# Patient Record
Sex: FEMALE | Race: WHITE | NOT HISPANIC OR LATINO | Employment: OTHER | URBAN - METROPOLITAN AREA
[De-identification: names, ages, dates, MRNs, and addresses within clinical notes are randomized per-mention and may not be internally consistent; named-entity substitution may affect disease eponyms.]

---

## 2017-01-04 ENCOUNTER — ALLSCRIPTS OFFICE VISIT (OUTPATIENT)
Dept: OTHER | Facility: OTHER | Age: 72
End: 2017-01-04

## 2017-07-12 ENCOUNTER — ALLSCRIPTS OFFICE VISIT (OUTPATIENT)
Dept: OTHER | Facility: OTHER | Age: 72
End: 2017-07-12

## 2018-01-12 VITALS
HEART RATE: 51 BPM | BODY MASS INDEX: 28.07 KG/M2 | WEIGHT: 158.44 LBS | SYSTOLIC BLOOD PRESSURE: 126 MMHG | DIASTOLIC BLOOD PRESSURE: 66 MMHG | HEIGHT: 63 IN

## 2018-01-13 VITALS
BODY MASS INDEX: 28.07 KG/M2 | SYSTOLIC BLOOD PRESSURE: 118 MMHG | HEART RATE: 50 BPM | HEIGHT: 63 IN | DIASTOLIC BLOOD PRESSURE: 68 MMHG | WEIGHT: 158.44 LBS

## 2018-07-10 PROBLEM — I10 ESSENTIAL HYPERTENSION: Status: ACTIVE | Noted: 2018-07-10

## 2018-07-10 PROBLEM — I49.1 PAC (PREMATURE ATRIAL CONTRACTION): Status: ACTIVE | Noted: 2018-07-10

## 2018-07-10 PROBLEM — I48.0 PAROXYSMAL ATRIAL FIBRILLATION (HCC): Status: ACTIVE | Noted: 2018-07-10

## 2018-07-10 PROBLEM — E78.5 DYSLIPIDEMIA: Status: ACTIVE | Noted: 2018-07-10

## 2018-07-10 NOTE — PROGRESS NOTES
Cardiology Outpatient Follow up Note    Anuj Rosales 68 y o  female MRN: [id number]    07/11/18          Assessment/Plan:    1  Paroxysmal atrial fibrillation  She is on Verapamil and Metoprolol succinate for symptoms, and feels relatively well controlled currently  She was previously on Xarelto 20 mg but this was discontinued due to minimal amount of afib seen lasting < 30 seconds  Repeat 7 day event monitor 1/17 showed no afib  Her symptoms seemed to correlate with PACs more than afib on previous monitor  2  Dyslipidemia  Lipid panel 3/14 showed total cholesterol elevated at 307, , HDL 68, and   She is taking krill oil, but I doubt this is going to lower her LDL enough  She states she has been tried on multiple statins, but had difficulty tolerating them due to her fibromyalgia  She tried diet and exercise and krill oil for 3 months  Lipid panel 9/15 showed total cholesterol 265, , HDL 63,   She reports she cannot take statins, and also tried Welchol with myalgias  Lipid panel 6/17 showed total cholesterol 264, HDL 63, ,       3  HTN  She is on Verapamil 120 and Metoprolol 50 bid, BP is controlled  1  Paroxysmal atrial fibrillation (HCC)  POCT ECG   2  Essential hypertension  POCT ECG   3  PAC (premature atrial contraction)  POCT ECG   4  Dyslipidemia  POCT ECG       HPI: 68 y o  woman with a history of HTN, HL, palpitations and paroxysmal atrial fibrillation, who is here for follow up of atrial fibrillation  Holter monitor in 2/14 showed minimum HR 40 bpm, maximum 169 bpm, average 63 bpm  320 PVCs, multiple short episodes of SVT, longest lasting about 14 seconds, which appeared to be atrial fibrillation at a rate of 169 bpm  Longest R-R interval was 1 9 seconds       Echo 5/9/14 showed normal LV systolic and diastolic function, EF 43%, left atrium mildly dilated, mildly thickened mitral valve with mild mitral regurgitation, normal PASP, with normal RV size and function  Stress echo on 5/7/14, she was able to exercise for 9 minutes of the Francis protocol, achieving 89% of MPHR, echo and EKG showed no inducible ischemia  I had started her on Metoprolol for rate control, and we had been titrating up the Metoprolol because she continued to have palpitations  However, she had issues with lightheadedness with Metoprolol at higher doses, and did not tolerate Flecainide  She saw Dr Ariel Boyer after 7 day event monitor in 10/14 which did not show any recurrence of afib, but only PACs  He gave her Propafenone to try, and she tried it for about a week but felt palpitations were worse, so stopped it  She is off Xarelto due to the very minimal afib that she had lasting less than 30 seconds as per Dr Ariel Boyer  7 day event monitor 1/17 showed minimum HR 47 BPM, maximum HR 94 BPM, average HR 60 BPM  No definitive afib seen, some PACs, PVCs, atrial couplet, short run of PSVT  Currently she is taking Metoprolol and Verapamil, and she feels that palpitations are occurring less frequently, not every day, lasts only for a few seconds  She reports that palpitations occur sometimes for a week, then go away and come back, which is unchanged from previously  She is exercising in the form of walking, she denies any problems with chest pain or shortness of breath with exertion  Mild SCHMIDT when climbing stairs  No orthopnea, uses 1 pillow to sleep, no PND  No LE edema  No dizziness or lightheadedness       Patient Active Problem List   Diagnosis    Paroxysmal atrial fibrillation (HCC)    Essential hypertension    PAC (premature atrial contraction)    Dyslipidemia       Allergies   Allergen Reactions    Iodine Hives     Severe allergy to IV CT scan dye    Iodinated Diagnostic Agents Hives         Current Outpatient Prescriptions:     ergocalciferol (VITAMIN D2) 50,000 units, Take 1 capsule by mouth every 14 (fourteen) days, Disp: , Rfl:     levothyroxine 75 mcg tablet, Take 1 tablet by mouth daily, Disp: , Rfl:     metoprolol succinate (TOPROL-XL) 50 mg 24 hr tablet, Take 1 tablet by mouth 2 (two) times a day, Disp: , Rfl:     omeprazole (PRILOSEC) 20 mg delayed release capsule, Take 1 capsule by mouth daily, Disp: , Rfl:     verapamil (CALAN-SR) 120 mg CR tablet, Take 120 mg by mouth daily, Disp: , Rfl:     Past Medical History:   Diagnosis Date    Arthritis     Cataract     Dyslipidemia (high LDL; low HDL)     Fibromyalgia     Hypertension     Hypothyroidism     Palpitation     Paroxysmal atrial fibrillation ([facility] [address] )        Family History   Problem Relation Age of Onset    Hypertension Mother     Hyperlipidemia Mother     Parkinsonism Mother     Stroke Mother     Atrial fibrillation Father     Stroke Father     Cancer Father         skin cancer    Anuerysm Neg Hx     Clotting disorder Neg Hx        Past Surgical History:   Procedure Laterality Date    CATARACT EXTRACTION, BILATERAL  11/2016    HYSTERECTOMY  05/2016    total hysterectomy    REPLACEMENT TOTAL KNEE      TONSILLECTOMY         Social History     Social History    Marital status: /Civil Union     Spouse name: N/A    Number of children: N/A    Years of education: N/A     Occupational History    Not on file  Social History Main Topics    Smoking status: Never Smoker    Smokeless tobacco: Never Used    Alcohol use No    Drug use: No    Sexual activity: Not on file     Other Topics Concern    Not on file     Social History Narrative    No narrative on file       Review of Systems   Constitution: Negative for chills, decreased appetite, diaphoresis, fever, weakness, malaise/fatigue, night sweats, weight gain and weight loss  HENT: Negative for ear pain, hearing loss, hoarse voice, nosebleeds, sore throat and tinnitus  Eyes: Negative for blurred vision and pain  Cardiovascular: Positive for palpitations   Negative for chest pain, claudication, cyanosis, dyspnea on exertion, irregular heartbeat, leg swelling, near-syncope, orthopnea, paroxysmal nocturnal dyspnea and syncope  Respiratory: Negative for cough, hemoptysis, shortness of breath, sleep disturbances due to breathing, snoring, sputum production and wheezing  Hematologic/Lymphatic: Negative for adenopathy and bleeding problem  Does not bruise/bleed easily  Skin: Negative for color change, dry skin, flushing, itching, poor wound healing and rash  Musculoskeletal: Positive for arthritis  Negative for back pain, falls, joint pain, muscle cramps, muscle weakness, myalgias and neck pain  Gastrointestinal: Negative for abdominal pain, constipation, diarrhea, dysphagia, heartburn, hematemesis, hematochezia, melena, nausea and vomiting  Genitourinary: Negative for dysuria, frequency, hematuria, hesitancy, non-menstrual bleeding and urgency  Neurological: Positive for headaches  Negative for excessive daytime sleepiness, dizziness, focal weakness, light-headedness, loss of balance, numbness, paresthesias, tremors and vertigo  Psychiatric/Behavioral: Negative for altered mental status, depression and memory loss  The patient does not have insomnia and is not nervous/anxious  Allergic/Immunologic: Positive for environmental allergies  Negative for persistent infections  Vitals: /76 (BP Location: Left arm, Patient Position: Sitting, Cuff Size: Adult)   Pulse 60   Ht 5' 3" (1 6 m)   Wt 71 5 kg (157 lb 9 6 oz)   SpO2 97%   BMI 27 92 kg/m²        Physical Exam:     GEN: Alert and oriented x 3, in no acute distress  Well appearing and well nourished  HEENT: Sclera anicteric, conjunctivae pink, mucous membranes moist  Oropharynx clear  NECK: Supple, no carotid bruits, no significant JVD  Trachea midline, no thyromegaly  HEART: Bradycardic, regular rhythm, normal S1 and S2, no murmurs, clicks, gallops or rubs  PMI nondisplaced, no thrills     LUNGS: Clear to auscultation bilaterally; no wheezes, rales, or rhonchi  No increased work of breathing or signs of respiratory distress  ABDOMEN: Soft, nontender, nondistended, normoactive bowel sounds  EXTREMITIES: Skin warm and well perfused, no clubbing, cyanosis, or edema  NEURO: No focal findings  Normal gait  Normal speech  Mood and affect normal    SKIN: Normal without suspicious lesions on exposed skin        Lab Results:       No results found for: HGBA1C  No results found for: CHOL  No results found for: HDL  No results found for: LDLCALC  No results found for: TRIG  No components found for: CHOLHDL

## 2018-07-11 ENCOUNTER — OFFICE VISIT (OUTPATIENT)
Dept: CARDIOLOGY CLINIC | Facility: CLINIC | Age: 73
End: 2018-07-11
Payer: MEDICARE

## 2018-07-11 VITALS
OXYGEN SATURATION: 97 % | SYSTOLIC BLOOD PRESSURE: 140 MMHG | BODY MASS INDEX: 27.93 KG/M2 | HEIGHT: 63 IN | WEIGHT: 157.6 LBS | HEART RATE: 60 BPM | DIASTOLIC BLOOD PRESSURE: 76 MMHG

## 2018-07-11 DIAGNOSIS — E78.5 DYSLIPIDEMIA: ICD-10-CM

## 2018-07-11 DIAGNOSIS — I48.0 PAROXYSMAL ATRIAL FIBRILLATION (HCC): Primary | ICD-10-CM

## 2018-07-11 DIAGNOSIS — I10 ESSENTIAL HYPERTENSION: ICD-10-CM

## 2018-07-11 DIAGNOSIS — I49.1 PAC (PREMATURE ATRIAL CONTRACTION): ICD-10-CM

## 2018-07-11 PROCEDURE — 99214 OFFICE O/P EST MOD 30 MIN: CPT | Performed by: INTERNAL MEDICINE

## 2018-07-11 PROCEDURE — 93000 ELECTROCARDIOGRAM COMPLETE: CPT | Performed by: INTERNAL MEDICINE

## 2018-07-11 RX ORDER — ERGOCALCIFEROL 1.25 MG/1
1 CAPSULE ORAL
COMMUNITY
Start: 2018-05-30 | End: 2021-01-26 | Stop reason: SDUPTHER

## 2018-07-11 RX ORDER — LEVOTHYROXINE SODIUM 0.07 MG/1
1 TABLET ORAL DAILY
COMMUNITY
Start: 2018-05-05 | End: 2020-10-19 | Stop reason: SDUPTHER

## 2018-07-11 RX ORDER — METOPROLOL SUCCINATE 50 MG/1
1 TABLET, EXTENDED RELEASE ORAL 2 TIMES DAILY
COMMUNITY
Start: 2014-04-24 | End: 2018-10-07 | Stop reason: SDUPTHER

## 2018-07-11 RX ORDER — OMEPRAZOLE 20 MG/1
1 CAPSULE, DELAYED RELEASE ORAL DAILY
COMMUNITY

## 2018-10-07 DIAGNOSIS — I48.0 PAROXYSMAL ATRIAL FIBRILLATION (HCC): Primary | ICD-10-CM

## 2018-10-10 RX ORDER — METOPROLOL SUCCINATE 50 MG/1
TABLET, EXTENDED RELEASE ORAL
Qty: 180 TABLET | Refills: 3 | Status: SHIPPED | OUTPATIENT
Start: 2018-10-10 | End: 2019-10-08 | Stop reason: SDUPTHER

## 2019-07-01 NOTE — PROGRESS NOTES
Cardiology Outpatient Follow up Note    Sherin Mccabe 76 y o  female MRN: [id number]    07/02/19          Assessment/Plan:    1  Paroxysmal atrial fibrillation  She is on Verapamil and Metoprolol succinate for symptoms, and feels relatively well controlled currently  She was previously on Xarelto 20 mg but this was discontinued due to minimal amount of afib seen lasting < 30 seconds  Repeat 7 day event monitor 1/17 showed no afib  Her symptoms seemed to correlate with PACs more than afib on previous monitor  Will order repeat 7 day event monitor now to screen for any higher burden of afib which would change our current management  2  Dyslipidemia  Lipid panel 3/14 showed total cholesterol elevated at 307, , HDL 68, and   She is taking krill oil, but I doubt this is going to lower her LDL enough  She states she has been tried on multiple statins, but had difficulty tolerating them due to her fibromyalgia  She tried diet and exercise and krill oil for 3 months  Lipid panel 9/15 showed total cholesterol 265, , HDL 63,   She reports she cannot take statins, and also tried Welchol with myalgias  Lipid panel 6/17 showed total cholesterol 264, HDL 63, ,   Will recheck lipid panel this year  3  HTN  She is on Verapamil 120 and Metoprolol 50 bid, BP is controlled  1  Paroxysmal atrial fibrillation (HCC)  POCT ECG    Cardiac event monitor   2  PAC (premature atrial contraction)     3  Essential hypertension     4  Dyslipidemia  Lipid Panel with Direct LDL reflex       HPI: 76 y  o  woman with a history of HTN, HL, palpitations and paroxysmal atrial fibrillation, who is here for follow up of atrial fibrillation  Holter monitor in 2/14 showed minimum HR 40 bpm, maximum 169 bpm, average 63 bpm  320 PVCs, multiple short episodes of SVT, longest lasting about 14 seconds, which appeared to be atrial fibrillation at a rate of 169 bpm  Longest R-R interval was 1 9 seconds  Echo 5/9/14 showed normal LV systolic and diastolic function, EF 27%, left atrium mildly dilated, mildly thickened mitral valve with mild mitral regurgitation, normal PASP, with normal RV size and function  Stress echo on 5/7/14, she was able to exercise for 9 minutes of the Francis protocol, achieving 89% of MPHR, echo and EKG showed no inducible ischemia  I had started her on Metoprolol for rate control, and we had been titrating up the Metoprolol because she continued to have palpitations  However, she had issues with lightheadedness with Metoprolol at higher doses, and did not tolerate Flecainide  She saw Dr Rosendo Delacruz after 7 day event monitor in 10/14 which did not show any recurrence of afib, but only PACs  He gave her Propafenone to try, and she tried it for about a week but felt palpitations were worse, so stopped it  She is off Xarelto due to the very minimal afib that she had lasting less than 30 seconds as per Dr Rosendo Delacruz  7 day event monitor 1/17 showed minimum HR 47 BPM, maximum HR 94 BPM, average HR 60 BPM  No definitive afib seen, some PACs, PVCs, atrial couplet, short run of PSVT  Currently she is taking Metoprolol and Verapamil, and she feels that palpitations are occurring only once in a blue moon, just lasts for a few seconds  She is exercising in the form of walking, she denies any problems with chest pain or shortness of breath with exertion  No orthopnea, uses 1 pillow to sleep, no PND  No LE edema  No dizziness or lightheadedness  She started taking CBD oil for fibromyalgia, and reports it does seem to help       Patient Active Problem List   Diagnosis    Paroxysmal atrial fibrillation (HCC)    Essential hypertension    PAC (premature atrial contraction)    Dyslipidemia       Allergies   Allergen Reactions    Iodine Hives     Severe allergy to IV CT scan dye    Iodinated Diagnostic Agents Hives         Current Outpatient Medications:     ergocalciferol (VITAMIN D2) 50,000 units, Take 1 capsule by mouth every 14 (fourteen) days, Disp: , Rfl:     levothyroxine 75 mcg tablet, Take 1 tablet by mouth daily, Disp: , Rfl:     metoprolol succinate (TOPROL-XL) 50 mg 24 hr tablet, TAKE 1 TABLET TWICE DAILY, Disp: 180 tablet, Rfl: 3    omeprazole (PRILOSEC) 20 mg delayed release capsule, Take 1 capsule by mouth daily, Disp: , Rfl:     verapamil (CALAN-SR) 120 mg CR tablet, Take 120 mg by mouth daily, Disp: , Rfl:     Past Medical History:   Diagnosis Date    Arthritis     Cataract     Dyslipidemia (high LDL; low HDL)     Fibromyalgia     Hypertension     Hypothyroidism     Palpitation     Paroxysmal atrial fibrillation ([facility] [address] )        Family History   Problem Relation Age of Onset    Hypertension Mother    Mccurdy Hyperlipidemia Mother     Parkinsonism Mother     Stroke Mother     Atrial fibrillation Father     Stroke Father     Cancer Father         skin cancer    Anuerysm Neg Hx     Clotting disorder Neg Hx        Past Surgical History:   Procedure Laterality Date    CATARACT EXTRACTION, BILATERAL  11/2016    HYSTERECTOMY  05/2016    total hysterectomy    REPLACEMENT TOTAL KNEE      TONSILLECTOMY         Social History     Socioeconomic History    Marital status: /Civil Union     Spouse name: Not on file    Number of children: Not on file    Years of education: Not on file    Highest education level: Not on file   Occupational History    Not on file   Social Needs    Financial resource strain: Not on file    Food insecurity:     Worry: Not on file     Inability: Not on file    Transportation needs:     Medical: Not on file     Non-medical: Not on file   Tobacco Use    Smoking status: Never Smoker    Smokeless tobacco: Never Used   Substance and Sexual Activity    Alcohol use: No    Drug use: No    Sexual activity: Not on file   Lifestyle    Physical activity:     Days per week: Not on file     Minutes per session: Not on file    Stress: Not on file   Relationships    Social connections:     Talks on phone: Not on file     Gets together: Not on file     Attends [employer] service: Not on file     Active member of club or organization: Not on file     Attends meetings of clubs or organizations: Not on file     Relationship status: Not on file    Intimate partner violence:     Fear of current or ex partner: Not on file     Emotionally abused: Not on file     Physically abused: Not on file     Forced sexual activity: Not on file   Other Topics Concern    Not on file   Social History Narrative    Not on file       Review of Systems   Constitution: Negative for chills, decreased appetite, diaphoresis, fever, malaise/fatigue, night sweats, weight gain and weight loss  HENT: Negative for ear pain, hearing loss, hoarse voice, nosebleeds, sore throat and tinnitus  Eyes: Negative for blurred vision and pain  Cardiovascular: Negative for chest pain, claudication, cyanosis, dyspnea on exertion, irregular heartbeat, leg swelling, near-syncope, orthopnea, palpitations, paroxysmal nocturnal dyspnea and syncope  Respiratory: Negative for cough, hemoptysis, shortness of breath, sleep disturbances due to breathing, snoring, sputum production and wheezing  Hematologic/Lymphatic: Negative for adenopathy and bleeding problem  Does not bruise/bleed easily  Skin: Negative for color change, dry skin, flushing, itching, poor wound healing and rash  Musculoskeletal: Positive for arthritis  Negative for back pain, falls, joint pain, muscle cramps, muscle weakness, myalgias and neck pain  Gastrointestinal: Negative for abdominal pain, constipation, diarrhea, dysphagia, heartburn, hematemesis, hematochezia, melena, nausea and vomiting  Genitourinary: Negative for dysuria, frequency, hematuria, hesitancy, non-menstrual bleeding and urgency  Neurological: Positive for headaches   Negative for excessive daytime sleepiness, dizziness, focal weakness, light-headedness, loss of balance, numbness, paresthesias, tremors, vertigo and weakness  Psychiatric/Behavioral: Negative for altered mental status, depression and memory loss  The patient does not have insomnia and is not nervous/anxious  Allergic/Immunologic: Positive for environmental allergies  Negative for persistent infections  Vitals: /80 (BP Location: Right arm, Patient Position: Sitting, Cuff Size: Adult)   Pulse 62   Ht 5' 3" (1 6 m)   Wt 71 2 kg (157 lb)   SpO2 98%   BMI 27 81 kg/m²        Physical Exam:     GEN: Alert and oriented x 3, in no acute distress  Well appearing and well nourished  HEENT: Sclera anicteric, conjunctivae pink, mucous membranes moist  Oropharynx clear  NECK: Supple, no carotid bruits, no significant JVD  Trachea midline, no thyromegaly  HEART: Bradycardic, regular rhythm, normal S1 and S2, no murmurs, clicks, gallops or rubs  PMI nondisplaced, no thrills  LUNGS: Clear to auscultation bilaterally; no wheezes, rales, or rhonchi  No increased work of breathing or signs of respiratory distress  ABDOMEN: Soft, nontender, nondistended, normoactive bowel sounds  EXTREMITIES: Skin warm and well perfused, no clubbing, cyanosis, or edema  NEURO: No focal findings  Normal gait  Normal speech  Mood and affect normal    SKIN: Normal without suspicious lesions on exposed skin        Lab Results:       No results found for: HGBA1C  No results found for: CHOL  No results found for: HDL  No results found for: LDLCALC  No results found for: TRIG  No results found for: CHOLHDL

## 2019-07-02 ENCOUNTER — OFFICE VISIT (OUTPATIENT)
Dept: CARDIOLOGY CLINIC | Facility: CLINIC | Age: 74
End: 2019-07-02
Payer: MEDICARE

## 2019-07-02 VITALS
HEIGHT: 63 IN | WEIGHT: 157 LBS | BODY MASS INDEX: 27.82 KG/M2 | SYSTOLIC BLOOD PRESSURE: 128 MMHG | OXYGEN SATURATION: 98 % | DIASTOLIC BLOOD PRESSURE: 80 MMHG | HEART RATE: 62 BPM

## 2019-07-02 DIAGNOSIS — I48.0 PAROXYSMAL ATRIAL FIBRILLATION (HCC): Primary | ICD-10-CM

## 2019-07-02 DIAGNOSIS — E78.5 DYSLIPIDEMIA: ICD-10-CM

## 2019-07-02 DIAGNOSIS — I49.1 PAC (PREMATURE ATRIAL CONTRACTION): ICD-10-CM

## 2019-07-02 DIAGNOSIS — I10 ESSENTIAL HYPERTENSION: ICD-10-CM

## 2019-07-02 PROCEDURE — 99214 OFFICE O/P EST MOD 30 MIN: CPT | Performed by: INTERNAL MEDICINE

## 2019-07-02 PROCEDURE — 93000 ELECTROCARDIOGRAM COMPLETE: CPT | Performed by: INTERNAL MEDICINE

## 2019-08-14 ENCOUNTER — EVENT RECORDER/EXTENDED HOLTER (OUTPATIENT)
Dept: CARDIOLOGY CLINIC | Facility: CLINIC | Age: 74
End: 2019-08-14
Payer: MEDICARE

## 2019-08-14 PROCEDURE — 93228 REMOTE 30 DAY ECG REV/REPORT: CPT | Performed by: INTERNAL MEDICINE

## 2019-10-08 DIAGNOSIS — I48.0 PAROXYSMAL ATRIAL FIBRILLATION (HCC): ICD-10-CM

## 2019-10-08 RX ORDER — METOPROLOL SUCCINATE 50 MG/1
TABLET, EXTENDED RELEASE ORAL
Qty: 180 TABLET | Refills: 3 | Status: SHIPPED | OUTPATIENT
Start: 2019-10-08 | End: 2020-01-07 | Stop reason: SDUPTHER

## 2020-01-07 DIAGNOSIS — I48.0 PAROXYSMAL ATRIAL FIBRILLATION (HCC): ICD-10-CM

## 2020-01-07 RX ORDER — METOPROLOL SUCCINATE 50 MG/1
50 TABLET, EXTENDED RELEASE ORAL 2 TIMES DAILY
Qty: 180 TABLET | Refills: 3 | Status: SHIPPED | OUTPATIENT
Start: 2020-01-07 | End: 2020-12-21 | Stop reason: SDUPTHER

## 2020-06-24 ENCOUNTER — OFFICE VISIT (OUTPATIENT)
Dept: CARDIOLOGY CLINIC | Facility: CLINIC | Age: 75
End: 2020-06-24
Payer: MEDICARE

## 2020-06-24 ENCOUNTER — TELEPHONE (OUTPATIENT)
Dept: CARDIOLOGY CLINIC | Facility: CLINIC | Age: 75
End: 2020-06-24

## 2020-06-24 ENCOUNTER — APPOINTMENT (OUTPATIENT)
Dept: LAB | Facility: CLINIC | Age: 75
End: 2020-06-24
Payer: MEDICARE

## 2020-06-24 ENCOUNTER — HOSPITAL ENCOUNTER (OUTPATIENT)
Dept: RADIOLOGY | Facility: HOSPITAL | Age: 75
Discharge: HOME | End: 2020-06-24
Payer: MEDICARE

## 2020-06-24 ENCOUNTER — TRANSCRIBE ORDERS (OUTPATIENT)
Dept: LAB | Facility: CLINIC | Age: 75
End: 2020-06-24

## 2020-06-24 VITALS
HEIGHT: 63 IN | DIASTOLIC BLOOD PRESSURE: 80 MMHG | HEART RATE: 62 BPM | SYSTOLIC BLOOD PRESSURE: 132 MMHG | BODY MASS INDEX: 28.53 KG/M2 | WEIGHT: 161 LBS | OXYGEN SATURATION: 98 %

## 2020-06-24 DIAGNOSIS — I48.0 PAROXYSMAL ATRIAL FIBRILLATION (HCC): Primary | ICD-10-CM

## 2020-06-24 DIAGNOSIS — R06.00 DOE (DYSPNEA ON EXERTION): ICD-10-CM

## 2020-06-24 DIAGNOSIS — R06.00 DOE (DYSPNEA ON EXERTION): Primary | ICD-10-CM

## 2020-06-24 DIAGNOSIS — I48.0 PAROXYSMAL ATRIAL FIBRILLATION (HCC): ICD-10-CM

## 2020-06-24 DIAGNOSIS — E78.5 DYSLIPIDEMIA: ICD-10-CM

## 2020-06-24 DIAGNOSIS — I10 ESSENTIAL HYPERTENSION: ICD-10-CM

## 2020-06-24 DIAGNOSIS — I49.1 PAC (PREMATURE ATRIAL CONTRACTION): ICD-10-CM

## 2020-06-24 LAB
ANION GAP SERPL CALCULATED.3IONS-SCNC: 5 MMOL/L (ref 4–13)
BUN SERPL-MCNC: 23 MG/DL (ref 5–25)
CALCIUM SERPL-MCNC: 8.9 MG/DL (ref 8.3–10.1)
CHLORIDE SERPL-SCNC: 104 MMOL/L (ref 100–108)
CO2 SERPL-SCNC: 29 MMOL/L (ref 21–32)
CREAT SERPL-MCNC: 0.84 MG/DL (ref 0.6–1.3)
ERYTHROCYTE [DISTWIDTH] IN BLOOD BY AUTOMATED COUNT: 13.2 % (ref 11.6–15.1)
GFR SERPL CREATININE-BSD FRML MDRD: 68 ML/MIN/1.73SQ M
GLUCOSE P FAST SERPL-MCNC: 96 MG/DL (ref 65–99)
HCT VFR BLD AUTO: 42.1 % (ref 34.8–46.1)
HGB BLD-MCNC: 13.5 G/DL (ref 11.5–15.4)
MCH RBC QN AUTO: 32 PG (ref 26.8–34.3)
MCHC RBC AUTO-ENTMCNC: 32.1 G/DL (ref 31.4–37.4)
MCV RBC AUTO: 100 FL (ref 82–98)
PLATELET # BLD AUTO: 279 THOUSANDS/UL (ref 149–390)
PMV BLD AUTO: 10 FL (ref 8.9–12.7)
POTASSIUM SERPL-SCNC: 4 MMOL/L (ref 3.5–5.3)
RBC # BLD AUTO: 4.22 MILLION/UL (ref 3.81–5.12)
SODIUM SERPL-SCNC: 138 MMOL/L (ref 136–145)
WBC # BLD AUTO: 6.39 THOUSAND/UL (ref 4.31–10.16)

## 2020-06-24 PROCEDURE — 99214 OFFICE O/P EST MOD 30 MIN: CPT | Performed by: NURSE PRACTITIONER

## 2020-06-24 PROCEDURE — 93000 ELECTROCARDIOGRAM COMPLETE: CPT | Performed by: NURSE PRACTITIONER

## 2020-06-24 PROCEDURE — 85027 COMPLETE CBC AUTOMATED: CPT

## 2020-06-24 PROCEDURE — 80048 BASIC METABOLIC PNL TOTAL CA: CPT

## 2020-06-24 PROCEDURE — 71046 X-RAY EXAM CHEST 2 VIEWS: CPT

## 2020-06-24 PROCEDURE — 36415 COLL VENOUS BLD VENIPUNCTURE: CPT

## 2020-06-25 ENCOUNTER — HOSPITAL ENCOUNTER (OUTPATIENT)
Dept: NUCLEAR MEDICINE | Facility: HOSPITAL | Age: 75
Discharge: HOME | End: 2020-06-25
Payer: MEDICARE

## 2020-06-25 DIAGNOSIS — R06.00 DOE (DYSPNEA ON EXERTION): ICD-10-CM

## 2020-06-25 DIAGNOSIS — I48.0 PAROXYSMAL ATRIAL FIBRILLATION (HCC): ICD-10-CM

## 2020-06-25 DIAGNOSIS — I48.0 PAF (PAROXYSMAL ATRIAL FIBRILLATION) (HCC): Primary | ICD-10-CM

## 2020-06-25 PROCEDURE — 78580 LUNG PERFUSION IMAGING: CPT

## 2020-06-25 PROCEDURE — A9540 TC99M MAA: HCPCS

## 2020-06-29 ENCOUNTER — TELEPHONE (OUTPATIENT)
Dept: CARDIOLOGY CLINIC | Facility: CLINIC | Age: 75
End: 2020-06-29

## 2020-06-29 ENCOUNTER — TELEPHONE (OUTPATIENT)
Dept: NON INVASIVE DIAGNOSTICS | Facility: HOSPITAL | Age: 75
End: 2020-06-29

## 2020-06-29 DIAGNOSIS — I25.118 ATHSCL HEART DISEASE OF NATIVE COR ART W OTH ANG PCTRS (HCC): Primary | ICD-10-CM

## 2020-07-01 ENCOUNTER — HOSPITAL ENCOUNTER (OUTPATIENT)
Dept: ULTRASOUND IMAGING | Facility: HOSPITAL | Age: 75
Discharge: HOME | End: 2020-07-01
Payer: MEDICARE

## 2020-07-01 DIAGNOSIS — R06.00 DOE (DYSPNEA ON EXERTION): ICD-10-CM

## 2020-07-01 DIAGNOSIS — I48.0 PAROXYSMAL ATRIAL FIBRILLATION (HCC): ICD-10-CM

## 2020-07-01 PROCEDURE — 93970 EXTREMITY STUDY: CPT | Performed by: SURGERY

## 2020-07-01 PROCEDURE — 93970 EXTREMITY STUDY: CPT

## 2020-07-27 ENCOUNTER — APPOINTMENT (OUTPATIENT)
Dept: LAB | Facility: CLINIC | Age: 75
End: 2020-07-27
Payer: MEDICARE

## 2020-07-27 ENCOUNTER — CLINICAL SUPPORT (OUTPATIENT)
Dept: CARDIOLOGY CLINIC | Facility: CLINIC | Age: 75
End: 2020-07-27
Payer: MEDICARE

## 2020-07-27 ENCOUNTER — TELEPHONE (OUTPATIENT)
Dept: CARDIOLOGY CLINIC | Facility: CLINIC | Age: 75
End: 2020-07-27

## 2020-07-27 DIAGNOSIS — I25.118 ATHSCL HEART DISEASE OF NATIVE COR ART W OTH ANG PCTRS (HCC): ICD-10-CM

## 2020-07-27 DIAGNOSIS — I48.0 PAF (PAROXYSMAL ATRIAL FIBRILLATION) (HCC): ICD-10-CM

## 2020-07-27 DIAGNOSIS — I48.0 PAF (PAROXYSMAL ATRIAL FIBRILLATION) (HCC): Primary | ICD-10-CM

## 2020-07-27 LAB
ALBUMIN SERPL BCP-MCNC: 3.7 G/DL (ref 3.5–5)
ALP SERPL-CCNC: 65 U/L (ref 46–116)
ALT SERPL W P-5'-P-CCNC: 22 U/L (ref 12–78)
ANION GAP SERPL CALCULATED.3IONS-SCNC: 7 MMOL/L (ref 4–13)
AST SERPL W P-5'-P-CCNC: 16 U/L (ref 5–45)
BASOPHILS # BLD AUTO: 0.03 THOUSANDS/ΜL (ref 0–0.1)
BASOPHILS NFR BLD AUTO: 1 % (ref 0–1)
BILIRUB SERPL-MCNC: 0.6 MG/DL (ref 0.2–1)
BUN SERPL-MCNC: 24 MG/DL (ref 5–25)
CALCIUM SERPL-MCNC: 9 MG/DL (ref 8.3–10.1)
CHLORIDE SERPL-SCNC: 105 MMOL/L (ref 100–108)
CO2 SERPL-SCNC: 28 MMOL/L (ref 21–32)
CREAT SERPL-MCNC: 0.91 MG/DL (ref 0.6–1.3)
EOSINOPHIL # BLD AUTO: 0.16 THOUSAND/ΜL (ref 0–0.61)
EOSINOPHIL NFR BLD AUTO: 3 % (ref 0–6)
ERYTHROCYTE [DISTWIDTH] IN BLOOD BY AUTOMATED COUNT: 13.2 % (ref 11.6–15.1)
GFR SERPL CREATININE-BSD FRML MDRD: 62 ML/MIN/1.73SQ M
GLUCOSE SERPL-MCNC: 102 MG/DL (ref 65–140)
HCT VFR BLD AUTO: 41.1 % (ref 34.8–46.1)
HGB BLD-MCNC: 13.5 G/DL (ref 11.5–15.4)
IMM GRANULOCYTES # BLD AUTO: 0.03 THOUSAND/UL (ref 0–0.2)
IMM GRANULOCYTES NFR BLD AUTO: 1 % (ref 0–2)
LYMPHOCYTES # BLD AUTO: 2.06 THOUSANDS/ΜL (ref 0.6–4.47)
LYMPHOCYTES NFR BLD AUTO: 34 % (ref 14–44)
MCH RBC QN AUTO: 32.6 PG (ref 26.8–34.3)
MCHC RBC AUTO-ENTMCNC: 32.8 G/DL (ref 31.4–37.4)
MCV RBC AUTO: 99 FL (ref 82–98)
MONOCYTES # BLD AUTO: 0.61 THOUSAND/ΜL (ref 0.17–1.22)
MONOCYTES NFR BLD AUTO: 10 % (ref 4–12)
NEUTROPHILS # BLD AUTO: 3.26 THOUSANDS/ΜL (ref 1.85–7.62)
NEUTS SEG NFR BLD AUTO: 51 % (ref 43–75)
NRBC BLD AUTO-RTO: 0 /100 WBCS
PLATELET # BLD AUTO: 292 THOUSANDS/UL (ref 149–390)
PMV BLD AUTO: 9.7 FL (ref 8.9–12.7)
POTASSIUM SERPL-SCNC: 3.7 MMOL/L (ref 3.5–5.3)
PROT SERPL-MCNC: 7.2 G/DL (ref 6.4–8.2)
RBC # BLD AUTO: 4.14 MILLION/UL (ref 3.81–5.12)
SODIUM SERPL-SCNC: 140 MMOL/L (ref 136–145)
WBC # BLD AUTO: 6.15 THOUSAND/UL (ref 4.31–10.16)

## 2020-07-27 PROCEDURE — 85025 COMPLETE CBC W/AUTO DIFF WBC: CPT

## 2020-07-27 PROCEDURE — 80053 COMPREHEN METABOLIC PANEL: CPT

## 2020-07-27 PROCEDURE — 36415 COLL VENOUS BLD VENIPUNCTURE: CPT

## 2020-07-27 PROCEDURE — 0296T PR EXT ECG > 48HR TO 21 DAY RCRD W/CONECT INTL RCRD: CPT | Performed by: INTERNAL MEDICINE

## 2020-08-03 DIAGNOSIS — I48.0 PAF (PAROXYSMAL ATRIAL FIBRILLATION) (HCC): ICD-10-CM

## 2020-08-03 PROCEDURE — U0003 INFECTIOUS AGENT DETECTION BY NUCLEIC ACID (DNA OR RNA); SEVERE ACUTE RESPIRATORY SYNDROME CORONAVIRUS 2 (SARS-COV-2) (CORONAVIRUS DISEASE [COVID-19]), AMPLIFIED PROBE TECHNIQUE, MAKING USE OF HIGH THROUGHPUT TECHNOLOGIES AS DESCRIBED BY CMS-2020-01-R: HCPCS

## 2020-08-04 LAB — SARS-COV-2 RNA SPEC QL NAA+PROBE: NOT DETECTED

## 2020-08-05 ENCOUNTER — ANESTHESIA EVENT (OUTPATIENT)
Dept: NON INVASIVE DIAGNOSTICS | Facility: HOSPITAL | Age: 75
End: 2020-08-05
Payer: MEDICARE

## 2020-08-05 RX ORDER — SODIUM CHLORIDE 9 MG/ML
50 INJECTION, SOLUTION INTRAVENOUS CONTINUOUS
Status: CANCELLED | OUTPATIENT
Start: 2020-08-05

## 2020-08-05 NOTE — ANESTHESIA PREPROCEDURE EVALUATION
Procedure:  DELANO    Relevant Problems   CARDIO   (+) Essential hypertension   (+) PAC (premature atrial contraction)   (+) Paroxysmal atrial fibrillation (HCC)        Physical Exam    Airway    Mallampati score: II  TM Distance: >3 FB  Neck ROM: full     Dental   lower dentures and upper dentures,     Cardiovascular      Pulmonary      Other Findings        Anesthesia Plan  ASA Score- 3     Anesthesia Type- IV sedation with anesthesia with ASA Monitors  Additional Monitors:   Airway Plan:           Plan Factors-Exercise tolerance (METS): >4 METS  Chart reviewed  EKG reviewed  Patient is not a current smoker  Patient not instructed to abstain from smoking on day of procedure  Patient did not smoke on day of surgery  Induction- intravenous  Postoperative Plan-     Informed Consent- Anesthetic plan and risks discussed with patient  I personally reviewed this patient with the CRNA  Discussed and agreed on the Anesthesia Plan with the CRNA  Sabas Centeno

## 2020-08-06 ENCOUNTER — ANESTHESIA (OUTPATIENT)
Dept: NON INVASIVE DIAGNOSTICS | Facility: HOSPITAL | Age: 75
End: 2020-08-06
Payer: MEDICARE

## 2020-08-06 ENCOUNTER — HOSPITAL ENCOUNTER (OUTPATIENT)
Dept: NON INVASIVE DIAGNOSTICS | Facility: HOSPITAL | Age: 75
Discharge: HOME | End: 2020-08-06
Payer: MEDICARE

## 2020-08-06 ENCOUNTER — HOSPITAL ENCOUNTER (OUTPATIENT)
Dept: NON INVASIVE DIAGNOSTICS | Facility: HOSPITAL | Age: 75
Discharge: HOME | End: 2020-08-06
Attending: INTERNAL MEDICINE | Admitting: INTERNAL MEDICINE
Payer: MEDICARE

## 2020-08-06 VITALS
DIASTOLIC BLOOD PRESSURE: 57 MMHG | TEMPERATURE: 98.1 F | BODY MASS INDEX: 27.82 KG/M2 | RESPIRATION RATE: 18 BRPM | OXYGEN SATURATION: 95 % | HEART RATE: 56 BPM | WEIGHT: 157 LBS | SYSTOLIC BLOOD PRESSURE: 114 MMHG | HEIGHT: 63 IN

## 2020-08-06 DIAGNOSIS — I48.0 PAF (PAROXYSMAL ATRIAL FIBRILLATION) (HCC): ICD-10-CM

## 2020-08-06 LAB
ANION GAP SERPL CALCULATED.3IONS-SCNC: 7 MMOL/L (ref 4–13)
ATRIAL RATE: 46 BPM
ATRIAL RATE: 54 BPM
ATRIAL RATE: 55 BPM
BUN SERPL-MCNC: 18 MG/DL (ref 5–25)
CALCIUM SERPL-MCNC: 9 MG/DL (ref 8.3–10.1)
CHLORIDE SERPL-SCNC: 107 MMOL/L (ref 100–108)
CO2 SERPL-SCNC: 27 MMOL/L (ref 21–32)
CREAT SERPL-MCNC: 0.94 MG/DL (ref 0.6–1.3)
GFR SERPL CREATININE-BSD FRML MDRD: 60 ML/MIN/1.73SQ M
GLUCOSE P FAST SERPL-MCNC: 103 MG/DL (ref 65–99)
GLUCOSE SERPL-MCNC: 103 MG/DL (ref 65–140)
P AXIS: 17 DEGREES
P AXIS: 37 DEGREES
POTASSIUM SERPL-SCNC: 3.9 MMOL/L (ref 3.5–5.3)
PR INTERVAL: 168 MS
PR INTERVAL: 172 MS
QRS AXIS: 10 DEGREES
QRS AXIS: 15 DEGREES
QRS AXIS: 16 DEGREES
QRSD INTERVAL: 68 MS
QRSD INTERVAL: 70 MS
QRSD INTERVAL: 80 MS
QT INTERVAL: 392 MS
QT INTERVAL: 412 MS
QT INTERVAL: 560 MS
QTC INTERVAL: 371 MS
QTC INTERVAL: 428 MS
QTC INTERVAL: 535 MS
SODIUM SERPL-SCNC: 141 MMOL/L (ref 136–145)
T WAVE AXIS: -12 DEGREES
T WAVE AXIS: 84 DEGREES
T WAVE AXIS: 95 DEGREES
VENTRICULAR RATE: 54 BPM
VENTRICULAR RATE: 55 BPM
VENTRICULAR RATE: 65 BPM

## 2020-08-06 PROCEDURE — 92960 CARDIOVERSION ELECTRIC EXT: CPT | Performed by: INTERNAL MEDICINE

## 2020-08-06 PROCEDURE — 92960 CARDIOVERSION ELECTRIC EXT: CPT

## 2020-08-06 PROCEDURE — 93312 ECHO TRANSESOPHAGEAL: CPT

## 2020-08-06 PROCEDURE — 93320 DOPPLER ECHO COMPLETE: CPT | Performed by: INTERNAL MEDICINE

## 2020-08-06 PROCEDURE — 93005 ELECTROCARDIOGRAM TRACING: CPT

## 2020-08-06 PROCEDURE — NC001 PR NO CHARGE: Performed by: INTERNAL MEDICINE

## 2020-08-06 PROCEDURE — 93312 ECHO TRANSESOPHAGEAL: CPT | Performed by: INTERNAL MEDICINE

## 2020-08-06 PROCEDURE — 93010 ELECTROCARDIOGRAM REPORT: CPT | Performed by: INTERNAL MEDICINE

## 2020-08-06 PROCEDURE — 80048 BASIC METABOLIC PNL TOTAL CA: CPT | Performed by: INTERNAL MEDICINE

## 2020-08-06 PROCEDURE — 93325 DOPPLER ECHO COLOR FLOW MAPG: CPT | Performed by: INTERNAL MEDICINE

## 2020-08-06 RX ORDER — SODIUM CHLORIDE 9 MG/ML
50 INJECTION, SOLUTION INTRAVENOUS CONTINUOUS
Status: DISCONTINUED | OUTPATIENT
Start: 2020-08-06 | End: 2020-08-06 | Stop reason: HOSPADM

## 2020-08-06 RX ORDER — PROPOFOL 10 MG/ML
INJECTION, EMULSION INTRAVENOUS AS NEEDED
Status: DISCONTINUED | OUTPATIENT
Start: 2020-08-06 | End: 2020-08-06

## 2020-08-06 RX ORDER — SODIUM CHLORIDE 9 MG/ML
INJECTION, SOLUTION INTRAVENOUS CONTINUOUS PRN
Status: DISCONTINUED | OUTPATIENT
Start: 2020-08-06 | End: 2020-08-06

## 2020-08-06 RX ORDER — FENTANYL CITRATE 50 UG/ML
INJECTION, SOLUTION INTRAMUSCULAR; INTRAVENOUS AS NEEDED
Status: DISCONTINUED | OUTPATIENT
Start: 2020-08-06 | End: 2020-08-06

## 2020-08-06 RX ORDER — PROPOFOL 10 MG/ML
INJECTION, EMULSION INTRAVENOUS CONTINUOUS PRN
Status: DISCONTINUED | OUTPATIENT
Start: 2020-08-06 | End: 2020-08-06

## 2020-08-06 RX ADMIN — SODIUM CHLORIDE 50 ML/HR: 0.9 INJECTION, SOLUTION INTRAVENOUS at 07:44

## 2020-08-06 RX ADMIN — FENTANYL CITRATE 25 MCG: 50 INJECTION, SOLUTION INTRAMUSCULAR; INTRAVENOUS at 09:20

## 2020-08-06 RX ADMIN — PROPOFOL 50 MG: 10 INJECTION, EMULSION INTRAVENOUS at 09:17

## 2020-08-06 RX ADMIN — PROPOFOL 50 MG: 10 INJECTION, EMULSION INTRAVENOUS at 09:14

## 2020-08-06 RX ADMIN — PROPOFOL 70 MCG/KG/MIN: 10 INJECTION, EMULSION INTRAVENOUS at 09:17

## 2020-08-06 RX ADMIN — SODIUM CHLORIDE: 0.9 INJECTION, SOLUTION INTRAVENOUS at 09:01

## 2020-08-06 RX ADMIN — FENTANYL CITRATE 25 MCG: 50 INJECTION, SOLUTION INTRAMUSCULAR; INTRAVENOUS at 09:14

## 2020-08-06 NOTE — ANESTHESIA POSTPROCEDURE EVALUATION
Post-Op Assessment Note    CV Status:  Stable  Pain Score: 0    Pain management: adequate     Mental Status:  Arousable   Hydration Status:  Stable   PONV Controlled:  None   Airway Patency:  Patent      Post Op Vitals Reviewed: Yes      Staff: Anesthesiologist, CRNA         No complications documented      BP      Temp      Pulse     Resp      SpO2

## 2020-08-06 NOTE — H&P
Cath Outpatient H&P Exam - Cardiology   Lillian Cameron 76 y o  female MRN: [id number]  Unit/Bed#: [facility] 03-01 Encounter: [id number]     Office cardiologist: Oleg KING/ pending appt with Shayna Bentley MD    PCP: Laura Maria MD      History of Present Illness   HPI:  Lillian Cameron is a 76 y o  female who presents with paroxysmal atrial fibrillation recently started on rivaroxaban, essential hypertension, dyslipidemia, hypothyroidism and fibromyalgia presented to [facility] outpatient noninvasive Cardiology for ambulatory transesophageal echocardiogram with cardioversion  Patient had remote history of paroxysmal atrial fibrillation and was in sinus mechanism until June of this year  She was found to have persistent atrial fibrillation on prolonged outpatient monitoring  She was initiated on rivaroxaban at the time for anticoagulation        Historical Information   Past Medical History:   Diagnosis Date    Arthritis     Cataract     Dyslipidemia (high LDL; low HDL)     Fibromyalgia     Hypertension     Hypothyroidism     Palpitation     Paroxysmal atrial fibrillation [facility])      Past Surgical History:   Procedure Laterality Date    CATARACT EXTRACTION, BILATERAL  11/2016    HYSTERECTOMY  05/2016    total hysterectomy    REPLACEMENT TOTAL KNEE      TONSILLECTOMY       Social History   Social History     Substance and Sexual Activity   Alcohol Use No     Social History     Substance and Sexual Activity   Drug Use No     Social History     Tobacco Use   Smoking Status Never Smoker   Smokeless Tobacco Never Used     Family History:   Family History   Problem Relation Age of Onset    Hypertension Mother     Hyperlipidemia Mother     Parkinsonism Mother     Stroke Mother     Atrial fibrillation Father     Stroke Father     Cancer Father         skin cancer    Anuerysm Neg Hx     Clotting disorder Neg Hx        Meds/Allergies   PTA meds:   Prior to Admission Medications Prescriptions Last Dose Informant Patient Reported? Taking?   ergocalciferol (VITAMIN D2) 50,000 units  Self Yes No   Sig: Take 1 capsule by mouth every 14 (fourteen) days   levothyroxine 75 mcg tablet  Self Yes No   Sig: Take 1 tablet by mouth daily   metoprolol succinate (TOPROL-XL) 50 mg 24 hr tablet   No No   Sig: Take 1 tablet (50 mg total) by mouth 2 (two) times a day   omeprazole (PRILOSEC) 20 mg delayed release capsule  Self Yes No   Sig: Take 1 capsule by mouth daily   rivaroxaban (XARELTO) 20 mg tablet   No No   Sig: Take 1 tablet (20 mg total) by mouth daily with breakfast   rivaroxaban (XARELTO) 20 mg tablet   No No   Sig: Take 1 tablet (20 mg total) by mouth daily with breakfast   verapamil (CALAN-SR) 120 mg CR tablet  Self Yes No   Sig: Take 120 mg by mouth daily      Facility-Administered Medications: None     Allergies   Allergen Reactions    Iodine Hives     Severe allergy to IV CT scan dye    Iodinated Diagnostic Agents Hives       Review of Systems    Physical Exam    GEN: Kika Enrique appears well, alert and oriented x 3, pleasant and cooperative   HEENT:  Normocephalic, atraumatic, anicteric, moist mucous membranes  NECK: No JVD or carotid bruits   HEART: Irregular rhythm, normal rate,  normal S1 and S2, no murmurs, clicks, gallops or rubs   LUNGS: Clear to auscultation bilaterally; no wheezes, rales, or rhonchi; respiration nonlabored   ABDOMEN:  Normoactive bowel sounds, soft, no tenderness, no distention  EXTREMITIES: peripheral pulses palpable; no edema  NEURO: no gross focal findings; cranial nerves grossly intact   SKIN:  Dry, intact, warm to touch    EKG:   Date:  08/06/2020  Interpretation:  Atrial fibrillation with nonspecific T-wave changes        ECHO:  [facility] [address] [place] [address]               Transthoracic  Echocardiogram  05/07/2014    SUMMARY  LEFT  VENTRICLE:  Systolic  function  was  normal   Ejection  fraction  was  estimated  to  be  60  %    There  were  no  regional  wall  motion  abnormalities  The  pulmonary  vein  flow  pattern  was  normal   Left  ventricular  diastolic  function  parameters  were  normal     LEFT  ATRIUM:  The  atrium  was  mildly  dilated  MITRAL  VALVE:  There  was  mild  thickening  of  the  valve,  involving  the leaflet  margin  more  than the  base  There  was  mild  regurgitation  TRICUSPID  VALVE:  There  was  mild  regurgitation  Pulmonary  artery  systolic  pressure  was  within  the  normal  range  PULMONIC  VALVE:  There  was  trace  regurgitation  [facility]  [facility]  Accredited  Echocardiography  Laboratory    Prepared  and  electronically  signed  by  Wendy Ellis MD      Lab Results:   I have personally reviewed pertinent lab results  No results found for: CKTOTAL, CKMB, CKMBINDEX, TROPONINI    Lab Results   Component Value Date    GLUCOSE 115 03/08/2015    CALCIUM 9 0 08/06/2020     (L) 03/08/2015    K 3 9 08/06/2020    CO2 27 08/06/2020     08/06/2020    BUN 18 08/06/2020    CREATININE 0 94 08/06/2020       Lab Results   Component Value Date    WBC 6 15 07/27/2020    HGB 13 5 07/27/2020    HCT 41 1 07/27/2020    MCV 99 (H) 07/27/2020     07/27/2020           No results found for: CHOL  No results found for: HDL  No results found for: LDLCALC  No results found for: TRIG    Lab Results   Component Value Date    ALT 22 07/27/2020    AST 16 07/27/2020         Assessment/Plan     Assessment:    1  Paroxysmal atrial fibrillation  -7/27 extended Holter:  Persistent atrial fibrillation  -05/2014 TTE:  LVEF 60%, no RWMA, LVIDd 4 5 cm, LA diameter 3 5 cm, no significant valvulopathy, normal RV  -[id number] 4, HAS-BLED 2  -rivaroxaban, verapamil 120 mg q d , metoprolol succinate 50 mg b i d   -last ECG demonstrating atrial fibrillation on 08/06  -last dose of anticoagulation 8/6 AM      Plan:  1   Proceed with transesophageal echocardiography and possible cardioversion if no intracardiac thrombus        Case discussed and reviewed with Dr Prabha Solitario who agrees with my assessment and plan  Thank you for involving us in the care of your patient  Epic/ Allscripts/Care Everywhere records reviewed: Yes    ** Please Note: Fluency DirectDictation voice to text software may have been used in the creation of this document   **

## 2020-08-07 ENCOUNTER — TELEPHONE (OUTPATIENT)
Dept: NON INVASIVE DIAGNOSTICS | Facility: HOSPITAL | Age: 75
End: 2020-08-07

## 2020-08-07 ENCOUNTER — CLINICAL SUPPORT (OUTPATIENT)
Dept: CARDIOLOGY CLINIC | Facility: CLINIC | Age: 75
End: 2020-08-07
Payer: MEDICARE

## 2020-08-07 VITALS
WEIGHT: 156 LBS | DIASTOLIC BLOOD PRESSURE: 72 MMHG | HEIGHT: 63 IN | TEMPERATURE: 97.3 F | HEART RATE: 75 BPM | BODY MASS INDEX: 27.64 KG/M2 | SYSTOLIC BLOOD PRESSURE: 130 MMHG

## 2020-08-07 DIAGNOSIS — I48.0 PAF (PAROXYSMAL ATRIAL FIBRILLATION) (HCC): Primary | ICD-10-CM

## 2020-08-07 DIAGNOSIS — I48.0 PAROXYSMAL ATRIAL FIBRILLATION (HCC): ICD-10-CM

## 2020-08-07 DIAGNOSIS — I10 ESSENTIAL (PRIMARY) HYPERTENSION: Primary | ICD-10-CM

## 2020-08-07 PROCEDURE — 93000 ELECTROCARDIOGRAM COMPLETE: CPT | Performed by: INTERNAL MEDICINE

## 2020-08-07 NOTE — PROGRESS NOTES
Patient came in today for EKG nurse visit under the direction of Dr Verena Santos extra heart beat after having cardioversion yesterday,8/6/20  As per Dr Marisa Morillo he reviewed EKG in office today patient is not in Afib yes she has an extra beat therefore he wants her to double up on her Verapamil 120mg twice daily, if she still do not feel better please contact us or call 911, also keep her appointment to see Dr Roderick Babin on Sept 3rd, demonstrated understanding preceded to check out

## 2020-08-12 ENCOUNTER — TRANSCRIBE ORDERS (OUTPATIENT)
Dept: LAB | Facility: CLINIC | Age: 75
End: 2020-08-12

## 2020-08-12 ENCOUNTER — APPOINTMENT (OUTPATIENT)
Dept: LAB | Facility: AMBULARY SURGERY CENTER | Age: 75
End: 2020-08-12
Payer: MEDICARE

## 2020-08-12 DIAGNOSIS — E03.9 MYXEDEMA HEART DISEASE: ICD-10-CM

## 2020-08-12 DIAGNOSIS — I51.9 MYXEDEMA HEART DISEASE: Primary | ICD-10-CM

## 2020-08-12 DIAGNOSIS — I51.9 MYXEDEMA HEART DISEASE: ICD-10-CM

## 2020-08-12 DIAGNOSIS — E03.9 MYXEDEMA HEART DISEASE: Primary | ICD-10-CM

## 2020-08-12 LAB
ALBUMIN SERPL BCP-MCNC: 4 G/DL (ref 3.5–5)
ALP SERPL-CCNC: 66 U/L (ref 46–116)
ALT SERPL W P-5'-P-CCNC: 19 U/L (ref 12–78)
ANION GAP SERPL CALCULATED.3IONS-SCNC: 8 MMOL/L (ref 4–13)
AST SERPL W P-5'-P-CCNC: 16 U/L (ref 5–45)
BACTERIA UR QL AUTO: NORMAL /HPF
BASOPHILS # BLD AUTO: 0.04 THOUSANDS/ΜL (ref 0–0.1)
BASOPHILS NFR BLD AUTO: 1 % (ref 0–1)
BILIRUB SERPL-MCNC: 0.71 MG/DL (ref 0.2–1)
BILIRUB UR QL STRIP: NEGATIVE
BUN SERPL-MCNC: 19 MG/DL (ref 5–25)
CALCIUM SERPL-MCNC: 9.5 MG/DL (ref 8.3–10.1)
CHLORIDE SERPL-SCNC: 105 MMOL/L (ref 100–108)
CHOLEST SERPL-MCNC: 257 MG/DL (ref 50–200)
CLARITY UR: CLEAR
CO2 SERPL-SCNC: 26 MMOL/L (ref 21–32)
COLOR UR: YELLOW
CREAT SERPL-MCNC: 0.92 MG/DL (ref 0.6–1.3)
EOSINOPHIL # BLD AUTO: 0.14 THOUSAND/ΜL (ref 0–0.61)
EOSINOPHIL NFR BLD AUTO: 2 % (ref 0–6)
ERYTHROCYTE [DISTWIDTH] IN BLOOD BY AUTOMATED COUNT: 13.2 % (ref 11.6–15.1)
GFR SERPL CREATININE-BSD FRML MDRD: 61 ML/MIN/1.73SQ M
GLUCOSE P FAST SERPL-MCNC: 95 MG/DL (ref 65–99)
GLUCOSE UR STRIP-MCNC: NEGATIVE MG/DL
HCT VFR BLD AUTO: 41.6 % (ref 34.8–46.1)
HDLC SERPL-MCNC: 70 MG/DL
HGB BLD-MCNC: 13.8 G/DL (ref 11.5–15.4)
HGB UR QL STRIP.AUTO: NEGATIVE
IMM GRANULOCYTES # BLD AUTO: 0.01 THOUSAND/UL (ref 0–0.2)
IMM GRANULOCYTES NFR BLD AUTO: 0 % (ref 0–2)
KETONES UR STRIP-MCNC: NEGATIVE MG/DL
LDLC SERPL CALC-MCNC: 170 MG/DL (ref 0–100)
LEUKOCYTE ESTERASE UR QL STRIP: NEGATIVE
LYMPHOCYTES # BLD AUTO: 2.22 THOUSANDS/ΜL (ref 0.6–4.47)
LYMPHOCYTES NFR BLD AUTO: 35 % (ref 14–44)
MCH RBC QN AUTO: 32.4 PG (ref 26.8–34.3)
MCHC RBC AUTO-ENTMCNC: 33.2 G/DL (ref 31.4–37.4)
MCV RBC AUTO: 98 FL (ref 82–98)
MONOCYTES # BLD AUTO: 0.6 THOUSAND/ΜL (ref 0.17–1.22)
MONOCYTES NFR BLD AUTO: 10 % (ref 4–12)
NEUTROPHILS # BLD AUTO: 3.26 THOUSANDS/ΜL (ref 1.85–7.62)
NEUTS SEG NFR BLD AUTO: 52 % (ref 43–75)
NITRITE UR QL STRIP: NEGATIVE
NON-SQ EPI CELLS URNS QL MICRO: NORMAL /HPF
NONHDLC SERPL-MCNC: 187 MG/DL
NRBC BLD AUTO-RTO: 0 /100 WBCS
PH UR STRIP.AUTO: 7 [PH]
PLATELET # BLD AUTO: 301 THOUSANDS/UL (ref 149–390)
PMV BLD AUTO: 10.1 FL (ref 8.9–12.7)
POTASSIUM SERPL-SCNC: 4.4 MMOL/L (ref 3.5–5.3)
PROT SERPL-MCNC: 7.7 G/DL (ref 6.4–8.2)
PROT UR STRIP-MCNC: NEGATIVE MG/DL
RBC # BLD AUTO: 4.26 MILLION/UL (ref 3.81–5.12)
RBC #/AREA URNS AUTO: NORMAL /HPF
SODIUM SERPL-SCNC: 139 MMOL/L (ref 136–145)
SP GR UR STRIP.AUTO: 1.02 (ref 1–1.03)
T4 FREE SERPL-MCNC: 1.37 NG/DL (ref 0.76–1.46)
TRIGL SERPL-MCNC: 85 MG/DL
TSH SERPL DL<=0.05 MIU/L-ACNC: 3.61 UIU/ML (ref 0.36–3.74)
UROBILINOGEN UR QL STRIP.AUTO: 1 E.U./DL
WBC # BLD AUTO: 6.27 THOUSAND/UL (ref 4.31–10.16)
WBC #/AREA URNS AUTO: NORMAL /HPF

## 2020-08-12 PROCEDURE — 80061 LIPID PANEL: CPT

## 2020-08-12 PROCEDURE — 84439 ASSAY OF FREE THYROXINE: CPT

## 2020-08-12 PROCEDURE — 84443 ASSAY THYROID STIM HORMONE: CPT

## 2020-08-12 PROCEDURE — 81001 URINALYSIS AUTO W/SCOPE: CPT | Performed by: INTERNAL MEDICINE

## 2020-08-12 PROCEDURE — 36415 COLL VENOUS BLD VENIPUNCTURE: CPT

## 2020-08-12 PROCEDURE — 85025 COMPLETE CBC W/AUTO DIFF WBC: CPT

## 2020-08-12 PROCEDURE — 80053 COMPREHEN METABOLIC PANEL: CPT

## 2020-09-03 ENCOUNTER — OFFICE VISIT (OUTPATIENT)
Dept: CARDIOLOGY CLINIC | Facility: CLINIC | Age: 75
End: 2020-09-03
Payer: MEDICARE

## 2020-09-03 VITALS
BODY MASS INDEX: 28.03 KG/M2 | SYSTOLIC BLOOD PRESSURE: 120 MMHG | WEIGHT: 158.2 LBS | HEIGHT: 63 IN | DIASTOLIC BLOOD PRESSURE: 64 MMHG | HEART RATE: 65 BPM | OXYGEN SATURATION: 97 %

## 2020-09-03 DIAGNOSIS — I49.1 PAC (PREMATURE ATRIAL CONTRACTION): Primary | ICD-10-CM

## 2020-09-03 DIAGNOSIS — E78.5 DYSLIPIDEMIA: ICD-10-CM

## 2020-09-03 DIAGNOSIS — I10 ESSENTIAL HYPERTENSION: ICD-10-CM

## 2020-09-03 DIAGNOSIS — I48.0 PAROXYSMAL ATRIAL FIBRILLATION (HCC): ICD-10-CM

## 2020-09-03 PROCEDURE — 99215 OFFICE O/P EST HI 40 MIN: CPT | Performed by: INTERNAL MEDICINE

## 2020-09-03 PROCEDURE — 93000 ELECTROCARDIOGRAM COMPLETE: CPT | Performed by: INTERNAL MEDICINE

## 2020-09-03 NOTE — PROGRESS NOTES
[facility]  Follow up note  Benedict Dockery 76 y o  female MRN: [id number]        Problems    1  PAC (premature atrial contraction)     2  Paroxysmal atrial fibrillation (HCC)     3  Dyslipidemia     4  Essential hypertension         Impression:     Symptomatic paroxysmal atrial fibrillation  o Dyspnea on exertion was a primary symptom with recent recurrence  o Status post cardioversion 8/6/2020  o Maintaining sinus rhythm  o Verapamil was increased, continues on metoprolol  o She is on Eliquis from an anticoagulation standpoint   Premature atrial contraction  o This at correlated with symptoms on prior Holter test   Hyperlipidemia  o Uncontrolled,   o May need to consider other options in the future slight she as Bempedoic acid   Hypertension  o Very well controlled on her current regimen   Fibromyalgia  o Apparently significantly worsened with all prior statin therapy, and refuses their use    Plan:     I am very happy that she is maintaining sinus rhythm since her cardioversion a month ago, however her DELANO did suggest that she had a markedly dilated left atrium, which makes the likelihood of maintaining sinus rhythm lower, I am also not entirely convinced her left atrium is that dilated, and I would like to send her for a trans thoracic echocardiogram to better identify her left atrial size   She clearly is very symptomatic in atrial fibrillation with dyspnea on exertion, so maintaining sinus rhythm is important, this may not be possible long-term with verapamil and metoprolol, but she is maintaining sinus rhythm for a month now on this regimen, and we discussed the possibility of needing sotalol and or Tikosyn and/or ablation in the future      I have spent 40 minutes with Patient  today in which greater than 50% of this time was spent in counseling/coordination of care regarding Diagnostic results, Prognosis, Risks and benefits of tx options, Patient and family education and Impressions  HPI:   Darling Aldana is a 76y o  year old female with hypertension, hyperlipidemia, palpitations due to PACs, paroxysmal symptomatic atrial fibrillation primarily with dyspnea as a symptom, previously following with Dr Jorge Villalta , recently evaluated by [facility] for symptomatic atrial fibrillation which was rate controlled on a regimen of rapid male and metoprolol  She has not tolerated propafenone or flecainide in the past, she saw electrophysiology in 2014 at her prior episode of atrial fibrillation  She was actually taken off anticoagulation due to minimally detected atrial fibrillation  AFib was recently discovered to be recurrent when she was seen couple months ago, and referred for cardioversion which was successful, she maintains sinus rhythm at this time, maintaining compliance with verapamil which dose was increased, and metoprolol, as well as restarted on Xarelto and quite stable on this  Eldon suggested severely enlarged left atrium, but preserved LV function  Her blood pressure is well controlled, lipids well controlled  Dyspnea has resolved, she denies chest pain, palpitations or lightheadedness  Review of Systems   Constitutional: Negative for appetite change, diaphoresis, fatigue and fever  Respiratory: Negative for chest tightness, shortness of breath and wheezing  Cardiovascular: Negative for chest pain, palpitations and leg swelling  Gastrointestinal: Negative for abdominal pain and blood in stool  Musculoskeletal: Negative for arthralgias and joint swelling  Skin: Negative for rash  Neurological: Negative for dizziness, syncope and light-headedness         Past Medical History:   Diagnosis Date    Arthritis     Cataract     Dyslipidemia (high LDL; low HDL)     Fibromyalgia     Hypertension     Hypothyroidism     Palpitation     Paroxysmal atrial fibrillation (HCC)      Social History     Substance and Sexual Activity   Alcohol Use No     Social History Substance and Sexual Activity   Drug Use No     Social History     Tobacco Use   Smoking Status Never Smoker   Smokeless Tobacco Never Used       Allergies: Allergies   Allergen Reactions    Iodine Hives     Severe allergy to IV CT scan dye    Iodinated Diagnostic Agents Hives       Medications:     Current Outpatient Medications:     ergocalciferol (VITAMIN D2) 50,000 units, Take 1 capsule by mouth every 14 (fourteen) days, Disp: , Rfl:     levothyroxine 75 mcg tablet, Take 1 tablet by mouth daily, Disp: , Rfl:     metoprolol succinate (TOPROL-XL) 50 mg 24 hr tablet, Take 1 tablet (50 mg total) by mouth 2 (two) times a day, Disp: 180 tablet, Rfl: 3    omeprazole (PRILOSEC) 20 mg delayed release capsule, Take 1 capsule by mouth daily, Disp: , Rfl:     rivaroxaban (XARELTO) 20 mg tablet, Take 20 mg by mouth, Disp: , Rfl:     verapamil (CALAN-SR) 120 mg CR tablet, Take 1 tablet (120 mg total) by mouth 2 (two) times a day, Disp: 90 tablet, Rfl: 3      There were no vitals filed for this visit  Weight (last 2 days)     None        Physical Exam  Constitutional:       General: She is not in acute distress  Appearance: She is well-developed  She is not diaphoretic  HENT:      Head: Normocephalic and atraumatic  Eyes:      General: No scleral icterus  Conjunctiva/sclera: Conjunctivae normal       Pupils: Pupils are equal, round, and reactive to light  Neck:      Musculoskeletal: Normal range of motion and neck supple  Thyroid: No thyromegaly  Trachea: No tracheal deviation  Cardiovascular:      Rate and Rhythm: Normal rate and regular rhythm  Heart sounds: Normal heart sounds  No murmur  No friction rub  No gallop  Pulmonary:      Effort: Pulmonary effort is normal  No respiratory distress  Breath sounds: Normal breath sounds  No wheezing or rales  Abdominal:      General: Bowel sounds are normal  There is no distension  Palpations: Abdomen is soft  Tenderness: There is no abdominal tenderness  Musculoskeletal: Normal range of motion  General: No tenderness or deformity  Skin:     General: Skin is warm and dry  Findings: No erythema or rash  Neurological:      Mental Status: She is alert and oriented to person, place, and time  Cranial Nerves: No cranial nerve deficit  Psychiatric:         Judgment: Judgment normal          Laboratory Studies:    Laboratory studies personally reviewed    Cardiac testing:     EKG reviewed personally:   9/20-sinus rhythm, nonspecific T-wave flattening      Echocardiogram:  8/20-DELANO-EF normal, severely dilated left atrium    Stress tests:      Catheterization:      Holter:         Jeanna Conrad MD    Portions of the record may have been created with voice recognition software  Occasional wrong word or "sound a like" substitutions may have occurred due to the inherent limitations of voice recognition software  Read the chart carefully and recognize, using context, where substitutions have occurred

## 2020-09-11 ENCOUNTER — OFFICE VISIT (OUTPATIENT)
Dept: INTERNAL MEDICINE CLINIC | Facility: CLINIC | Age: 75
End: 2020-09-11
Payer: MEDICARE

## 2020-09-11 VITALS
HEART RATE: 58 BPM | SYSTOLIC BLOOD PRESSURE: 140 MMHG | HEIGHT: 63 IN | TEMPERATURE: 97.8 F | WEIGHT: 157 LBS | BODY MASS INDEX: 27.82 KG/M2 | DIASTOLIC BLOOD PRESSURE: 82 MMHG

## 2020-09-11 DIAGNOSIS — I49.1 PAC (PREMATURE ATRIAL CONTRACTION): ICD-10-CM

## 2020-09-11 DIAGNOSIS — E78.5 DYSLIPIDEMIA: ICD-10-CM

## 2020-09-11 DIAGNOSIS — I10 ESSENTIAL HYPERTENSION: Primary | ICD-10-CM

## 2020-09-11 DIAGNOSIS — M72.2 PLANTAR FASCIITIS: ICD-10-CM

## 2020-09-11 DIAGNOSIS — E03.9 ACQUIRED HYPOTHYROIDISM: ICD-10-CM

## 2020-09-11 DIAGNOSIS — I48.0 PAROXYSMAL ATRIAL FIBRILLATION (HCC): ICD-10-CM

## 2020-09-11 DIAGNOSIS — L98.9 SKIN LESION OF BACK: ICD-10-CM

## 2020-09-11 DIAGNOSIS — Z23 NEED FOR INFLUENZA VACCINATION: ICD-10-CM

## 2020-09-11 PROCEDURE — 90653 IIV ADJUVANT VACCINE IM: CPT

## 2020-09-11 PROCEDURE — G0008 ADMIN INFLUENZA VIRUS VAC: HCPCS

## 2020-09-11 PROCEDURE — 99214 OFFICE O/P EST MOD 30 MIN: CPT | Performed by: INTERNAL MEDICINE

## 2020-09-11 NOTE — PROGRESS NOTES
Assessment/Plan:           1  Essential hypertension    2  Need for influenza vaccination  -     FLUAD: influenza vaccine, trivalent, adjuvanted, 0 5 mL    3  Paroxysmal atrial fibrillation (HCC)    4  PAC (premature atrial contraction)    5  Dyslipidemia    6  Skin lesion of back    7  Plantar fasciitis  -     Foot Care Products ([employer] Arch Support Insoles) MISC; by Does not apply route continuous           1  Essential hypertension      2  Need for influenza vaccination    - FLUAD: influenza vaccine, trivalent, adjuvanted, 0 5 mL    3  Paroxysmal atrial fibrillation (HCC)      4  PAC (premature atrial contraction)      5  Dyslipidemia      6  Skin lesion of back             Subjective:      Patient ID: Jamee Abdul is a 76 y o  female  HPI    The following portions of the patient's history were reviewed and updated as appropriate: She  has a past medical history of Arthritis, Cataract, Dyslipidemia (high LDL; low HDL), Fibromyalgia, Hypertension, Hypothyroidism, Palpitation, and Paroxysmal atrial fibrillation ([facility] 75 )  She   Patient Active Problem List    Diagnosis Date Noted    [facility] 07/10/2018    Essential hypertension 07/10/2018    PAC (premature atrial contraction) 07/10/2018    Dyslipidemia 07/10/2018     She  has a past surgical history that includes Replacement total knee; Tonsillectomy; Hysterectomy (05/2016); and Cataract extraction, bilateral (11/2016)  Her family history includes Atrial fibrillation in her father; Cancer in her father; Hyperlipidemia in her mother; Hypertension in her mother; Parkinsonism in her mother; Stroke in her father and mother  She  reports that she has never smoked  She has never used smokeless tobacco  She reports that she does not drink alcohol or use drugs    Current Outpatient Medications   Medication Sig Dispense Refill    ergocalciferol (VITAMIN D2) 50,000 units Take 1 capsule by mouth every 14 (fourteen) days      levothyroxine 75 mcg tablet Take 1 tablet by mouth daily      metoprolol succinate (TOPROL-XL) 50 mg 24 hr tablet Take 1 tablet (50 mg total) by mouth 2 (two) times a day 180 tablet 3    omeprazole (PRILOSEC) 20 mg delayed release capsule Take 1 capsule by mouth daily      rivaroxaban (XARELTO) 20 mg tablet Take 20 mg by mouth      verapamil (CALAN-SR) 120 mg CR tablet Take 1 tablet (120 mg total) by mouth 2 (two) times a day 90 tablet 3    Foot Care Products ([employer] Arch Support Insoles) MISC by Does not apply route continuous 1 each 0     No current facility-administered medications for this visit  Current Outpatient Medications on File Prior to Visit   Medication Sig    ergocalciferol (VITAMIN D2) 50,000 units Take 1 capsule by mouth every 14 (fourteen) days    levothyroxine 75 mcg tablet Take 1 tablet by mouth daily    metoprolol succinate (TOPROL-XL) 50 mg 24 hr tablet Take 1 tablet (50 mg total) by mouth 2 (two) times a day    omeprazole (PRILOSEC) 20 mg delayed release capsule Take 1 capsule by mouth daily    rivaroxaban (XARELTO) 20 mg tablet Take 20 mg by mouth    verapamil (CALAN-SR) 120 mg CR tablet Take 1 tablet (120 mg total) by mouth 2 (two) times a day     No current facility-administered medications on file prior to visit  She is allergic to iodine and iodinated diagnostic agents       Review of Systems   Constitutional: Negative for appetite change, chills, fatigue and fever  HENT: Negative for sore throat and trouble swallowing  Eyes: Negative for redness  Respiratory: Negative for shortness of breath  Cardiovascular: Negative for chest pain and palpitations  Gastrointestinal: Negative for abdominal pain, constipation and diarrhea  Genitourinary: Negative for dysuria and hematuria  Musculoskeletal: Negative for back pain and neck pain  Skin: Negative for rash  Neurological: Negative for seizures, weakness and headaches  Hematological: Negative for adenopathy  Psychiatric/Behavioral: Negative for confusion  The patient is not nervous/anxious            Objective:      /82 (BP Location: Left arm, Patient Position: Sitting, Cuff Size: Adult)   Pulse 58   Temp 97 8 °F (36 6 °C) (Temporal)   Ht 5' 3" (1 6 m)   Wt 71 2 kg (157 lb)   BMI 27 81 kg/m²     Recent Results (from the past 1344 hour(s))   CBC and differential    Collection Time: 07/27/20 10:55 AM   Result Value Ref Range    WBC 6 15 4 31 - 10 16 Thousand/uL    RBC 4 14 3 81 - 5 12 Million/uL    Hemoglobin 13 5 11 5 - 15 4 g/dL    Hematocrit 41 1 34 8 - 46 1 %    MCV 99 (H) 82 - 98 fL    MCH 32 6 26 8 - 34 3 pg    MCHC 32 8 31 4 - 37 4 g/dL    RDW 13 2 11 6 - 15 1 %    MPV 9 7 8 9 - 12 7 fL    Platelets 087 595 - 841 Thousands/uL    nRBC 0 /100 WBCs    Neutrophils Relative 51 43 - 75 %    Immat GRANS % 1 0 - 2 %    Lymphocytes Relative 34 14 - 44 %    Monocytes Relative 10 4 - 12 %    Eosinophils Relative 3 0 - 6 %    Basophils Relative 1 0 - 1 %    Neutrophils Absolute 3 26 1 85 - 7 62 Thousands/µL    Immature Grans Absolute 0 03 0 00 - 0 20 Thousand/uL    Lymphocytes Absolute 2 06 0 60 - 4 47 Thousands/µL    Monocytes Absolute 0 61 0 17 - 1 22 Thousand/µL    Eosinophils Absolute 0 16 0 00 - 0 61 Thousand/µL    Basophils Absolute 0 03 0 00 - 0 10 Thousands/µL   Comprehensive metabolic panel    Collection Time: 07/27/20 10:55 AM   Result Value Ref Range    Sodium 140 136 - 145 mmol/L    Potassium 3 7 3 5 - 5 3 mmol/L    Chloride 105 100 - 108 mmol/L    CO2 28 21 - 32 mmol/L    ANION GAP 7 4 - 13 mmol/L    BUN 24 5 - 25 mg/dL    Creatinine 0 91 0 60 - 1 30 mg/dL    Glucose 102 65 - 140 mg/dL    Calcium 9 0 8 3 - 10 1 mg/dL    AST 16 5 - 45 U/L    ALT 22 12 - 78 U/L    Alkaline Phosphatase 65 46 - 116 U/L    Total Protein 7 2 6 4 - 8 2 g/dL    Albumin 3 7 3 5 - 5 0 g/dL    Total Bilirubin 0 60 0 20 - 1 00 mg/dL    eGFR 62 ml/min/1 73sq m   Novel Coronavirus (COVID-19), PCR [facility]    Collection Time: 08/03/20 12:59 PM    Specimen: Nose; Nares   Result Value Ref Range    SARS-CoV-2  Not Detected Not Detected   ECG 12 lead    Collection Time: 08/06/20  7:27 AM   Result Value Ref Range    Ventricular Rate 65 BPM    Atrial Rate 46 BPM    IL Interval  ms    QRSD Interval 80 ms    QT Interval 412 ms    QTC Interval 428 ms    P Axis  degrees    QRS Axis 10 degrees    T Wave Axis -12 degrees   Basic metabolic panel    Collection Time: 08/06/20  7:44 AM   Result Value Ref Range    Sodium 141 136 - 145 mmol/L    Potassium 3 9 3 5 - 5 3 mmol/L    Chloride 107 100 - 108 mmol/L    CO2 27 21 - 32 mmol/L    ANION GAP 7 4 - 13 mmol/L    BUN 18 5 - 25 mg/dL    Creatinine 0 94 0 60 - 1 30 mg/dL    Glucose 103 65 - 140 mg/dL    Glucose, Fasting 103 (H) 65 - 99 mg/dL    Calcium 9 0 8 3 - 10 1 mg/dL    eGFR 60 ml/min/1 73sq m   ECG 12 lead    Collection Time: 08/06/20  9:35 AM   Result Value Ref Range    Ventricular Rate 54 BPM    Atrial Rate 54 BPM    IL Interval 172 ms    QRSD Interval 70 ms    QT Interval 392 ms    QTC Interval 371 ms    P [place] 17 degrees    QRS Axis 16 degrees    T Wave Axis 95 degrees   ECG 12 lead    Collection Time: 08/06/20  9:36 AM   Result Value Ref Range    Ventricular Rate 55 BPM    Atrial Rate 55 BPM    IL Interval 168 ms    QRSD Interval 68 ms    QT Interval 560 ms    QTC Interval 535 ms    P Axis 37 degrees    QRS Axis 15 degrees    T Wave Axis 84 degrees   Urinalysis with microscopic    Collection Time: 08/12/20 11:51 AM   Result Value Ref Range    Clarity, UA Clear     Color, UA Yellow     Specific [place], UA 1 021 1 003 - 1 030    pH, UA 7 0 4 5, 5 0, 5 5, 6 0, 6 5, 7 0, 7 5, 8 0    Glucose, UA Negative Negative mg/dl    Ketones, UA Negative Negative mg/dl    Blood, UA Negative Negative    Protein, UA Negative Negative mg/dl    Nitrite, UA Negative Negative    Bilirubin, UA Negative Negative    Urobilinogen, UA 1 0 0 2, 1 0 E U /dl E U /dl    Leukocytes, UA Negative Negative    WBC, UA None Seen None Seen, 0-5, 5-55, 5-65 /hpf    RBC, UA None Seen None Seen, 0-5 /hpf    Bacteria, UA None Seen None Seen, Occasional /hpf    Epithelial Cells None Seen None Seen, Occasional /hpf   CBC and differential    Collection Time: 08/12/20 11:51 AM   Result Value Ref Range    WBC 6 27 4 31 - 10 16 Thousand/uL    RBC 4 26 3 81 - 5 12 Million/uL    Hemoglobin 13 8 11 5 - 15 4 g/dL    Hematocrit 41 6 34 8 - 46 1 %    MCV 98 82 - 98 fL    MCH 32 4 26 8 - 34 3 pg    MCHC 33 2 31 4 - 37 4 g/dL    RDW 13 2 11 6 - 15 1 %    MPV 10 1 8 9 - 12 7 fL    Platelets 413 691 - 024 Thousands/uL    nRBC 0 /100 WBCs    Neutrophils Relative 52 43 - 75 %    Immat GRANS % 0 0 - 2 %    Lymphocytes Relative 35 14 - 44 %    Monocytes Relative 10 4 - 12 %    Eosinophils Relative 2 0 - 6 %    Basophils Relative 1 0 - 1 %    Neutrophils Absolute 3 26 1 85 - 7 62 Thousands/µL    Immature Grans Absolute 0 01 0 00 - 0 20 Thousand/uL    Lymphocytes Absolute 2 22 0 60 - 4 47 Thousands/µL    Monocytes Absolute 0 60 0 17 - 1 22 Thousand/µL    Eosinophils Absolute 0 14 0 00 - 0 61 Thousand/µL    Basophils Absolute 0 04 0 00 - 0 10 Thousands/µL   Lipid panel    Collection Time: 08/12/20 11:51 AM   Result Value Ref Range    Cholesterol 257 (H) 50 - 200 mg/dL    Triglycerides 85 <=150 mg/dL    HDL, Direct 70 >=40 mg/dL    LDL Calculated 170 (H) 0 - 100 mg/dL    Non-HDL-Chol (CHOL-HDL) 187 mg/dl   Comprehensive metabolic panel    Collection Time: 08/12/20 11:51 AM   Result Value Ref Range    Sodium 139 136 - 145 mmol/L    Potassium 4 4 3 5 - 5 3 mmol/L    Chloride 105 100 - 108 mmol/L    CO2 26 21 - 32 mmol/L    ANION GAP 8 4 - 13 mmol/L    BUN 19 5 - 25 mg/dL    Creatinine 0 92 0 60 - 1 30 mg/dL    Glucose, Fasting 95 65 - 99 mg/dL    Calcium 9 5 8 3 - 10 1 mg/dL    AST 16 5 - 45 U/L    ALT 19 12 - 78 U/L    Alkaline Phosphatase 66 46 - 116 U/L    Total Protein 7 7 6 4 - 8 2 g/dL    Albumin 4 0 3 5 - 5 0 g/dL    Total Bilirubin 0 71 0 20 - 1 00 mg/dL    eGFR 61 ml/min/1 73sq m   TSH, 3rd generation    Collection Time: 08/12/20 11:51 AM   Result Value Ref Range    TSH 3RD GENERATON 3 610 0 358 - 3 740 uIU/mL   T4, free    Collection Time: 08/12/20 11:51 AM   Result Value Ref Range    Free T4 1 37 0 76 - 1 46 ng/dL        Physical Exam  Constitutional:       General: She is not in acute distress  Appearance: Normal appearance  HENT:      Head: Normocephalic and atraumatic  Nose: Nose normal       Mouth/Throat:      Mouth: Mucous membranes are moist    Eyes:      Extraocular Movements: Extraocular movements intact  Pupils: Pupils are equal, round, and reactive to light  Cardiovascular:      Rate and Rhythm: Normal rate and regular rhythm  Pulses: Normal pulses  Heart sounds: Normal heart sounds  No murmur  No friction rub  Pulmonary:      Effort: Pulmonary effort is normal  No respiratory distress  Breath sounds: Normal breath sounds  No wheezing  Abdominal:      General: Abdomen is flat  Bowel sounds are normal  There is no distension  Palpations: Abdomen is soft  There is no mass  Tenderness: There is no abdominal tenderness  There is no guarding  Musculoskeletal: Normal range of motion  Neurological:      General: No focal deficit present  Mental Status: She is alert and oriented to person, place, and time  Mental status is at baseline  Cranial Nerves: No cranial nerve deficit     Psychiatric:         Mood and Affect: Mood normal          Behavior: Behavior normal

## 2020-09-14 ENCOUNTER — TELEPHONE (OUTPATIENT)
Dept: INTERNAL MEDICINE CLINIC | Facility: CLINIC | Age: 75
End: 2020-09-14

## 2020-09-14 NOTE — TELEPHONE ENCOUNTER
Pt called reports that PCP was going to have samples for her for cholesterol, pt was unsure of name of medication     There are no samples for the patient at [facility]     Please advise     Pt is waiting for call back at   [phone]

## 2020-09-25 ENCOUNTER — HOSPITAL ENCOUNTER (OUTPATIENT)
Dept: NON INVASIVE DIAGNOSTICS | Facility: HOSPITAL | Age: 75
Discharge: HOME | End: 2020-09-25
Payer: MEDICARE

## 2020-09-25 DIAGNOSIS — I48.0 PAROXYSMAL ATRIAL FIBRILLATION (HCC): ICD-10-CM

## 2020-09-25 PROCEDURE — C8929 TTE W OR WO FOL WCON,DOPPLER: HCPCS

## 2020-09-25 PROCEDURE — 93306 TTE W/DOPPLER COMPLETE: CPT | Performed by: INTERNAL MEDICINE

## 2020-09-25 RX ADMIN — PERFLUTREN 0.4 ML/MIN: 6.52 INJECTION, SUSPENSION INTRAVENOUS at 12:06

## 2020-10-19 DIAGNOSIS — E03.9 ACQUIRED HYPOTHYROIDISM: Primary | ICD-10-CM

## 2020-10-19 RX ORDER — LEVOTHYROXINE SODIUM 0.07 MG/1
75 TABLET ORAL DAILY
Qty: 90 TABLET | Refills: 1 | Status: SHIPPED | OUTPATIENT
Start: 2020-10-19 | End: 2021-04-05 | Stop reason: SDUPTHER

## 2020-11-02 ENCOUNTER — OFFICE VISIT (OUTPATIENT)
Dept: CARDIOLOGY CLINIC | Facility: MEDICAL CENTER | Age: 75
End: 2020-11-02
Payer: MEDICARE

## 2020-11-02 VITALS
WEIGHT: 158 LBS | BODY MASS INDEX: 28 KG/M2 | DIASTOLIC BLOOD PRESSURE: 64 MMHG | OXYGEN SATURATION: 97 % | HEART RATE: 90 BPM | SYSTOLIC BLOOD PRESSURE: 130 MMHG | HEIGHT: 63 IN

## 2020-11-02 DIAGNOSIS — I48.0 PAROXYSMAL ATRIAL FIBRILLATION (HCC): Primary | ICD-10-CM

## 2020-11-02 DIAGNOSIS — I49.1 PAC (PREMATURE ATRIAL CONTRACTION): ICD-10-CM

## 2020-11-02 DIAGNOSIS — E78.5 DYSLIPIDEMIA: ICD-10-CM

## 2020-11-02 DIAGNOSIS — I10 ESSENTIAL HYPERTENSION: ICD-10-CM

## 2020-11-02 PROCEDURE — 93000 ELECTROCARDIOGRAM COMPLETE: CPT | Performed by: INTERNAL MEDICINE

## 2020-11-02 PROCEDURE — 99214 OFFICE O/P EST MOD 30 MIN: CPT | Performed by: INTERNAL MEDICINE

## 2020-12-01 ENCOUNTER — TELEMEDICINE (OUTPATIENT)
Dept: INTERNAL MEDICINE CLINIC | Facility: CLINIC | Age: 75
End: 2020-12-01
Payer: MEDICARE

## 2020-12-01 DIAGNOSIS — Z20.828 EXPOSURE TO SARS-ASSOCIATED CORONAVIRUS: ICD-10-CM

## 2020-12-01 DIAGNOSIS — J04.10 TRACHEITIS: Primary | ICD-10-CM

## 2020-12-01 PROCEDURE — 99213 OFFICE O/P EST LOW 20 MIN: CPT | Performed by: INTERNAL MEDICINE

## 2020-12-01 RX ORDER — DEXTROMETHORPHAN POLISTIREX 30 MG/5ML
5 SUSPENSION ORAL 2 TIMES DAILY
Qty: 200 ML | Refills: 0 | Status: SHIPPED | OUTPATIENT
Start: 2020-12-01 | End: 2020-12-21

## 2020-12-02 DIAGNOSIS — J04.10 TRACHEITIS: ICD-10-CM

## 2020-12-02 DIAGNOSIS — Z20.828 EXPOSURE TO SARS-ASSOCIATED CORONAVIRUS: ICD-10-CM

## 2020-12-02 PROCEDURE — U0003 INFECTIOUS AGENT DETECTION BY NUCLEIC ACID (DNA OR RNA); SEVERE ACUTE RESPIRATORY SYNDROME CORONAVIRUS 2 (SARS-COV-2) (CORONAVIRUS DISEASE [COVID-19]), AMPLIFIED PROBE TECHNIQUE, MAKING USE OF HIGH THROUGHPUT TECHNOLOGIES AS DESCRIBED BY CMS-2020-01-R: HCPCS | Performed by: INTERNAL MEDICINE

## 2020-12-03 LAB — SARS-COV-2 RNA SPEC QL NAA+PROBE: NOT DETECTED

## 2020-12-21 ENCOUNTER — OFFICE VISIT (OUTPATIENT)
Dept: INTERNAL MEDICINE CLINIC | Facility: CLINIC | Age: 75
End: 2020-12-21
Payer: MEDICARE

## 2020-12-21 VITALS
BODY MASS INDEX: 27.11 KG/M2 | TEMPERATURE: 97.8 F | HEIGHT: 63 IN | SYSTOLIC BLOOD PRESSURE: 140 MMHG | HEART RATE: 78 BPM | DIASTOLIC BLOOD PRESSURE: 82 MMHG | OXYGEN SATURATION: 97 % | WEIGHT: 153 LBS

## 2020-12-21 DIAGNOSIS — E03.9 ACQUIRED HYPOTHYROIDISM: ICD-10-CM

## 2020-12-21 DIAGNOSIS — I10 ESSENTIAL (PRIMARY) HYPERTENSION: ICD-10-CM

## 2020-12-21 DIAGNOSIS — Z00.00 WELLNESS EXAMINATION: Primary | ICD-10-CM

## 2020-12-21 DIAGNOSIS — K21.9 GASTROESOPHAGEAL REFLUX DISEASE WITHOUT ESOPHAGITIS: ICD-10-CM

## 2020-12-21 DIAGNOSIS — M72.2 PLANTAR FASCIITIS: ICD-10-CM

## 2020-12-21 DIAGNOSIS — I10 ESSENTIAL HYPERTENSION: ICD-10-CM

## 2020-12-21 DIAGNOSIS — I48.0 PAROXYSMAL ATRIAL FIBRILLATION (HCC): ICD-10-CM

## 2020-12-21 DIAGNOSIS — E78.5 DYSLIPIDEMIA: ICD-10-CM

## 2020-12-21 DIAGNOSIS — I36.1 NONRHEUMATIC TRICUSPID VALVE REGURGITATION: ICD-10-CM

## 2020-12-21 PROCEDURE — G0438 PPPS, INITIAL VISIT: HCPCS | Performed by: INTERNAL MEDICINE

## 2020-12-21 PROCEDURE — 99214 OFFICE O/P EST MOD 30 MIN: CPT | Performed by: INTERNAL MEDICINE

## 2020-12-21 PROCEDURE — 1123F ACP DISCUSS/DSCN MKR DOCD: CPT | Performed by: INTERNAL MEDICINE

## 2020-12-21 RX ORDER — METOPROLOL SUCCINATE 50 MG/1
50 TABLET, EXTENDED RELEASE ORAL 2 TIMES DAILY
Qty: 180 TABLET | Refills: 1 | Status: SHIPPED | OUTPATIENT
Start: 2020-12-21 | End: 2021-03-23 | Stop reason: SDUPTHER

## 2021-01-06 NOTE — PROGRESS NOTES
Follow-up   Office Visit Note  Fabiola Michelle   76 y o    female   MRN: [id number]  [address]  [address]  [address] [place] [id number] [phone]    PCP: Wesly Sanchez MD  Cardiologist:  Dr Emily Rees              Summary of recommendations  Add ASA 81 mg/d  Add Zetia 10 mg/d   Lipid profile 8 weeks  Continue Xarelto  Stop pitavastatin - not taking anyway- too $$$  Refer to neurology re: possible TIA  Carotid duplex  Monitor home blood pressures  Goal less than 130/80  Follow up will be scheduled with Dr Emily Rees in March          Assessment/plan  TIA like symptoms  Add aspirin 81 mg daily  Carotid ultrasound  She needs prior medical therapy for her lipids  Referred to neurology  Compliant with her anticoagulation; no missed doses  Persistent atrial fibrillation; now on a rate control strategy  [id number] ( age, female HTN) On verapamil  120 mg DAILY and metoprolol succinate 50 q12h  EKG today AFib 73 beats per minute  --placed on a rate control strategy per her cardiologist 11/2/20  --intolerance to propafenone, and flecainide  itching/ dizziness)    --on [address] [place] [address] Road with Xarelto 20 mg daily  Palpitations-sx correlated with PACs in the past  Valvular heart disease  · Mitral regurgitation, mild-to-moderate  Echo September 2020  · Tricuspid regurgitation, moderate to severe  Echo September 2020  Hyperlipidemia  · Lipid profile June 2017:      , non    · Lipid profile August 2020:  , non   Placed on pitavastatin 1 mg daily  Tolerated it, but could not continue due to $$$  Will start zetia 10 mg/d  · Several Statin intolerance-myalgias  · Colesevelam intolerance-myalgias  Hypertension, essential   BP  134/102 , by me after being in the room a few minutes  on Verapamil 120 daily and Toprol 50 mg b i d   - monitor home blood pressures record and bring to the office    Low-salt diet  Hypothyroidism on replacement  Fibromyalgia  Cardiac testing  --Holter 2/14:minimum HR 40 bpm, maximum 169 bpm, average 63 bpm  320 PVCs, multiple short episodes of SVT, longest lasting about 14 seconds, which appeared to be atrial fibrillation at a rate of 169 bpm  Longest R-R interval was 1 9 seconds  --TTE 1/20 normal LV systolic and diastolic function, EF 00%, left atrium mildly dilated, mildly thickened mitral valve with mild mitral regurgitation, normal PASP, with normal RV size and function  --Stress echo 5/7/14 Francis protocol x 9 mins, 89% of MPHR, echo and EKG showed no inducible ischemia  --7 day event monitor January 2017 minimum HR 47 BPM, maximum HR 94 BPM, average HR 60 BPM  No definitive afib seen, some PACs, PVCs, atrial couplet, short run of PSVT  --7 day event monitor July 2019- ordered but not completed  --TTE 9/25/20  EF 55-60%  No RWMA  Moderate LAE  Moderate to markedly dilated right atrium  At least mild to moderate MR  Mild AI  Moderate severe TR  Peak PA pressure 50 mm Hg  HPI  Car Bernal is a 76 y  o  woman with hypertension, hyperlipidemia , palpitations and paroxysmal atrial fibrillation  In the past she was on higher doses of metoprolol for rate control and palpitations  She had issues with lightheadedness and could not tolerate high doses  She had also did not tolerate flecainide  She did see EPS/Dr Margy Parker  October 14, 2014  She had a 7 day event monitor which not show any recurrence of AFib only PACs  He gave her propafenone to try  She felt palpitations and felt worse so she stopped it  In the past she was on Xarelto  Due to the very minimal atrial fibrillation that she had, lasting less than 30 seconds, Xarelto was stopped as per Dr Margy Parker ( he noted it is debatable if stroke risk may be overestimated by [id number] given the brevity of afib (<30seconds)  He did note she was on verapamil and metoprolol and did complain of some fatigue    He tried her on propafenone however this was not tolerable and subsequently was discontinued She has followed with Dr Angie Floyd routinely  The last visit was June 2019:  She was on metoprolol and verapamil  She felt the palpitations were very rare lasting a few seconds  She was walking regularly and denies any exertional chest pain shortness of breath  She started taking CBD oil for fibromyalgia and reports that it was helping      6/24/2020  She presents today for routine cardiology follow-up  She complains of achey chest pain, shortness of breath, palpitations and fatigue  She has been feeling poorly for a while She complains of lightheadedness  She has some pleuritic pain  She tells me last week she took a trip to [place], and drove, which is 10 hours each way  Her EKG shows she is in atrial fibrillation at 75 beats per minute  It is not clear when she went into AFib  She is compliant with her Toprol and verapamil  /60  She is not volume overloaded  She is symptomatic   A rhythm control strategy is recommended  She has a severe IV dye allergy  We cannot do a CT scan with IV contrast   Will order a V/Q and lower extremity duplex  Will start Xarelto 20 mg daily  Will get her per short prescription to a local pharmacy as well as a 90 day supply     Will schedule for DELANO guided cardioversion in about 5 weeks  In the past, many years ago she was on flecainide  She cannot recall what the intolerance was  She was also on propafenone and was intolerant as well  Will schedule an appointment with a new cardiologist in 2 months  We agreed if any testing was abnormal I will call her  Interval history  11/2/20 OV with cardiologist Dr Jodi Zee   She was in atrial fibrillation; unaware she was in AFib   placed on a rate control strategy for her AFib  She tolerated b i d  verapamil poorly, is back to 1 a day metoprolol twice a day        1/7/2021  Followup- couldn't get in to see her Dr Harris Benjamin    None at rest    EKG today atrial fibrillation 73 beats per minute    periodicially feels throbbing feeling in her neck- every other day  I do not think this is her AFib    1 week before [place], she had a spell-lasted 5-10 minutes: got up to go to bed, couldn't walk, was speaking nonsense, witnessed by , who asked her why she was not making any sense  Was aware of what was happening  Returned to normal fairly quickly  Did not go to ER,; didn't see PCP  No recurrence    Not taking pitavastatin- too costly  Her LDL was 170, checked a few months ago  Agreeable to starting ezetimibe, if not too costly  Did discuss possible PCSK9 inhibitor  There is also bempedoic acid, to reduce her LDL  Likely this is too costly    Blood pressure by the medical assistant:  154/96  Repeat by me 134/102 I recommend she adhere to a low-salt diet  Monitor home blood pressure  Goal blood pressure less than 130/80  Last week systolic 565, another 844- on home monitor ( was validated by PCP)  Today 134/80        I have spent 25 minutes with Patient  today in which greater than 50% of this time was spent in counseling/coordination of care regarding Patient and family education, Importance of tx compliance and Risk factor reductions  Assessment  Diagnoses and all orders for this visit:    Paroxysmal atrial fibrillation ([facility] [address] )    Essential hypertension    Dyslipidemia  -     ezetimibe (ZETIA) 10 mg tablet; Take 1 tablet (10 mg total) by mouth daily  -     Lipid panel; Future    PAC (premature atrial contraction)    TIA (transient ischemic attack)  -     aspirin 81 mg chewable tablet; Chew 1 tablet (81 mg total) daily  -     VAS carotid complete study; Future  -     Ambulatory referral to Neurology; Future    Essential (primary) hypertension  -     verapamil (CALAN-SR) 120 mg CR tablet;  Take 1 tablet (120 mg total) by mouth daily          Past Medical History:   Diagnosis Date    Arthritis     Cataract     Dyslipidemia (high LDL; low HDL)     Fibromyalgia     Hypertension  Hypothyroidism     Palpitation     Paroxysmal atrial fibrillation ([facility] [address] )        Review of Systems   Constitution: Negative for chills and malaise/fatigue  See HPI   Cardiovascular: Positive for dyspnea on exertion  Negative for chest pain, claudication, cyanosis, irregular heartbeat, leg swelling, near-syncope, orthopnea, palpitations, paroxysmal nocturnal dyspnea and syncope  Respiratory: Negative for cough and shortness of breath  Gastrointestinal: Negative for heartburn and nausea  Neurological: Negative for dizziness, focal weakness, headaches, light-headedness and weakness  All other systems reviewed and are negative  Allergies   Allergen Reactions    Iodine Hives     Severe allergy to IV CT scan dye    Iodinated Diagnostic Agents Hives           Current Outpatient Medications:     ergocalciferol (VITAMIN D2) 50,000 units, Take 1 capsule by mouth every 14 (fourteen) days, Disp: , Rfl:     Foot Care Products (Spenco Arch Support Insoles) MISC, by Does not apply route continuous, Disp: 1 each, Rfl: 0    levothyroxine 75 mcg tablet, Take 1 tablet (75 mcg total) by mouth daily, Disp: 90 tablet, Rfl: 1    metoprolol succinate (TOPROL-XL) 50 mg 24 hr tablet, Take 1 tablet (50 mg total) by mouth 2 (two) times a day, Disp: 180 tablet, Rfl: 1    omeprazole (PRILOSEC) 20 mg delayed release capsule, Take 1 capsule by mouth daily, Disp: , Rfl:     rivaroxaban (XARELTO) 20 mg tablet, Take 1 tablet (20 mg total) by mouth daily with breakfast, Disp: 30 tablet, Rfl: 3    verapamil (CALAN-SR) 120 mg CR tablet, Take 1 tablet (120 mg total) by mouth daily, Disp: , Rfl:     aspirin 81 mg chewable tablet, Chew 1 tablet (81 mg total) daily, Disp: 30 tablet, Rfl: 5    ezetimibe (ZETIA) 10 mg tablet, Take 1 tablet (10 mg total) by mouth daily, Disp: 30 tablet, Rfl: 1        Social History     Socioeconomic History    Marital status: /Civil Union     Spouse name: Not on file    Number of children: Not on file    Years of education: Not on file    Highest education level: Not on file   Occupational History    Not on file   Social Needs    Financial resource strain: Not on file    Food insecurity     Worry: Not on file     Inability: Not on file    Transportation needs     Medical: Not on file     Non-medical: Not on file   Tobacco Use    Smoking status: Never Smoker    Smokeless tobacco: Never Used   Substance and Sexual Activity    Alcohol use: No    Drug use: No    Sexual activity: Not on file   Lifestyle    Physical activity     Days per week: Not on file     Minutes per session: Not on file    Stress: Not on file   Relationships    Social connections     Talks on phone: Not on file     Gets together: Not on file     Attends [employer] service: Not on file     Active member of club or organization: Not on file     Attends meetings of clubs or organizations: Not on file     Relationship status: Not on file    Intimate partner violence     Fear of current or ex partner: Not on file     Emotionally abused: Not on file     Physically abused: Not on file     Forced sexual activity: Not on file   Other Topics Concern    Not on file   Social History Narrative    Not on file       Family History   Problem Relation Age of Onset    Hypertension Mother     Hyperlipidemia Mother     Parkinsonism Mother     Stroke Mother     Atrial fibrillation Father     Stroke Father     Cancer Father         skin cancer    Anuerysm Neg Hx     Clotting disorder Neg Hx        Physical Exam   Constitutional: She is oriented to person, place, and time  No distress  HENT:   Head: Normocephalic and atraumatic  Eyes: Conjunctivae and EOM are normal    Neck: Normal range of motion  Neck supple  Cardiovascular: Normal rate, regular rhythm and intact distal pulses  Murmur heard  High-pitched blowing holosystolic murmur is present with a grade of 2/6 at the apex    Pulmonary/Chest: Effort normal and breath sounds normal    Abdominal: Soft  Bowel sounds are normal    Musculoskeletal: Normal range of motion  Neurological: She is alert and oriented to person, place, and time  Skin: Skin is warm and dry  She is not diaphoretic  Psychiatric: She has a normal mood and affect  Nursing note and vitals reviewed  Vitals: Blood pressure 154/96, pulse 65, height 5' 3" (1 6 m), weight 70 4 kg (155 lb 3 2 oz), SpO2 98 %  Wt Readings from Last 3 Encounters:   01/07/21 70 4 kg (155 lb 3 2 oz)   12/21/20 69 4 kg (153 lb)   11/02/20 71 7 kg (158 lb)         Labs & Results:  Lab Results   Component Value Date    WBC 6 27 08/12/2020    HGB 13 8 08/12/2020    HCT 41 6 08/12/2020    MCV 98 08/12/2020     08/12/2020     No results found for: BNP  No components found for: CHEM  No results found for: Zelpha Herrera, TROPONINT, CKMBINDEX  No results found for this or any previous visit  No results found for this or any previous visit  This note was completed in part utilizing [employer] fluency direct voice recognition software  Grammatical errors, random word insertion, spelling mistakes, and incomplete sentences may be an occasional consequence of the system secondary to software limitations, ambient noise and hardware issues  At the time of dictation, efforts were made to edit, clarify and /or correct errors  Please read the chart carefully and recognize, using context, where substitutions have occurred    If you have any questions or concerns about the context, text or information contained within the body of this dictation, please contact myself, the provider, for further clarification

## 2021-01-07 ENCOUNTER — OFFICE VISIT (OUTPATIENT)
Dept: CARDIOLOGY CLINIC | Facility: CLINIC | Age: 76
End: 2021-01-07
Payer: MEDICARE

## 2021-01-07 VITALS
SYSTOLIC BLOOD PRESSURE: 154 MMHG | DIASTOLIC BLOOD PRESSURE: 96 MMHG | HEART RATE: 65 BPM | BODY MASS INDEX: 27.5 KG/M2 | WEIGHT: 155.2 LBS | HEIGHT: 63 IN | OXYGEN SATURATION: 98 %

## 2021-01-07 DIAGNOSIS — I10 ESSENTIAL HYPERTENSION: ICD-10-CM

## 2021-01-07 DIAGNOSIS — E78.5 DYSLIPIDEMIA: ICD-10-CM

## 2021-01-07 DIAGNOSIS — I10 ESSENTIAL (PRIMARY) HYPERTENSION: ICD-10-CM

## 2021-01-07 DIAGNOSIS — I49.1 PAC (PREMATURE ATRIAL CONTRACTION): ICD-10-CM

## 2021-01-07 DIAGNOSIS — I48.0 PAROXYSMAL ATRIAL FIBRILLATION (HCC): Primary | ICD-10-CM

## 2021-01-07 DIAGNOSIS — G45.9 TIA (TRANSIENT ISCHEMIC ATTACK): ICD-10-CM

## 2021-01-07 PROCEDURE — 99214 OFFICE O/P EST MOD 30 MIN: CPT | Performed by: NURSE PRACTITIONER

## 2021-01-07 RX ORDER — EZETIMIBE 10 MG/1
10 TABLET ORAL DAILY
Qty: 30 TABLET | Refills: 1 | Status: SHIPPED | OUTPATIENT
Start: 2021-01-07 | End: 2021-06-23

## 2021-01-07 RX ORDER — ASPIRIN 81 MG/1
81 TABLET, CHEWABLE ORAL DAILY
Qty: 30 TABLET | Refills: 5 | Status: SHIPPED | OUTPATIENT
Start: 2021-01-07 | End: 2022-01-06

## 2021-01-07 NOTE — LETTER
January 7, 2021     Eder Lopez, Hannah Camacho  [address]  [address] [address]    Patient: Gabriel John   YOB: 1945   Date of Visit: 1/7/2021       Dear Dr Jerrie Sandifer:    Thank you for referring Bradley Barreto to me for evaluation  Below are my notes for this consultation  If you have questions, please do not hesitate to call me  I look forward to following your patient along with you  Sincerely,        CHRISTINE Saldivar        CC: MD Haylie Gomez, [facility]  1/7/2021  3:30 PM  Sign when Signing Visit  Follow-up   Office Visit Note  Gabriel John   76 y o    female   MRN: [id number]  [address]  [address] [facility] [address] [facility] [place] [place] [address]  [phone]    PCP: Eder Lopez MD  Cardiologist:  Dr Krystian Martínez              Summary of recommendations  Add ASA 81 mg/d  Add Zetia 10 mg/d   Lipid profile 8 weeks  Continue Xarelto  Stop pitavastatin - not taking anyway- too $$$  Refer to neurology re: possible TIA  Carotid duplex  Monitor home blood pressures  Goal less than 130/80  Follow up will be scheduled with Dr Krystian Martínez in March          Assessment/plan  TIA like symptoms  Add aspirin 81 mg daily  Carotid ultrasound  She needs prior medical therapy for her lipids  Referred to neurology  Compliant with her anticoagulation; no missed doses  Persistent atrial fibrillation; now on a rate control strategy  [id number] ( age, female HTN) On verapamil  120 mg DAILY and metoprolol succinate 50 q12h  EKG today AFib 73 beats per minute  --placed on a rate control strategy per her cardiologist 11/2/20  --intolerance to propafenone, and flecainide  itching/ dizziness)    --on [address] with Xarelto 20 mg daily  Palpitations-sx correlated with PACs in the past  Valvular heart disease  · Mitral regurgitation, mild-to-moderate  Echo September 2020  · Tricuspid regurgitation, moderate to severe  Echo September 2020  Hyperlipidemia      · Lipid profile June 2017:      , non    · Lipid profile August 2020:  , non   Placed on pitavastatin 1 mg daily  Tolerated it, but could not continue due to $$$  Will start zetia 10 mg/d  · Several Statin intolerance-myalgias  · Colesevelam intolerance-myalgias  Hypertension, essential   BP  134/102 , by me after being in the room a few minutes  on Verapamil 120 daily and Toprol 50 mg b i d   - monitor home blood pressures record and bring to the office  Low-salt diet  Hypothyroidism on replacement  Fibromyalgia  Cardiac testing  --Holter 2/14:minimum HR 40 bpm, maximum 169 bpm, average 63 bpm  320 PVCs, multiple short episodes of SVT, longest lasting about 14 seconds, which appeared to be atrial fibrillation at a rate of 169 bpm  Longest R-R interval was 1 9 seconds  --TTE 8/83 normal LV systolic and diastolic function, EF 13%, left atrium mildly dilated, mildly thickened mitral valve with mild mitral regurgitation, normal PASP, with normal RV size and function  --Stress echo 5/7/14 Francis protocol x 9 mins, 89% of MPHR, echo and EKG showed no inducible ischemia  --7 day event monitor January 2017 minimum HR 47 BPM, maximum HR 94 BPM, average HR 60 BPM  No definitive afib seen, some PACs, PVCs, atrial couplet, short run of PSVT  --7 day event monitor July 2019- ordered but not completed  --TTE 9/25/20  EF 55-60%  No RWMA  Moderate LAE  Moderate to markedly dilated right atrium  At least mild to moderate MR  Mild AI  Moderate severe TR  Peak PA pressure 50 mm Hg  HPI  Ofelia Jamil is a 76 y  o  woman with hypertension, hyperlipidemia , palpitations and paroxysmal atrial fibrillation  In the past she was on higher doses of metoprolol for rate control and palpitations  She had issues with lightheadedness and could not tolerate high doses  She had also did not tolerate flecainide  She did see EPS/Dr Jose Luis Bauer  October 14, 2014   She had a 7 day event monitor which not show any recurrence of AFib only PACs  He gave her propafenone to try  She felt palpitations and felt worse so she stopped it  In the past she was on Xarelto  Due to the very minimal atrial fibrillation that she had, lasting less than 30 seconds, Xarelto was stopped as per Dr Michele Stephens ( he noted it is debatable if stroke risk may be overestimated by [id number] given the brevity of afib (<30seconds)  He did note she was on verapamil and metoprolol and did complain of some fatigue  He tried her on propafenone however this was not tolerable and subsequently was discontinued She has followed with Dr Madeline Ascencio routinely  The last visit was June 2019:  She was on metoprolol and verapamil  She felt the palpitations were very rare lasting a few seconds  She was walking regularly and denies any exertional chest pain shortness of breath  She started taking CBD oil for fibromyalgia and reports that it was helping      6/24/2020  She presents today for routine cardiology follow-up  She complains of achey chest pain, shortness of breath, palpitations and fatigue  She has been feeling poorly for a while She complains of lightheadedness  She has some pleuritic pain  She tells me last week she took a trip to [place], and drove, which is 10 hours each way  Her EKG shows she is in atrial fibrillation at 75 beats per minute  It is not clear when she went into AFib  She is compliant with her Toprol and verapamil  /60  She is not volume overloaded  She is symptomatic   A rhythm control strategy is recommended  She has a severe IV dye allergy  We cannot do a CT scan with IV contrast   Will order a V/Q and lower extremity duplex  Will start Xarelto 20 mg daily  Will get her per short prescription to a local pharmacy as well as a 90 day supply     Will schedule for DELANO guided cardioversion in about 5 weeks  In the past, many years ago she was on flecainide  She cannot recall what the intolerance was    She was also on propafenone and was intolerant as well  Will schedule an appointment with a new cardiologist in 2 months  We agreed if any testing was abnormal I will call her  Interval history  11/2/20 OV with cardiologist Dr Denise Dean   She was in atrial fibrillation; unaware she was in AFib   placed on a rate control strategy for her AFib  She tolerated b i d  verapamil poorly, is back to 1 a day metoprolol twice a day        1/7/2021  Followup- couldn't get in to see her Dr Dong Muñiz  None at rest    EKG today atrial fibrillation 73 beats per minute    periodicially feels throbbing feeling in her neck- every other day  I do not think this is her AFib    1 week before Aly, she had a spell-lasted 5-10 minutes: got up to go to bed, couldn't walk, was speaking nonsense, witnessed by , who asked her why she was not making any sense  Was aware of what was happening  Returned to normal fairly quickly  Did not go to ER,; didn't see PCP  No recurrence    Not taking pitavastatin- too costly  Her LDL was 170, checked a few months ago  Agreeable to starting ezetimibe, if not too costly  Did discuss possible PCSK9 inhibitor  There is also bempedoic acid, to reduce her LDL  Likely this is too costly    Blood pressure by the medical assistant:  154/96  Repeat by me 134/102 I recommend she adhere to a low-salt diet  Monitor home blood pressure  Goal blood pressure less than 130/80  Last week systolic 984, another 425- on home monitor ( was validated by PCP)  Today 134/80        I have spent 25 minutes with Patient  today in which greater than 50% of this time was spent in counseling/coordination of care regarding Patient and family education, Importance of tx compliance and Risk factor reductions  Assessment  Diagnoses and all orders for this visit:    Paroxysmal atrial fibrillation ([address] )    Essential hypertension    Dyslipidemia  -     ezetimibe (ZETIA) 10 mg tablet;  Take 1 tablet (10 mg total) by mouth daily  -     Lipid panel; Future    PAC (premature atrial contraction)    TIA (transient ischemic attack)  -     aspirin 81 mg chewable tablet; Chew 1 tablet (81 mg total) daily  -     VAS carotid complete study; Future  -     Ambulatory referral to Neurology; Future    Essential (primary) hypertension  -     verapamil (CALAN-SR) 120 mg CR tablet; Take 1 tablet (120 mg total) by mouth daily          Past Medical History:   Diagnosis Date    Arthritis     Cataract     Dyslipidemia (high LDL; low HDL)     Fibromyalgia     Hypertension     Hypothyroidism     Palpitation     Paroxysmal atrial fibrillation ([facility] [address] )        Review of Systems   Constitution: Negative for chills and malaise/fatigue  See HPI   Cardiovascular: Positive for dyspnea on exertion  Negative for chest pain, claudication, cyanosis, irregular heartbeat, leg swelling, near-syncope, orthopnea, palpitations, paroxysmal nocturnal dyspnea and syncope  Respiratory: Negative for cough and shortness of breath  Gastrointestinal: Negative for heartburn and nausea  Neurological: Negative for dizziness, focal weakness, headaches, light-headedness and weakness  All other systems reviewed and are negative  Allergies   Allergen Reactions    Iodine Hives     Severe allergy to IV CT scan dye    Iodinated Diagnostic Agents Hives           Current Outpatient Medications:     ergocalciferol (VITAMIN D2) 50,000 units, Take 1 capsule by mouth every 14 (fourteen) days, Disp: , Rfl:     Foot Care Products ([place] Arch Support Insoles) MISC, by Does not apply route continuous, Disp: 1 each, Rfl: 0    levothyroxine 75 mcg tablet, Take 1 tablet (75 mcg total) by mouth daily, Disp: 90 tablet, Rfl: 1    metoprolol succinate (TOPROL-XL) 50 mg 24 hr tablet, Take 1 tablet (50 mg total) by mouth 2 (two) times a day, Disp: 180 tablet, Rfl: 1    omeprazole (PRILOSEC) 20 mg delayed release capsule, Take 1 capsule by mouth daily, Disp: , Rfl:    rivaroxaban (XARELTO) 20 mg tablet, Take 1 tablet (20 mg total) by mouth daily with breakfast, Disp: 30 tablet, Rfl: 3    verapamil (CALAN-SR) 120 mg CR tablet, Take 1 tablet (120 mg total) by mouth daily, Disp: , Rfl:     aspirin 81 mg chewable tablet, Chew 1 tablet (81 mg total) daily, Disp: 30 tablet, Rfl: 5    ezetimibe (ZETIA) 10 mg tablet, Take 1 tablet (10 mg total) by mouth daily, Disp: 30 tablet, Rfl: 1        Social History     Socioeconomic History    Marital status: /Civil Union     Spouse name: Not on file    Number of children: Not on file    Years of education: Not on file    Highest education level: Not on file   Occupational History    Not on file   Social Needs    Financial resource strain: Not on file    Food insecurity     Worry: Not on file     Inability: Not on file   [employer] needs     Medical: Not on file     Non-medical: Not on file   Tobacco Use    Smoking status: Never Smoker    Smokeless tobacco: Never Used   Substance and Sexual Activity    Alcohol use: No    Drug use: No    Sexual activity: Not on file   Lifestyle    Physical activity     Days per week: Not on file     Minutes per session: Not on file    Stress: Not on file   Relationships    Social connections     Talks on phone: Not on file     Gets together: Not on file     Attends Baptism service: Not on file     Active member of club or organization: Not on file     Attends meetings of clubs or organizations: Not on file     Relationship status: Not on file    Intimate partner violence     Fear of current or ex partner: Not on file     Emotionally abused: Not on file     Physically abused: Not on file     Forced sexual activity: Not on file   Other Topics Concern    Not on file   Social History Narrative    Not on file       Family History   Problem Relation Age of Onset    Hypertension Mother     Hyperlipidemia Mother     Parkinsonism Mother     Stroke Mother     Atrial fibrillation Father     Stroke Father     Cancer Father         skin cancer    Anuerysm Neg Hx     Clotting disorder Neg Hx        Physical Exam   Constitutional: She is oriented to person, place, and time  No distress  HENT:   Head: Normocephalic and atraumatic  Eyes: Conjunctivae and EOM are normal    Neck: Normal range of motion  Neck supple  Cardiovascular: Normal rate, regular rhythm and intact distal pulses  Murmur heard  High-pitched blowing holosystolic murmur is present with a grade of 2/6 at the apex  Pulmonary/Chest: Effort normal and breath sounds normal    Abdominal: Soft  Bowel sounds are normal    Musculoskeletal: Normal range of motion  Neurological: She is alert and oriented to person, place, and time  Skin: Skin is warm and dry  She is not diaphoretic  Psychiatric: She has a normal mood and affect  Nursing note and vitals reviewed  Vitals: Blood pressure 154/96, pulse 65, height 5' 3" (1 6 m), weight 70 4 kg (155 lb 3 2 oz), SpO2 98 %  Wt Readings from Last 3 Encounters:   01/07/21 70 4 kg (155 lb 3 2 oz)   12/21/20 69 4 kg (153 lb)   11/02/20 71 7 kg (158 lb)         Labs & Results:  Lab Results   Component Value Date    WBC 6 27 08/12/2020    HGB 13 8 08/12/2020    HCT 41 6 08/12/2020    MCV 98 08/12/2020     08/12/2020     No results found for: BNP  No components found for: CHEM  No results found for: Anthony Roles, TROPONINT, CKMBINDEX  No results found for this or any previous visit  No results found for this or any previous visit  This note was completed in part utilizing [employer] fluency direct voice recognition software  Grammatical errors, random word insertion, spelling mistakes, and incomplete sentences may be an occasional consequence of the system secondary to software limitations, ambient noise and hardware issues  At the time of dictation, efforts were made to edit, clarify and /or correct errors    Please read the chart carefully and recognize, using context, where substitutions have occurred    If you have any questions or concerns about the context, text or information contained within the body of this dictation, please contact myself, the provider, for further clarification

## 2021-01-08 ENCOUNTER — HOSPITAL ENCOUNTER (OUTPATIENT)
Dept: NON INVASIVE DIAGNOSTICS | Facility: CLINIC | Age: 76
Discharge: HOME | End: 2021-01-08
Payer: MEDICARE

## 2021-01-08 DIAGNOSIS — G45.9 TIA (TRANSIENT ISCHEMIC ATTACK): ICD-10-CM

## 2021-01-08 PROCEDURE — 93880 EXTRACRANIAL BILAT STUDY: CPT

## 2021-01-08 PROCEDURE — 93880 EXTRACRANIAL BILAT STUDY: CPT | Performed by: SURGERY

## 2021-01-11 ENCOUNTER — TELEPHONE (OUTPATIENT)
Dept: CARDIOLOGY CLINIC | Facility: CLINIC | Age: 76
End: 2021-01-11

## 2021-01-12 ENCOUNTER — TELEPHONE (OUTPATIENT)
Dept: NEUROLOGY | Facility: CLINIC | Age: 76
End: 2021-01-12

## 2021-01-14 ENCOUNTER — CONSULT (OUTPATIENT)
Dept: NEUROLOGY | Facility: CLINIC | Age: 76
End: 2021-01-14
Payer: MEDICARE

## 2021-01-14 VITALS
DIASTOLIC BLOOD PRESSURE: 90 MMHG | BODY MASS INDEX: 27.61 KG/M2 | SYSTOLIC BLOOD PRESSURE: 140 MMHG | HEIGHT: 63 IN | WEIGHT: 155.8 LBS | HEART RATE: 82 BPM

## 2021-01-14 DIAGNOSIS — R55 POSTURAL DIZZINESS WITH PRESYNCOPE: Primary | ICD-10-CM

## 2021-01-14 DIAGNOSIS — R42 POSTURAL DIZZINESS WITH PRESYNCOPE: Primary | ICD-10-CM

## 2021-01-14 DIAGNOSIS — G45.9 TIA (TRANSIENT ISCHEMIC ATTACK): ICD-10-CM

## 2021-01-14 PROCEDURE — 99205 OFFICE O/P NEW HI 60 MIN: CPT | Performed by: PSYCHIATRY & NEUROLOGY

## 2021-01-14 NOTE — PROGRESS NOTES
Patient ID: Benita Meigs is a 76 y o  female  Assessment/Plan:    Presyncope  75 y/o f w h/o Afib on Xeralto (5 years) s/p Cardioversion in Aug, currently chronic Afib , HTN, HLD  hypothyroidism presents as a new patient for evaluation of transient presyncope/lightheadedness and difficulty getting words out  Impression- Seems like pt had a presyncopal episode, she does endorse feeling sick during that time with cough, sore throat  It was transient and her symptoms got resolved within few min  She didn't have any focal neuro symptoms/defecits  Since pt has chronic Afib since August will check MRI brain wo contrast for completeness  Plan-  · MRI brain wo contrast   · Continue Xarelto 20 mg p o  Q d  · Continue aspirin 81 mg  · Regulate blood pressure goal-  less than 130/90   · Regular 20-30 minutes exercise 5 days a week  · Follow-up as needed if MRI brain positive for any acute findings         Diagnoses and all orders for this visit:    Presyncope    TIA (transient ischemic attack)  -     Ambulatory referral to Neurology  -     MRI brain without contrast; Future           Subjective:    75 y/o f w h/o Afib on Xeralto (5 years) s/p Cardioversion in Aug, currently chronic Afib , HTN, HLD  hypothyroidism presents as a new patient for evaluation of transient lightheadedness and difficulty getting words out  Pt reports around Dec 2nd week, she and her  were feeling sick with cough, diarrhea  She suddenly felt dizziness,  she describes it as lightheadedness as if she she is about to pass out lasted for a min ,she laid over the bed and couldn't get herself back up, then she had to go to the bathroom and had to move her bowel, then she wannted to say something but couldn't get words out   "she kept saying--I want to" Within 5 min she started feeling better and slept okay  She denies  facial droop, no nausea, no vomiting, no slurred speech, no focal weakness       No h/o smoking, no alcohol usage, no h/o smoke, no h/o MEG,     She takes Xeralto d/t Afib, no missed dosage, started aspirin 81 mg last week,   Not taking Ival Gutter- think its expensive, muscle pain  She plans to take natural med-from chiropracter  She snores at night, doesn't wake up in middle of night  BP at home 130/70's measure it regularly, Her recent Lipid panel- LDL-170, Choleterol- 257  She tries to be active and denies any other residual symptoms  Family history positive for mother had a TIA event, father had a stroke  The following portions of the patient's history were reviewed and updated as appropriate: allergies, current medications, past family history, past medical history, past social history, past surgical history and problem list          Objective:    Blood pressure 140/90, pulse 82, height 5' 3" (1 6 m), weight 70 7 kg (155 lb 12 8 oz)  Physical Exam  Vitals signs reviewed  Constitutional:       Appearance: Normal appearance  HENT:      Head: Normocephalic and atraumatic  Mouth/Throat:      Mouth: Mucous membranes are moist    Eyes:      Extraocular Movements: Extraocular movements intact  Conjunctiva/sclera: Conjunctivae normal       Pupils: Pupils are equal, round, and reactive to light  Neck:      Musculoskeletal: Normal range of motion  Cardiovascular:      Pulses: Normal pulses  Pulmonary:      Effort: Pulmonary effort is normal       Breath sounds: Normal breath sounds  Abdominal:      General: Bowel sounds are normal       Palpations: Abdomen is soft  Musculoskeletal: Normal range of motion  Skin:     General: Skin is warm  Capillary Refill: Capillary refill takes less than 2 seconds  Neurological:      Mental Status: She is alert  Neurological Examination:     Mental Status: The patient was awake, alert, attentive, oriented to person, place, and time  Recent and remote memory intact to conversation with no evidence of language dysfunction   Satisfactory fund of knowledge  Normal attention span and concentration  Able to name, repeat, describe a complex scene  Cranial Nerves:   I: smell Not tested   II: visual fields Full to confrontation  Pupils equal, round, reactive to light with normal accomodation  Fundus: benign fundus  III,IV,VI: extraocular muscles EOMI, no nystagmus   V: masseter and pterygoid strength full  Sensation in the V1 through V3 distributions intact to pinprick and light touch bilaterally  VII: Face is symmetric with no weakness noted  VIII: Audition intact to finger rub bilaterally  IX/X: Uvula midline  Soft palate elevation symmetric  XI: Trapezius and SCM strength 5/5 B/L  XII: Tongue midline with no atrophy or fasciculations with appropriate movement  Motor Examination:   No pronator drift  Bulk: Normal  No atrophy Tone: Normal  Fasciculations: None  Deltoid Biceps Triceps WE   WF   FF IO     Right        5         5          5         5      5      5   5        Left           5        5          5          5      5     5   5                       IP        Quad   Ham     TA       Gastroc   Right      5            5          5         5                5  Left         5            5         5         5                5       Reflexes:                   Biceps Brachioradialis Triceps Patella Achilles Plantars   Right          2+            2+                  2+        2+       2+         Down   Left            2+             2+                 2+         2+       2+         Down     Clonus: None    Pathological Reflexes:  Hoffmans: negative  Babinsky: negative  Jaw Jerk: negative    Coordination: Patient able to perform normal finger-to-nose and heel to shin appropriately  Normal rapid alternating movements  Sensory: Mild decrease vibration in b/l lower extremities  Normal sensation to light touch, pin prick throughout  Gait:normal stance and posture, normal stride length and arm swing, normal turn around    Romberg negative  ROS:    Review of Systems   Constitutional: Negative  Negative for appetite change and fever  HENT: Negative  Negative for hearing loss, tinnitus, trouble swallowing and voice change  Eyes: Negative  Negative for photophobia and pain  Respiratory: Negative  Negative for shortness of breath  Cardiovascular: Negative  Negative for palpitations  Gastrointestinal: Negative  Negative for nausea and vomiting  Endocrine: Negative  Negative for cold intolerance  Genitourinary: Negative  Negative for dysuria, frequency and urgency  Musculoskeletal: Negative  Negative for myalgias and neck pain  Skin: Negative  Negative for rash  Neurological: Negative  Negative for dizziness, tremors, seizures, syncope, facial asymmetry, speech difficulty, weakness, light-headedness, numbness and headaches  Hematological: Negative  Does not bruise/bleed easily  Psychiatric/Behavioral: Negative  Negative for confusion, hallucinations and sleep disturbance

## 2021-01-14 NOTE — ASSESSMENT & PLAN NOTE
75 y/o f w h/o Afib on Xeralto (5 years) s/p Cardioversion in Aug, currently chronic Afib , HTN, HLD  hypothyroidism presents as a new patient for evaluation of transient presyncope/lightheadedness and difficulty getting words out  Impression- Seems like pt had a presyncopal episode, she does endorse feeling sick during that time with cough, sore throat  It was transient and her symptoms got resolved within few min  She didn't have any focal neuro symptoms/defecits  Since pt has chronic Afib since August will check MRI brain wo contrast for completeness  Plan-  · MRI brain wo contrast   · Continue Xarelto 20 mg p o  Q d    · Continue aspirin 81 mg  · Regulate blood pressure goal-  less than 130/90   · Regular 20-30 minutes exercise 5 days a week  · Follow-up as needed if MRI brain positive for any acute findings

## 2021-01-18 ENCOUNTER — HOSPITAL ENCOUNTER (OUTPATIENT)
Dept: MRI IMAGING | Facility: HOSPITAL | Age: 76
Discharge: HOME | End: 2021-01-18
Payer: MEDICARE

## 2021-01-18 DIAGNOSIS — G45.9 TIA (TRANSIENT ISCHEMIC ATTACK): ICD-10-CM

## 2021-01-18 PROCEDURE — G1004 CDSM NDSC: HCPCS

## 2021-01-18 PROCEDURE — 70551 MRI BRAIN STEM W/O DYE: CPT

## 2021-01-26 DIAGNOSIS — E55.9 VITAMIN D DEFICIENCY DISEASE: Primary | ICD-10-CM

## 2021-01-26 RX ORDER — ERGOCALCIFEROL 1.25 MG/1
50000 CAPSULE ORAL
Qty: 7 CAPSULE | Refills: 3 | Status: SHIPPED | OUTPATIENT
Start: 2021-01-26 | End: 2022-02-28 | Stop reason: SDUPTHER

## 2021-02-12 DIAGNOSIS — Z23 ENCOUNTER FOR IMMUNIZATION: ICD-10-CM

## 2021-02-15 ENCOUNTER — HOSPITAL ENCOUNTER (EMERGENCY)
Facility: HOSPITAL | Age: 76
Discharge: HOME | End: 2021-02-15
Attending: EMERGENCY MEDICINE | Admitting: EMERGENCY MEDICINE
Payer: MEDICARE

## 2021-02-15 ENCOUNTER — APPOINTMENT (EMERGENCY)
Dept: RADIOLOGY | Facility: HOSPITAL | Age: 76
End: 2021-02-15
Payer: MEDICARE

## 2021-02-15 VITALS
BODY MASS INDEX: 27.46 KG/M2 | TEMPERATURE: 98.1 F | RESPIRATION RATE: 18 BRPM | DIASTOLIC BLOOD PRESSURE: 71 MMHG | SYSTOLIC BLOOD PRESSURE: 119 MMHG | WEIGHT: 155 LBS | HEART RATE: 68 BPM | HEIGHT: 63 IN | OXYGEN SATURATION: 95 %

## 2021-02-15 DIAGNOSIS — M54.6 RIGHT-SIDED THORACIC BACK PAIN: ICD-10-CM

## 2021-02-15 DIAGNOSIS — R00.2 PALPITATIONS: Primary | ICD-10-CM

## 2021-02-15 DIAGNOSIS — I48.91 ATRIAL FIBRILLATION (HCC): ICD-10-CM

## 2021-02-15 LAB
ALBUMIN SERPL BCP-MCNC: 4.2 G/DL (ref 3.5–5)
ALP SERPL-CCNC: 72 U/L (ref 46–116)
ALT SERPL W P-5'-P-CCNC: 25 U/L (ref 12–78)
ANION GAP SERPL CALCULATED.3IONS-SCNC: 11 MMOL/L (ref 4–13)
AST SERPL W P-5'-P-CCNC: 24 U/L (ref 5–45)
BASOPHILS # BLD AUTO: 0.06 THOUSANDS/ΜL (ref 0–0.1)
BASOPHILS NFR BLD AUTO: 1 % (ref 0–1)
BILIRUB SERPL-MCNC: 0.61 MG/DL (ref 0.2–1)
BUN SERPL-MCNC: 21 MG/DL (ref 5–25)
CALCIUM SERPL-MCNC: 9.4 MG/DL (ref 8.3–10.1)
CHLORIDE SERPL-SCNC: 104 MMOL/L (ref 100–108)
CO2 SERPL-SCNC: 27 MMOL/L (ref 21–32)
CREAT SERPL-MCNC: 1.01 MG/DL (ref 0.6–1.3)
D DIMER PPP FEU-MCNC: 0.48 UG/ML FEU
EOSINOPHIL # BLD AUTO: 0.2 THOUSAND/ΜL (ref 0–0.61)
EOSINOPHIL NFR BLD AUTO: 2 % (ref 0–6)
ERYTHROCYTE [DISTWIDTH] IN BLOOD BY AUTOMATED COUNT: 13.4 % (ref 11.6–15.1)
GFR SERPL CREATININE-BSD FRML MDRD: 55 ML/MIN/1.73SQ M
GLUCOSE SERPL-MCNC: 115 MG/DL (ref 65–140)
HCT VFR BLD AUTO: 46 % (ref 34.8–46.1)
HGB BLD-MCNC: 14.9 G/DL (ref 11.5–15.4)
IMM GRANULOCYTES # BLD AUTO: 0.03 THOUSAND/UL (ref 0–0.2)
IMM GRANULOCYTES NFR BLD AUTO: 0 % (ref 0–2)
LYMPHOCYTES # BLD AUTO: 2.91 THOUSANDS/ΜL (ref 0.6–4.47)
LYMPHOCYTES NFR BLD AUTO: 35 % (ref 14–44)
MAGNESIUM SERPL-MCNC: 2 MG/DL (ref 1.6–2.6)
MCH RBC QN AUTO: 32.1 PG (ref 26.8–34.3)
MCHC RBC AUTO-ENTMCNC: 32.4 G/DL (ref 31.4–37.4)
MCV RBC AUTO: 99 FL (ref 82–98)
MONOCYTES # BLD AUTO: 0.72 THOUSAND/ΜL (ref 0.17–1.22)
MONOCYTES NFR BLD AUTO: 9 % (ref 4–12)
NEUTROPHILS # BLD AUTO: 4.34 THOUSANDS/ΜL (ref 1.85–7.62)
NEUTS SEG NFR BLD AUTO: 53 % (ref 43–75)
NRBC BLD AUTO-RTO: 0 /100 WBCS
PLATELET # BLD AUTO: 334 THOUSANDS/UL (ref 149–390)
PMV BLD AUTO: 10.1 FL (ref 8.9–12.7)
POTASSIUM SERPL-SCNC: 3.6 MMOL/L (ref 3.5–5.3)
PROT SERPL-MCNC: 8.3 G/DL (ref 6.4–8.2)
RBC # BLD AUTO: 4.64 MILLION/UL (ref 3.81–5.12)
SODIUM SERPL-SCNC: 142 MMOL/L (ref 136–145)
TROPONIN I SERPL-MCNC: <0.02 NG/ML
TSH SERPL DL<=0.05 MIU/L-ACNC: 2.88 UIU/ML (ref 0.36–3.74)
WBC # BLD AUTO: 8.26 THOUSAND/UL (ref 4.31–10.16)

## 2021-02-15 PROCEDURE — 99285 EMERGENCY DEPT VISIT HI MDM: CPT | Performed by: EMERGENCY MEDICINE

## 2021-02-15 PROCEDURE — 93005 ELECTROCARDIOGRAM TRACING: CPT

## 2021-02-15 PROCEDURE — 83735 ASSAY OF MAGNESIUM: CPT | Performed by: EMERGENCY MEDICINE

## 2021-02-15 PROCEDURE — 84484 ASSAY OF TROPONIN QUANT: CPT | Performed by: EMERGENCY MEDICINE

## 2021-02-15 PROCEDURE — 36415 COLL VENOUS BLD VENIPUNCTURE: CPT | Performed by: EMERGENCY MEDICINE

## 2021-02-15 PROCEDURE — 85025 COMPLETE CBC W/AUTO DIFF WBC: CPT | Performed by: EMERGENCY MEDICINE

## 2021-02-15 PROCEDURE — 99285 EMERGENCY DEPT VISIT HI MDM: CPT

## 2021-02-15 PROCEDURE — 84443 ASSAY THYROID STIM HORMONE: CPT | Performed by: EMERGENCY MEDICINE

## 2021-02-15 PROCEDURE — 85379 FIBRIN DEGRADATION QUANT: CPT | Performed by: EMERGENCY MEDICINE

## 2021-02-15 PROCEDURE — 71045 X-RAY EXAM CHEST 1 VIEW: CPT

## 2021-02-15 PROCEDURE — 80053 COMPREHEN METABOLIC PANEL: CPT | Performed by: EMERGENCY MEDICINE

## 2021-02-15 PROCEDURE — 96374 THER/PROPH/DIAG INJ IV PUSH: CPT

## 2021-02-15 PROCEDURE — 72070 X-RAY EXAM THORAC SPINE 2VWS: CPT

## 2021-02-15 RX ORDER — DILTIAZEM HYDROCHLORIDE 5 MG/ML
5 INJECTION INTRAVENOUS ONCE
Status: COMPLETED | OUTPATIENT
Start: 2021-02-15 | End: 2021-02-15

## 2021-02-15 RX ORDER — LIDOCAINE 50 MG/G
1 PATCH TOPICAL ONCE
Status: DISCONTINUED | OUTPATIENT
Start: 2021-02-15 | End: 2021-02-15 | Stop reason: HOSPADM

## 2021-02-15 RX ORDER — DILTIAZEM HYDROCHLORIDE 5 MG/ML
10 INJECTION INTRAVENOUS ONCE
Status: DISCONTINUED | OUTPATIENT
Start: 2021-02-15 | End: 2021-02-15

## 2021-02-15 RX ADMIN — DILTIAZEM HYDROCHLORIDE 5 MG: 5 INJECTION INTRAVENOUS at 12:50

## 2021-02-15 RX ADMIN — LIDOCAINE 5% 1 PATCH: 700 PATCH TOPICAL at 12:50

## 2021-02-15 NOTE — ED PROVIDER NOTES
History  Chief Complaint   Patient presents with    Shoulder Pain     Patient reports right shoulder pain that started thursday that now wraps around to her left shoulder and rib  Patient reports feeling some chest pressure, "but I have been in AFIB since october " Denies NVD/SOB, but did have one episode of vomiting on thursday   Palpitations     Reports palpitations since october, hx AFIB, today the palpitations seemed worse  42-year-old female presents to the emergency department with rapid palpitations and right-sided discomfort  She started experiencing pain in her right shoulder on Thursday (4 days ago) without identified provocation  She thought she might have pulled a muscle  Discomfort intensified and she notices this from below the shoulder blade around the ribs and underneath the breast   Discomfort is continuous  It is somewhat worsened with movements  She notes that her heart rate at rest is usually in the 50s to 60s  Today was appreciated to be in the 90s  This felt very rapid and irregular to her  She expresses concern for atrial fibrillation  She describes having undergone cardioversion for this last August   Atrial fibrillation returned in October  She reports compliance with her metoprolol 50 mg XL b i d , Xarelto an aspirin  Medical history otherwise significant for hypertension, hypothyroidism and fibromyalgia  No history of DVT/PE or other cardiac issues  She denies any recent changes in activity, medication or diet  Prior to Admission Medications   Prescriptions Last Dose Informant Patient Reported? Taking?    Foot Care Products (Spenco Arch Support Insoles) MISC  Self No No   Sig: by Does not apply route continuous   aspirin 81 mg chewable tablet   No No   Sig: Chew 1 tablet (81 mg total) daily   ergocalciferol (VITAMIN D2) 50,000 units   No No   Sig: Take 1 capsule (50,000 Units total) by mouth every 14 (fourteen) days   ezetimibe (ZETIA) 10 mg tablet   No No Sig: Take 1 tablet (10 mg total) by mouth daily   Patient not taking: Reported on 1/14/2021   levothyroxine 75 mcg tablet  Self No No   Sig: Take 1 tablet (75 mcg total) by mouth daily   metoprolol succinate (TOPROL-XL) 50 mg 24 hr tablet  Self No No   Sig: Take 1 tablet (50 mg total) by mouth 2 (two) times a day   omeprazole (PRILOSEC) 20 mg delayed release capsule  Self Yes No   Sig: Take 1 capsule by mouth daily   rivaroxaban (XARELTO) 20 mg tablet  Self No No   Sig: Take 1 tablet (20 mg total) by mouth daily with breakfast   verapamil (CALAN-SR) 120 mg CR tablet   No No   Sig: Take 1 tablet (120 mg total) by mouth daily      Facility-Administered Medications: None       Past Medical History:   Diagnosis Date    Arthritis     Cataract     Dyslipidemia (high LDL; low HDL)     Fibromyalgia     Hypertension     Hypothyroidism     Palpitation     Paroxysmal atrial fibrillation [facility])        Past Surgical History:   Procedure Laterality Date    CATARACT EXTRACTION, BILATERAL  11/2016    HYSTERECTOMY  05/2016    total hysterectomy    REPLACEMENT TOTAL KNEE      TONSILLECTOMY         Family History   Problem Relation Age of Onset    Hypertension Mother     Hyperlipidemia Mother     Parkinsonism Mother     Stroke Mother     Atrial fibrillation Father     Stroke Father     Cancer Father         skin cancer    Anuerysm Neg Hx     Clotting disorder Neg Hx      I have reviewed and agree with the history as documented  E-Cigarette/Vaping    E-Cigarette Use Never User      E-Cigarette/Vaping Substances    Nicotine No     THC No     CBD No     Flavoring No     Unknown No      Social History     Tobacco Use    Smoking status: Never Smoker    Smokeless tobacco: Never Used   Substance Use Topics    Alcohol use: No    Drug use: No       Review of Systems   Gastrointestinal: Positive for nausea and vomiting  All other systems reviewed and are negative        Physical Exam  Physical Exam  Vitals signs and nursing note reviewed  Constitutional:       Appearance: Normal appearance  HENT:      Head: Normocephalic  Nose: Nose normal       Mouth/Throat:      Mouth: Mucous membranes are moist    Eyes:      Extraocular Movements: Extraocular movements intact  Conjunctiva/sclera: Conjunctivae normal    Cardiovascular:      Rate and Rhythm: Tachycardia present  Rhythm irregular  Pulmonary:      Effort: Pulmonary effort is normal       Breath sounds: Normal breath sounds  Abdominal:      General: Bowel sounds are normal       Palpations: Abdomen is soft  Musculoskeletal: Normal range of motion  General: Tenderness ( diffusely of the extremities-chronic per patient/unchanged) present  Arms:       Right lower leg: No edema  Left lower leg: No edema  Skin:     General: Skin is warm and dry  Findings: No rash (No rash over the affected region)  Neurological:      General: No focal deficit present  Mental Status: She is alert and oriented to person, place, and time     Psychiatric:         Mood and Affect: Mood normal          Vital Signs  ED Triage Vitals [02/15/21 1204]   Temperature Pulse Respirations Blood Pressure SpO2   98 1 °F (36 7 °C) 96 16 140/87 98 %      Temp Source Heart Rate Source Patient Position - Orthostatic VS BP Location FiO2 (%)   Oral Monitor Lying Right arm --      Pain Score       6           Vitals:    02/15/21 1204 02/15/21 1230 02/15/21 1315 02/15/21 1330   BP: 140/87 128/70 136/67 122/72   Pulse: 96 76 64 70   Patient Position - Orthostatic VS: Lying Lying Lying Lying         Visual Acuity      ED Medications  Medications   lidocaine (LIDODERM) 5 % patch 1 patch (1 patch Topical Medication Applied 2/15/21 1250)   diltiazem (CARDIZEM) injection 5 mg (5 mg Intravenous Given 2/15/21 1250)       Diagnostic Studies  Results Reviewed     Procedure Component Value Units Date/Time    Comprehensive metabolic panel [[id number]]  (Abnormal) Collected: 02/15/21 1228    Lab Status: Final result Specimen: Blood from Arm, Left Updated: 02/15/21 1259     Sodium 142 mmol/L      Potassium 3 6 mmol/L      Chloride 104 mmol/L      CO2 27 mmol/L      ANION GAP 11 mmol/L      BUN 21 mg/dL      Creatinine 1 01 mg/dL      Glucose 115 mg/dL      Calcium 9 4 mg/dL      AST 24 U/L      ALT 25 U/L      Alkaline Phosphatase 72 U/L      Total Protein 8 3 g/dL      Albumin 4 2 g/dL      Total Bilirubin 0 61 mg/dL      eGFR 55 ml/min/1 73sq m     Narrative:      [facility] guidelines for Chronic Kidney Disease (CKD):     Stage 1 with normal or high GFR (GFR > 90 mL/min/1 73 square meters)    Stage 2 Mild CKD (GFR = 60-89 mL/min/1 73 square meters)    Stage 3A Moderate CKD (GFR = 45-59 mL/min/1 73 square meters)    Stage 3B Moderate CKD (GFR = 30-44 mL/min/1 73 square meters)    Stage 4 Severe CKD (GFR = 15-29 mL/min/1 73 square meters)    Stage 5 End Stage CKD (GFR <15 mL/min/1 73 square meters)  Note: GFR calculation is accurate only with a steady state creatinine    TSH [[id number]]  (Normal) Collected: 02/15/21 1228    Lab Status: Final result Specimen: Blood from Arm, Left Updated: 02/15/21 1259     TSH 3RD GENERATON 2 876 uIU/mL     Narrative:      Patients undergoing fluorescein dye angiography may retain small amounts of fluorescein in the body for 48-72 hours post procedure  Samples containing fluorescein can produce falsely depressed TSH values  If the patient had this procedure,a specimen should be resubmitted post fluorescein clearance        Magnesium [[id number]]  (Normal) Collected: 02/15/21 1228    Lab Status: Final result Specimen: Blood from Arm, Left Updated: 02/15/21 1259     Magnesium 2 0 mg/dL     Troponin I [[id number]]  (Normal) Collected: 02/15/21 1228    Lab Status: Final result Specimen: Blood from Arm, Left Updated: 02/15/21 1257     Troponin I <0 02 ng/mL     D-Dimer [[id number]]  (Normal) Collected: 02/15/21 1228    Lab Status: Final result Specimen: Blood from Arm, Left Updated: 02/15/21 1248     D-Dimer, Quant 0 48 ug/ml FEU     CBC and differential [[id number]]  (Abnormal) Collected: 02/15/21 1228    Lab Status: Final result Specimen: Blood from Arm, Left Updated: 02/15/21 1237     WBC 8 26 Thousand/uL      RBC 4 64 Million/uL      Hemoglobin 14 9 g/dL      Hematocrit 46 0 %      MCV 99 fL      MCH 32 1 pg      MCHC 32 4 g/dL      RDW 13 4 %      MPV 10 1 fL      Platelets 396 Thousands/uL      nRBC 0 /100 WBCs      Neutrophils Relative 53 %      Immat GRANS % 0 %      Lymphocytes Relative 35 %      Monocytes Relative 9 %      Eosinophils Relative 2 %      Basophils Relative 1 %      Neutrophils Absolute 4 34 Thousands/µL      Immature Grans Absolute 0 03 Thousand/uL      Lymphocytes Absolute 2 91 Thousands/µL      Monocytes Absolute 0 72 Thousand/µL      Eosinophils Absolute 0 20 Thousand/µL      Basophils Absolute 0 06 Thousands/µL                  XR chest 1 view portable   ED Interpretation by Betty Krishna MD (02/15 1340)   Unremarkable cardiac silhouette  Clear lungs  by Urmila Berg MD (02/15 1345)      XR thoracic spine 2 views   ED Interpretation by Betty Krishna MD (02/15 1343)   Osteopenia and degenerative changes  No fracture appreciated  Procedures  ECG 12 Lead Documentation Only    Date/Time: 2/15/2021 12:22 PM  Performed by: Betty Krishna MD  Authorized by: Betty Krishna MD     ECG reviewed by me, the ED Provider: yes    Patient location:  ED and bedside  Previous ECG:     Previous ECG:  Compared to current    Comparison ECG info:  August 6, 2020-sinus bradycardia appreciated on prior EKG    Morphology otherwise similar  Rate:     ECG rate:  85    ECG rate assessment: normal    Rhythm:     Rhythm: atrial fibrillation    Ectopy:     Ectopy: none    QRS:     QRS axis:  Normal    QRS intervals:  Normal  Conduction:     Conduction: normal    ST segments:     ST segments:  Normal  T waves:     T waves: non-specific and flattening      T wave flattening noted on lead: Diffuse  ED Course  ED Course as of Feb 15 1349   Mon Feb 15, 2021   1247 Heart rate has lowered to the 70s to 80s  Will change dose of diltiazem to 5 mg       1343 Patient feeling improved at this time  Heart rates in the 60s to low 70s following low dose of diltiazem  She has tolerated higher doses of her calcium channel and beta blocker medications in the past   With today's heart rate having been aberrancy will have her continue monitoring this  Most recent cardiology note from January reviewed with plan for rate control  She indicates that she has plan for follow-up with her cardiologist in a couple of weeks  Advised to keep record of her heart rates to bring to the appointment  She additionally has a small monitoring device which assess his heart rate and provides a rhythm strip  She has this saved on her phone and can bring to the appointment as well  With regard to right-sided chest discomfort-suspect muscular etiology  Upon further thinking she indicated that she does do a lot of crocheting with her right arm and this may have contributed to discomfort  She will take a short break from this    Advised use of ice/heat & consideration of lidocaine patches which she is aware are available over-the-counter                                              MDM    Disposition  Final diagnoses:   Palpitations   Right-sided thoracic back pain   Atrial fibrillation ([address] )     Time reflects when diagnosis was documented in both MDM as applicable and the Disposition within this note     Time User Action Codes Description Comment    2/15/2021  1:47 PM David Pack A Add [R00 2] Palpitations     2/15/2021  1:47 PM David Pack A Add [M54 6] Right-sided thoracic back pain     2/15/2021  1:47 PM David Pack A Add [I48 91] Atrial fibrillation [facility])       ED Disposition     ED Disposition Condition Date/Time Comment    Discharge Stable Mon Feb 15, 2021  1:47 PM Reynaldo Mays discharge to home/self care  Follow-up Information     Follow up With Specialties Details Why Luz Quiroz MD Cardiology Go to  Your appointment as previously scheduled for March 3rd  Contact the office sooner if you have new symptoms or notice heart rates which are faster than typical for you [address] [facility] [address]  [phone]            Patient's Medications   Discharge Prescriptions    No medications on file     No discharge procedures on file      PDMP Review     None          ED Provider  Electronically Signed by           Elisa Mixon MD  02/15/21 4649

## 2021-02-18 LAB
ATRIAL RATE: 234 BPM
QRS AXIS: 58 DEGREES
QRSD INTERVAL: 70 MS
QT INTERVAL: 336 MS
QTC INTERVAL: 399 MS
T WAVE AXIS: 252 DEGREES
VENTRICULAR RATE: 85 BPM

## 2021-02-18 PROCEDURE — 93010 ELECTROCARDIOGRAM REPORT: CPT | Performed by: INTERNAL MEDICINE

## 2021-03-01 ENCOUNTER — LAB (OUTPATIENT)
Dept: LAB | Facility: CLINIC | Age: 76
End: 2021-03-01
Payer: MEDICARE

## 2021-03-01 ENCOUNTER — TRANSCRIBE ORDERS (OUTPATIENT)
Dept: LAB | Facility: CLINIC | Age: 76
End: 2021-03-01

## 2021-03-01 DIAGNOSIS — E78.5 DYSLIPIDEMIA: ICD-10-CM

## 2021-03-01 LAB
CHOLEST SERPL-MCNC: 256 MG/DL (ref 50–200)
HDLC SERPL-MCNC: 78 MG/DL
LDLC SERPL CALC-MCNC: 161 MG/DL (ref 0–100)
NONHDLC SERPL-MCNC: 178 MG/DL
TRIGL SERPL-MCNC: 87 MG/DL

## 2021-03-01 PROCEDURE — 80061 LIPID PANEL: CPT

## 2021-03-01 PROCEDURE — 36415 COLL VENOUS BLD VENIPUNCTURE: CPT

## 2021-03-03 ENCOUNTER — OFFICE VISIT (OUTPATIENT)
Dept: CARDIOLOGY CLINIC | Facility: CLINIC | Age: 76
End: 2021-03-03
Payer: MEDICARE

## 2021-03-03 VITALS
BODY MASS INDEX: 27.61 KG/M2 | DIASTOLIC BLOOD PRESSURE: 80 MMHG | OXYGEN SATURATION: 98 % | WEIGHT: 155.8 LBS | SYSTOLIC BLOOD PRESSURE: 132 MMHG | HEART RATE: 72 BPM | HEIGHT: 63 IN

## 2021-03-03 DIAGNOSIS — I10 ESSENTIAL HYPERTENSION: ICD-10-CM

## 2021-03-03 DIAGNOSIS — I48.0 PAROXYSMAL ATRIAL FIBRILLATION (HCC): Primary | ICD-10-CM

## 2021-03-03 DIAGNOSIS — R07.2 PRECORDIAL PAIN: ICD-10-CM

## 2021-03-03 DIAGNOSIS — E78.5 DYSLIPIDEMIA: ICD-10-CM

## 2021-03-03 DIAGNOSIS — R94.31 ABNORMAL ECG: ICD-10-CM

## 2021-03-03 DIAGNOSIS — I49.1 PAC (PREMATURE ATRIAL CONTRACTION): ICD-10-CM

## 2021-03-03 DIAGNOSIS — M25.511 ACUTE PAIN OF RIGHT SHOULDER: ICD-10-CM

## 2021-03-03 PROCEDURE — 99214 OFFICE O/P EST MOD 30 MIN: CPT | Performed by: INTERNAL MEDICINE

## 2021-03-03 PROCEDURE — 93000 ELECTROCARDIOGRAM COMPLETE: CPT | Performed by: INTERNAL MEDICINE

## 2021-03-03 NOTE — PROGRESS NOTES
[facility]  Follow up note  Yenny Bishop 68 y o  female MRN: [id number]        Problems    1  Paroxysmal atrial fibrillation (HCC)  POCT ECG   2  PAC (premature atrial contraction)     3  Dyslipidemia     4   Essential hypertension         Impression:     Paroxysmal atrial fibrillation  o Dyspnea was felt to be the primary symptom, this prompted maintenance of sinus rhythm with cardioversion, however AFib recurred, which seems likely considering the atrial dilation, she has been persistent since November, well rate controlled, no symptoms associated with currently being in AFib   o She continues on appropriate anticoagulation   Chest pain/shoulder pain  o Possible anginal equivalent, inferolateral T-wave inversions on EKG  o Long history of severely uncontrolled cholesterol all suggest the possibility of CAD and testing is indicated   Valvular heart disease  o Mild-to-moderate MR  o Moderate to severe TR with moderately elevated pulmonary pressures, but normal RV OT Doppler signal without notching  o Nuclear lung scan suggested heterogeneous uptake, was low likelihood for PE  o No signs of volume overload or any significant murmurs on examination   Premature atrial contraction  o Resolved now that in persistent AFib   Hyperlipidemia  o Severely uncontrolled for many years, statin intolerant, Zetia too costly, and did not like the side effect profile in the package insert on Repatha/Praluent, and refuses PCSK9 inhibitors  o she is taking over-the-counter supplements   Hypertension  o Well controlled   Fibromyalgia  o [place] some, worse with statins    Plan:    Due to fibromyalgia and baseline muscle aches, not interested in pursuing PCSK9 inhibitor therapy, and recently prescribe Zetia but too costly, otherwise severely intolerant to statins    With recent complaints of shoulder discomfort and back discomfort, abnormal EKG in the ER and in my office today with T-wave inversions inferolaterally, some concern for angina and would recommend ischemic testing  HPI:   Elian Vera is a 68y o  year old female with hypertension, hyperlipidemia, a history of PACs, paroxysmal symptomatic atrial fibrillation, who underwent cardioversion 8/6/2020, unfortunately AFib recurred by November  Interestingly symptoms of dyspnea had prompted rhythm maintenance, but when she presented in November in controlled AFib, she had no symptoms, and for the last 4 months has continued to have no symptoms associated with AFib  She did however have chest discomfort, back discomfort, shoulder discomfort recently, went to the ER on 02/15 where she had minor EKG abnormalities, more prominent on today's EKG with inferolateral T-wave inversions, but her workup from a troponin standpoint was normal   She did not get referred for stress testing  She does have fibromyalgia so aches and pains are frequent for her and she does have a chiropractor who she went to see  She severely intolerant to statin therapy, Zetia recently too costly, and she refuses PCSK9 inhibitors after discussion today  Her current cholesterol is uncontrolled at 160  Review of Systems   Constitutional: Negative for appetite change, diaphoresis, fatigue and fever  Respiratory: Negative for chest tightness, shortness of breath and wheezing  Cardiovascular: Positive for chest pain  Negative for palpitations and leg swelling  Gastrointestinal: Negative for abdominal pain and blood in stool  Musculoskeletal: Positive for arthralgias and myalgias  Negative for joint swelling  Skin: Negative for rash  Neurological: Negative for dizziness, syncope and light-headedness         Past Medical History:   Diagnosis Date    Arthritis     Cataract     Dyslipidemia (high LDL; low HDL)     Fibromyalgia     Hypertension     Hypothyroidism     Palpitation     Paroxysmal atrial fibrillation [facility])      Social History     Substance and Sexual Activity   Alcohol Use No     Social History     Substance and Sexual Activity   Drug Use No     Social History     Tobacco Use   Smoking Status Never Smoker   Smokeless Tobacco Never Used       Allergies: Allergies   Allergen Reactions    Iodine (Food Allergy) Hives     Severe allergy to IV CT scan dye    Iodinated Diagnostic Agents Hives       Medications:     Current Outpatient Medications:     aspirin 81 mg chewable tablet, Chew 1 tablet (81 mg total) daily, Disp: 30 tablet, Rfl: 5    ergocalciferol (VITAMIN D2) 50,000 units, Take 1 capsule (50,000 Units total) by mouth every 14 (fourteen) days, Disp: 7 capsule, Rfl: 3    Foot Care Products (Spenco Arch Support Insoles) MISC, by Does not apply route continuous, Disp: 1 each, Rfl: 0    levothyroxine 75 mcg tablet, Take 1 tablet (75 mcg total) by mouth daily, Disp: 90 tablet, Rfl: 1    metoprolol succinate (TOPROL-XL) 50 mg 24 hr tablet, Take 1 tablet (50 mg total) by mouth 2 (two) times a day, Disp: 180 tablet, Rfl: 1    omeprazole (PRILOSEC) 20 mg delayed release capsule, Take 1 capsule by mouth daily, Disp: , Rfl:     rivaroxaban (XARELTO) 20 mg tablet, Take 1 tablet (20 mg total) by mouth daily with breakfast, Disp: 30 tablet, Rfl: 3    verapamil (CALAN-SR) 120 mg CR tablet, Take 1 tablet (120 mg total) by mouth daily, Disp: , Rfl:     ezetimibe (ZETIA) 10 mg tablet, Take 1 tablet (10 mg total) by mouth daily (Patient not taking: Reported on 1/14/2021), Disp: 30 tablet, Rfl: 1      Vitals:    03/03/21 0933   BP: 132/80   Pulse: 72   SpO2: 98%     Weight (last 2 days)     Date/Time   Weight    03/03/21 0933   70 7 (155 8)            Physical Exam  Constitutional:       General: She is not in acute distress  Appearance: She is not diaphoretic  HENT:      Head: Normocephalic and atraumatic  Eyes:      General: No scleral icterus  Conjunctiva/sclera: Conjunctivae normal    Neck:      Musculoskeletal: Normal range of motion  Vascular: No JVD  Cardiovascular:      Rate and Rhythm: Normal rate and regular rhythm  Heart sounds: Normal heart sounds  No murmur  Pulmonary:      Effort: Pulmonary effort is normal  No respiratory distress  Breath sounds: Normal breath sounds  No wheezing, rhonchi or rales  Musculoskeletal:         General: No tenderness  Right lower leg: Normal  No edema  Left lower leg: Normal  No edema  Skin:     General: Skin is warm and dry  Laboratory Studies:    Laboratory studies personally read    Cardiac testing:     EKG reviewed personally:   9/20-sinus rhythm, nonspecific T-wave flattening  11/20-AFib, heart rate 71, nonspecific T-wave abnormality  3/21-AFib, inferolateral T-wave inversions concerning for ischemia    Echocardiogram:  8/20-DELANO-EF normal, severely dilated left atrium    Stress tests:      Catheterization:      Holter:         Miriam Shearer MD    Portions of the record may have been created with voice recognition software  Occasional wrong word or "sound a like" substitutions may have occurred due to the inherent limitations of voice recognition software  Read the chart carefully and recognize, using context, where substitutions have occurred

## 2021-03-05 ENCOUNTER — HOSPITAL ENCOUNTER (OUTPATIENT)
Dept: RADIOLOGY | Facility: HOSPITAL | Age: 76
Discharge: HOME | End: 2021-03-05
Payer: MEDICARE

## 2021-03-05 ENCOUNTER — HOSPITAL ENCOUNTER (OUTPATIENT)
Dept: NON INVASIVE DIAGNOSTICS | Facility: HOSPITAL | Age: 76
Discharge: HOME | End: 2021-03-05
Payer: MEDICARE

## 2021-03-05 DIAGNOSIS — M25.511 ACUTE PAIN OF RIGHT SHOULDER: ICD-10-CM

## 2021-03-05 DIAGNOSIS — R07.2 PRECORDIAL PAIN: ICD-10-CM

## 2021-03-05 LAB
CHEST PAIN STATEMENT: NORMAL
MAX DIASTOLIC BP: 100 MMHG
MAX HEART RATE: 111 BPM
MAX PREDICTED HEART RATE: 144 BPM
MAX. SYSTOLIC BP: 180 MMHG
PROTOCOL NAME: NORMAL
REASON FOR TERMINATION: NORMAL
TARGET HR FORMULA: NORMAL
TEST INDICATION: NORMAL
TIME IN EXERCISE PHASE: NORMAL

## 2021-03-05 PROCEDURE — 78452 HT MUSCLE IMAGE SPECT MULT: CPT

## 2021-03-05 PROCEDURE — 93017 CV STRESS TEST TRACING ONLY: CPT

## 2021-03-05 PROCEDURE — A9502 TC99M TETROFOSMIN: HCPCS

## 2021-03-05 PROCEDURE — G1004 CDSM NDSC: HCPCS

## 2021-03-05 RX ADMIN — REGADENOSON 0.4 MG: 0.08 INJECTION, SOLUTION INTRAVENOUS at 10:13

## 2021-03-06 PROCEDURE — 93018 CV STRESS TEST I&R ONLY: CPT | Performed by: INTERNAL MEDICINE

## 2021-03-06 PROCEDURE — 93016 CV STRESS TEST SUPVJ ONLY: CPT | Performed by: INTERNAL MEDICINE

## 2021-03-06 PROCEDURE — 78452 HT MUSCLE IMAGE SPECT MULT: CPT | Performed by: INTERNAL MEDICINE

## 2021-03-23 DIAGNOSIS — I48.0 PAROXYSMAL ATRIAL FIBRILLATION (HCC): ICD-10-CM

## 2021-03-23 RX ORDER — METOPROLOL SUCCINATE 50 MG/1
50 TABLET, EXTENDED RELEASE ORAL 2 TIMES DAILY
Qty: 180 TABLET | Refills: 1 | Status: SHIPPED | OUTPATIENT
Start: 2021-03-23 | End: 2021-10-01 | Stop reason: HOSPADM

## 2021-03-24 ENCOUNTER — OFFICE VISIT (OUTPATIENT)
Dept: INTERNAL MEDICINE CLINIC | Facility: CLINIC | Age: 76
End: 2021-03-24
Payer: MEDICARE

## 2021-03-24 VITALS
DIASTOLIC BLOOD PRESSURE: 85 MMHG | HEART RATE: 75 BPM | WEIGHT: 151 LBS | BODY MASS INDEX: 26.75 KG/M2 | OXYGEN SATURATION: 97 % | HEIGHT: 63 IN | TEMPERATURE: 97.3 F | SYSTOLIC BLOOD PRESSURE: 132 MMHG

## 2021-03-24 DIAGNOSIS — E55.9 VITAMIN D DEFICIENCY DISEASE: ICD-10-CM

## 2021-03-24 DIAGNOSIS — I48.0 PAROXYSMAL ATRIAL FIBRILLATION (HCC): Primary | ICD-10-CM

## 2021-03-24 DIAGNOSIS — E03.9 ACQUIRED HYPOTHYROIDISM: ICD-10-CM

## 2021-03-24 DIAGNOSIS — R07.89 OTHER CHEST PAIN: ICD-10-CM

## 2021-03-24 DIAGNOSIS — E78.5 DYSLIPIDEMIA: ICD-10-CM

## 2021-03-24 DIAGNOSIS — Z11.59 NEED FOR HEPATITIS C SCREENING TEST: ICD-10-CM

## 2021-03-24 DIAGNOSIS — I10 ESSENTIAL (PRIMARY) HYPERTENSION: ICD-10-CM

## 2021-03-24 PROCEDURE — 99214 OFFICE O/P EST MOD 30 MIN: CPT | Performed by: INTERNAL MEDICINE

## 2021-03-24 RX ORDER — NITROGLYCERIN 0.4 MG/1
0.4 TABLET SUBLINGUAL
Qty: 15 TABLET | Refills: 1 | Status: SHIPPED | OUTPATIENT
Start: 2021-03-24

## 2021-03-24 NOTE — PROGRESS NOTES
Assessment/Plan: This is a 77-year-old lady with a history of hypertension paroxysmal atrial fibrillation acquired hypothyroidism dyslipidemia  She had a recent visit to the emergency room last month for palpitations and had an episode of atrial fibrillation with rapid ventricular rate  Currently she is taking metoprolol and verapamil and her rate is stable  Labs were reviewed  1  Paroxysmal atrial fibrillation (HCC)  Comments:  Rate is controlled continue current medications  Orders:  -     CBC and differential; Future  -     Comprehensive metabolic panel; Future  -     Urinalysis with microscopic    2  Vitamin D deficiency disease  Comments:  Continue supplementation  3  Essential (primary) hypertension  Comments:  Blood pressure is controlled and she will continue to monitor at home  4  Acquired hypothyroidism    5  Need for hepatitis C screening test  -     Hepatitis C antibody; Future    6  Other chest pain  -     nitroglycerin (NITROSTAT) 0 4 mg SL tablet; Place 1 tablet (0 4 mg total) under the tongue every 5 (five) minutes as needed for chest pain    7  Dyslipidemia  Comments:  She has multiple reactions to statins and has been unable to tolerate them  Continue low-cholesterol diet and exercise  1  Paroxysmal atrial fibrillation ([address] )      2  Vitamin D deficiency disease      3  Essential (primary) hypertension      4  Acquired hypothyroidism      5  Need for hepatitis C screening test    - Hepatitis C antibody; Future           Subjective:      Patient ID: Aubrey Keith is a 68 y o  female  This is a 77-year-old lady with a history of hypertension paroxysmal atrial fibrillation acquired hypothyroidism dyslipidemia  She denies any chest pain or shortness of breath        The following portions of the patient's history were reviewed and updated as appropriate: She  has a past medical history of Acid reflux, Arthritis, Cataract, Dyslipidemia (high LDL; low HDL), Fibromyalgia, Hyperlipidemia, Hypertension, Hypothyroidism, Palpitation, Paroxysmal atrial fibrillation ([facility] [address] ), and Shingles (09/12/2010)  She   Patient Active Problem List    Diagnosis Date Noted    Abnormal ECG 03/03/2021    Presyncope 01/14/2021    Acquired hypothyroidism 09/11/2020    Plantar fasciitis 09/11/2020    Paroxysmal atrial fibrillation ([facility] [address] ) 07/10/2018    Essential hypertension 07/10/2018    PAC (premature atrial contraction) 07/10/2018    Dyslipidemia 07/10/2018     She  has a past surgical history that includes Replacement total knee; Tonsillectomy; Hysterectomy (05/2016); Cataract extraction, bilateral (11/2016); Colonoscopy (07/11/2016); and Mammo (historical) (4/8/2010 & 2/19/2020)  Her family history includes Atrial fibrillation in her father; Cancer in her father; Chronic bronchitis in her mother; Hyperlipidemia in her mother; Hypertension in her mother; Parkinsonism in her mother; Stroke in her father and mother  She  reports that she has never smoked  She has never used smokeless tobacco  She reports that she does not drink alcohol or use drugs    Current Outpatient Medications   Medication Sig Dispense Refill    aspirin 81 mg chewable tablet Chew 1 tablet (81 mg total) daily 30 tablet 5    ergocalciferol (VITAMIN D2) 50,000 units Take 1 capsule (50,000 Units total) by mouth every 14 (fourteen) days 7 capsule 3    Foot Care Products (S[employer] Arch Support Insoles) MISC by Does not apply route continuous 1 each 0    levothyroxine 75 mcg tablet Take 1 tablet (75 mcg total) by mouth daily 90 tablet 1    metoprolol succinate (TOPROL-XL) 50 mg 24 hr tablet Take 1 tablet (50 mg total) by mouth 2 (two) times a day 180 tablet 1    omeprazole (PRILOSEC) 20 mg delayed release capsule Take 1 capsule by mouth daily      rivaroxaban (XARELTO) 20 mg tablet Take 1 tablet (20 mg total) by mouth daily with breakfast 180 tablet 1    verapamil (CALAN-SR) 120 mg CR tablet Take 1 tablet (120 mg total) by mouth daily      ezetimibe (ZETIA) 10 mg tablet Take 1 tablet (10 mg total) by mouth daily (Patient not taking: Reported on 1/14/2021) 30 tablet 1    nitroglycerin (NITROSTAT) 0 4 mg SL tablet Place 1 tablet (0 4 mg total) under the tongue every 5 (five) minutes as needed for chest pain 15 tablet 1     No current facility-administered medications for this visit  Current Outpatient Medications on File Prior to Visit   Medication Sig    aspirin 81 mg chewable tablet Chew 1 tablet (81 mg total) daily    ergocalciferol (VITAMIN D2) 50,000 units Take 1 capsule (50,000 Units total) by mouth every 14 (fourteen) days   [address] [facility] [facility] by Does not apply route continuous    levothyroxine 75 mcg tablet Take 1 tablet (75 mcg total) by mouth daily    metoprolol succinate (TOPROL-XL) 50 mg 24 hr tablet Take 1 tablet (50 mg total) by mouth 2 (two) times a day    omeprazole (PRILOSEC) 20 mg delayed release capsule Take 1 capsule by mouth daily    rivaroxaban (XARELTO) 20 mg tablet Take 1 tablet (20 mg total) by mouth daily with breakfast    verapamil (CALAN-SR) 120 mg CR tablet Take 1 tablet (120 mg total) by mouth daily    ezetimibe (ZETIA) 10 mg tablet Take 1 tablet (10 mg total) by mouth daily (Patient not taking: Reported on 1/14/2021)     No current facility-administered medications on file prior to visit  She is allergic to iodine and iodinated diagnostic agents       Review of Systems   Constitutional: Negative for appetite change, chills, fatigue and fever  HENT: Negative for sore throat and trouble swallowing  Eyes: Negative for redness  Respiratory: Negative for shortness of breath  Cardiovascular: Negative for chest pain and palpitations  Gastrointestinal: Negative for abdominal pain, constipation and diarrhea  Genitourinary: Negative for dysuria and hematuria  Musculoskeletal: Negative for back pain and neck pain  Skin: Negative for rash  Neurological: Negative for seizures, weakness and headaches  Hematological: Negative for adenopathy  Psychiatric/Behavioral: Negative for confusion  The patient is not nervous/anxious            Objective:      /85 (BP Location: Right arm, Patient Position: Sitting, Cuff Size: Standard)   Pulse 75   Temp (!) 97 3 °F (36 3 °C) (Temporal)   Ht 5' 3" (1 6 m)   Wt 68 5 kg (151 lb)   SpO2 97%   BMI 26 75 kg/m²     Recent Results (from the past 1344 hour(s))   ECG 12 lead    Collection Time: 02/15/21 12:11 PM   Result Value Ref Range    Ventricular Rate 85 BPM    Atrial Rate 234 BPM    OH Interval  ms    QRSD Interval 70 ms    QT Interval 336 ms    QTC Interval 399 ms    P Axis  degrees    QRS Axis 58 degrees    T Wave Axis 252 degrees   CBC and differential    Collection Time: 02/15/21 12:28 PM   Result Value Ref Range    WBC 8 26 4 31 - 10 16 Thousand/uL    RBC 4 64 3 81 - 5 12 Million/uL    Hemoglobin 14 9 11 5 - 15 4 g/dL    Hematocrit 46 0 34 8 - 46 1 %    MCV 99 (H) 82 - 98 fL    MCH 32 1 26 8 - 34 3 pg    MCHC 32 4 31 4 - 37 4 g/dL    RDW 13 4 11 6 - 15 1 %    MPV 10 1 8 9 - 12 7 fL    Platelets 952 666 - 591 Thousands/uL    nRBC 0 /100 WBCs    Neutrophils Relative 53 43 - 75 %    Immat GRANS % 0 0 - 2 %    Lymphocytes Relative 35 14 - 44 %    Monocytes Relative 9 4 - 12 %    Eosinophils Relative 2 0 - 6 %    Basophils Relative 1 0 - 1 %    Neutrophils Absolute 4 34 1 85 - 7 62 Thousands/µL    Immature Grans Absolute 0 03 0 00 - 0 20 Thousand/uL    Lymphocytes Absolute 2 91 0 60 - 4 47 Thousands/µL    Monocytes Absolute 0 72 0 17 - 1 22 Thousand/µL    Eosinophils Absolute 0 20 0 00 - 0 61 Thousand/µL    Basophils Absolute 0 06 0 00 - 0 10 Thousands/µL   Comprehensive metabolic panel    Collection Time: 02/15/21 12:28 PM   Result Value Ref Range    Sodium 142 136 - 145 mmol/L    Potassium 3 6 3 5 - 5 3 mmol/L    Chloride 104 100 - 108 mmol/L    CO2 27 21 - 32 mmol/L    ANION GAP 11 4 - 13 mmol/L BUN 21 5 - 25 mg/dL    Creatinine 1 01 0 60 - 1 30 mg/dL    Glucose 115 65 - 140 mg/dL    Calcium 9 4 8 3 - 10 1 mg/dL    AST 24 5 - 45 U/L    ALT 25 12 - 78 U/L    Alkaline Phosphatase 72 46 - 116 U/L    Total Protein 8 3 (H) 6 4 - 8 2 g/dL    Albumin 4 2 3 5 - 5 0 g/dL    Total Bilirubin 0 61 0 20 - 1 00 mg/dL    eGFR 55 ml/min/1 73sq m   TSH    Collection Time: 02/15/21 12:28 PM   Result Value Ref Range    TSH 3RD GENERATON 2 876 0 358 - 3 740 uIU/mL   Magnesium    Collection Time: 02/15/21 12:28 PM   Result Value Ref Range    Magnesium 2 0 1 6 - 2 6 mg/dL   Troponin I    Collection Time: 02/15/21 12:28 PM   Result Value Ref Range    Troponin I <0 02 <=0 04 ng/mL   D-Dimer    Collection Time: 02/15/21 12:28 PM   Result Value Ref Range    D-Dimer, Quant 0 48 <0 50 ug/ml FEU   Lipid panel    Collection Time: 03/01/21 11:30 AM   Result Value Ref Range    Cholesterol 256 (H) 50 - 200 mg/dL    Triglycerides 87 <=150 mg/dL    HDL, Direct 78 >=40 mg/dL    LDL Calculated 161 (H) 0 - 100 mg/dL    Non-HDL-Chol (CHOL-HDL) 178 mg/dl   Stress strip    Collection Time: 03/05/21 10:07 AM   Result Value Ref Range    Protocol Name [address]     Time In Exercise Phase 00:03:00     MAX  SYSTOLIC  mmHg    Max Diastolic Bp 114 mmHg    Max Heart Rate 111 BPM    Max Predicted Heart Rate 144 BPM    Reason for Termination Protocol Complete     Test Indication Precordial Pain     Target Hr Formular (220 - Age)*100%     Arrhy During Ex      ECG Interp Before Ex      ECG Interp during Ex      Ex Summary Comment      Chest Pain Statement none     Overall Hr Response To Exercise      Overall BP Response To Exercise          Physical Exam  Constitutional:       General: She is not in acute distress  Appearance: Normal appearance  HENT:      Head: Normocephalic and atraumatic  Nose: Nose normal       Mouth/Throat:      Mouth: Mucous membranes are moist    Eyes:      Extraocular Movements: Extraocular movements intact  Pupils: Pupils are equal, round, and reactive to light  Neck:      Musculoskeletal: Normal range of motion and neck supple  Cardiovascular:      Rate and Rhythm: Normal rate  Rhythm irregular  Pulses: Normal pulses  Heart sounds: Normal heart sounds  No murmur  No friction rub  Pulmonary:      Effort: Pulmonary effort is normal  No respiratory distress  Breath sounds: Normal breath sounds  No wheezing  Abdominal:      General: Abdomen is flat  Bowel sounds are normal  There is no distension  Palpations: Abdomen is soft  There is no mass  Tenderness: There is no abdominal tenderness  There is no guarding  Musculoskeletal: Normal range of motion  Neurological:      General: No focal deficit present  Mental Status: She is alert and oriented to person, place, and time  Mental status is at baseline  Cranial Nerves: No cranial nerve deficit     Psychiatric:         Mood and Affect: Mood normal          Behavior: Behavior normal

## 2021-03-26 DIAGNOSIS — I48.0 PAROXYSMAL ATRIAL FIBRILLATION (HCC): ICD-10-CM

## 2021-04-05 DIAGNOSIS — E03.9 ACQUIRED HYPOTHYROIDISM: ICD-10-CM

## 2021-04-06 RX ORDER — LEVOTHYROXINE SODIUM 0.07 MG/1
75 TABLET ORAL DAILY
Qty: 90 TABLET | Refills: 1 | Status: SHIPPED | OUTPATIENT
Start: 2021-04-06 | End: 2021-10-04 | Stop reason: SDUPTHER

## 2021-04-28 ENCOUNTER — TELEPHONE (OUTPATIENT)
Dept: INTERNAL MEDICINE CLINIC | Facility: CLINIC | Age: 76
End: 2021-04-28

## 2021-04-28 DIAGNOSIS — Z11.52 ENCOUNTER FOR SCREENING FOR COVID-19: Primary | ICD-10-CM

## 2021-04-28 DIAGNOSIS — Z11.52 ENCOUNTER FOR SCREENING FOR COVID-19: ICD-10-CM

## 2021-04-28 PROCEDURE — U0003 INFECTIOUS AGENT DETECTION BY NUCLEIC ACID (DNA OR RNA); SEVERE ACUTE RESPIRATORY SYNDROME CORONAVIRUS 2 (SARS-COV-2) (CORONAVIRUS DISEASE [COVID-19]), AMPLIFIED PROBE TECHNIQUE, MAKING USE OF HIGH THROUGHPUT TECHNOLOGIES AS DESCRIBED BY CMS-2020-01-R: HCPCS | Performed by: INTERNAL MEDICINE

## 2021-04-28 PROCEDURE — U0005 INFEC AGEN DETEC AMPLI PROBE: HCPCS | Performed by: INTERNAL MEDICINE

## 2021-04-28 NOTE — TELEPHONE ENCOUNTER
PT AND HER  WERE VISITING HER DAUGHTER AND FOUND OUT SHE TESTED POSITIVE FOR COVID   SHOULD HER AND HER  GET TESTED

## 2021-04-29 LAB — SARS-COV-2 RNA RESP QL NAA+PROBE: NEGATIVE

## 2021-05-19 ENCOUNTER — TRANSCRIBE ORDERS (OUTPATIENT)
Dept: LAB | Facility: AMBULARY SURGERY CENTER | Age: 76
End: 2021-05-19

## 2021-05-19 ENCOUNTER — APPOINTMENT (OUTPATIENT)
Dept: LAB | Facility: AMBULARY SURGERY CENTER | Age: 76
End: 2021-05-19
Payer: MEDICARE

## 2021-05-19 DIAGNOSIS — I48.0 PAROXYSMAL ATRIAL FIBRILLATION (HCC): ICD-10-CM

## 2021-05-19 DIAGNOSIS — Z11.59 NEED FOR HEPATITIS C SCREENING TEST: ICD-10-CM

## 2021-05-19 LAB
ALBUMIN SERPL BCP-MCNC: 3.9 G/DL (ref 3.5–5)
ALP SERPL-CCNC: 62 U/L (ref 46–116)
ALT SERPL W P-5'-P-CCNC: 21 U/L (ref 12–78)
ANION GAP SERPL CALCULATED.3IONS-SCNC: 6 MMOL/L (ref 4–13)
AST SERPL W P-5'-P-CCNC: 18 U/L (ref 5–45)
BACTERIA UR QL AUTO: ABNORMAL /HPF
BASOPHILS # BLD AUTO: 0.04 THOUSANDS/ΜL (ref 0–0.1)
BASOPHILS NFR BLD AUTO: 1 % (ref 0–1)
BILIRUB SERPL-MCNC: 0.71 MG/DL (ref 0.2–1)
BILIRUB UR QL STRIP: NEGATIVE
BUN SERPL-MCNC: 20 MG/DL (ref 5–25)
CALCIUM SERPL-MCNC: 9.6 MG/DL (ref 8.3–10.1)
CHLORIDE SERPL-SCNC: 106 MMOL/L (ref 100–108)
CLARITY UR: CLEAR
CO2 SERPL-SCNC: 29 MMOL/L (ref 21–32)
COLOR UR: ABNORMAL
CREAT SERPL-MCNC: 0.87 MG/DL (ref 0.6–1.3)
EOSINOPHIL # BLD AUTO: 0.19 THOUSAND/ΜL (ref 0–0.61)
EOSINOPHIL NFR BLD AUTO: 3 % (ref 0–6)
ERYTHROCYTE [DISTWIDTH] IN BLOOD BY AUTOMATED COUNT: 13.3 % (ref 11.6–15.1)
GFR SERPL CREATININE-BSD FRML MDRD: 65 ML/MIN/1.73SQ M
GLUCOSE P FAST SERPL-MCNC: 100 MG/DL (ref 65–99)
GLUCOSE UR STRIP-MCNC: NEGATIVE MG/DL
HCT VFR BLD AUTO: 42.5 % (ref 34.8–46.1)
HCV AB SER QL: NORMAL
HGB BLD-MCNC: 13.6 G/DL (ref 11.5–15.4)
HGB UR QL STRIP.AUTO: NEGATIVE
HYALINE CASTS #/AREA URNS LPF: ABNORMAL /LPF
IMM GRANULOCYTES # BLD AUTO: 0.02 THOUSAND/UL (ref 0–0.2)
IMM GRANULOCYTES NFR BLD AUTO: 0 % (ref 0–2)
KETONES UR STRIP-MCNC: NEGATIVE MG/DL
LEUKOCYTE ESTERASE UR QL STRIP: ABNORMAL
LYMPHOCYTES # BLD AUTO: 1.89 THOUSANDS/ΜL (ref 0.6–4.47)
LYMPHOCYTES NFR BLD AUTO: 27 % (ref 14–44)
MCH RBC QN AUTO: 31.9 PG (ref 26.8–34.3)
MCHC RBC AUTO-ENTMCNC: 32 G/DL (ref 31.4–37.4)
MCV RBC AUTO: 100 FL (ref 82–98)
MONOCYTES # BLD AUTO: 0.74 THOUSAND/ΜL (ref 0.17–1.22)
MONOCYTES NFR BLD AUTO: 11 % (ref 4–12)
NEUTROPHILS # BLD AUTO: 4.01 THOUSANDS/ΜL (ref 1.85–7.62)
NEUTS SEG NFR BLD AUTO: 58 % (ref 43–75)
NITRITE UR QL STRIP: NEGATIVE
NON-SQ EPI CELLS URNS QL MICRO: ABNORMAL /HPF
NRBC BLD AUTO-RTO: 0 /100 WBCS
PH UR STRIP.AUTO: 5.5 [PH]
PLATELET # BLD AUTO: 312 THOUSANDS/UL (ref 149–390)
PMV BLD AUTO: 9.9 FL (ref 8.9–12.7)
POTASSIUM SERPL-SCNC: 4.1 MMOL/L (ref 3.5–5.3)
PROT SERPL-MCNC: 7.5 G/DL (ref 6.4–8.2)
PROT UR STRIP-MCNC: NEGATIVE MG/DL
RBC # BLD AUTO: 4.27 MILLION/UL (ref 3.81–5.12)
RBC #/AREA URNS AUTO: ABNORMAL /HPF
SODIUM SERPL-SCNC: 141 MMOL/L (ref 136–145)
SP GR UR STRIP.AUTO: 1.03 (ref 1–1.03)
UROBILINOGEN UR QL STRIP.AUTO: 0.2 E.U./DL
WBC # BLD AUTO: 6.89 THOUSAND/UL (ref 4.31–10.16)
WBC #/AREA URNS AUTO: ABNORMAL /HPF

## 2021-05-19 PROCEDURE — 36415 COLL VENOUS BLD VENIPUNCTURE: CPT

## 2021-05-19 PROCEDURE — 80053 COMPREHEN METABOLIC PANEL: CPT

## 2021-05-19 PROCEDURE — 86803 HEPATITIS C AB TEST: CPT

## 2021-05-19 PROCEDURE — 81001 URINALYSIS AUTO W/SCOPE: CPT | Performed by: INTERNAL MEDICINE

## 2021-05-19 PROCEDURE — 85025 COMPLETE CBC W/AUTO DIFF WBC: CPT

## 2021-06-08 DIAGNOSIS — I10 ESSENTIAL (PRIMARY) HYPERTENSION: ICD-10-CM

## 2021-06-23 ENCOUNTER — OFFICE VISIT (OUTPATIENT)
Dept: INTERNAL MEDICINE CLINIC | Facility: CLINIC | Age: 76
End: 2021-06-23
Payer: MEDICARE

## 2021-06-23 VITALS
TEMPERATURE: 98.6 F | SYSTOLIC BLOOD PRESSURE: 126 MMHG | DIASTOLIC BLOOD PRESSURE: 76 MMHG | HEART RATE: 67 BPM | OXYGEN SATURATION: 98 % | HEIGHT: 63 IN | WEIGHT: 149 LBS | BODY MASS INDEX: 26.4 KG/M2

## 2021-06-23 DIAGNOSIS — E78.5 DYSLIPIDEMIA: ICD-10-CM

## 2021-06-23 DIAGNOSIS — E03.9 ACQUIRED HYPOTHYROIDISM: ICD-10-CM

## 2021-06-23 DIAGNOSIS — I10 ESSENTIAL (PRIMARY) HYPERTENSION: Primary | ICD-10-CM

## 2021-06-23 DIAGNOSIS — I48.0 PAROXYSMAL ATRIAL FIBRILLATION (HCC): ICD-10-CM

## 2021-06-23 DIAGNOSIS — E55.9 VITAMIN D DEFICIENCY DISEASE: ICD-10-CM

## 2021-06-23 PROCEDURE — 99214 OFFICE O/P EST MOD 30 MIN: CPT | Performed by: INTERNAL MEDICINE

## 2021-06-23 NOTE — PROGRESS NOTES
Assessment/Plan: This is a 51-year-old lady with a history of hypertension hypothyroidism paroxysmal atrial fibrillation hyperlipidemia and degenerative joint disease  She denies any chest pain or shortness of breath  She did notice her heart rate dropped to the low 40s and she felt fatigued  She will follow-up with her electro cardiologist and cardiologist   Her brother also has problem with atrial fibrillation and has a pacemaker  Incidentally her granddaughter has AICD      1  Essential (primary) hypertension  Comments:  Blood pressure stable on verapamil and metoprolol  Orders:  -     CBC and differential; Future  -     Comprehensive metabolic panel; Future  -     Lipid panel; Future  -     UA (URINE) with reflex to Scope    2  Acquired hypothyroidism  Comments:  Continue levothyroxine 75 mcg daily  Orders:  -     TSH, 3rd generation; Future    3  Paroxysmal atrial fibrillation (HCC)    4  Vitamin D deficiency disease    5  Dyslipidemia           1  Essential (primary) hypertension      2  Acquired hypothyroidism             Subjective:      Patient ID: Bessie Lomax is a 68 y o  female  This is a 51-year-old lady with a history of hypertension hypothyroidism paroxysmal atrial fibrillation hyperlipidemia and degenerative joint disease  She denies any chest pain or shortness of breath  She did notice her heart rate dropped to the low 40s and she felt fatigued  She will follow-up with her electro cardiologist and cardiologist   Her brother also has problem with atrial fibrillation and has a pacemaker  Incidentally her granddaughter has AICD      The following portions of the patient's history were reviewed and updated as appropriate: She  has a past medical history of Acid reflux, Arthritis, Cataract, Dyslipidemia (high LDL; low HDL), Fibromyalgia, Hyperlipidemia, Hypertension, Hypothyroidism, Palpitation, Paroxysmal atrial fibrillation ([address] ), and Shingles (09/12/2010)    She   Patient Active Problem List    Diagnosis Date Noted    Abnormal ECG 03/03/2021    Presyncope 01/14/2021    Acquired hypothyroidism 09/11/2020    Plantar fasciitis 09/11/2020    Paroxysmal atrial fibrillation ([address] ) 07/10/2018    Essential hypertension 07/10/2018    PAC (premature atrial contraction) 07/10/2018    Dyslipidemia 07/10/2018     She  has a past surgical history that includes Replacement total knee; Tonsillectomy; Hysterectomy (05/2016); Cataract extraction, bilateral (11/2016); Colonoscopy (07/11/2016); and Mammo (historical) (4/8/2010 & 2/19/2020)  Her family history includes Atrial fibrillation in her father; Cancer in her father; Chronic bronchitis in her mother; Hyperlipidemia in her mother; Hypertension in her mother; Parkinsonism in her mother; Stroke in her father and mother  She  reports that she has never smoked  She has never used smokeless tobacco  She reports that she does not drink alcohol and does not use drugs    Current Outpatient Medications   Medication Sig Dispense Refill    aspirin 81 mg chewable tablet Chew 1 tablet (81 mg total) daily 30 tablet 5    ergocalciferol (VITAMIN D2) 50,000 units Take 1 capsule (50,000 Units total) by mouth every 14 (fourteen) days 7 capsule 3    Foot Care Products ([employer] Arch Support Insoles) MISC by Does not apply route continuous 1 each 0    levothyroxine 75 mcg tablet Take 1 tablet (75 mcg total) by mouth daily 90 tablet 1    metoprolol succinate (TOPROL-XL) 50 mg 24 hr tablet Take 1 tablet (50 mg total) by mouth 2 (two) times a day 180 tablet 1    nitroglycerin (NITROSTAT) 0 4 mg SL tablet Place 1 tablet (0 4 mg total) under the tongue every 5 (five) minutes as needed for chest pain 15 tablet 1    omeprazole (PRILOSEC) 20 mg delayed release capsule Take 1 capsule by mouth daily      rivaroxaban (XARELTO) 20 mg tablet Take 1 tablet (20 mg total) by mouth daily with breakfast 90 tablet 3    verapamil (CALAN-SR) 120 mg CR tablet Take 1 tablet (120 mg total) by mouth daily 90 tablet 0     No current facility-administered medications for this visit  Current Outpatient Medications on File Prior to Visit   Medication Sig    aspirin 81 mg chewable tablet Chew 1 tablet (81 mg total) daily    ergocalciferol (VITAMIN D2) 50,000 units Take 1 capsule (50,000 Units total) by mouth every 14 (fourteen) days   [address] [facility] ([address] [facility]) MISC by Does not apply route continuous    levothyroxine 75 mcg tablet Take 1 tablet (75 mcg total) by mouth daily    metoprolol succinate (TOPROL-XL) 50 mg 24 hr tablet Take 1 tablet (50 mg total) by mouth 2 (two) times a day    nitroglycerin (NITROSTAT) 0 4 mg SL tablet Place 1 tablet (0 4 mg total) under the tongue every 5 (five) minutes as needed for chest pain    omeprazole (PRILOSEC) 20 mg delayed release capsule Take 1 capsule by mouth daily    rivaroxaban (XARELTO) 20 mg tablet Take 1 tablet (20 mg total) by mouth daily with breakfast    verapamil (CALAN-SR) 120 mg CR tablet Take 1 tablet (120 mg total) by mouth daily    [DISCONTINUED] ezetimibe (ZETIA) 10 mg tablet Take 1 tablet (10 mg total) by mouth daily (Patient not taking: Reported on 1/14/2021)     No current facility-administered medications on file prior to visit  She is allergic to iodine - food allergy and iodinated diagnostic agents       Review of Systems   Constitutional: Negative for appetite change, chills, fatigue and fever  HENT: Negative for sore throat and trouble swallowing  Eyes: Negative for redness  Respiratory: Negative for shortness of breath  Cardiovascular: Negative for chest pain and palpitations  Gastrointestinal: Negative for abdominal pain, constipation and diarrhea  Genitourinary: Negative for dysuria and hematuria  Musculoskeletal: Negative for back pain and neck pain  Skin: Negative for rash  Neurological: Negative for seizures, weakness and headaches     Hematological: Negative for adenopathy  Psychiatric/Behavioral: Negative for confusion  The patient is not nervous/anxious            Objective:      /76 (BP Location: Right arm, Patient Position: Sitting, Cuff Size: Standard)   Pulse 67   Temp 98 6 °F (37 °C) (Temporal)   Ht 5' 3" (1 6 m)   Wt 67 6 kg (149 lb)   SpO2 98%   BMI 26 39 kg/m²     Recent Results (from the past 1344 hour(s))   Urinalysis with microscopic    Collection Time: 05/19/21  9:33 AM   Result Value Ref Range    Clarity, UA Clear     Color, UA Dk Yellow     Specific [place], UA 1 026 1 003 - 1 030    pH, UA 5 5 4 5, 5 0, 5 5, 6 0, 6 5, 7 0, 7 5, 8 0    Glucose, UA Negative Negative mg/dl    Ketones, UA Negative Negative mg/dl    Blood, UA Negative Negative    Protein, UA Negative Negative mg/dl    Nitrite, UA Negative Negative    Bilirubin, UA Negative Negative    Urobilinogen, UA 0 2 0 2, 1 0 E U /dl E U /dl    Leukocytes, UA Trace (A) Negative    WBC, UA 2-4 None Seen, 2-4 /hpf    RBC, UA 4-10 (A) None Seen, 2-4 /hpf    Hyaline Casts, UA None Seen None Seen /lpf    Bacteria, UA None Seen None Seen, Occasional /hpf    Epithelial Cells None Seen None Seen, Occasional /hpf   Hepatitis C antibody    Collection Time: 05/19/21  9:33 AM   Result Value Ref Range    Hepatitis C Ab Non-reactive Non-reactive   CBC and differential    Collection Time: 05/19/21  9:33 AM   Result Value Ref Range    WBC 6 89 4 31 - 10 16 Thousand/uL    RBC 4 27 3 81 - 5 12 Million/uL    Hemoglobin 13 6 11 5 - 15 4 g/dL    Hematocrit 42 5 34 8 - 46 1 %     (H) 82 - 98 fL    MCH 31 9 26 8 - 34 3 pg    MCHC 32 0 31 4 - 37 4 g/dL    RDW 13 3 11 6 - 15 1 %    MPV 9 9 8 9 - 12 7 fL    Platelets 590 667 - 369 Thousands/uL    nRBC 0 /100 WBCs    Neutrophils Relative 58 43 - 75 %    Immat GRANS % 0 0 - 2 %    Lymphocytes Relative 27 14 - 44 %    Monocytes Relative 11 4 - 12 %    Eosinophils Relative 3 0 - 6 %    Basophils Relative 1 0 - 1 %    Neutrophils Absolute 4 01 1 85 - 7 62 Thousands/µL Immature Grans Absolute 0 02 0 00 - 0 20 Thousand/uL    Lymphocytes Absolute 1 89 0 60 - 4 47 Thousands/µL    Monocytes Absolute 0 74 0 17 - 1 22 Thousand/µL    Eosinophils Absolute 0 19 0 00 - 0 61 Thousand/µL    Basophils Absolute 0 04 0 00 - 0 10 Thousands/µL   Comprehensive metabolic panel    Collection Time: 05/19/21  9:33 AM   Result Value Ref Range    Sodium 141 136 - 145 mmol/L    Potassium 4 1 3 5 - 5 3 mmol/L    Chloride 106 100 - 108 mmol/L    CO2 29 21 - 32 mmol/L    ANION GAP 6 4 - 13 mmol/L    BUN 20 5 - 25 mg/dL    Creatinine 0 87 0 60 - 1 30 mg/dL    Glucose, Fasting 100 (H) 65 - 99 mg/dL    Calcium 9 6 8 3 - 10 1 mg/dL    AST 18 5 - 45 U/L    ALT 21 12 - 78 U/L    Alkaline Phosphatase 62 46 - 116 U/L    Total Protein 7 5 6 4 - 8 2 g/dL    Albumin 3 9 3 5 - 5 0 g/dL    Total Bilirubin 0 71 0 20 - 1 00 mg/dL    eGFR 65 ml/min/1 73sq m        Physical Exam  Constitutional:       General: She is not in acute distress  Appearance: Normal appearance  HENT:      Head: Normocephalic and atraumatic  Nose: Nose normal       Mouth/Throat:      Mouth: Mucous membranes are moist    Eyes:      Extraocular Movements: Extraocular movements intact  Pupils: Pupils are equal, round, and reactive to light  Cardiovascular:      Rate and Rhythm: Normal rate and regular rhythm  Pulses: Normal pulses  Heart sounds: Normal heart sounds  No murmur heard  No friction rub  Pulmonary:      Effort: Pulmonary effort is normal  No respiratory distress  Breath sounds: Normal breath sounds  No wheezing  Abdominal:      General: Abdomen is flat  Bowel sounds are normal  There is no distension  Palpations: Abdomen is soft  There is no mass  Tenderness: There is no abdominal tenderness  There is no guarding  Musculoskeletal:         General: Normal range of motion  Neurological:      General: No focal deficit present        Mental Status: She is alert and oriented to person, place, and time  Mental status is at baseline  Cranial Nerves: No cranial nerve deficit     Psychiatric:         Mood and Affect: Mood normal          Behavior: Behavior normal

## 2021-08-27 ENCOUNTER — CONSULT (OUTPATIENT)
Dept: CARDIOLOGY CLINIC | Facility: CLINIC | Age: 76
End: 2021-08-27
Payer: MEDICARE

## 2021-08-27 VITALS
BODY MASS INDEX: 27 KG/M2 | HEIGHT: 63 IN | WEIGHT: 152.4 LBS | OXYGEN SATURATION: 98 % | SYSTOLIC BLOOD PRESSURE: 138 MMHG | DIASTOLIC BLOOD PRESSURE: 80 MMHG | HEART RATE: 65 BPM

## 2021-08-27 DIAGNOSIS — I48.11 LONGSTANDING PERSISTENT ATRIAL FIBRILLATION (HCC): Primary | ICD-10-CM

## 2021-08-27 PROCEDURE — 99205 OFFICE O/P NEW HI 60 MIN: CPT | Performed by: INTERNAL MEDICINE

## 2021-08-27 PROCEDURE — 93000 ELECTROCARDIOGRAM COMPLETE: CPT | Performed by: INTERNAL MEDICINE

## 2021-08-27 NOTE — PROGRESS NOTES
[facility]  [address]    Outpatient New Consult    Today's Date: 08/27/21        Patient name: Long Villar  YOB: 1945  Sex: female         Chief Complaint: *Referred for afib      ASSESSMENT:  Problem List Items Addressed This Visit     None        69 yo female  1) Persistent afib  Dx July 2020, last normal EKG July 2019, symptoms started June 2020, [id number]  Symptomatic with fatigue, shortness of breath, and intermittent feels strong sensation in her head when HR drops to 40bpm     SHe is on both verpamil and metoprolol for rate control  Has afib w RVR on less rate control in past  Normal EF  LAE mild 3 8cm  Mild MR, MOderate TR  No ETOH  NO MEG screening  DCCV AUg 2020 lasted 2 months NSR and did feel better  2) Tachy-indy syndromeSinus bradycardia post DCCV and in afib  she has intermittent sensation in her head when HR drops to 40bpm that maybe presyncope  3) HTN on multidrug regimen  4) Mild MR, MOderate TR          PLAN:  1  Went over options such as pacemaker, afib ablation, antiarrhythmics  SHe is symptomatic in afib and would benefit from rhythm control  WIll try Afib ablation and admit for TIkosyn at same time  SHe has too much bradycardia for amiodarone or sotalol  I explained to her she may need pacemaker  Explained 3 day admission for medication with ablation on first day  Estimate about 70% chance of success w ablation plus antiarrhythmic w persistent afib and explained she may need repeat ablations  Explained serious complications are rare but can be quite serious and include bleeding around the heart  WE discussed enrolling in randomized Pivotal trial for posterior wall isolation at time of ablation as well       2  D/c Verpamil 2 days prior to ablation as it will interact w tikosyn and also to avoid bradycardia    3   Decrease metoprolol XL to 25mg nightly now to avoid further bradycardia episodes  No orders of the defined types were placed in this encounter  There are no discontinued medications    HPI/Subjective:       69 yo female  1) Persistent afib  Dx July 2020, last normal EKG July 2019, symptoms started June 2020, [id number]  Symptomatic with fatigue, shortness of breath, and intermittent feels strong sensation in her head when HR drops to 40bpm     SHe is on both verpamil and metoprolol for rate control  Has afib w RVR on less rate control in past  Normal EF  LAE mild 3 8cm  Mild MR, MOderate TR  No ETOH  NO MEG screening  DCCV AUg 2020 lasted 2 months NSR and did feel better  2) Tachy-indy syndromeSinus bradycardia post DCCV and in afib  she has intermittent sensation in her head when HR drops to 40bpm that maybe presyncope  3) HTN on multidrug regimen  4) Mild MR, MOderate TR      Jarrett Caicedo is well appearing today  SHe has intermittent episodes of feeling tired, short of beath and the strong episodes feels wave come over her, and HR is down to about 40bpm  Before uptitrating her meds she had more rapid afib and was more short of breath  SHe used to have low burden paroxysmal afib 4-5 years ago but in June 2020 became very short of breath leading to dx of afib and then DCCV , she stayed in NSR for 2 months and then recurred and felt better when she was in NSR  SHe has no edema, not on diuretic  Please note HPI is listed by problem with with update following it, it is copied again in the assessment above and reflects medical decision making as well  Complete 12 point ROS reviewed and otherwise non pertinent or negative except as per HPI pertinent positives in Cardiovascular and Respiratory emphasized  Please see paper chart for outpatient clinic patients where the patient completed the 12 point ROS survey             Past Medical History:   Diagnosis Date    Acid reflux     Arthritis     Cataract     Dyslipidemia (high LDL; low HDL)     Fibromyalgia     Hyperlipidemia     Hypertension     Hypothyroidism     Palpitation     Paroxysmal atrial fibrillation (HCC)     Shingles 09/12/2010    Right C7       Allergies   Allergen Reactions    Iodine - Food Allergy Hives     Severe allergy to IV CT scan dye    Iodinated Diagnostic Agents Hives     I reviewed the Home Medication list and Allergies in the chart  Scheduled Meds:  Current Outpatient Medications   Medication Sig Dispense Refill    aspirin 81 mg chewable tablet Chew 1 tablet (81 mg total) daily 30 tablet 5    ergocalciferol (VITAMIN D2) 50,000 units Take 1 capsule (50,000 Units total) by mouth every 14 (fourteen) days 7 capsule 3    Foot Care Products ([place] Arch Support Insoles) MISC by Does not apply route continuous 1 each 0    levothyroxine 75 mcg tablet Take 1 tablet (75 mcg total) by mouth daily 90 tablet 1    metoprolol succinate (TOPROL-XL) 50 mg 24 hr tablet Take 1 tablet (50 mg total) by mouth 2 (two) times a day 180 tablet 1    nitroglycerin (NITROSTAT) 0 4 mg SL tablet Place 1 tablet (0 4 mg total) under the tongue every 5 (five) minutes as needed for chest pain 15 tablet 1    omeprazole (PRILOSEC) 20 mg delayed release capsule Take 1 capsule by mouth daily      rivaroxaban (XARELTO) 20 mg tablet Take 1 tablet (20 mg total) by mouth daily with breakfast 90 tablet 3    verapamil (CALAN-SR) 120 mg CR tablet Take 1 tablet (120 mg total) by mouth daily 90 tablet 0     No current facility-administered medications for this visit       PRN Meds:         Family History   Problem Relation Age of Onset    Hypertension Mother     Hyperlipidemia Mother    Chelle Roles Parkinsonism Mother     Stroke Mother     Chronic bronchitis Mother     Atrial fibrillation Father     Stroke Father     Cancer Father         skin cancer    Anuerysm Neg Hx     Clotting disorder Neg Hx        Social History Socioeconomic History    Marital status: /Civil Union     Spouse name: Not on file    Number of children: Not on file    Years of education: Not on file    Highest education level: Not on file   Occupational History    Not on file   Tobacco Use    Smoking status: Never Smoker    Smokeless tobacco: Never Used   Vaping Use    Vaping Use: Never used   Substance and Sexual Activity    Alcohol use: No    Drug use: No    Sexual activity: Not on file   Other Topics Concern    Not on file   Social History Narrative    Not on file     Social Determinants of Health     Financial Resource Strain:     Difficulty of Paying Living Expenses:    Food Insecurity:     Worried About Running Out of Food in the Last Year:     [address] in the Last Year:    Transportation Needs:     Lack of Transportation (Medical):  Lack of Transportation (Non-Medical):    Physical Activity:     Days of Exercise per Week:     Minutes of Exercise per Session:    Stress:     Feeling of Stress :    Social Connections:     Frequency of Communication with Friends and Family:     Frequency of Social Gatherings with Friends and Family:     Attends Gnosticism Services:     Active Member of Clubs or Organizations:     Attends Club or Organization Meetings:     Marital Status:    Intimate Partner Violence:     Fear of Current or Ex-Partner:     Emotionally Abused:     Physically Abused:     Sexually Abused:          OBJECTIVE:    /80   Pulse 65   Ht 5' 3" (1 6 m)   Wt 69 1 kg (152 lb 6 4 oz)   SpO2 98%   BMI 27 00 kg/m²   Vitals:    08/27/21 1458   Weight: 69 1 kg (152 lb 6 4 oz)     GEN: No acute distress, Alert and oriented, well appearing  HEENT:External ears normal, wearing a mask     EYES: Pupils equal, sclera anicteric, midline, normal conjuctiva  NECK: No JVD, supple, no obvious masses or thryomegaly or goiter  CARDIOVASCULAR: irreg irreg, No murmur, rub, gallops S1,S2  LUNGS: Clear To auscultation bilaterally, normal effort, no rales, rhonchi, crackles   ABDOMEN:  nondistended,  without obvious organomegaly or ascites  EXTREMITIES/VASCULAR:  No edema  warm an well perfused  PSYCH: Normal Affect,  linear speech pattern without evidence of psychosis  NEURO: Grossly intact, moving all extremiteis equal, face symmetric, alert and responsive, no obvious focal defecits   GAIT:  Ambulates normally without difficulty  HEME: No bleeding, bruising, petechia, purpura   SKIN: No significant rashes on visibile skin, warm, no diaphoresis or pallor  Lab Results:       LABS:      Chemistry        Component Value Date/Time     (L) 03/08/2015 1950    K 4 1 05/19/2021 0933    K 3 7 03/08/2015 1950     05/19/2021 0933     03/08/2015 1950    CO2 29 05/19/2021 0933    CO2 23 03/08/2015 1950    BUN 20 05/19/2021 0933    BUN 15 03/08/2015 1950    CREATININE 0 87 05/19/2021 0933    CREATININE 0 61 03/08/2015 1950        Component Value Date/Time    CALCIUM 9 6 05/19/2021 0933    CALCIUM 9 0 03/08/2015 1950    ALKPHOS 62 05/19/2021 0933    ALKPHOS 65 10/11/2016 1210    AST 18 05/19/2021 0933    AST 15 10/11/2016 1210    ALT 21 05/19/2021 0933    ALT 13 10/11/2016 1210    BILITOT 0 6 10/11/2016 1210            No results found for: CHOL  Lab Results   Component Value Date    HDL 78 03/01/2021    HDL 70 08/12/2020     Lab Results   Component Value Date    LDLCALC 161 (H) 03/01/2021    LDLCALC 170 (H) 08/12/2020     Lab Results   Component Value Date    TRIG 87 03/01/2021    TRIG 85 08/12/2020     No results found for: CHOLHDL    IMAGING: No results found       Cardiac testing:   Results for orders placed during the hospital encounter of 09/25/20    Echo complete with contrast if indicated    [address]  [address] [facility], [place]   [phone]    Transthoracic Echocardiogram  2D, M-mode, Doppler, and Color Doppler    Study date:  25-Sep-2020    Patient: DAYLIN ANTHONY  MR number: [id number]  Account number: [de-identified]  : 1945  Age: 76 years  Gender: Female  Status: Outpatient  Location: Echo lab  Height: 63 in  Weight: 156 6 lb  BP: 157/ 87 mmHg    Indications: Atrial fibrillation    Diagnoses: I48 0 - Atrial fibrillation    Sonographer:  YUSUF Tyler  Primary Physician:  Fantasma Hernandez MD  Referring Physician:  Trude Fothergill, MD  Group:  [facility]  Interpreting Physician:  Ela Menjivar MD    SUMMARY    LEFT VENTRICLE:  Systolic function was normal  Ejection fraction was estimated in the range of 55 % to 60 % to be 55 %  There were no regional wall motion abnormalities  Wall thickness was at the upper limits of normal     LEFT ATRIUM:  The atrium was moderately dilated  RIGHT ATRIUM:  The atrium was moderately to markedly dilated  MITRAL VALVE:  There was at least mild to moderate regurgitation  AORTIC VALVE:  There was mild regurgitation  TRICUSPID VALVE:  There was moderate to severe regurgitation  Estimated peak PA pressure was 50 mmHg  IVC, HEPATIC VEINS:  The respirophasic change in diameter was more than 50%  HISTORY: PRIOR HISTORY: A Fib ,Hypothyroidism,HTN,PAC,Dyslipidemia,Arthritis,Fibromyalgia,palpitation  PROCEDURE: The procedure was performed in the echo lab  This was a routine study  The transthoracic approach was used  The study included complete 2D imaging, M-mode, complete spectral Doppler, and color Doppler  The heart rate was 138  bpm, at the start of the study  Images were obtained from the parasternal, apical, subcostal, and suprasternal notch acoustic windows  Intravenous contrast ( 0 4ml Definity in NSS) was administered to enhance Doppler signals  Image quality  was adequate  LEFT VENTRICLE: Size was normal  Systolic function was normal  Ejection fraction was estimated in the range of 55 % to 60 % to be 55 %  There were no regional wall motion abnormalities   Sandra Stovall thickness was at the upper limits of normal     RIGHT VENTRICLE: The size was normal  Systolic function was normal     LEFT ATRIUM: The atrium was moderately dilated  RIGHT ATRIUM: The atrium was moderately to markedly dilated  MITRAL VALVE: There was normal leaflet separation  DOPPLER: The transmitral velocity was within the normal range  There was no evidence for stenosis  There was at least mild to moderate regurgitation  AORTIC VALVE: The valve was trileaflet  Leaflets exhibited mildly increased thickness and normal cuspal separation  DOPPLER: Transaortic velocity was within the normal range  There was no evidence for stenosis  There was mild  regurgitation  TRICUSPID VALVE: DOPPLER: There was moderate to severe regurgitation  Estimated peak PA pressure was 50 mmHg  PULMONIC VALVE: DOPPLER: There was trace regurgitation  PERICARDIUM: There was no thickening or calcification  There was no pericardial effusion  AORTA: The root exhibited normal size  SYSTEMIC VEINS: IVC: The inferior vena cava was normal in size  The respirophasic change in diameter was more than 50%  SYSTEM MEASUREMENT TABLES    2D mode  AoR Diam 2D: 3 cm  LA Diam (2D): 3 8 cm  LA/Ao (2D): 1 27  FS (2D Teich): 35 1 %  IVSd (2D): 0 93 cm  LVDEV: 87 2 cmï¾³  LVEDV MOD BP: 95 cmï¾³  LVESV: 30 9 cmï¾³  LVIDd(2D): 4 39 cm  LVISd (2D): 2 85 cm  LVOT Area 2D: 2 84 cmï¾²  LVPWd (2D): 0 89 cm  SV (Teich): 56 3 cmï¾³    Apical four chamber  LVEDV MOD A4C: 96 cmï¾³  LVEF A4C: 49 %  LVLD A4C: 7 11 cm    Apical two chamber  LVEF A2C: 61 %    Unspecified Scan Mode  KOMAL Cont Eq (Peak Tc): 2 59 cmï¾²  LVOT (VTI): 21 2 cm  LVOT Diam : 1 9 cm  LVOT Vmax: 1130 mm/s  LVOT Vmax; Mean: 1130 mm/s  Peak Grad ; Mean: 5 mm[Hg]  SV (LVOT): 60 cmï¾³  VTI;Mean: 2 mm[Hg]  MR Vol  (PISA): 98 cmï¾³  MV Peak A Tc: 395 mm/s  MV Peak E Tc   Mean: 854 mm/s  MVA (PHT): 4 4 cmï¾²  Max P mm[Hg]  PHT: 50 ms  V Max: 5600 mm/s  Vmax: 5600 mm/s  Max PG: 37 mm[Hg]  V Max: 3010 mm/s  Vmax: 3040 mm/s  RA Area: 28 2 cmï¾²  RA Volume: 103 cmï¾³  TAPSE: 2 3 cm    [facility] Accredited Echocardiography Laboratory    Prepared and electronically signed by    Paola Kirby MD  Signed 25-Sep-2020 16:34:04    Results for orders placed during the hospital encounter of 20    DELANO    Narrative  [address]  [facility], [address] [facility]  [phone]    Transesophageal Echocardiogram  2D, Doppler, and Color Doppler    Study date:  06-Aug-2020    Patient: Rosita Paz  MR number: [id number]  Account number: [de-identified]  : 1945  Age: 76 years  Gender: Female  Status: Outpatient  Location: Echo lab  Height: 63 in  Weight: 161 lb  BP: 164/ 100 mmHg    Indications: PAF    Diagnoses: I48 0 - Atrial fibrillation    Sonographer:  YUSUF Ortega  Interpreting Physician:  Bret Rosales MD  Primary Physician:  Lisa Iglesias MD  Referring Physician:  CHRISTINE Leon  Group:  [facility]  Cardiology Fellow: Adilene Melendez MD  RN:  Miles Rodriguez RN    SUMMARY    LEFT VENTRICLE:  Systolic function was normal  Ejection fraction was estimated to be 60 %  There were no regional wall motion abnormalities  There was no evidence of concentric hypertrophy  RIGHT VENTRICLE:  The ventricle was mildly dilated  Systolic function was normal     LEFT ATRIUM:  The atrium was markedly dilated  No thrombus was identified  LEFT ATRIAL APPENDAGE:  The function was moderately reduced (moderately reduced emptying velocity)  No thrombus was identified  ATRIAL SEPTUM:  No defect or patent foramen ovale was identified  Doppler evaluation was performed  There was no right-to-left shunt, in the baseline state  RIGHT ATRIUM:  The atrium was markedly dilated  No thrombus was identified      RIGHT ATRIAL APPENDAGE:  The function was moderately reduced (moderately reduced emptying velocity)  There was no right atrial appendage thrombus identified  MITRAL VALVE:  There was mild to moderate regurgitation  Regurgitation grade was 1-2+ on a scale of 0 to 4+  AORTIC VALVE:  There was trace regurgitation  TRICUSPID VALVE:  There was moderate to severe regurgitation with an ERO of 66 mm2 and a regurgitant volume estimated at 47 mL  Regurgitation grade was 3+ on a scale of 0 to 4+  HISTORY: PRIOR HISTORY: PAF/PAC; HTN; Dyslipidemia    PROCEDURE: The procedure was performed in the echo lab  This was a routine study  The risks and alternatives of the procedure were explained to the patient and informed consent was obtained  The transesophageal approach was used  The study  included complete 2D imaging, complete spectral Doppler, and color Doppler  The heart rate was 80 bpm, at the start of the study  An adult omniplane probe was inserted by the cardiology fellow under direct supervision of the attending  cardiologist  Intubated with ease  One intubation attempt(s)  There was no blood detected on the probe  Image quality was good  There were no complications during the procedure  MEDICATIONS: Anesthesia administered by anesthesia team     LEFT VENTRICLE: Size was normal  Systolic function was normal  Ejection fraction was estimated to be 60 %  There were no regional wall motion abnormalities  Wall thickness was normal  There was no evidence of concentric hypertrophy  DOPPLER: The study was not technically sufficient to allow evaluation of LV diastolic function  RIGHT VENTRICLE: The ventricle was mildly dilated  Systolic function was normal     LEFT ATRIUM: The atrium was markedly dilated  No thrombus was identified  APPENDAGE: The size was normal  No thrombus was identified  DOPPLER: The function was moderately reduced (moderately reduced emptying velocity)  ATRIAL SEPTUM: No defect or patent foramen ovale was identified  Doppler evaluation was performed   There was no right-to-left shunt, in the baseline state  RIGHT ATRIUM: The atrium was markedly dilated  No thrombus was identified  APPENDAGE: The size was normal  There was no right atrial appendage thrombus identified  DOPPLER: The function was moderately reduced (moderately reduced emptying  velocity)  MITRAL VALVE: Valve structure was normal  There was normal leaflet separation  DOPPLER: There was no evidence for stenosis  There was mild to moderate regurgitation  Regurgitation grade was 1-2+ on a scale of 0 to 4+  AORTIC VALVE: The valve was trileaflet  Leaflets exhibited normal thickness and normal cuspal separation  DOPPLER: There was no evidence for stenosis  There was trace regurgitation  TRICUSPID VALVE: The valve structure was normal  There was normal leaflet separation  DOPPLER: The transtricuspid velocity was within the normal range  There was moderate to severe regurgitation with an ERO of 66 mm2 and a regurgitant  volume estimated at 47 mL  Regurgitation grade was 3+ on a scale of 0 to 4+  The regurgitant jet was directed centrally  PULMONIC VALVE: Leaflets exhibited normal thickness, no calcification, and normal cuspal separation  DOPPLER: There was no evidence for stenosis  There was no significant regurgitation  PERICARDIUM: There was no pericardial effusion  The pericardium was normal in appearance  AORTA: The root exhibited normal size  The ascending aorta was normal in size  There was no atheroma  There was no evidence for dissection  There was no evidence for aneurysm  [address] Accredited Echocardiography Laboratory    Prepared and electronically signed by    Alan Pizano MD  Signed 06-Aug-2020 18:25:56    No results found for this or any previous visit      Results for orders placed during the hospital encounter of 03/05/21    NM myocardial perfusion spect (stress and/or rest)    [facility]  [place]   [address] [facility] [address]  [phone]    Rest/Stress Gated SPECT Myocardial Perfusion Imaging After Regadenoson    Patient: Willow ANTHONY  MR number: [id number]  Account number: [de-identified]  : 1945  Age: 68 years  Gender: Female  Status: Outpatient  Location: Stress lab  Height: 63 in  Weight: 155 lb  BP: 180/ 100 mmHg    Allergies: IODINE, IODINATED DIAGNOSTIC AGENTS    Diagnosis: R07 9 - Chest pain, unspecified    Primary Physician:  Harika Soto MD  RN:  KENAN Sotelo  Referring Physician:  Hussein Quigley MD  Report Prepared By[de-identified]  KENAN Sotelo  RN:  Rosalba Andre RN  Interpreting Physician:  Kai Cid DO    INDICATIONS: Evaluation for coronary artery disease  HISTORY: The patient is a 68year old  female  Chest pain status: chest pain  Coronary artery disease risk factors: dyslipidemia and hypertension  Cardiovascular history: arrhythmia  Medications: an anticoagulant, a beta blocker,  a calcium channel blocker, aspirin, a lipid lowering agent, and thyroid medications  PHYSICAL EXAM: Baseline physical exam screening: no wheezes audible  REST ECG: Normal baseline ECG  Atrial fibrillation  PROCEDURE: The study was performed in the the Stress lab  A regadenoson infusion pharmacologic stress test was performed  Gated SPECT myocardial perfusion imaging was performed after stress and at rest  Systolic blood pressure was 180  mmHg, at the start of the study  Diastolic blood pressure was 100 mmHg, at the start of the study  The heart rate was 87 bpm, at the start of the study  Patient was not experiencing chest pain at the time of the injection of the  radiopharmaceutical  IV double checked    Regadenoson protocol:  Time HR bpm SBP mmHg DBP mmHg Symptoms ST change Rhythm/conduct  Baseline 10:07 85 180 100 none none NSR, no ectopy, A-fib  Immediate 10:13 102 180 90 none -- same as above  1 min 10:14 86 169 80 dizziness -- same as above  2 min 10:15 84 160 74 subsiding, nausea -- --  3 min 10:16 87 159 81 subsiding -- same as above  4 min 10:17 86 143 88 none -- --  No medications or fluids given  STRESS SUMMARY: Duration of pharmacologic stress was 3 min  Functional capacity was normal  Maximal heart rate during stress was 111 bpm  The heart rate response to stress was normal  There was normal resting blood pressure with an  appropriate response to stress  The rate-pressure product for the peak heart rate and blood pressure was 92850  There was no chest pain during stress  The stress test was terminated due to protocol completion  Pre oxygen saturation: 98 %  Peak oxygen saturation: 99 %  The stress ECG was normal  There were no stress arrhythmias or conduction abnormalities  ISOTOPE ADMINISTRATION:  Resting isotope administration Stress isotope administration  Agent Sestamibi Sestamibi  Dose 10 9 mCi 32 mCi  Date 03/05/2021 03/05/2021    The radiopharmaceutical was injected at the peak effect of pharmacologic stress  MYOCARDIAL PERFUSION IMAGING:  The image quality was good  Left ventricular size was normal     PERFUSION DEFECTS:  -  There were no perfusion defects  GATED SPECT:  The calculated left ventricular ejection fraction was 74 %  Left ventricular ejection fraction was within normal limits by visual estimate  There was no left ventricular regional abnormality  SUMMARY:  -  Stress results: Target heart rate was achieved  There was no chest pain during stress  -  ECG conclusions: The stress ECG was normal   -  Perfusion imaging: There were no perfusion defects   -  Gated SPECT: The calculated left ventricular ejection fraction was 74 %  Left ventricular ejection fraction was within normal limits by visual estimate  There was no left ventricular regional abnormality  IMPRESSIONS: Normal study after pharmacologic stress  Myocardial perfusion imaging was normal at rest and with stress   Left ventricular systolic function was normal     Prepared and signed by    Katia Napier DO  Signed 03/06/2021 16:28:17          I reviewed and interpreted the following LABS/EKG/TELE/IMAGING and below is summary of my interpretation (if data available):    LABS: normal bmp, cbc    Current EKG and Rhythm Strip: afib controlled rate 76bpm    Past EKGs and RHYTHM strip: Sinus bradycardia 8/16/20 50bpm w PAC    HOLTER/EVENT Monitor:persistent afib on zio July 2020        ECHO images reviewed and this is my interpretation:  Normal EF, mild LAE, mild MR, mod TR

## 2021-08-29 DIAGNOSIS — I10 ESSENTIAL (PRIMARY) HYPERTENSION: ICD-10-CM

## 2021-08-30 ENCOUNTER — TELEPHONE (OUTPATIENT)
Dept: CARDIOLOGY CLINIC | Facility: CLINIC | Age: 76
End: 2021-08-30

## 2021-08-30 DIAGNOSIS — I48.0 PAROXYSMAL ATRIAL FIBRILLATION (HCC): Primary | ICD-10-CM

## 2021-08-30 NOTE — TELEPHONE ENCOUNTER
Can we please get price for Dofetilide/sotalol for this patient to have it after procedure  Thank you

## 2021-08-30 NOTE — TELEPHONE ENCOUNTER
----- Message from Irineo Villegas MD sent at 8/27/2021  4:36 PM EDT -----  Please schedule afib ablation (after her vacation in sept) w cryo, navx, akhil, post wall  Hold xarelto morning of procedure only  Hold Verpamil 2 days prior to ablation  PLease set up for admission for Dofetilide post ablation- Check cost w Carmen Gallardo please  SHe can be referred for the Piv[employer] trial research abhi Tracy if you can let him know when you schedule   thanks

## 2021-09-01 NOTE — TELEPHONE ENCOUNTER
Patient schedule for DELANO/Afib ablations posterior wall /medication post procedure ( Sotalol) with Dr Nimesh Aparicio on 9/28/21  Patient aware of general instructions, medications holds (Xarelto-Hold the Am of and Verapamil- Hold two day's prior) and labs require  Patient present DYE allergy, premedication call in [facility]   Patient cover under medicare

## 2021-09-22 ENCOUNTER — APPOINTMENT (OUTPATIENT)
Dept: LAB | Facility: CLINIC | Age: 76
End: 2021-09-22
Payer: MEDICARE

## 2021-09-22 DIAGNOSIS — E03.9 ACQUIRED HYPOTHYROIDISM: ICD-10-CM

## 2021-09-22 DIAGNOSIS — I10 ESSENTIAL (PRIMARY) HYPERTENSION: ICD-10-CM

## 2021-09-22 LAB
ALBUMIN SERPL BCP-MCNC: 4 G/DL (ref 3.5–5)
ALP SERPL-CCNC: 65 U/L (ref 46–116)
ALT SERPL W P-5'-P-CCNC: 23 U/L (ref 12–78)
ANION GAP SERPL CALCULATED.3IONS-SCNC: 8 MMOL/L (ref 4–13)
AST SERPL W P-5'-P-CCNC: 19 U/L (ref 5–45)
BACTERIA UR QL AUTO: NORMAL /HPF
BASOPHILS # BLD AUTO: 0.04 THOUSANDS/ΜL (ref 0–0.1)
BASOPHILS NFR BLD AUTO: 1 % (ref 0–1)
BILIRUB SERPL-MCNC: 0.66 MG/DL (ref 0.2–1)
BILIRUB UR QL STRIP: NEGATIVE
BUN SERPL-MCNC: 21 MG/DL (ref 5–25)
CALCIUM SERPL-MCNC: 9.1 MG/DL (ref 8.3–10.1)
CHLORIDE SERPL-SCNC: 105 MMOL/L (ref 100–108)
CHOLEST SERPL-MCNC: 247 MG/DL (ref 50–200)
CLARITY UR: CLEAR
CO2 SERPL-SCNC: 29 MMOL/L (ref 21–32)
COLOR UR: YELLOW
CREAT SERPL-MCNC: 0.96 MG/DL (ref 0.6–1.3)
EOSINOPHIL # BLD AUTO: 0.18 THOUSAND/ΜL (ref 0–0.61)
EOSINOPHIL NFR BLD AUTO: 3 % (ref 0–6)
ERYTHROCYTE [DISTWIDTH] IN BLOOD BY AUTOMATED COUNT: 13.5 % (ref 11.6–15.1)
GFR SERPL CREATININE-BSD FRML MDRD: 58 ML/MIN/1.73SQ M
GLUCOSE P FAST SERPL-MCNC: 88 MG/DL (ref 65–99)
GLUCOSE UR STRIP-MCNC: NEGATIVE MG/DL
HCT VFR BLD AUTO: 40.9 % (ref 34.8–46.1)
HDLC SERPL-MCNC: 74 MG/DL
HGB BLD-MCNC: 13.1 G/DL (ref 11.5–15.4)
HGB UR QL STRIP.AUTO: ABNORMAL
IMM GRANULOCYTES # BLD AUTO: 0.02 THOUSAND/UL (ref 0–0.2)
IMM GRANULOCYTES NFR BLD AUTO: 0 % (ref 0–2)
KETONES UR STRIP-MCNC: NEGATIVE MG/DL
LDLC SERPL CALC-MCNC: 157 MG/DL (ref 0–100)
LEUKOCYTE ESTERASE UR QL STRIP: NEGATIVE
LYMPHOCYTES # BLD AUTO: 2.05 THOUSANDS/ΜL (ref 0.6–4.47)
LYMPHOCYTES NFR BLD AUTO: 32 % (ref 14–44)
MCH RBC QN AUTO: 32.3 PG (ref 26.8–34.3)
MCHC RBC AUTO-ENTMCNC: 32 G/DL (ref 31.4–37.4)
MCV RBC AUTO: 101 FL (ref 82–98)
MONOCYTES # BLD AUTO: 0.67 THOUSAND/ΜL (ref 0.17–1.22)
MONOCYTES NFR BLD AUTO: 10 % (ref 4–12)
NEUTROPHILS # BLD AUTO: 3.47 THOUSANDS/ΜL (ref 1.85–7.62)
NEUTS SEG NFR BLD AUTO: 54 % (ref 43–75)
NITRITE UR QL STRIP: NEGATIVE
NON-SQ EPI CELLS URNS QL MICRO: NORMAL /HPF
NONHDLC SERPL-MCNC: 173 MG/DL
NRBC BLD AUTO-RTO: 0 /100 WBCS
PH UR STRIP.AUTO: 5.5 [PH]
PLATELET # BLD AUTO: 272 THOUSANDS/UL (ref 149–390)
PMV BLD AUTO: 9.8 FL (ref 8.9–12.7)
POTASSIUM SERPL-SCNC: 4.4 MMOL/L (ref 3.5–5.3)
PROT SERPL-MCNC: 7 G/DL (ref 6.4–8.2)
PROT UR STRIP-MCNC: NEGATIVE MG/DL
RBC # BLD AUTO: 4.06 MILLION/UL (ref 3.81–5.12)
RBC #/AREA URNS AUTO: NORMAL /HPF
SODIUM SERPL-SCNC: 142 MMOL/L (ref 136–145)
SP GR UR STRIP.AUTO: <=1.005 (ref 1–1.03)
TRIGL SERPL-MCNC: 81 MG/DL
TSH SERPL DL<=0.05 MIU/L-ACNC: 1.69 UIU/ML (ref 0.36–3.74)
UROBILINOGEN UR QL STRIP.AUTO: 0.2 E.U./DL
WBC # BLD AUTO: 6.43 THOUSAND/UL (ref 4.31–10.16)
WBC #/AREA URNS AUTO: NORMAL /HPF

## 2021-09-22 PROCEDURE — 85025 COMPLETE CBC W/AUTO DIFF WBC: CPT

## 2021-09-22 PROCEDURE — 81001 URINALYSIS AUTO W/SCOPE: CPT | Performed by: INTERNAL MEDICINE

## 2021-09-22 PROCEDURE — 80053 COMPREHEN METABOLIC PANEL: CPT

## 2021-09-22 PROCEDURE — 80061 LIPID PANEL: CPT

## 2021-09-22 PROCEDURE — 84443 ASSAY THYROID STIM HORMONE: CPT

## 2021-09-22 PROCEDURE — 36415 COLL VENOUS BLD VENIPUNCTURE: CPT

## 2021-09-28 ENCOUNTER — HOSPITAL ENCOUNTER (OUTPATIENT)
Dept: NON INVASIVE DIAGNOSTICS | Facility: HOSPITAL | Age: 76
Discharge: HOME | DRG: 274 | End: 2021-09-28
Attending: INTERNAL MEDICINE
Payer: MEDICARE

## 2021-09-28 ENCOUNTER — ANESTHESIA EVENT (OUTPATIENT)
Dept: NON INVASIVE DIAGNOSTICS | Facility: HOSPITAL | Age: 76
DRG: 274 | End: 2021-09-28
Payer: MEDICARE

## 2021-09-28 ENCOUNTER — HOSPITAL ENCOUNTER (INPATIENT)
Dept: NON INVASIVE DIAGNOSTICS | Facility: HOSPITAL | Age: 76
LOS: 2 days | Discharge: HOME | DRG: 274 | End: 2021-10-01
Attending: INTERNAL MEDICINE | Admitting: INTERNAL MEDICINE
Payer: MEDICARE

## 2021-09-28 DIAGNOSIS — I48.0 PAROXYSMAL ATRIAL FIBRILLATION (HCC): ICD-10-CM

## 2021-09-28 LAB
ANION GAP SERPL CALCULATED.3IONS-SCNC: 4 MMOL/L (ref 4–13)
BASOPHILS # BLD AUTO: 0.02 THOUSANDS/ΜL (ref 0–0.1)
BASOPHILS NFR BLD AUTO: 0 % (ref 0–1)
BUN SERPL-MCNC: 21 MG/DL (ref 5–25)
CALCIUM SERPL-MCNC: 9.3 MG/DL (ref 8.3–10.1)
CHLORIDE SERPL-SCNC: 108 MMOL/L (ref 100–108)
CO2 SERPL-SCNC: 26 MMOL/L (ref 21–32)
CREAT SERPL-MCNC: 0.88 MG/DL (ref 0.6–1.3)
EOSINOPHIL # BLD AUTO: 0 THOUSAND/ΜL (ref 0–0.61)
EOSINOPHIL NFR BLD AUTO: 0 % (ref 0–6)
ERYTHROCYTE [DISTWIDTH] IN BLOOD BY AUTOMATED COUNT: 13.4 % (ref 11.6–15.1)
GFR SERPL CREATININE-BSD FRML MDRD: 64 ML/MIN/1.73SQ M
GLUCOSE P FAST SERPL-MCNC: 181 MG/DL (ref 65–99)
GLUCOSE SERPL-MCNC: 181 MG/DL (ref 65–140)
HCT VFR BLD AUTO: 44.2 % (ref 34.8–46.1)
HGB BLD-MCNC: 14.5 G/DL (ref 11.5–15.4)
IMM GRANULOCYTES # BLD AUTO: 0.02 THOUSAND/UL (ref 0–0.2)
IMM GRANULOCYTES NFR BLD AUTO: 0 % (ref 0–2)
INR PPP: 1.17 (ref 0.84–1.19)
KCT BLD-ACNC: 166 SEC (ref 89–137)
KCT BLD-ACNC: 280 SEC (ref 89–137)
KCT BLD-ACNC: 318 SEC (ref 89–137)
KCT BLD-ACNC: 331 SEC (ref 89–137)
KCT BLD-ACNC: 344 SEC (ref 89–137)
LYMPHOCYTES # BLD AUTO: 0.92 THOUSANDS/ΜL (ref 0.6–4.47)
LYMPHOCYTES NFR BLD AUTO: 15 % (ref 14–44)
MCH RBC QN AUTO: 31.9 PG (ref 26.8–34.3)
MCHC RBC AUTO-ENTMCNC: 32.8 G/DL (ref 31.4–37.4)
MCV RBC AUTO: 97 FL (ref 82–98)
MONOCYTES # BLD AUTO: 0.08 THOUSAND/ΜL (ref 0.17–1.22)
MONOCYTES NFR BLD AUTO: 1 % (ref 4–12)
NEUTROPHILS # BLD AUTO: 4.93 THOUSANDS/ΜL (ref 1.85–7.62)
NEUTS SEG NFR BLD AUTO: 84 % (ref 43–75)
NRBC BLD AUTO-RTO: 0 /100 WBCS
PLATELET # BLD AUTO: 335 THOUSANDS/UL (ref 149–390)
PMV BLD AUTO: 9.5 FL (ref 8.9–12.7)
POTASSIUM SERPL-SCNC: 3.9 MMOL/L (ref 3.5–5.3)
PROTHROMBIN TIME: 14.4 SECONDS (ref 11.6–14.5)
RBC # BLD AUTO: 4.55 MILLION/UL (ref 3.81–5.12)
SODIUM SERPL-SCNC: 138 MMOL/L (ref 136–145)
SPECIMEN SOURCE: ABNORMAL
WBC # BLD AUTO: 5.97 THOUSAND/UL (ref 4.31–10.16)

## 2021-09-28 PROCEDURE — 93662 INTRACARDIAC ECG (ICE): CPT | Performed by: INTERNAL MEDICINE

## 2021-09-28 PROCEDURE — C1769 GUIDE WIRE: HCPCS | Performed by: INTERNAL MEDICINE

## 2021-09-28 PROCEDURE — C1733 CATH, EP, OTHR THAN COOL-TIP: HCPCS | Performed by: INTERNAL MEDICINE

## 2021-09-28 PROCEDURE — 93613 INTRACARDIAC EPHYS 3D MAPG: CPT | Performed by: INTERNAL MEDICINE

## 2021-09-28 PROCEDURE — 93656 COMPRE EP EVAL ABLTJ ATR FIB: CPT | Performed by: INTERNAL MEDICINE

## 2021-09-28 PROCEDURE — C1894 INTRO/SHEATH, NON-LASER: HCPCS | Performed by: INTERNAL MEDICINE

## 2021-09-28 PROCEDURE — 85610 PROTHROMBIN TIME: CPT | Performed by: PHYSICIAN ASSISTANT

## 2021-09-28 PROCEDURE — 93005 ELECTROCARDIOGRAM TRACING: CPT

## 2021-09-28 PROCEDURE — 92960 CARDIOVERSION ELECTRIC EXT: CPT | Performed by: INTERNAL MEDICINE

## 2021-09-28 PROCEDURE — C1893 INTRO/SHEATH, FIXED,NON-PEEL: HCPCS | Performed by: INTERNAL MEDICINE

## 2021-09-28 PROCEDURE — 02583ZZ DESTRUCTION OF CONDUCTION MECHANISM, PERCUTANEOUS APPROACH: ICD-10-PCS | Performed by: INTERNAL MEDICINE

## 2021-09-28 PROCEDURE — 80048 BASIC METABOLIC PNL TOTAL CA: CPT | Performed by: PHYSICIAN ASSISTANT

## 2021-09-28 PROCEDURE — C1730 CATH, EP, 19 OR FEW ELECT: HCPCS | Performed by: INTERNAL MEDICINE

## 2021-09-28 PROCEDURE — 85347 COAGULATION TIME ACTIVATED: CPT

## 2021-09-28 PROCEDURE — C9113 INJ PANTOPRAZOLE SODIUM, VIA: HCPCS | Performed by: PHYSICIAN ASSISTANT

## 2021-09-28 PROCEDURE — 99214 OFFICE O/P EST MOD 30 MIN: CPT | Performed by: INTERNAL MEDICINE

## 2021-09-28 PROCEDURE — 93312 ECHO TRANSESOPHAGEAL: CPT

## 2021-09-28 PROCEDURE — 85025 COMPLETE CBC W/AUTO DIFF WBC: CPT | Performed by: PHYSICIAN ASSISTANT

## 2021-09-28 PROCEDURE — 02K83ZZ MAP CONDUCTION MECHANISM, PERCUTANEOUS APPROACH: ICD-10-PCS | Performed by: INTERNAL MEDICINE

## 2021-09-28 PROCEDURE — C1759 CATH, INTRA ECHOCARDIOGRAPHY: HCPCS | Performed by: INTERNAL MEDICINE

## 2021-09-28 RX ORDER — LIDOCAINE HYDROCHLORIDE 10 MG/ML
INJECTION, SOLUTION EPIDURAL; INFILTRATION; INTRACAUDAL; PERINEURAL AS NEEDED
Status: DISCONTINUED | OUTPATIENT
Start: 2021-09-28 | End: 2021-09-28

## 2021-09-28 RX ORDER — SODIUM CHLORIDE 9 MG/ML
INJECTION, SOLUTION INTRAVENOUS CONTINUOUS PRN
Status: DISCONTINUED | OUTPATIENT
Start: 2021-09-28 | End: 2021-09-28

## 2021-09-28 RX ORDER — HEPARIN SODIUM 10000 [USP'U]/100ML
INJECTION, SOLUTION INTRAVENOUS
Status: COMPLETED | OUTPATIENT
Start: 2021-09-28 | End: 2021-09-28

## 2021-09-28 RX ORDER — PROPOFOL 10 MG/ML
INJECTION, EMULSION INTRAVENOUS AS NEEDED
Status: DISCONTINUED | OUTPATIENT
Start: 2021-09-28 | End: 2021-09-28

## 2021-09-28 RX ORDER — HEPARIN SODIUM 1000 [USP'U]/ML
INJECTION, SOLUTION INTRAVENOUS; SUBCUTANEOUS CODE/TRAUMA/SEDATION MEDICATION
Status: COMPLETED | OUTPATIENT
Start: 2021-09-28 | End: 2021-09-28

## 2021-09-28 RX ORDER — SOTALOL HYDROCHLORIDE 80 MG/1
120 TABLET ORAL EVERY 12 HOURS SCHEDULED
Status: DISCONTINUED | OUTPATIENT
Start: 2021-09-28 | End: 2021-09-29

## 2021-09-28 RX ORDER — PROTAMINE SULFATE 10 MG/ML
INJECTION, SOLUTION INTRAVENOUS AS NEEDED
Status: DISCONTINUED | OUTPATIENT
Start: 2021-09-28 | End: 2021-09-28

## 2021-09-28 RX ORDER — CEFAZOLIN SODIUM 1 G/3ML
INJECTION, POWDER, FOR SOLUTION INTRAMUSCULAR; INTRAVENOUS AS NEEDED
Status: DISCONTINUED | OUTPATIENT
Start: 2021-09-28 | End: 2021-09-28

## 2021-09-28 RX ORDER — SUCCINYLCHOLINE/SOD CL,ISO/PF 100 MG/5ML
SYRINGE (ML) INTRAVENOUS AS NEEDED
Status: DISCONTINUED | OUTPATIENT
Start: 2021-09-28 | End: 2021-09-28

## 2021-09-28 RX ORDER — ACETAMINOPHEN 325 MG/1
650 TABLET ORAL EVERY 4 HOURS PRN
Status: DISCONTINUED | OUTPATIENT
Start: 2021-09-28 | End: 2021-10-01 | Stop reason: HOSPADM

## 2021-09-28 RX ORDER — NITROGLYCERIN 0.4 MG/1
0.4 TABLET SUBLINGUAL
Status: DISCONTINUED | OUTPATIENT
Start: 2021-09-28 | End: 2021-10-01 | Stop reason: HOSPADM

## 2021-09-28 RX ORDER — FENTANYL CITRATE 50 UG/ML
INJECTION, SOLUTION INTRAMUSCULAR; INTRAVENOUS AS NEEDED
Status: DISCONTINUED | OUTPATIENT
Start: 2021-09-28 | End: 2021-09-28

## 2021-09-28 RX ORDER — OXYCODONE HYDROCHLORIDE 5 MG/1
5 TABLET ORAL EVERY 4 HOURS PRN
Status: DISCONTINUED | OUTPATIENT
Start: 2021-09-28 | End: 2021-10-01 | Stop reason: HOSPADM

## 2021-09-28 RX ORDER — PANTOPRAZOLE SODIUM 40 MG/1
40 INJECTION, POWDER, FOR SOLUTION INTRAVENOUS ONCE
Status: COMPLETED | OUTPATIENT
Start: 2021-09-28 | End: 2021-09-28

## 2021-09-28 RX ORDER — PANTOPRAZOLE SODIUM 40 MG/1
40 TABLET, DELAYED RELEASE ORAL
Status: DISCONTINUED | OUTPATIENT
Start: 2021-09-29 | End: 2021-10-01 | Stop reason: HOSPADM

## 2021-09-28 RX ORDER — ONDANSETRON 2 MG/ML
INJECTION INTRAMUSCULAR; INTRAVENOUS AS NEEDED
Status: DISCONTINUED | OUTPATIENT
Start: 2021-09-28 | End: 2021-09-28

## 2021-09-28 RX ORDER — LEVOTHYROXINE SODIUM 0.07 MG/1
75 TABLET ORAL DAILY
Status: DISCONTINUED | OUTPATIENT
Start: 2021-09-29 | End: 2021-10-01 | Stop reason: HOSPADM

## 2021-09-28 RX ADMIN — PHENYLEPHRINE HYDROCHLORIDE 60 MCG/MIN: 10 INJECTION INTRAVENOUS at 08:32

## 2021-09-28 RX ADMIN — LIDOCAINE HYDROCHLORIDE 50 MG: 10 INJECTION, SOLUTION EPIDURAL; INFILTRATION; INTRACAUDAL; PERINEURAL at 08:06

## 2021-09-28 RX ADMIN — Medication 100 MG: at 08:07

## 2021-09-28 RX ADMIN — FENTANYL CITRATE 50 MCG: 50 INJECTION INTRAMUSCULAR; INTRAVENOUS at 08:06

## 2021-09-28 RX ADMIN — HEPARIN SODIUM 3000 UNITS/HR: 10000 INJECTION, SOLUTION INTRAVENOUS at 09:08

## 2021-09-28 RX ADMIN — CEFAZOLIN SODIUM 1000 MG: 1 INJECTION, POWDER, FOR SOLUTION INTRAMUSCULAR; INTRAVENOUS at 08:09

## 2021-09-28 RX ADMIN — SODIUM CHLORIDE: 9 INJECTION, SOLUTION INTRAVENOUS at 08:10

## 2021-09-28 RX ADMIN — SODIUM CHLORIDE: 0.9 INJECTION, SOLUTION INTRAVENOUS at 07:57

## 2021-09-28 RX ADMIN — PROPOFOL 20 MG: 10 INJECTION, EMULSION INTRAVENOUS at 11:23

## 2021-09-28 RX ADMIN — SODIUM CHLORIDE: 0.9 INJECTION, SOLUTION INTRAVENOUS at 08:10

## 2021-09-28 RX ADMIN — ONDANSETRON 4 MG: 2 INJECTION INTRAMUSCULAR; INTRAVENOUS at 11:10

## 2021-09-28 RX ADMIN — FENTANYL CITRATE 25 MCG: 50 INJECTION INTRAMUSCULAR; INTRAVENOUS at 11:29

## 2021-09-28 RX ADMIN — RIVAROXABAN 20 MG: 20 TABLET, FILM COATED ORAL at 17:32

## 2021-09-28 RX ADMIN — HEPARIN SODIUM 8000 UNITS: 1000 INJECTION INTRAVENOUS; SUBCUTANEOUS at 09:08

## 2021-09-28 RX ADMIN — HEPARIN SODIUM 3000 UNITS: 1000 INJECTION INTRAVENOUS; SUBCUTANEOUS at 09:28

## 2021-09-28 RX ADMIN — PROPOFOL 30 MG: 10 INJECTION, EMULSION INTRAVENOUS at 08:27

## 2021-09-28 RX ADMIN — PANTOPRAZOLE SODIUM 40 MG: 40 INJECTION, POWDER, FOR SOLUTION INTRAVENOUS at 08:46

## 2021-09-28 RX ADMIN — PROTAMINE SULFATE 40 MG: 10 INJECTION, SOLUTION INTRAVENOUS at 11:05

## 2021-09-28 RX ADMIN — FENTANYL CITRATE 25 MCG: 50 INJECTION INTRAMUSCULAR; INTRAVENOUS at 11:33

## 2021-09-28 RX ADMIN — PROPOFOL 150 MG: 10 INJECTION, EMULSION INTRAVENOUS at 08:06

## 2021-09-28 RX ADMIN — SOTALOL HYDROCHLORIDE TABLES AF 120 MG: 80 TABLET ORAL at 19:18

## 2021-09-28 NOTE — ANESTHESIA PREPROCEDURE EVALUATION
Procedure:  CARDIAC EPS/AFIB ABLATION    Relevant Problems   CARDIO   (+) Essential hypertension   (+) PAC (premature atrial contraction)   (+) Paroxysmal atrial fibrillation (HCC)      ENDO   (+) Acquired hypothyroidism      GI/HEPATIC   (+) Acid reflux      Other   (+) Dyslipidemia   (+) Presyncope      TTE: LVEF 55-60%  Denies any SOB on exertion  Occasional chest pressure with slow HR  ET > 4 mets    Last dose Verapamil 2 days ago  Last dose Xarelto yesterday    Physical Exam    Airway    Mallampati score: I  TM Distance: >3 FB  Neck ROM: full     Dental   upper dentures and lower dentures,     Cardiovascular  Rhythm: irregular, Rate: normal,     Pulmonary  Breath sounds clear to auscultation,     Other Findings        Anesthesia Plan  ASA Score- 2     Anesthesia Type- general with ASA Monitors  Additional Monitors:   Airway Plan: ETT  Comment: Recent labs personally reviewed:  Lab Results       Component                Value               Date                       WBC                      5 97                09/28/2021                 HGB                      14 5                09/28/2021                 PLT                      335                 09/28/2021            Lab Results       Component                Value               Date                       NA                       134 (L)             03/08/2015                 K                        3 9                 09/28/2021                 BUN                      21                  09/28/2021                 CREATININE               0 88                09/28/2021                 GLUCOSE                  115                 03/08/2015            No results found for: PTT   Lab Results       Component                Value               Date                       INR                      1 17                09/28/2021              Blood type A      I, Nickie Olivia MD, have personally seen and evaluated the patient prior to anesthetic care  I have reviewed the pre-anesthetic record, medical history, allergies, medications and any other medical records if appropriate to the anesthetic care  If a CRNA is involved in the case, I have reviewed the CRNA assessment, if present, and agree  I consented the patient for general anesthesia with appropriate airway support as indicated  We reviewed the risks associated including PONV, sore throat, allergic reaction to anesthetics and management plan to address these issues  We discussed the indication and risks associated with any invasive monitors that would be placed  We discussed post op pain control and expectations  We discussed rare complications including hypoxia, perioperative cardiac and neurologic events, and death based on the patient's baseline risk  All questions and concerns were addressed          Plan Factors-Exercise tolerance (METS): >4 METS  Chart reviewed  EKG reviewed  Imaging results reviewed  Existing labs reviewed  Patient summary reviewed  Patient is not a current smoker  Patient did not smoke on day of surgery  Obstructive sleep apnea risk education given perioperatively  Induction- intravenous  Postoperative Plan- Plan for postoperative opioid use  Planned trial extubation    Informed Consent- Anesthetic plan and risks discussed with patient  I personally reviewed this patient with the CRNA  Discussed and agreed on the Anesthesia Plan with the CRNA  Phi Jo

## 2021-09-28 NOTE — ANESTHESIA POSTPROCEDURE EVALUATION
Post-Op Assessment Note    CV Status:  Stable  Pain Score: 0    Pain management: adequate     Mental Status:  Sleepy   Hydration Status:  Stable   PONV Controlled:  Controlled   Airway Patency:  Patent      Post Op Vitals Reviewed: Yes      Staff: CRNA         No complications documented      BP   128/87   Temp  97 0   Pulse  74   Resp   12   SpO2   99

## 2021-09-29 LAB
ANION GAP SERPL CALCULATED.3IONS-SCNC: 5 MMOL/L (ref 4–13)
ATRIAL RATE: 107 BPM
ATRIAL RATE: 63 BPM
ATRIAL RATE: 70 BPM
ATRIAL RATE: 71 BPM
ATRIAL RATE: 74 BPM
ATRIAL RATE: 76 BPM
BUN SERPL-MCNC: 21 MG/DL (ref 5–25)
CALCIUM SERPL-MCNC: 8.1 MG/DL (ref 8.3–10.1)
CHLORIDE SERPL-SCNC: 107 MMOL/L (ref 100–108)
CO2 SERPL-SCNC: 27 MMOL/L (ref 21–32)
CREAT SERPL-MCNC: 0.74 MG/DL (ref 0.6–1.3)
ERYTHROCYTE [DISTWIDTH] IN BLOOD BY AUTOMATED COUNT: 13.8 % (ref 11.6–15.1)
GFR SERPL CREATININE-BSD FRML MDRD: 79 ML/MIN/1.73SQ M
GLUCOSE P FAST SERPL-MCNC: 109 MG/DL (ref 65–99)
GLUCOSE SERPL-MCNC: 109 MG/DL (ref 65–140)
HCT VFR BLD AUTO: 36.5 % (ref 34.8–46.1)
HGB BLD-MCNC: 11.8 G/DL (ref 11.5–15.4)
MCH RBC QN AUTO: 31.9 PG (ref 26.8–34.3)
MCHC RBC AUTO-ENTMCNC: 32.3 G/DL (ref 31.4–37.4)
MCV RBC AUTO: 99 FL (ref 82–98)
P AXIS: -17 DEGREES
P AXIS: 51 DEGREES
P AXIS: 60 DEGREES
PLATELET # BLD AUTO: 248 THOUSANDS/UL (ref 149–390)
PMV BLD AUTO: 9.7 FL (ref 8.9–12.7)
POTASSIUM SERPL-SCNC: 3.6 MMOL/L (ref 3.5–5.3)
PR INTERVAL: 144 MS
PR INTERVAL: 157 MS
PR INTERVAL: 161 MS
PR INTERVAL: 163 MS
PR INTERVAL: 170 MS
QRS AXIS: 11 DEGREES
QRS AXIS: 21 DEGREES
QRS AXIS: 22 DEGREES
QRS AXIS: 51 DEGREES
QRS AXIS: 53 DEGREES
QRS AXIS: 58 DEGREES
QRSD INTERVAL: 75 MS
QRSD INTERVAL: 76 MS
QRSD INTERVAL: 80 MS
QRSD INTERVAL: 82 MS
QT INTERVAL: 356 MS
QT INTERVAL: 396 MS
QT INTERVAL: 400 MS
QT INTERVAL: 406 MS
QT INTERVAL: 413 MS
QT INTERVAL: 456 MS
QTC INTERVAL: 428 MS
QTC INTERVAL: 441 MS
QTC INTERVAL: 447 MS
QTC INTERVAL: 450 MS
QTC INTERVAL: 459 MS
QTC INTERVAL: 466 MS
RBC # BLD AUTO: 3.7 MILLION/UL (ref 3.81–5.12)
SODIUM SERPL-SCNC: 139 MMOL/L (ref 136–145)
T WAVE AXIS: -35 DEGREES
T WAVE AXIS: 152 DEGREES
T WAVE AXIS: 155 DEGREES
T WAVE AXIS: 18 DEGREES
T WAVE AXIS: 240 DEGREES
T WAVE AXIS: 78 DEGREES
VENTRICULAR RATE: 63 BPM
VENTRICULAR RATE: 70 BPM
VENTRICULAR RATE: 71 BPM
VENTRICULAR RATE: 74 BPM
VENTRICULAR RATE: 76 BPM
VENTRICULAR RATE: 95 BPM
WBC # BLD AUTO: 10.03 THOUSAND/UL (ref 4.31–10.16)

## 2021-09-29 PROCEDURE — 80048 BASIC METABOLIC PNL TOTAL CA: CPT | Performed by: PHYSICIAN ASSISTANT

## 2021-09-29 PROCEDURE — 99214 OFFICE O/P EST MOD 30 MIN: CPT | Performed by: INTERNAL MEDICINE

## 2021-09-29 PROCEDURE — 93005 ELECTROCARDIOGRAM TRACING: CPT

## 2021-09-29 PROCEDURE — 93312 ECHO TRANSESOPHAGEAL: CPT | Performed by: INTERNAL MEDICINE

## 2021-09-29 PROCEDURE — 93010 ELECTROCARDIOGRAM REPORT: CPT | Performed by: INTERNAL MEDICINE

## 2021-09-29 PROCEDURE — 85027 COMPLETE CBC AUTOMATED: CPT | Performed by: PHYSICIAN ASSISTANT

## 2021-09-29 PROCEDURE — 93320 DOPPLER ECHO COMPLETE: CPT | Performed by: INTERNAL MEDICINE

## 2021-09-29 PROCEDURE — 93325 DOPPLER ECHO COLOR FLOW MAPG: CPT | Performed by: INTERNAL MEDICINE

## 2021-09-29 RX ORDER — POTASSIUM CHLORIDE 20 MEQ/1
40 TABLET, EXTENDED RELEASE ORAL ONCE
Status: COMPLETED | OUTPATIENT
Start: 2021-09-29 | End: 2021-09-29

## 2021-09-29 RX ORDER — SOTALOL HYDROCHLORIDE 80 MG/1
120 TABLET ORAL EVERY 12 HOURS SCHEDULED
Status: DISCONTINUED | OUTPATIENT
Start: 2021-09-29 | End: 2021-10-01 | Stop reason: HOSPADM

## 2021-09-29 RX ADMIN — SOTALOL HYDROCHLORIDE TABLES AF 120 MG: 80 TABLET ORAL at 21:03

## 2021-09-29 RX ADMIN — LEVOTHYROXINE SODIUM 75 MCG: 75 TABLET ORAL at 07:03

## 2021-09-29 RX ADMIN — SOTALOL HYDROCHLORIDE TABLES AF 120 MG: 80 TABLET ORAL at 07:03

## 2021-09-29 RX ADMIN — PANTOPRAZOLE SODIUM 40 MG: 40 TABLET, DELAYED RELEASE ORAL at 05:26

## 2021-09-29 RX ADMIN — RIVAROXABAN 20 MG: 20 TABLET, FILM COATED ORAL at 18:00

## 2021-09-29 RX ADMIN — ACETAMINOPHEN 650 MG: 325 TABLET, FILM COATED ORAL at 03:04

## 2021-09-29 RX ADMIN — POTASSIUM CHLORIDE 40 MEQ: 1500 TABLET, EXTENDED RELEASE ORAL at 14:15

## 2021-09-30 LAB
ANION GAP SERPL CALCULATED.3IONS-SCNC: 1 MMOL/L (ref 4–13)
ATRIAL RATE: 67 BPM
BUN SERPL-MCNC: 21 MG/DL (ref 5–25)
CALCIUM SERPL-MCNC: 8.2 MG/DL (ref 8.3–10.1)
CHLORIDE SERPL-SCNC: 109 MMOL/L (ref 100–108)
CO2 SERPL-SCNC: 28 MMOL/L (ref 21–32)
CREAT SERPL-MCNC: 0.71 MG/DL (ref 0.6–1.3)
GFR SERPL CREATININE-BSD FRML MDRD: 83 ML/MIN/1.73SQ M
GLUCOSE SERPL-MCNC: 91 MG/DL (ref 65–140)
MAGNESIUM SERPL-MCNC: 2.1 MG/DL (ref 1.6–2.6)
P AXIS: 75 DEGREES
POTASSIUM SERPL-SCNC: 4.5 MMOL/L (ref 3.5–5.3)
PR INTERVAL: 158 MS
QRS AXIS: 35 DEGREES
QRSD INTERVAL: 70 MS
QT INTERVAL: 390 MS
QTC INTERVAL: 412 MS
SODIUM SERPL-SCNC: 138 MMOL/L (ref 136–145)
T WAVE AXIS: 56 DEGREES
VENTRICULAR RATE: 67 BPM

## 2021-09-30 PROCEDURE — 80048 BASIC METABOLIC PNL TOTAL CA: CPT | Performed by: PHYSICIAN ASSISTANT

## 2021-09-30 PROCEDURE — 93010 ELECTROCARDIOGRAM REPORT: CPT | Performed by: INTERNAL MEDICINE

## 2021-09-30 PROCEDURE — 93005 ELECTROCARDIOGRAM TRACING: CPT

## 2021-09-30 PROCEDURE — 99214 OFFICE O/P EST MOD 30 MIN: CPT | Performed by: INTERNAL MEDICINE

## 2021-09-30 PROCEDURE — 83735 ASSAY OF MAGNESIUM: CPT | Performed by: PHYSICIAN ASSISTANT

## 2021-09-30 RX ADMIN — SOTALOL HYDROCHLORIDE TABLES AF 120 MG: 80 TABLET ORAL at 20:55

## 2021-09-30 RX ADMIN — RIVAROXABAN 20 MG: 20 TABLET, FILM COATED ORAL at 17:09

## 2021-09-30 RX ADMIN — ACETAMINOPHEN 650 MG: 325 TABLET, FILM COATED ORAL at 13:28

## 2021-09-30 RX ADMIN — LEVOTHYROXINE SODIUM 75 MCG: 75 TABLET ORAL at 06:32

## 2021-09-30 RX ADMIN — SOTALOL HYDROCHLORIDE TABLES AF 120 MG: 80 TABLET ORAL at 09:05

## 2021-09-30 RX ADMIN — ACETAMINOPHEN 650 MG: 325 TABLET, FILM COATED ORAL at 09:07

## 2021-09-30 RX ADMIN — PANTOPRAZOLE SODIUM 40 MG: 40 TABLET, DELAYED RELEASE ORAL at 06:32

## 2021-10-01 VITALS
SYSTOLIC BLOOD PRESSURE: 136 MMHG | HEART RATE: 59 BPM | BODY MASS INDEX: 26.99 KG/M2 | RESPIRATION RATE: 18 BRPM | HEIGHT: 63 IN | OXYGEN SATURATION: 95 % | WEIGHT: 152.34 LBS | DIASTOLIC BLOOD PRESSURE: 79 MMHG | TEMPERATURE: 97.5 F

## 2021-10-01 LAB
ATRIAL RATE: 63 BPM
ATRIAL RATE: 64 BPM
ATRIAL RATE: 65 BPM
P AXIS: 62 DEGREES
P AXIS: 62 DEGREES
P AXIS: 77 DEGREES
PR INTERVAL: 160 MS
PR INTERVAL: 160 MS
PR INTERVAL: 176 MS
QRS AXIS: 23 DEGREES
QRS AXIS: 65 DEGREES
QRS AXIS: 70 DEGREES
QRSD INTERVAL: 70 MS
QRSD INTERVAL: 70 MS
QRSD INTERVAL: 80 MS
QT INTERVAL: 432 MS
QT INTERVAL: 448 MS
QT INTERVAL: 484 MS
QTC INTERVAL: 449 MS
QTC INTERVAL: 458 MS
QTC INTERVAL: 499 MS
T WAVE AXIS: 51 DEGREES
T WAVE AXIS: 63 DEGREES
T WAVE AXIS: 89 DEGREES
VENTRICULAR RATE: 63 BPM
VENTRICULAR RATE: 64 BPM
VENTRICULAR RATE: 65 BPM

## 2021-10-01 PROCEDURE — 93010 ELECTROCARDIOGRAM REPORT: CPT | Performed by: INTERNAL MEDICINE

## 2021-10-01 PROCEDURE — 93005 ELECTROCARDIOGRAM TRACING: CPT

## 2021-10-01 PROCEDURE — 99239 HOSP IP/OBS DSCHRG MGMT >30: CPT | Performed by: INTERNAL MEDICINE

## 2021-10-01 RX ORDER — SOTALOL HYDROCHLORIDE 120 MG/1
120 TABLET ORAL EVERY 12 HOURS SCHEDULED
Qty: 60 TABLET | Refills: 3 | Status: SHIPPED | OUTPATIENT
Start: 2021-10-01 | End: 2021-10-26 | Stop reason: SDUPTHER

## 2021-10-01 RX ADMIN — LEVOTHYROXINE SODIUM 75 MCG: 75 TABLET ORAL at 06:48

## 2021-10-01 RX ADMIN — SOTALOL HYDROCHLORIDE TABLES AF 120 MG: 80 TABLET ORAL at 09:30

## 2021-10-01 RX ADMIN — PANTOPRAZOLE SODIUM 40 MG: 40 TABLET, DELAYED RELEASE ORAL at 06:48

## 2021-10-04 ENCOUNTER — TRANSITIONAL CARE MANAGEMENT (OUTPATIENT)
Dept: INTERNAL MEDICINE CLINIC | Facility: CLINIC | Age: 76
End: 2021-10-04

## 2021-10-04 ENCOUNTER — OFFICE VISIT (OUTPATIENT)
Dept: INTERNAL MEDICINE CLINIC | Facility: CLINIC | Age: 76
End: 2021-10-04
Payer: MEDICARE

## 2021-10-04 VITALS
HEIGHT: 63 IN | TEMPERATURE: 98.6 F | HEART RATE: 63 BPM | DIASTOLIC BLOOD PRESSURE: 72 MMHG | SYSTOLIC BLOOD PRESSURE: 103 MMHG | WEIGHT: 150.2 LBS | BODY MASS INDEX: 26.61 KG/M2 | OXYGEN SATURATION: 99 %

## 2021-10-04 DIAGNOSIS — E03.9 ACQUIRED HYPOTHYROIDISM: ICD-10-CM

## 2021-10-04 DIAGNOSIS — I48.0 PAROXYSMAL ATRIAL FIBRILLATION (HCC): Primary | ICD-10-CM

## 2021-10-04 DIAGNOSIS — E55.9 VITAMIN D DEFICIENCY DISEASE: ICD-10-CM

## 2021-10-04 DIAGNOSIS — E78.5 DYSLIPIDEMIA: ICD-10-CM

## 2021-10-04 PROCEDURE — 99496 TRANSJ CARE MGMT HIGH F2F 7D: CPT | Performed by: INTERNAL MEDICINE

## 2021-10-04 RX ORDER — LEVOTHYROXINE SODIUM 0.07 MG/1
75 TABLET ORAL DAILY
Qty: 90 TABLET | Refills: 1 | Status: SHIPPED | OUTPATIENT
Start: 2021-10-04 | End: 2022-04-08 | Stop reason: SDUPTHER

## 2021-10-08 ENCOUNTER — CLINICAL SUPPORT (OUTPATIENT)
Dept: CARDIOLOGY CLINIC | Facility: CLINIC | Age: 76
End: 2021-10-08
Payer: MEDICARE

## 2021-10-08 VITALS
HEIGHT: 63 IN | SYSTOLIC BLOOD PRESSURE: 154 MMHG | BODY MASS INDEX: 26.47 KG/M2 | RESPIRATION RATE: 18 BRPM | WEIGHT: 149.38 LBS | HEART RATE: 57 BPM | OXYGEN SATURATION: 97 % | DIASTOLIC BLOOD PRESSURE: 78 MMHG

## 2021-10-08 DIAGNOSIS — I48.0 PAROXYSMAL ATRIAL FIBRILLATION (HCC): Primary | ICD-10-CM

## 2021-10-08 PROCEDURE — 93000 ELECTROCARDIOGRAM COMPLETE: CPT

## 2021-10-26 ENCOUNTER — OFFICE VISIT (OUTPATIENT)
Dept: CARDIOLOGY CLINIC | Facility: CLINIC | Age: 76
End: 2021-10-26
Payer: MEDICARE

## 2021-10-26 VITALS
DIASTOLIC BLOOD PRESSURE: 64 MMHG | SYSTOLIC BLOOD PRESSURE: 142 MMHG | WEIGHT: 150 LBS | HEART RATE: 63 BPM | HEIGHT: 63 IN | BODY MASS INDEX: 26.58 KG/M2

## 2021-10-26 DIAGNOSIS — I48.0 PAROXYSMAL ATRIAL FIBRILLATION (HCC): Primary | ICD-10-CM

## 2021-10-26 PROCEDURE — 99214 OFFICE O/P EST MOD 30 MIN: CPT | Performed by: INTERNAL MEDICINE

## 2021-10-26 PROCEDURE — 93000 ELECTROCARDIOGRAM COMPLETE: CPT | Performed by: INTERNAL MEDICINE

## 2021-10-26 RX ORDER — SUCRALFATE 1 G/1
1 TABLET ORAL 4 TIMES DAILY
Qty: 60 TABLET | Refills: 3 | Status: SHIPPED | OUTPATIENT
Start: 2021-10-26 | End: 2022-04-08 | Stop reason: ALTCHOICE

## 2021-10-26 RX ORDER — SOTALOL HYDROCHLORIDE 120 MG/1
120 TABLET ORAL EVERY 12 HOURS SCHEDULED
Qty: 60 TABLET | Refills: 3 | Status: SHIPPED | OUTPATIENT
Start: 2021-10-26 | End: 2022-02-21 | Stop reason: SDUPTHER

## 2021-11-17 ENCOUNTER — CLINICAL SUPPORT (OUTPATIENT)
Dept: INTERNAL MEDICINE CLINIC | Facility: CLINIC | Age: 76
End: 2021-11-17
Payer: MEDICARE

## 2021-11-17 DIAGNOSIS — Z23 FLU VACCINE NEED: Primary | ICD-10-CM

## 2021-11-17 PROCEDURE — 90662 IIV NO PRSV INCREASED AG IM: CPT

## 2021-11-17 PROCEDURE — G0008 ADMIN INFLUENZA VIRUS VAC: HCPCS

## 2021-11-30 ENCOUNTER — OFFICE VISIT (OUTPATIENT)
Dept: CARDIOLOGY CLINIC | Facility: CLINIC | Age: 76
End: 2021-11-30
Payer: MEDICARE

## 2021-11-30 VITALS
WEIGHT: 149.2 LBS | HEART RATE: 55 BPM | HEIGHT: 63 IN | SYSTOLIC BLOOD PRESSURE: 128 MMHG | BODY MASS INDEX: 26.44 KG/M2 | DIASTOLIC BLOOD PRESSURE: 76 MMHG

## 2021-11-30 DIAGNOSIS — I48.0 PAROXYSMAL ATRIAL FIBRILLATION (HCC): Primary | ICD-10-CM

## 2021-11-30 PROCEDURE — 99213 OFFICE O/P EST LOW 20 MIN: CPT | Performed by: INTERNAL MEDICINE

## 2021-11-30 PROCEDURE — 93000 ELECTROCARDIOGRAM COMPLETE: CPT | Performed by: INTERNAL MEDICINE

## 2021-12-07 ENCOUNTER — TELEPHONE (OUTPATIENT)
Dept: GASTROENTEROLOGY | Facility: AMBULARY SURGERY CENTER | Age: 76
End: 2021-12-07

## 2021-12-07 ENCOUNTER — OFFICE VISIT (OUTPATIENT)
Dept: GASTROENTEROLOGY | Facility: AMBULARY SURGERY CENTER | Age: 76
End: 2021-12-07
Payer: MEDICARE

## 2021-12-07 VITALS
SYSTOLIC BLOOD PRESSURE: 128 MMHG | BODY MASS INDEX: 26.58 KG/M2 | HEIGHT: 63 IN | WEIGHT: 150 LBS | DIASTOLIC BLOOD PRESSURE: 80 MMHG

## 2021-12-07 DIAGNOSIS — I48.0 PAROXYSMAL ATRIAL FIBRILLATION (HCC): ICD-10-CM

## 2021-12-07 DIAGNOSIS — Z86.010 HISTORY OF COLON POLYPS: Primary | ICD-10-CM

## 2021-12-07 DIAGNOSIS — K21.9 HIATAL HERNIA WITH GERD: ICD-10-CM

## 2021-12-07 DIAGNOSIS — K44.9 HIATAL HERNIA WITH GERD: ICD-10-CM

## 2021-12-07 DIAGNOSIS — K21.9 GASTROESOPHAGEAL REFLUX DISEASE, UNSPECIFIED WHETHER ESOPHAGITIS PRESENT: ICD-10-CM

## 2021-12-07 PROCEDURE — 99204 OFFICE O/P NEW MOD 45 MIN: CPT | Performed by: PHYSICIAN ASSISTANT

## 2022-01-06 ENCOUNTER — OFFICE VISIT (OUTPATIENT)
Dept: CARDIOLOGY CLINIC | Facility: CLINIC | Age: 77
End: 2022-01-06
Payer: MEDICARE

## 2022-01-06 VITALS
HEART RATE: 57 BPM | HEIGHT: 63 IN | OXYGEN SATURATION: 98 % | DIASTOLIC BLOOD PRESSURE: 78 MMHG | BODY MASS INDEX: 26.75 KG/M2 | WEIGHT: 151 LBS | SYSTOLIC BLOOD PRESSURE: 148 MMHG

## 2022-01-06 DIAGNOSIS — I49.1 PAC (PREMATURE ATRIAL CONTRACTION): ICD-10-CM

## 2022-01-06 DIAGNOSIS — I48.0 PAROXYSMAL ATRIAL FIBRILLATION (HCC): Primary | ICD-10-CM

## 2022-01-06 DIAGNOSIS — E78.5 DYSLIPIDEMIA: ICD-10-CM

## 2022-01-06 DIAGNOSIS — I10 ESSENTIAL HYPERTENSION: ICD-10-CM

## 2022-01-06 PROCEDURE — 93000 ELECTROCARDIOGRAM COMPLETE: CPT | Performed by: INTERNAL MEDICINE

## 2022-01-06 PROCEDURE — 99214 OFFICE O/P EST MOD 30 MIN: CPT | Performed by: INTERNAL MEDICINE

## 2022-01-06 NOTE — PROGRESS NOTES
[facility]  Follow up note  Armani Charles 68 y o  female MRN: [id number]        Problems    1  Paroxysmal atrial fibrillation (HCC)  POCT ECG   2  PAC (premature atrial contraction)     3  Essential hypertension     4  Dyslipidemia         Impression:     Paroxysmal atrial fibrillation  o Persistence and dyspnea resulted in referral to EP  o She underwent ablation 9/21  o Sotalol initiation at the same time  o  milliseconds is acceptable, I discussed sotalol and med interaction with her, she can reach out if any future physicians are unsure about interaction with sotalol  o She continues on anticoagulation  o Happy that she is maintaining sinus rhythm and she feels well in sinus rhythm   Chest pain/shoulder pain  o Her symptoms resolved  o She did get sent for ischemic testing 3/21 which was normal by stress Myoview   Valvular heart disease  o MR Had been mild-to-moderate, especially in the presence of AFib but found to be only mild by DELANO 9/21  o TR Had been moderate to severe especially in the midst of AFib but found to be mild-to-moderate on DELANO 9/21  o No significant murmur on exam today   Hyperlipidemia  o Severely uncontrolled for many years, statin intolerant, Zetia too costly, not interested in PCSK9 inhibitor therapy   Hypertension  o Slightly elevated blood pressure today   Fibromyalgia  o For this reason she is intolerant to statin    Plan:    Overall doing really well  Happy that she is maintaining sinus rhythm  Overall valve disease remains pretty mild  Her exam is normal  EKG excellent today, QTC stable on sotalol  I will have her follow-up in approximately 8 months as she expects to see Dr Stan Blunt at sometime in the late spring  HPI:   Armani Charles is a 68y o  year old female with hypertension, hyperlipidemia, a history of PACs, paroxysmal symptomatic atrial fibrillation, who underwent cardioversion in mid 2020, with recurrence of AFib by November of 2020    Symptoms that prompted the cardioversion in the 1st place had resolved when I saw her between November of 2020 and March of 2021, thus had not recommended repeat cardioversion or escalation to rhythm control with ablation at that time based on the fact that she was not having any symptoms  However she ended up getting referred to electrophysiology and apparently conveyed concerning symptoms to them and she did undergo ablation, with initiation of sotalol, she does not have any bradycardia, she does not have any lightheadedness, she says she actually feels quite a bit better maintaining sinus rhythm, and ultimately very glad she underwent the ablation  QTC is normal today  Lipids are severely uncontrolled, she statin intolerant, Zetia in the past was too expensive, she was not interested in PCSK9 inhibitor therapy  Stress test for concerning ischemic symptoms 3/21 was normal     Review of Systems   Constitutional: Negative for appetite change, diaphoresis, fatigue and fever  Respiratory: Negative for chest tightness, shortness of breath and wheezing  Cardiovascular: Negative for chest pain, palpitations and leg swelling  Gastrointestinal: Negative for abdominal pain and blood in stool  Musculoskeletal: Positive for arthralgias and myalgias  Negative for joint swelling  Skin: Negative for rash  Neurological: Negative for dizziness, syncope and light-headedness         Past Medical History:   Diagnosis Date    Acid reflux     Arthritis     Cataract     Colon polyp     Dyslipidemia (high LDL; low HDL)     Fibromyalgia     GERD (gastroesophageal reflux disease)     Hyperlipidemia     Hypertension     Hypothyroidism     Palpitation     Paroxysmal atrial fibrillation ([address] )     Shingles 09/12/2010    Right C7     Social History     Substance and Sexual Activity   Alcohol Use No     Social History     Substance and Sexual Activity   Drug Use No     Social History     Tobacco Use   Smoking Status Never Smoker   Smokeless Tobacco Never Used       Allergies: Allergies   Allergen Reactions    Iodine - Food Allergy Hives     Severe allergy to IV CT scan dye    Iodinated Diagnostic Agents Hives       Medications:     Current Outpatient Medications:     ergocalciferol (VITAMIN D2) 50,000 units, Take 1 capsule (50,000 Units total) by mouth every 14 (fourteen) days, Disp: 7 capsule, Rfl: 3    levothyroxine 75 mcg tablet, Take 1 tablet (75 mcg total) by mouth daily, Disp: 90 tablet, Rfl: 1    omeprazole (PRILOSEC) 20 mg delayed release capsule, Take 1 capsule by mouth daily, Disp: , Rfl:     rivaroxaban (XARELTO) 20 mg tablet, Take 1 tablet (20 mg total) by mouth daily with dinner, Disp: 90 tablet, Rfl: 3    sotalol AF (BETAPACE AF) 120 MG TABS, Take 1 tablet (120 mg total) by mouth every 12 (twelve) hours, Disp: 60 tablet, Rfl: 3    sucralfate (CARAFATE) 1 g tablet, Take 1 tablet (1 g total) by mouth 4 (four) times a day (Patient taking differently: Take 1 g by mouth 4 (four) times a day Patient takes once a day ), Disp: 60 tablet, Rfl: 3    nitroglycerin (NITROSTAT) 0 4 mg SL tablet, Place 1 tablet (0 4 mg total) under the tongue every 5 (five) minutes as needed for chest pain (Patient not taking: Reported on 1/6/2022 ), Disp: 15 tablet, Rfl: 1      Vitals:    01/06/22 1027   BP: 148/78   Pulse: 57   SpO2: 98%     Weight (last 2 days)     Date/Time Weight    01/06/22 1027 68 5 (151)        Physical Exam  Constitutional:       General: She is not in acute distress  Appearance: She is not diaphoretic  HENT:      Head: Normocephalic and atraumatic  Eyes:      General: No scleral icterus  Conjunctiva/sclera: Conjunctivae normal    Neck:      Vascular: No JVD  Cardiovascular:      Rate and Rhythm: Normal rate and regular rhythm  Heart sounds: Normal heart sounds  No murmur heard  Pulmonary:      Effort: Pulmonary effort is normal  No respiratory distress        Breath sounds: Normal breath sounds  No wheezing, rhonchi or rales  Musculoskeletal:         General: No tenderness  Cervical back: Normal range of motion  Right lower leg: Normal  No edema  Left lower leg: Normal  No edema  Skin:     General: Skin is warm and dry  Laboratory Studies:    Laboratory testing personally reviewed    Cardiac testing:     EKG reviewed personally:   Results for orders placed or performed in visit on 01/06/22   POCT ECG    Impression    Sinus bradycardia, normal EKG, normal  kelly sec           Echocardiogram:  8/20-DELANO-EF normal, severely dilated left atrium    Stress tests:      Catheterization:      Holter:         Shayna Bentley MD    Portions of the record may have been created with voice recognition software  Occasional wrong word or "sound a like" substitutions may have occurred due to the inherent limitations of voice recognition software  Read the chart carefully and recognize, using context, where substitutions have occurred

## 2022-01-12 ENCOUNTER — OFFICE VISIT (OUTPATIENT)
Dept: INTERNAL MEDICINE CLINIC | Facility: CLINIC | Age: 77
End: 2022-01-12
Payer: MEDICARE

## 2022-01-12 VITALS
HEIGHT: 63 IN | BODY MASS INDEX: 26.97 KG/M2 | SYSTOLIC BLOOD PRESSURE: 135 MMHG | HEART RATE: 59 BPM | TEMPERATURE: 99.4 F | DIASTOLIC BLOOD PRESSURE: 82 MMHG | WEIGHT: 152.2 LBS | OXYGEN SATURATION: 99 %

## 2022-01-12 DIAGNOSIS — E55.9 VITAMIN D DEFICIENCY DISEASE: ICD-10-CM

## 2022-01-12 DIAGNOSIS — Z13.820 ENCOUNTER FOR OSTEOPOROSIS SCREENING IN ASYMPTOMATIC POSTMENOPAUSAL PATIENT: ICD-10-CM

## 2022-01-12 DIAGNOSIS — I10 ESSENTIAL HYPERTENSION: Primary | ICD-10-CM

## 2022-01-12 DIAGNOSIS — I48.0 PAROXYSMAL ATRIAL FIBRILLATION (HCC): ICD-10-CM

## 2022-01-12 DIAGNOSIS — E78.5 DYSLIPIDEMIA: ICD-10-CM

## 2022-01-12 DIAGNOSIS — E03.9 ACQUIRED HYPOTHYROIDISM: ICD-10-CM

## 2022-01-12 DIAGNOSIS — Z12.31 VISIT FOR SCREENING MAMMOGRAM: ICD-10-CM

## 2022-01-12 DIAGNOSIS — Z78.0 ENCOUNTER FOR OSTEOPOROSIS SCREENING IN ASYMPTOMATIC POSTMENOPAUSAL PATIENT: ICD-10-CM

## 2022-01-12 DIAGNOSIS — Z00.00 MEDICARE ANNUAL WELLNESS VISIT, SUBSEQUENT: ICD-10-CM

## 2022-01-12 PROCEDURE — G0439 PPPS, SUBSEQ VISIT: HCPCS | Performed by: INTERNAL MEDICINE

## 2022-01-12 PROCEDURE — 99214 OFFICE O/P EST MOD 30 MIN: CPT | Performed by: INTERNAL MEDICINE

## 2022-01-12 NOTE — PROGRESS NOTES
Assessment/Plan:      Depression Screening and Follow-up Plan: Patient was screened for depression during today's encounter  They screened negative with a PHQ-2 score of 0           1  Essential hypertension  -     CBC and differential; Future; Expected date: 03/01/2022  -     Comprehensive metabolic panel; Future; Expected date: 03/01/2022  -     Urinalysis with microscopic; Future; Expected date: 03/01/2022    2  Paroxysmal atrial fibrillation (HCC)    3  Acquired hypothyroidism  -     TSH + Free T4; Future; Expected date: 03/01/2022    4  Vitamin D deficiency disease    5  Medicare annual wellness visit, subsequent    6  Dyslipidemia  -     C-reactive protein; Future; Expected date: 03/01/2022  -     Lipid panel; Future; Expected date: 03/01/2022    7  Visit for screening mammogram  -     Mammo screening bilateral w 3d & cad; Future; Expected date: 01/12/2022    8  Encounter for osteoporosis screening in asymptomatic postmenopausal patient  -     DXA bone density spine hip and pelvis; Future; Expected date: 01/12/2022           1  Essential hypertension      2  Paroxysmal atrial fibrillation (HCC)      3  Acquired hypothyroidism      4  Vitamin D deficiency disease      5  Medicare annual wellness visit, subsequent         No problem-specific Assessment & Plan notes found for this encounter  Subjective:      Patient ID: Delores Gudino is a 68 y o  female  HPI    The following portions of the patient's history were reviewed and updated as appropriate: She  has a past medical history of Acid reflux, Arthritis, Cataract, Colon polyp, Dyslipidemia (high LDL; low HDL), Fibromyalgia, GERD (gastroesophageal reflux disease), Hyperlipidemia, Hypertension, Hypothyroidism, Palpitation, Paroxysmal atrial fibrillation ([address] ), and Shingles (09/12/2010)    She   Patient Active Problem List    Diagnosis Date Noted    Acid reflux     Abnormal ECG 03/03/2021    Presyncope 01/14/2021    Acquired hypothyroidism 09/11/2020  Plantar fasciitis 09/11/2020    Paroxysmal atrial fibrillation ([facility] [address] ) 07/10/2018    Essential hypertension 07/10/2018    PAC (premature atrial contraction) 07/10/2018    Dyslipidemia 07/10/2018     She  has a past surgical history that includes Replacement total knee; Tonsillectomy; Hysterectomy (05/2016); Cataract extraction, bilateral (11/2016); Colonoscopy (07/11/2016); Mammo (historical) (4/8/2010 & 2/19/2020); Colonoscopy; and Upper gastrointestinal endoscopy  Her family history includes Atrial fibrillation in her father; Cancer in her father; Chronic bronchitis in her mother; Hyperlipidemia in her mother; Hypertension in her mother; Parkinsonism in her mother; Stroke in her father and mother  She  reports that she has never smoked  She has never used smokeless tobacco  She reports that she does not drink alcohol and does not use drugs  Current Outpatient Medications   Medication Sig Dispense Refill    omeprazole (PRILOSEC) 20 mg delayed release capsule Take 1 capsule by mouth daily      sotalol AF (BETAPACE AF) 120 MG TABS Take 1 tablet (120 mg total) by mouth every 12 (twelve) hours 60 tablet 3    sucralfate (CARAFATE) 1 g tablet Take 1 tablet (1 g total) by mouth 4 (four) times a day (Patient taking differently: Take 1 g by mouth 4 (four) times a day Patient takes once a day ) 60 tablet 3    ergocalciferol (VITAMIN D2) 50,000 units Take 1 capsule (50,000 Units total) by mouth every 14 (fourteen) days 7 capsule 3    levothyroxine 75 mcg tablet Take 1 tablet (75 mcg total) by mouth daily 90 tablet 1    nitroglycerin (NITROSTAT) 0 4 mg SL tablet Place 1 tablet (0 4 mg total) under the tongue every 5 (five) minutes as needed for chest pain (Patient not taking: Reported on 1/6/2022 ) 15 tablet 1    rivaroxaban (XARELTO) 20 mg tablet Take 1 tablet (20 mg total) by mouth daily with dinner 90 tablet 3     No current facility-administered medications for this visit       Current Outpatient Medications on File Prior to Visit   Medication Sig    omeprazole (PRILOSEC) 20 mg delayed release capsule Take 1 capsule by mouth daily    sotalol AF (BETAPACE AF) 120 MG TABS Take 1 tablet (120 mg total) by mouth every 12 (twelve) hours    sucralfate (CARAFATE) 1 g tablet Take 1 tablet (1 g total) by mouth 4 (four) times a day (Patient taking differently: Take 1 g by mouth 4 (four) times a day Patient takes once a day )    ergocalciferol (VITAMIN D2) 50,000 units Take 1 capsule (50,000 Units total) by mouth every 14 (fourteen) days    levothyroxine 75 mcg tablet Take 1 tablet (75 mcg total) by mouth daily    nitroglycerin (NITROSTAT) 0 4 mg SL tablet Place 1 tablet (0 4 mg total) under the tongue every 5 (five) minutes as needed for chest pain (Patient not taking: Reported on 1/6/2022 )    rivaroxaban (XARELTO) 20 mg tablet Take 1 tablet (20 mg total) by mouth daily with dinner     No current facility-administered medications on file prior to visit  She is allergic to iodine - food allergy and iodinated diagnostic agents       Review of Systems   Constitutional: Negative for appetite change, chills, fatigue and fever  HENT: Negative for sore throat and trouble swallowing  Eyes: Negative for redness  Respiratory: Negative for shortness of breath  Cardiovascular: Negative for chest pain and palpitations  Gastrointestinal: Negative for abdominal pain, constipation and diarrhea  Genitourinary: Negative for dysuria and hematuria  Musculoskeletal: Negative for back pain and neck pain  Skin: Negative for rash  Neurological: Negative for seizures, weakness and headaches  Hematological: Negative for adenopathy  Psychiatric/Behavioral: Negative for confusion  The patient is not nervous/anxious            Objective:      /82   Pulse 59   Temp 99 4 °F (37 4 °C) (Temporal)   Ht 5' 3" (1 6 m)   Wt 69 kg (152 lb 3 2 oz)   SpO2 99%   BMI 26 96 kg/m²     Results Reviewed     None          No results found for this or any previous visit (from the past 1344 hour(s))  Physical Exam  Constitutional:       General: She is not in acute distress  Appearance: Normal appearance  HENT:      Head: Normocephalic and atraumatic  Nose: Nose normal       Mouth/Throat:      Mouth: Mucous membranes are moist    Eyes:      Extraocular Movements: Extraocular movements intact  Pupils: Pupils are equal, round, and reactive to light  Cardiovascular:      Rate and Rhythm: Normal rate and regular rhythm  Pulses: Normal pulses  Heart sounds: Normal heart sounds  No murmur heard  No friction rub  Pulmonary:      Effort: Pulmonary effort is normal  No respiratory distress  Breath sounds: Normal breath sounds  No wheezing  Abdominal:      General: Abdomen is flat  Bowel sounds are normal  There is no distension  Palpations: Abdomen is soft  There is no mass  Tenderness: There is no abdominal tenderness  There is no guarding  Musculoskeletal:         General: Normal range of motion  Neurological:      General: No focal deficit present  Mental Status: She is alert and oriented to person, place, and time  Mental status is at baseline  Cranial Nerves: No cranial nerve deficit  Psychiatric:         Mood and Affect: Mood normal          Behavior: Behavior normal        BMI Counseling: Body mass index is 26 96 kg/m²  The BMI is above normal  Nutrition recommendations include reducing portion sizes, decreasing overall calorie intake and 3-5 servings of fruits/vegetables daily

## 2022-01-12 NOTE — PROGRESS NOTES
Assessment and Plan:     Problem List Items Addressed This Visit        Endocrine    Acquired hypothyroidism       Cardiovascular and Mediastinum    Paroxysmal atrial fibrillation (HCC)    Essential hypertension - Primary      Other Visit Diagnoses     Vitamin D deficiency disease        Medicare annual wellness visit, subsequent               Preventive health issues were discussed with patient, and age appropriate screening tests were ordered as noted in patient's After Visit Summary  Personalized health advice and appropriate referrals for health education or preventive services given if needed, as noted in patient's After Visit Summary       History of Present Illness:     Patient presents for Medicare Annual Wellness visit    Patient Care Team:  Napoleon Joy MD as PCP - MD Art Anders MD     Problem List:     Patient Active Problem List   Diagnosis    Paroxysmal atrial fibrillation [facility])    Essential hypertension    PAC (premature atrial contraction)    Dyslipidemia    Acquired hypothyroidism    Plantar fasciitis    Presyncope    Abnormal ECG    Acid reflux      Past Medical and Surgical History:     Past Medical History:   Diagnosis Date    Acid reflux     Arthritis     Cataract     Colon polyp     Dyslipidemia (high LDL; low HDL)     Fibromyalgia     GERD (gastroesophageal reflux disease)     Hyperlipidemia     Hypertension     Hypothyroidism     Palpitation     Paroxysmal atrial fibrillation ([address] )     Shingles 09/12/2010    Right C7     Past Surgical History:   Procedure Laterality Date    CATARACT EXTRACTION, BILATERAL  11/2016    COLONOSCOPY  07/11/2016    COLONOSCOPY      HYSTERECTOMY  05/2016    total hysterectomy    MAMMO (HISTORICAL)  4/8/2010 & 2/19/2020    REPLACEMENT TOTAL KNEE      TONSILLECTOMY      UPPER GASTROINTESTINAL ENDOSCOPY        Family History:     Family History   Problem Relation Age of Onset    Hypertension Mother    [employer] Hyperlipidemia Mother     Parkinsonism Mother     Stroke Mother     Chronic bronchitis Mother     Atrial fibrillation Father     Stroke Father     Cancer Father         skin cancer    Anuerysm Neg Hx     Clotting disorder Neg Hx       Social History:     Social History     Socioeconomic History    Marital status: /Civil Union     Spouse name: None    Number of children: None    Years of education: None    Highest education level: None   Occupational History    None   Tobacco Use    Smoking status: Never Smoker    Smokeless tobacco: Never Used   Vaping Use    Vaping Use: Never used   Substance and Sexual Activity    Alcohol use: No    Drug use: No    Sexual activity: Not Currently   Other Topics Concern    None   Social History Narrative    None     Social Determinants of Health     Financial Resource Strain: Not on file   Food Insecurity: Not on file   Transportation Needs: Not on file   Physical Activity: Not on file   Stress: Not on file   Social Connections: Not on file   Intimate Partner Violence: Not on file   Housing Stability: Not on file      Medications and Allergies:     Current Outpatient Medications   Medication Sig Dispense Refill    omeprazole (PRILOSEC) 20 mg delayed release capsule Take 1 capsule by mouth daily      sotalol AF (BETAPACE AF) 120 MG TABS Take 1 tablet (120 mg total) by mouth every 12 (twelve) hours 60 tablet 3    sucralfate (CARAFATE) 1 g tablet Take 1 tablet (1 g total) by mouth 4 (four) times a day (Patient taking differently: Take 1 g by mouth 4 (four) times a day Patient takes once a day ) 60 tablet 3    ergocalciferol (VITAMIN D2) 50,000 units Take 1 capsule (50,000 Units total) by mouth every 14 (fourteen) days 7 capsule 3    levothyroxine 75 mcg tablet Take 1 tablet (75 mcg total) by mouth daily 90 tablet 1    nitroglycerin (NITROSTAT) 0 4 mg SL tablet Place 1 tablet (0 4 mg total) under the tongue every 5 (five) minutes as needed for chest pain (Patient not taking: Reported on 1/6/2022 ) 15 tablet 1    rivaroxaban (XARELTO) 20 mg tablet Take 1 tablet (20 mg total) by mouth daily with dinner 90 tablet 3     No current facility-administered medications for this visit  Allergies   Allergen Reactions    Iodine - Food Allergy Hives     Severe allergy to IV CT scan dye    Iodinated Diagnostic Agents Hives      Immunizations:     Immunization History   Administered Date(s) Administered    INFLUENZA 09/11/2020    Influenza, high dose seasonal 0 7 mL 11/17/2021    Pneumococcal Conjugate 13-Valent 05/27/2015    Td (adult), Unspecified 07/25/2007    Td (adult), adsorbed 07/25/2007    Tdap 09/01/2015    influenza, trivalent, adjuvanted 09/11/2020      Health Maintenance:         Topic Date Due    Breast Cancer Screening: Mammogram  03/08/2022    Hepatitis C Screening  Completed         Topic Date Due    Pneumococcal Vaccine: 65+ Years (2 of 2 - PPSV23) 05/27/2016      Medicare Health Risk Assessment:     /82   Pulse 59   Temp 99 4 °F (37 4 °C) (Temporal)   Ht 5' 3" (1 6 m)   Wt 69 kg (152 lb 3 2 oz)   SpO2 99%   BMI 26 96 kg/m²      Samantha Martínez is here for her Subsequent Wellness visit  Health Risk Assessment:   Patient rates overall health as very good  Patient feels that their physical health rating is much better  Patient is very satisfied with their life  Eyesight was rated as same  Hearing was rated as same  Patient feels that their emotional and mental health rating is same  Patients states they are sometimes angry  Patient states they are sometimes unusually tired/fatigued  Pain experienced in the last 7 days has been none  Patient states that she has experienced no weight loss or gain in last 6 months  Depression Screening:   PHQ-2 Score: 0      Fall Risk Screening:    In the past year, patient has experienced: no history of falling in past year      Urinary Incontinence Screening:   Patient has not leaked urine accidently in the last six months  Home Safety:  Patient does not have trouble with stairs inside or outside of their home  Patient has working smoke alarms and has working carbon monoxide detector  Home safety hazards include: none  Nutrition:   Current diet is Regular  Medications:   Patient is currently taking over-the-counter supplements  OTC medications include: see medication list  Patient is able to manage medications  Activities of Daily Living (ADLs)/Instrumental Activities of Daily Living (IADLs):   Walk and transfer into and out of bed and chair?: Yes  Dress and groom yourself?: Yes    Bathe or shower yourself?: Yes    Feed yourself? Yes  Do your laundry/housekeeping?: Yes  Manage your money, pay your bills and track your expenses?: Yes  Make your own meals?: Yes    Do your own shopping?: Yes    Previous Hospitalizations:   Any hospitalizations or ED visits within the last 12 months?: Yes    How many hospitalizations have you had in the last year?: 1-2    Advance Care Planning:   Living will: No      PREVENTIVE SCREENINGS      Cardiovascular Screening:    General: Screening Current      Diabetes Screening:     General: Screening Current      Breast Cancer Screening:     General: Screening Current      Cervical Cancer Screening:    General: Screening Not Indicated      Lung Cancer Screening:     General: Screening Not Indicated      Hepatitis C Screening:    General: Screening Current    Screening, Brief Intervention, and Referral to Treatment (SBIRT)    Screening  Typical number of drinks in a day: 0  Typical number of drinks in a week: 0  Interpretation: Low risk drinking behavior      Single Item Drug Screening:  How often have you used an illegal drug (including marijuana) or a prescription medication for non-medical reasons in the past year? never    Single Item Drug Screen Score: 0  Interpretation: Negative screen for possible drug use disorder    Other Counseling Topics:   Car/seat belt/driving safety, skin self-exam, sunscreen and regular weightbearing exercise and calcium and vitamin D intake         Shaina Mahmood MD

## 2022-01-12 NOTE — PATIENT INSTRUCTIONS
Medicare Preventive Visit Patient Instructions  Thank you for completing your Welcome to Medicare Visit or Medicare Annual Wellness Visit today  Your next wellness visit will be due in one year (1/13/2023)  The screening/preventive services that you may require over the next 5-10 years are detailed below  Some tests may not apply to you based off risk factors and/or age  Screening tests ordered at today's visit but not completed yet may show as past due  Also, please note that scanned in results may not display below  Preventive Screenings:  Service Recommendations Previous Testing/Comments   Colorectal Cancer Screening  * Colonoscopy    * Fecal Occult Blood Test (FOBT)/Fecal Immunochemical Test (FIT)  * Fecal DNA/Cologuard Test  * Flexible Sigmoidoscopy Age: 54-65 years old   Colonoscopy: every 10 years (may be performed more frequently if at higher risk)  OR  FOBT/FIT: every 1 year  OR  Cologuard: every 3 years  OR  Sigmoidoscopy: every 5 years  Screening may be recommended earlier than age 48 if at higher risk for colorectal cancer  Also, an individualized decision between you and your healthcare provider will decide whether screening between the ages of 74-80 would be appropriate  Colonoscopy: Not on file  FOBT/FIT: Not on file  Cologuard: Not on file  Sigmoidoscopy: Not on file          Breast Cancer Screening Age: 36 years old  Frequency: every 1-2 years  Not required if history of left and right mastectomy Mammogram: 03/08/2021    Screening Current   Cervical Cancer Screening Between the ages of 21-29, pap smear recommended once every 3 years  Between the ages of 33-67, can perform pap smear with HPV co-testing every 5 years     Recommendations may differ for women with a history of total hysterectomy, cervical cancer, or abnormal pap smears in past  Pap Smear: Not on file    Screening Not Indicated   Hepatitis C Screening Once for adults born between 1945 and 1965  More frequently in patients at high risk for Hepatitis C Hep C Antibody: 05/19/2021    Screening Current   Diabetes Screening 1-2 times per year if you're at risk for diabetes or have pre-diabetes Fasting glucose: 109 mg/dL   A1C: No results in last 5 years    Screening Current   Cholesterol Screening Once every 5 years if you don't have a lipid disorder  May order more often based on risk factors  Lipid panel: 09/22/2021    Screening Current     Other Preventive Screenings Covered by Medicare:  1  Abdominal Aortic Aneurysm (AAA) Screening: covered once if your at risk  You're considered to be at risk if you have a family history of AAA  2  Lung Cancer Screening: covers low dose CT scan once per year if you meet all of the following conditions: (1) Age 50-69; (2) No signs or symptoms of lung cancer; (3) Current smoker or have quit smoking within the last 15 years; (4) You have a tobacco smoking history of at least 30 pack years (packs per day multiplied by number of years you smoked); (5) You get a written order from a healthcare provider  3  Glaucoma Screening: covered annually if you're considered high risk: (1) You have diabetes OR (2) Family history of glaucoma OR (3)  aged 48 and older OR (3)  American aged 72 and older  3  Osteoporosis Screening: covered every 2 years if you meet one of the following conditions: (1) You're estrogen deficient and at risk for osteoporosis based off medical history and other findings; (2) Have a vertebral abnormality; (3) On glucocorticoid therapy for more than 3 months; (4) Have primary hyperparathyroidism; (5) On osteoporosis medications and need to assess response to drug therapy  · Last bone density test (DXA Scan): Not on file  5  HIV Screening: covered annually if you're between the age of 12-76  Also covered annually if you are younger than 13 and older than 72 with risk factors for HIV infection   For pregnant patients, it is covered up to 3 times per pregnancy  Immunizations:  Immunization Recommendations   Influenza Vaccine Annual influenza vaccination during flu season is recommended for all persons aged >= 6 months who do not have contraindications   Pneumococcal Vaccine (Prevnar and Pneumovax)  * Prevnar = PCV13  * Pneumovax = PPSV23   Adults 25-60 years old: 1-3 doses may be recommended based on certain risk factors  Adults 72 years old: Prevnar (PCV13) vaccine recommended followed by Pneumovax (PPSV23) vaccine  If already received PPSV23 since turning 65, then PCV13 recommended at least one year after PPSV23 dose  Hepatitis B Vaccine 3 dose series if at intermediate or high risk (ex: diabetes, end stage renal disease, liver disease)   Tetanus (Td) Vaccine - COST NOT COVERED BY MEDICARE PART B Following completion of primary series, a booster dose should be given every 10 years to maintain immunity against tetanus  Td may also be given as tetanus wound prophylaxis  Tdap Vaccine - COST NOT COVERED BY MEDICARE PART B Recommended at least once for all adults  For pregnant patients, recommended with each pregnancy  Shingles Vaccine (Shingrix) - COST NOT COVERED BY MEDICARE PART B  2 shot series recommended in those aged 48 and above     Health Maintenance Due:      Topic Date Due    Breast Cancer Screening: Mammogram  03/08/2022    Hepatitis C Screening  Completed     Immunizations Due:      Topic Date Due    Pneumococcal Vaccine: 65+ Years (2 of 2 - PPSV23) 05/27/2016     Advance Directives   What are advance directives? Advance directives are legal documents that state your wishes and plans for medical care  These plans are made ahead of time in case you lose your ability to make decisions for yourself  Advance directives can apply to any medical decision, such as the treatments you want, and if you want to donate organs  What are the types of advance directives?   There are many types of advance directives, and each state has rules about how to use them  You may choose a combination of any of the following:  · Living will: This is a written record of the treatment you want  You can also choose which treatments you do not want, which to limit, and which to stop at a certain time  This includes surgery, medicine, IV fluid, and tube feedings  · Durable power of  for healthcare [facility]): This is a written record that states who you want to make healthcare choices for you when you are unable to make them for yourself  This person, called a proxy, is usually a family member or a friend  You may choose more than 1 proxy  · Do not resuscitate (DNR) order:  A DNR order is used in case your heart stops beating or you stop breathing  It is a request not to have certain forms of treatment, such as CPR  A DNR order may be included in other types of advance directives  · Medical directive: This covers the care that you want if you are in a coma, near death, or unable to make decisions for yourself  You can list the treatments you want for each condition  Treatment may include pain medicine, surgery, blood transfusions, dialysis, IV or tube feedings, and a ventilator (breathing machine)  · Values history: This document has questions about your views, beliefs, and how you feel and think about life  This information can help others choose the care that you would choose  Why are advance directives important? An advance directive helps you control your care  Although spoken wishes may be used, it is better to have your wishes written down  Spoken wishes can be misunderstood, or not followed  Treatments may be given even if you do not want them  An advance directive may make it easier for your family to make difficult choices about your care  Weight Management   Why it is important to manage your weight:  Being overweight increases your risk of health conditions such as heart disease, high blood pressure, type 2 diabetes, and certain types of cancer   It can also increase your risk for osteoarthritis, sleep apnea, and other respiratory problems  Aim for a slow, steady weight loss  Even a small amount of weight loss can lower your risk of health problems  How to lose weight safely:  A safe and healthy way to lose weight is to eat fewer calories and get regular exercise  You can lose up about 1 pound a week by decreasing the number of calories you eat by 500 calories each day  Healthy meal plan for weight management:  A healthy meal plan includes a variety of foods, contains fewer calories, and helps you stay healthy  A healthy meal plan includes the following:  · Eat whole-grain foods more often  A healthy meal plan should contain fiber  Fiber is the part of grains, fruits, and vegetables that is not broken down by your body  Whole-grain foods are healthy and provide extra fiber in your diet  Some examples of whole-grain foods are whole-wheat breads and pastas, oatmeal, brown rice, and bulgur  · Eat a variety of vegetables every day  Include dark, leafy greens such as spinach, kale, eddie greens, and mustard greens  Eat yellow and orange vegetables such as carrots, sweet potatoes, and winter squash  · Eat a variety of fruits every day  Choose fresh or canned fruit (canned in its own juice or light syrup) instead of juice  Fruit juice has very little or no fiber  · Eat low-fat dairy foods  Drink fat-free (skim) milk or 1% milk  Eat fat-free yogurt and low-fat cottage cheese  Try low-fat cheeses such as mozzarella and other reduced-fat cheeses  · Choose meat and other protein foods that are low in fat  Choose beans or other legumes such as split peas or lentils  Choose fish, skinless poultry (chicken or turkey), or lean cuts of red meat (beef or pork)  Before you cook meat or poultry, cut off any visible fat  · Use less fat and oil  Try baking foods instead of frying them   Add less fat, such as margarine, sour cream, regular salad dressing and mayonnaise to foods  Eat fewer high-fat foods  Some examples of high-fat foods include french fries, doughnuts, ice cream, and cakes  · Eat fewer sweets  Limit foods and drinks that are high in sugar  This includes candy, cookies, regular soda, and sweetened drinks  Exercise:  Exercise at least 30 minutes per day on most days of the week  Some examples of exercise include walking, biking, dancing, and swimming  You can also fit in more physical activity by taking the stairs instead of the elevator or parking farther away from stores  Ask your healthcare provider about the best exercise plan for you  © Copyright [employer] 2018 Information is for End User's use only and may not be sold, redistributed or otherwise used for commercial purposes   All illustrations and images included in CareNotes® are the copyrighted property of A D A M [employer]  or [address] [place] [address]

## 2022-02-07 ENCOUNTER — TELEPHONE (OUTPATIENT)
Dept: CARDIOLOGY CLINIC | Facility: CLINIC | Age: 77
End: 2022-02-07

## 2022-02-07 NOTE — TELEPHONE ENCOUNTER
Yecenia Beebe is scheduled for a Colonoscopy and an EGD on 3/29 with Dr Yordan Gorman / Jesus Carter  Please advise if ok to hold Xarelto for 2 days prior to procedure

## 2022-02-21 DIAGNOSIS — I48.0 PAROXYSMAL ATRIAL FIBRILLATION (HCC): ICD-10-CM

## 2022-02-22 RX ORDER — SOTALOL HYDROCHLORIDE 120 MG/1
120 TABLET ORAL EVERY 12 HOURS SCHEDULED
Qty: 60 TABLET | Refills: 3 | Status: SHIPPED | OUTPATIENT
Start: 2022-02-22 | End: 2022-06-08

## 2022-02-28 DIAGNOSIS — E55.9 VITAMIN D DEFICIENCY DISEASE: ICD-10-CM

## 2022-02-28 RX ORDER — ERGOCALCIFEROL 1.25 MG/1
50000 CAPSULE ORAL
Qty: 7 CAPSULE | Refills: 0 | Status: CANCELLED | OUTPATIENT
Start: 2022-02-28 | End: 2022-05-31

## 2022-02-28 RX ORDER — ERGOCALCIFEROL 1.25 MG/1
50000 CAPSULE ORAL
Qty: 7 CAPSULE | Refills: 3 | Status: SHIPPED | OUTPATIENT
Start: 2022-02-28

## 2022-03-01 ENCOUNTER — APPOINTMENT (OUTPATIENT)
Dept: LAB | Facility: AMBULARY SURGERY CENTER | Age: 77
End: 2022-03-01
Payer: MEDICARE

## 2022-03-01 DIAGNOSIS — I10 ESSENTIAL HYPERTENSION: ICD-10-CM

## 2022-03-01 DIAGNOSIS — E78.5 DYSLIPIDEMIA: ICD-10-CM

## 2022-03-01 DIAGNOSIS — E03.9 ACQUIRED HYPOTHYROIDISM: ICD-10-CM

## 2022-03-01 LAB
ALBUMIN SERPL BCP-MCNC: 3.6 G/DL (ref 3.5–5)
ALP SERPL-CCNC: 63 U/L (ref 46–116)
ALT SERPL W P-5'-P-CCNC: 26 U/L (ref 12–78)
ANION GAP SERPL CALCULATED.3IONS-SCNC: 5 MMOL/L (ref 4–13)
AST SERPL W P-5'-P-CCNC: 18 U/L (ref 5–45)
BASOPHILS # BLD AUTO: 0.05 THOUSANDS/ΜL (ref 0–0.1)
BASOPHILS NFR BLD AUTO: 1 % (ref 0–1)
BILIRUB SERPL-MCNC: 0.53 MG/DL (ref 0.2–1)
BUN SERPL-MCNC: 20 MG/DL (ref 5–25)
CALCIUM SERPL-MCNC: 9.1 MG/DL (ref 8.3–10.1)
CHLORIDE SERPL-SCNC: 109 MMOL/L (ref 100–108)
CHOLEST SERPL-MCNC: 277 MG/DL
CO2 SERPL-SCNC: 27 MMOL/L (ref 21–32)
CREAT SERPL-MCNC: 0.87 MG/DL (ref 0.6–1.3)
CRP SERPL QL: <3 MG/L
EOSINOPHIL # BLD AUTO: 0.18 THOUSAND/ΜL (ref 0–0.61)
EOSINOPHIL NFR BLD AUTO: 3 % (ref 0–6)
ERYTHROCYTE [DISTWIDTH] IN BLOOD BY AUTOMATED COUNT: 12.8 % (ref 11.6–15.1)
GFR SERPL CREATININE-BSD FRML MDRD: 64 ML/MIN/1.73SQ M
GLUCOSE P FAST SERPL-MCNC: 86 MG/DL (ref 65–99)
HCT VFR BLD AUTO: 40.1 % (ref 34.8–46.1)
HDLC SERPL-MCNC: 65 MG/DL
HGB BLD-MCNC: 12.9 G/DL (ref 11.5–15.4)
IMM GRANULOCYTES # BLD AUTO: 0.02 THOUSAND/UL (ref 0–0.2)
IMM GRANULOCYTES NFR BLD AUTO: 0 % (ref 0–2)
LDLC SERPL CALC-MCNC: 195 MG/DL (ref 0–100)
LYMPHOCYTES # BLD AUTO: 1.66 THOUSANDS/ΜL (ref 0.6–4.47)
LYMPHOCYTES NFR BLD AUTO: 31 % (ref 14–44)
MCH RBC QN AUTO: 32 PG (ref 26.8–34.3)
MCHC RBC AUTO-ENTMCNC: 32.2 G/DL (ref 31.4–37.4)
MCV RBC AUTO: 100 FL (ref 82–98)
MONOCYTES # BLD AUTO: 0.61 THOUSAND/ΜL (ref 0.17–1.22)
MONOCYTES NFR BLD AUTO: 11 % (ref 4–12)
NEUTROPHILS # BLD AUTO: 2.93 THOUSANDS/ΜL (ref 1.85–7.62)
NEUTS SEG NFR BLD AUTO: 54 % (ref 43–75)
NONHDLC SERPL-MCNC: 212 MG/DL
NRBC BLD AUTO-RTO: 0 /100 WBCS
PLATELET # BLD AUTO: 277 THOUSANDS/UL (ref 149–390)
PMV BLD AUTO: 10.3 FL (ref 8.9–12.7)
POTASSIUM SERPL-SCNC: 3.9 MMOL/L (ref 3.5–5.3)
PROT SERPL-MCNC: 7.5 G/DL (ref 6.4–8.2)
RBC # BLD AUTO: 4.03 MILLION/UL (ref 3.81–5.12)
SODIUM SERPL-SCNC: 141 MMOL/L (ref 136–145)
T4 FREE SERPL-MCNC: 1.19 NG/DL (ref 0.76–1.46)
TRIGL SERPL-MCNC: 83 MG/DL
TSH SERPL DL<=0.05 MIU/L-ACNC: 1.95 UIU/ML (ref 0.36–3.74)
WBC # BLD AUTO: 5.45 THOUSAND/UL (ref 4.31–10.16)

## 2022-03-01 PROCEDURE — 86140 C-REACTIVE PROTEIN: CPT

## 2022-03-01 PROCEDURE — 80061 LIPID PANEL: CPT

## 2022-03-01 PROCEDURE — 84439 ASSAY OF FREE THYROXINE: CPT

## 2022-03-01 PROCEDURE — 84443 ASSAY THYROID STIM HORMONE: CPT

## 2022-03-01 PROCEDURE — 80053 COMPREHEN METABOLIC PANEL: CPT

## 2022-03-01 PROCEDURE — 85025 COMPLETE CBC W/AUTO DIFF WBC: CPT

## 2022-03-01 PROCEDURE — 36415 COLL VENOUS BLD VENIPUNCTURE: CPT

## 2022-03-02 ENCOUNTER — APPOINTMENT (OUTPATIENT)
Dept: LAB | Facility: AMBULARY SURGERY CENTER | Age: 77
End: 2022-03-02
Payer: MEDICARE

## 2022-03-02 LAB
BACTERIA UR QL AUTO: ABNORMAL /HPF
BILIRUB UR QL STRIP: NEGATIVE
CLARITY UR: CLEAR
COLOR UR: ABNORMAL
GLUCOSE UR STRIP-MCNC: NEGATIVE MG/DL
HGB UR QL STRIP.AUTO: ABNORMAL
KETONES UR STRIP-MCNC: NEGATIVE MG/DL
LEUKOCYTE ESTERASE UR QL STRIP: NEGATIVE
MUCOUS THREADS UR QL AUTO: ABNORMAL
NITRITE UR QL STRIP: NEGATIVE
NON-SQ EPI CELLS URNS QL MICRO: ABNORMAL /HPF
PH UR STRIP.AUTO: 6 [PH]
PROT UR STRIP-MCNC: NEGATIVE MG/DL
RBC #/AREA URNS AUTO: ABNORMAL /HPF
SP GR UR STRIP.AUTO: 1.02 (ref 1–1.03)
UROBILINOGEN UR STRIP-ACNC: <2 MG/DL
WBC #/AREA URNS AUTO: ABNORMAL /HPF

## 2022-03-02 PROCEDURE — 81001 URINALYSIS AUTO W/SCOPE: CPT

## 2022-03-10 ENCOUNTER — TELEPHONE (OUTPATIENT)
Dept: INTERNAL MEDICINE CLINIC | Facility: CLINIC | Age: 77
End: 2022-03-10

## 2022-03-10 DIAGNOSIS — M81.0 AGE-RELATED OSTEOPOROSIS WITHOUT CURRENT PATHOLOGICAL FRACTURE: Primary | ICD-10-CM

## 2022-03-22 ENCOUNTER — ANESTHESIA (OUTPATIENT)
Dept: ANESTHESIOLOGY | Facility: HOSPITAL | Age: 77
End: 2022-03-22

## 2022-03-22 ENCOUNTER — ANESTHESIA EVENT (OUTPATIENT)
Dept: ANESTHESIOLOGY | Facility: HOSPITAL | Age: 77
End: 2022-03-22

## 2022-03-24 ENCOUNTER — TELEPHONE (OUTPATIENT)
Dept: GASTROENTEROLOGY | Facility: CLINIC | Age: 77
End: 2022-03-24

## 2022-03-24 NOTE — TELEPHONE ENCOUNTER
Patient placed on continuous cardiac monitor, automatic blood pressure cuff and continuous pulse oximeter.   Spoke to patient and explained difference in colonoscopy versus cologuard  Recommend colonoscopy because better at finding colon cancer in early stages  Cologuard can have false positive and negative results  Any positive cologuard would require further evaluation with colonoscopy  Patient is agreeable to have colonoscopy done at time of EGD please schedule    Thank you

## 2022-03-24 NOTE — TELEPHONE ENCOUNTER
Patient called to reschedule her colon/EGD to 06/24/22 with Dr Bandar Montalvo at [place] [id number]  Patient is inquiring as to whether or not she could do cologuard instead of the colonoscopy  Please advise  Thanks!

## 2022-03-30 ENCOUNTER — OFFICE VISIT (OUTPATIENT)
Dept: OBGYN CLINIC | Facility: CLINIC | Age: 77
End: 2022-03-30
Payer: MEDICARE

## 2022-03-30 ENCOUNTER — APPOINTMENT (OUTPATIENT)
Dept: RADIOLOGY | Facility: AMBULARY SURGERY CENTER | Age: 77
End: 2022-03-30
Attending: ORTHOPAEDIC SURGERY
Payer: MEDICARE

## 2022-03-30 VITALS
HEIGHT: 63 IN | WEIGHT: 152 LBS | DIASTOLIC BLOOD PRESSURE: 84 MMHG | SYSTOLIC BLOOD PRESSURE: 160 MMHG | BODY MASS INDEX: 26.93 KG/M2 | HEART RATE: 53 BPM

## 2022-03-30 DIAGNOSIS — M25.571 PAIN, JOINT, ANKLE AND FOOT, RIGHT: ICD-10-CM

## 2022-03-30 DIAGNOSIS — S93.409A SPRAIN OF LATERAL LIGAMENT OF ANKLE JOINT: Primary | ICD-10-CM

## 2022-03-30 DIAGNOSIS — M76.71 PERONEUS BREVIS TENDINITIS, RIGHT: ICD-10-CM

## 2022-03-30 PROCEDURE — 73610 X-RAY EXAM OF ANKLE: CPT

## 2022-03-30 PROCEDURE — 99203 OFFICE O/P NEW LOW 30 MIN: CPT | Performed by: ORTHOPAEDIC SURGERY

## 2022-03-30 NOTE — PROGRESS NOTES
BEVERLY Alaniz  Attending, Orthopaedic Surgery  Foot and [address] [facility] [address] [facility]        ORTHOPAEDIC FOOT AND ANKLE CLINIC VISIT     Assessment:     Encounter Diagnoses   Name Primary?  Sprain of lateral ligament of ankle joint Yes    Peroneus brevis tendinitis, right         Plan:   · The patient verbalized understanding of exam findings and treatment plan  We engaged in the shared decision-making process and treatment options were discussed at length with the patient  Surgical and conservative management discussed today along with risks and benefits  · She has pain with resisted eversion as well as TTP over the peroneal tubercle and peroneus brevis tendon  · Recommend continued WBAT RLE  No boot necessary  · Begin PT immediately  Return in about 6 weeks (around 5/11/2022)  History of Present Illness:   Chief Complaint:   Chief Complaint   Patient presents with    Right Ankle - Pain     Riya Veliz is a 68 y o  female who is being seen for right ankle pain  Patient had a fall approximately 3 weeks ago  She did not have any pain at that time, but developed some lateral ankle pain about a week after the fall  Pain is localized at the lateral ankle/calc with minimal radiating and described as sharp and severe  Patient denies numbness, tingling or radicular pain  Denies history of neuropathy  Patient does not smoke, does not have diabetes and takes rivaroxaban blood thinners  Patient denies family history of anesthesia complications and has not had any complications with anesthesia       Pain/symptom timing:  Worse during the day when active  Pain/symptom context:  Worse with activites and work  Pain/symptom modifying factors:  Rest makes better, activities make worse  Pain/symptom associated signs/symptoms: none    Prior treatment   · NSAIDsYes    · Injections No   · Bracing/Orthotics No   · Physical Therapy No     Orthopedic Surgical History:   See below    Past Medical, Surgical and Social History:  Past Medical History:  has a past medical history of Acid reflux, Arthritis, Cataract, Colon polyp, Dyslipidemia (high LDL; low HDL), Fibromyalgia, GERD (gastroesophageal reflux disease), Hyperlipidemia, Hypertension, Hypothyroidism, Palpitation, Paroxysmal atrial fibrillation ([facility] [address] ), and Shingles (09/12/2010)  Problem List: does not have any pertinent problems on file  Past Surgical History:  has a past surgical history that includes Replacement total knee; Tonsillectomy; Hysterectomy (05/2016); Cataract extraction, bilateral (11/2016); Colonoscopy (07/11/2016); Mammo (historical) (4/8/2010 & 2/19/2020); Colonoscopy; and Upper gastrointestinal endoscopy  Family History: family history includes Atrial fibrillation in her father; Cancer in her father; Chronic bronchitis in her mother; Hyperlipidemia in her mother; Hypertension in her mother; Parkinsonism in her mother; Stroke in her father and mother  Social History:  reports that she has never smoked  She has never used smokeless tobacco  She reports that she does not drink alcohol and does not use drugs  Current Medications: has a current medication list which includes the following prescription(s): ergocalciferol, levothyroxine, omeprazole, rivaroxaban, sotalol af, ergocalciferol, nitroglycerin, and sucralfate  Allergies: is allergic to iodine - food allergy and iodinated diagnostic agents       Review of Systems:  General- denies fever/chills  HEENT- denies hearing loss or sore throat  Eyes- denies eye pain or visual disturbances, denies red eyes  Respiratory- denies cough or SOB  Cardio- denies chest pain or palpitations  GI- denies abdominal pain  Endocrine- denies urinary frequency  Urinary- denies pain with urination  Musculoskeletal- Negative except noted above  Skin- denies rashes or wounds  Neurological- denies dizziness or headache  Psychiatric- denies anxiety or difficulty concentrating    Physical Exam:   BP 160/84 (BP Location: Right arm, Patient Position: Sitting, Cuff Size: Adult)   Pulse (!) 53   Ht 5' 3" (1 6 m)   Wt 68 9 kg (152 lb)   BMI 26 93 kg/m²   General/Constitutional: No apparent distress: well-nourished and well developed  Eyes: normal ocular motion  Cardio: RRR, Normal S1S2, No m/r/g  Lymphatic: No appreciable lymphadenopathy  Respiratory: Non-labored breathing, CTA b/l no w/c/r  Vascular: No edema, swelling or tenderness, except as noted in detailed exam   Integumentary: No impressive skin lesions present, except as noted in detailed exam   Neuro: No ataxia or tremors noted  Psych: Normal mood and affect, oriented to person, place and time  Appropriate affect  Musculoskeletal: Normal, except as noted in detailed exam and in HPI  Examination    Right    Gait Antalgic   Musculoskeletal Tender to palpation at lateral calcaneus at the peroneal tubercle    Skin Normal       Nails Normal    Range of Motion  20 degrees dorsiflexion, 40 degrees plantarflexion  Subtalar motion: normal    Stability Stable anterior drawer    Muscle Strength 5/5 tibialis anterior  5/5 gastrocnemius-soleus  5/5 posterior tibialis  5/5 peroneal/eversion strength  5/5 EHL  5/5 FHL    Neurologic Normal    Sensation Intact to light touch throughout sural, saphenous, superficial peroneal, deep peroneal and medial/lateral plantar nerve distributions  Josephine-Krupa 5 07 filament (10g) testing deferred  Cardiovascular Brisk capillary refill < 2 seconds,intact DP and PT pulses    Special Tests None      Imaging Studies:   3 views of the right ankle were taken, reviewed and interpreted independently that demonstrate no acute fracture, dislocation, or significant degenerative changes  Reviewed by me personally  Audelia Munroe Lachman, MD  Foot & Ankle Surgery   Department of [address] [place] [address]      I personally performed the service  Audelia Munroe Lachman, MD

## 2022-04-04 ENCOUNTER — EVALUATION (OUTPATIENT)
Dept: PHYSICAL THERAPY | Facility: REHABILITATION | Age: 77
End: 2022-04-04
Payer: MEDICARE

## 2022-04-04 DIAGNOSIS — M76.71 PERONEUS BREVIS TENDINITIS, RIGHT: ICD-10-CM

## 2022-04-04 DIAGNOSIS — S93.409A SPRAIN OF LATERAL LIGAMENT OF ANKLE JOINT: Primary | ICD-10-CM

## 2022-04-04 PROCEDURE — 97161 PT EVAL LOW COMPLEX 20 MIN: CPT | Performed by: PHYSICAL THERAPIST

## 2022-04-04 PROCEDURE — 97110 THERAPEUTIC EXERCISES: CPT | Performed by: PHYSICAL THERAPIST

## 2022-04-04 NOTE — PROGRESS NOTES
PT Evaluation     Today's date: 2022  Patient name: Estrada Carcamo  : 1945  MRN: [id number]  Referring provider: Demetria Diaz MD  Dx:   Encounter Diagnosis     ICD-10-CM    1  Sprain of lateral ligament of ankle joint  S93 409A Ambulatory Referral to Physical Therapy   2  Peroneus brevis tendinitis, right  [id number] Ambulatory Referral to Physical Therapy       Start Time: 1150  Stop Time: 1245  Total time in clinic (min): 55 minutes    Assessment  Assessment details: Estrada Carcamo is a 68y o  year old female who presents with R ankle pain  Current deficits include impaired ankle ROM, decreased LE strength, impaired gait, positive special testing, and pain  These deficits impair her ability to perform all typical I/ADLs, household tasks, and social/recreational tasks without pain  Recommend skilled PT services to address previously stated deficits to promote progress towards PLOF  Impairments: abnormal gait, abnormal or restricted ROM, activity intolerance, impaired balance, impaired physical strength, lacks appropriate home exercise program, pain with function and weight-bearing intolerance  Barriers to therapy: Patient reported plantar fasciitis on R  Understanding of Dx/Px/POC: good   Prognosis: good    Goals  STG (4 weeks):  1  R ankle eversion ROM increased by 10* to promote improved activity tolerance  2  Increase eversion strength to at least 4+/5 for improved activity tolerance  3  Decrease pain at worst from 5/10 to 3/10 or less for improved QoL  LTG (8 weeks):  1  Increased global ankle strength to at least to promote 4+/5 to promote improved activity tolerance  2  Able to stand for at least 30 mintues without symptom exacerbation to promote improved activity tolerance for household tasks  3  Able to walk for 30 minutes without symptom exacerbation to promote improved community ambulation    4  Able to ascend/descend 1 flight of stairs without compensation to promote improved household and community ambulation  5  Independent with HEP  Plan  Plan details: Thank you for referring Sunny Cunningham to Physical Therapy at [place] [id number] and for the opportunity to coordinate care  Patient would benefit from: skilled physical therapy  Planned modality interventions: cryotherapy, electrical stimulation/Russian stimulation, TENS, thermotherapy: hydrocollator packs and unattended electrical stimulation  Planned therapy interventions: abdominal trunk stabilization, activity modification, ADL retraining, balance, balance/weight bearing training, behavior modification, body mechanics training, breathing training, flexibility, functional ROM exercises, gait training, graded activity, graded motor, graded exercise, home exercise program, work reintegration, transfer training, therapeutic training, therapeutic exercise, therapeutic activities, stretching, strengthening, self care, sensory integrative techniques, postural training, patient education, neuromuscular re-education, muscle pump exercises, motor coordination training, Carmichael taping, manual therapy, joint mobilization and IADL retraining  Frequency: 2x week  Duration in visits: 10  Plan of Care beginning date: 4/4/2022  Treatment plan discussed with: patient        Subjective Evaluation    History of Present Illness  Mechanism of injury:   04/04/22:  Sunny Cunningham presents to skilled physical therapy with complaints of R ankle pain  Rere Shoemaker reports tripping over a mop with her R foot and falling on 3/12/22  States she landed on her stomach with her arms and legs out  Patient reports symptoms began a week after the fall; reports R ankle, R wrist, R elbow, R knee  Rere Edwardskel reports R ankle pain located just distal to the lateral malleolus  Since onset, symptoms have stayed the same  Rere Shoemaekr also reports having plantar fasciitis on the R with pain at the R heel  Currently, Jose Alfredoleti Edwardssonalmalika is having difficulty with walking, up/down steps   Patient denies difficulty sleeping secondary to R ankle pain  Patient denies numbness, tingling in right leg and right foot  Next follow up appointment with physician is schedule on 5/3/22  Pain  Current pain ratin  At best pain ratin  At worst pain ratin  Location: R ankle  Quality: sharp (like pushing on a bad bruise)  Relieving factors: rest    Social Support  Steps to enter house: no  Stairs in house: no   Lives in: condominium (ground level)  Lives with: spouse    Employment status: working    Diagnostic Tests  X-ray: normal  Treatments  Current treatment: physical therapy  Patient Goals  Patient goal: "To be able to walk "        Objective     Observations     Additional Observation Details  Gait: decreased stance time on R compared to L    Palpation     Additional Palpation Details  Tenderness along bilateral peroneal muscle groups equally which patient attributes to fibromyalgia    Tenderness     Right Ankle/Foot   Tenderness in the lateral malleolus and medial calcaneus  No tenderness in the medial malleolus  Active Range of Motion   Left Ankle/Foot   Dorsiflexion (ke): 5 degrees   Plantar flexion: 45 degrees   Inversion: 55 degrees   Eversion: 20 degrees     Right Ankle/Foot   Dorsiflexion (ke): 15 degrees   Plantar flexion: 45 degrees   Inversion: 50 degrees   Eversion: 5 degrees with pain    Strength/Myotome Testing     Left Ankle/Foot   Dorsiflexion: 5  Plantar flexion: 5  Inversion: 5  Eversion: 5    Right Ankle/Foot   Dorsiflexion: 4+  Plantar flexion: 4  Inversion: 4+  Eversion: 4- (pain)    Tests     Right Ankle/Foot   Positive for varus tilt  Negative for anterior drawer, posterior drawer, valgus tilt and windlass              Precautions: Fibromyalgia, HDL, dyslipidemia, GERD, HTN, hypothyroidism, paroxysmal afib     *indicates included in HEP  HEP code: [facility]       Manuals           R Ankle PROM           Forefoot mobility           TC distraction                                 Neuro Re-Ed           NBOS balance           Tandem balance           SL balance           Tandem walking           Walking with head turns           Arch ups           Toe curls with towel           Toe yoga           Toe splays           Arch leans           Rocker board           SL DB handoff           SL airplanes                                 Ther Ex           Bridges nv          Clamshells           Bike  nv          SLR flexion           SLR abduction           Heel raises nv          Toe raises nv          TB DF/PF Pink TB 20x          TB inversion/ eversion Pink TB 20x                     Patient education done          Ther Activity           Sit to stand           Goblet squat           Leg press           Band resisted side stepping           Monster walks           Forward step ups           Lateral step ups           Deadlift                                                        Gait Training           Single crutch ambulation           Ambulation w/out AD           Stair training                                 Modalities

## 2022-04-05 ENCOUNTER — OFFICE VISIT (OUTPATIENT)
Dept: PHYSICAL THERAPY | Facility: REHABILITATION | Age: 77
End: 2022-04-05
Payer: MEDICARE

## 2022-04-05 DIAGNOSIS — S93.409A SPRAIN OF LATERAL LIGAMENT OF ANKLE JOINT: Primary | ICD-10-CM

## 2022-04-05 DIAGNOSIS — M76.71 PERONEUS BREVIS TENDINITIS, RIGHT: ICD-10-CM

## 2022-04-05 PROCEDURE — 97110 THERAPEUTIC EXERCISES: CPT

## 2022-04-05 PROCEDURE — 97140 MANUAL THERAPY 1/> REGIONS: CPT

## 2022-04-05 NOTE — PROGRESS NOTES
Daily Note     Today's date: 2022  Patient name: Nely Bailey  : 1945  MRN: [id number]  Referring provider: Christine Boggs MD  Dx:   Encounter Diagnosis     ICD-10-CM    1  Sprain of lateral ligament of ankle joint  S93 409A    2  Peroneus brevis tendinitis, right  [id number]                   Subjective: Patient stated that her ankle is hurting a little today  It is usually stiff in the AM  She has been having ankle pain when transitioning to standing  Objective: See treatment diary below      Assessment: Tolerated treatment well  Patient demonstrated good static balance, completing without UE support  Patient reports reduced ankle stiffness post bike warm and PROM, with improvement in amb mechanics noted  Good ankle control with TB resisted tasks, minimal end range discomfort with IV  Patient c/o lateral ankle pain with heel raises, trial at leg press with light weight NV  No reproduction with ankle pain with elevated STS  Continued PT would be beneficial to improve function       Plan: Continue per plan of care         Precautions: Fibromyalgia, HDL, dyslipidemia, GERD, HTN, hypothyroidism, paroxysmal afib     *indicates included in HEP  HEP code: [facility]       Manuals          R Ankle PROM  done         Forefoot mobility           TC distraction                                 Neuro Re-Ed           NBOS balance  EO to EC 2x30"         Tandem balance  Semi 2x30"         SL balance           Tandem walking           Walking with head turns           Arch ups           Toe curls with towel           Toe yoga           Toe splays           Arch leans           Rocker board           SL DB handoff           SL airplanes                                 Ther Ex           Bridges nv 2x10         Clamshells           Bike  nv 5' lvl 1         SLR flexion  2x10         SLR abduction           Heel raises nv 2x10         Toe raises nv 2x10         TB DF/PF Pink TB 20x Pink TB 20x         TB inversion/ eversion Pink TB 20x Pink TB 20x                    Patient education done          Ther Activity           Sit to stand  2x10 + airex         Goblet squat           Leg press           Band resisted side stepping           Monster walks           Forward step ups           Lateral step ups           Deadlift                                                        Gait Training           Single crutch ambulation           Ambulation w/out AD           Stair training                                 Modalities

## 2022-04-08 ENCOUNTER — OFFICE VISIT (OUTPATIENT)
Dept: INTERNAL MEDICINE CLINIC | Facility: CLINIC | Age: 77
End: 2022-04-08
Payer: MEDICARE

## 2022-04-08 VITALS
DIASTOLIC BLOOD PRESSURE: 68 MMHG | OXYGEN SATURATION: 98 % | SYSTOLIC BLOOD PRESSURE: 124 MMHG | TEMPERATURE: 98.2 F | HEIGHT: 63 IN | WEIGHT: 150 LBS | BODY MASS INDEX: 26.58 KG/M2 | HEART RATE: 60 BPM

## 2022-04-08 DIAGNOSIS — E03.9 ACQUIRED HYPOTHYROIDISM: ICD-10-CM

## 2022-04-08 DIAGNOSIS — E55.9 VITAMIN D DEFICIENCY DISEASE: ICD-10-CM

## 2022-04-08 DIAGNOSIS — E78.5 DYSLIPIDEMIA: ICD-10-CM

## 2022-04-08 DIAGNOSIS — Z23 NEED FOR SHINGLES VACCINE: ICD-10-CM

## 2022-04-08 DIAGNOSIS — I10 ESSENTIAL HYPERTENSION: Primary | ICD-10-CM

## 2022-04-08 DIAGNOSIS — M81.0 AGE-RELATED OSTEOPOROSIS WITHOUT CURRENT PATHOLOGICAL FRACTURE: ICD-10-CM

## 2022-04-08 DIAGNOSIS — Z28.21 COVID-19 VACCINATION DECLINED: ICD-10-CM

## 2022-04-08 PROCEDURE — 99214 OFFICE O/P EST MOD 30 MIN: CPT | Performed by: INTERNAL MEDICINE

## 2022-04-08 RX ORDER — ZOSTER VACCINE RECOMBINANT, ADJUVANTED 50 MCG/0.5
0.5 KIT INTRAMUSCULAR ONCE
Qty: 1 EACH | Refills: 1 | Status: SHIPPED | OUTPATIENT
Start: 2022-04-08 | End: 2022-04-08

## 2022-04-08 RX ORDER — LEVOTHYROXINE SODIUM 0.07 MG/1
75 TABLET ORAL DAILY
Qty: 90 TABLET | Refills: 1 | Status: SHIPPED | OUTPATIENT
Start: 2022-04-08 | End: 2022-07-08

## 2022-04-08 NOTE — PROGRESS NOTES
Assessment/Plan:           1  Essential hypertension  Comments:  Controlled with sotalol  2  Acquired hypothyroidism  Comments:  Continue levothyroxine 75 mcgs daily  Orders:  -     levothyroxine 75 mcg tablet; Take 1 tablet (75 mcg total) by mouth daily    3  Age-related osteoporosis without current pathological fracture  Comments:  She is scheduled to see Rheumatology at [facility]  4  Vitamin D deficiency disease  Comments:  Continue supplementation with vitamin-D     5  Dyslipidemia  Comments:  Patient has elevated 10 year coronary risk 24 7%    6  Need for shingles vaccine  -     Zoster Vac Recomb Adjuvanted (Shingrix) 50 MCG/0 5ML SUSR; Inject 0 5 mL into a muscle once for 1 dose Repeat dose in 2 to 6 months    7  COVID-19 vaccination declined           There are no diagnoses linked to this encounter  No problem-specific Assessment & Plan notes found for this encounter  Subjective:      Patient ID: Amanda Yarbrough is a 68 y o  female  HPI    The following portions of the patient's history were reviewed and updated as appropriate: She  has a past medical history of Acid reflux, Arthritis, Cataract, Colon polyp, Dyslipidemia (high LDL; low HDL), Fibromyalgia, GERD (gastroesophageal reflux disease), Hyperlipidemia, Hypertension, Hypothyroidism, Palpitation, Paroxysmal atrial fibrillation ([facility] [address] ), and Shingles (09/12/2010)  She   Patient Active Problem List    Diagnosis Date Noted    Acid reflux     Abnormal ECG 03/03/2021    Presyncope 01/14/2021    Acquired hypothyroidism 09/11/2020    Plantar fasciitis 09/11/2020    Paroxysmal atrial fibrillation ([facility] [address] ) 07/10/2018    Essential hypertension 07/10/2018    PAC (premature atrial contraction) 07/10/2018    Dyslipidemia 07/10/2018     She  has a past surgical history that includes Replacement total knee; Tonsillectomy; Hysterectomy (05/2016); Cataract extraction, bilateral (11/2016); Colonoscopy (07/11/2016);  Mammo (historical) (4/8/2010 & 2/19/2020); Colonoscopy; and Upper gastrointestinal endoscopy  Her family history includes Atrial fibrillation in her father; Cancer in her father; Chronic bronchitis in her mother; Hyperlipidemia in her mother; Hypertension in her mother; Parkinsonism in her mother; Stroke in her father and mother  She  reports that she has never smoked  She has never used smokeless tobacco  She reports that she does not drink alcohol and does not use drugs  Current Outpatient Medications   Medication Sig Dispense Refill    ergocalciferol (VITAMIN D2) 50,000 units Take 1 capsule (50,000 Units total) by mouth every 14 (fourteen) days 7 capsule 3    levothyroxine 75 mcg tablet Take 1 tablet (75 mcg total) by mouth daily 90 tablet 1    omeprazole (PRILOSEC) 20 mg delayed release capsule Take 1 capsule by mouth daily      rivaroxaban (XARELTO) 20 mg tablet Take 1 tablet (20 mg total) by mouth daily with dinner 90 tablet 3    sotalol AF (BETAPACE AF) 120 MG TABS Take 1 tablet (120 mg total) by mouth every 12 (twelve) hours 60 tablet 3    nitroglycerin (NITROSTAT) 0 4 mg SL tablet Place 1 tablet (0 4 mg total) under the tongue every 5 (five) minutes as needed for chest pain (Patient not taking: Reported on 1/6/2022 ) 15 tablet 1    Zoster Vac Recomb Adjuvanted (Shingrix) 50 MCG/0 5ML SUSR Inject 0 5 mL into a muscle once for 1 dose Repeat dose in 2 to 6 months 1 each 1     No current facility-administered medications for this visit       Current Outpatient Medications on File Prior to Visit   Medication Sig    ergocalciferol (VITAMIN D2) 50,000 units Take 1 capsule (50,000 Units total) by mouth every 14 (fourteen) days    omeprazole (PRILOSEC) 20 mg delayed release capsule Take 1 capsule by mouth daily    rivaroxaban (XARELTO) 20 mg tablet Take 1 tablet (20 mg total) by mouth daily with dinner    sotalol AF (BETAPACE AF) 120 MG TABS Take 1 tablet (120 mg total) by mouth every 12 (twelve) hours    [DISCONTINUED] levothyroxine 75 mcg tablet Take 1 tablet (75 mcg total) by mouth daily    nitroglycerin (NITROSTAT) 0 4 mg SL tablet Place 1 tablet (0 4 mg total) under the tongue every 5 (five) minutes as needed for chest pain (Patient not taking: Reported on 1/6/2022 )    [DISCONTINUED] ergocalciferol (VITAMIN D2) 50,000 units TAKE 1 CAPSULE BY MOUTH EVERY 14 DAYS    [DISCONTINUED] sucralfate (CARAFATE) 1 g tablet Take 1 tablet (1 g total) by mouth 4 (four) times a day (Patient not taking: Reported on 4/4/2022 )     No current facility-administered medications on file prior to visit  She is allergic to iodine - food allergy and iodinated diagnostic agents       Review of Systems   Constitutional: Negative for appetite change, chills, fatigue and fever  HENT: Negative for sore throat and trouble swallowing  Eyes: Negative for redness  Respiratory: Negative for shortness of breath  Cardiovascular: Negative for chest pain and palpitations  Gastrointestinal: Negative for abdominal pain, constipation and diarrhea  Genitourinary: Negative for dysuria and hematuria  Musculoskeletal: Negative for back pain and neck pain  Skin: Negative for rash  Neurological: Negative for seizures, weakness and headaches  Hematological: Negative for adenopathy  Psychiatric/Behavioral: Negative for confusion  The patient is not nervous/anxious            Objective:      /68 (BP Location: Left arm, Patient Position: Sitting, Cuff Size: Adult)   Pulse 60   Temp 98 2 °F (36 8 °C) (Temporal)   Ht 5' 3" (1 6 m)   Wt 68 kg (150 lb)   SpO2 98%   BMI 26 57 kg/m²     Results Reviewed     None          Recent Results (from the past 1344 hour(s))   CBC and differential    Collection Time: 03/01/22 10:34 AM   Result Value Ref Range    WBC 5 45 4 31 - 10 16 Thousand/uL    RBC 4 03 3 81 - 5 12 Million/uL    Hemoglobin 12 9 11 5 - 15 4 g/dL    Hematocrit 40 1 34 8 - 46 1 %     (H) 82 - 98 fL    MCH 32 0 26 8 - 34 3 pg MCHC 32 2 31 4 - 37 4 g/dL    RDW 12 8 11 6 - 15 1 %    MPV 10 3 8 9 - 12 7 fL    Platelets 076 494 - 782 Thousands/uL    nRBC 0 /100 WBCs    Neutrophils Relative 54 43 - 75 %    Immat GRANS % 0 0 - 2 %    Lymphocytes Relative 31 14 - 44 %    Monocytes Relative 11 4 - 12 %    Eosinophils Relative 3 0 - 6 %    Basophils Relative 1 0 - 1 %    Neutrophils Absolute 2 93 1 85 - 7 62 Thousands/µL    Immature Grans Absolute 0 02 0 00 - 0 20 Thousand/uL    Lymphocytes Absolute 1 66 0 60 - 4 47 Thousands/µL    Monocytes Absolute 0 61 0 17 - 1 22 Thousand/µL    Eosinophils Absolute 0 18 0 00 - 0 61 Thousand/µL    Basophils Absolute 0 05 0 00 - 0 10 Thousands/µL   Comprehensive metabolic panel    Collection Time: 03/01/22 10:34 AM   Result Value Ref Range    Sodium 141 136 - 145 mmol/L    Potassium 3 9 3 5 - 5 3 mmol/L    Chloride 109 (H) 100 - 108 mmol/L    CO2 27 21 - 32 mmol/L    ANION GAP 5 4 - 13 mmol/L    BUN 20 5 - 25 mg/dL    Creatinine 0 87 0 60 - 1 30 mg/dL    Glucose, Fasting 86 65 - 99 mg/dL    Calcium 9 1 8 3 - 10 1 mg/dL    AST 18 5 - 45 U/L    ALT 26 12 - 78 U/L    Alkaline Phosphatase 63 46 - 116 U/L    Total Protein 7 5 6 4 - 8 2 g/dL    Albumin 3 6 3 5 - 5 0 g/dL    Total Bilirubin 0 53 0 20 - 1 00 mg/dL    eGFR 64 ml/min/1 73sq m   C-reactive protein    Collection Time: 03/01/22 10:34 AM   Result Value Ref Range    CRP <3 0 <3 0 mg/L   Lipid panel    Collection Time: 03/01/22 10:34 AM   Result Value Ref Range    Cholesterol 277 (H) See Comment mg/dL    Triglycerides 83 See Comment mg/dL    HDL, Direct 65 >=50 mg/dL    LDL Calculated 195 (H) 0 - 100 mg/dL    Non-HDL-Chol (CHOL-HDL) 212 mg/dl   TSH, 3rd generation    Collection Time: 03/01/22 10:34 AM   Result Value Ref Range    TSH 3RD GENERATON 1 950 0 358 - 3 740 uIU/mL   T4, free    Collection Time: 03/01/22 10:34 AM   Result Value Ref Range    Free T4 1 19 0 76 - 1 46 ng/dL   Urinalysis with microscopic    Collection Time: 03/02/22 11:25 AM   Result Value Ref Range    Color, UA Light Yellow     Clarity, UA Clear     Specific Gravity, UA 1 022 1 003 - 1 030    pH, UA 6 0 4 5, 5 0, 5 5, 6 0, 6 5, 7 0, 7 5, 8 0    Leukocytes, UA Negative Negative    Nitrite, UA Negative Negative    Protein, UA Negative Negative mg/dl    Glucose, UA Negative Negative mg/dl    Ketones, UA Negative Negative mg/dl    Urobilinogen, UA <2 0 <2 0 mg/dl mg/dl    Bilirubin, UA Negative Negative    Blood, UA Moderate (A) Negative    RBC, UA Innumerable (A) None Seen, 1-2 /hpf    WBC, UA 4-10 (A) None Seen, 1-2 /hpf    Epithelial Cells Occasional None Seen, Occasional /hpf    Bacteria, UA None Seen None Seen, Occasional /hpf    MUCUS THREADS Occasional (A) None Seen        Physical Exam  Constitutional:       General: She is not in acute distress  Appearance: Normal appearance  HENT:      Head: Normocephalic and atraumatic  Nose: Nose normal       Mouth/Throat:      Mouth: Mucous membranes are moist    Eyes:      Extraocular Movements: Extraocular movements intact  Pupils: Pupils are equal, round, and reactive to light  Cardiovascular:      Rate and Rhythm: Normal rate and regular rhythm  Pulses: Normal pulses  Heart sounds: Normal heart sounds  No murmur heard  No friction rub  Pulmonary:      Effort: Pulmonary effort is normal  No respiratory distress  Breath sounds: Normal breath sounds  No wheezing  Abdominal:      General: Abdomen is flat  Bowel sounds are normal  There is no distension  Palpations: Abdomen is soft  There is no mass  Tenderness: There is no abdominal tenderness  There is no guarding  Musculoskeletal:         General: Normal range of motion  Cervical back: Normal range of motion  Neurological:      General: No focal deficit present  Mental Status: She is alert and oriented to person, place, and time  Mental status is at baseline  Cranial Nerves: No cranial nerve deficit     Psychiatric:         Mood and Affect: Mood normal          Behavior: Behavior normal        Falls Plan of Care: Balance, strength, and gait training instructions were provided

## 2022-04-12 ENCOUNTER — APPOINTMENT (OUTPATIENT)
Dept: PHYSICAL THERAPY | Facility: REHABILITATION | Age: 77
End: 2022-04-12
Payer: MEDICARE

## 2022-04-14 ENCOUNTER — OFFICE VISIT (OUTPATIENT)
Dept: PHYSICAL THERAPY | Facility: REHABILITATION | Age: 77
End: 2022-04-14
Payer: MEDICARE

## 2022-04-14 DIAGNOSIS — S93.409A SPRAIN OF LATERAL LIGAMENT OF ANKLE JOINT: Primary | ICD-10-CM

## 2022-04-14 DIAGNOSIS — M76.71 PERONEUS BREVIS TENDINITIS, RIGHT: ICD-10-CM

## 2022-04-14 PROCEDURE — 97110 THERAPEUTIC EXERCISES: CPT | Performed by: PHYSICAL THERAPIST

## 2022-04-14 PROCEDURE — 97140 MANUAL THERAPY 1/> REGIONS: CPT | Performed by: PHYSICAL THERAPIST

## 2022-04-14 PROCEDURE — 97112 NEUROMUSCULAR REEDUCATION: CPT | Performed by: PHYSICAL THERAPIST

## 2022-04-14 NOTE — PROGRESS NOTES
Daily Note     Today's date: 2022  Patient name: Jesus Pearson  : 1945  MRN: [id number]  Referring provider: Du Nieves MD  Dx:   Encounter Diagnosis     ICD-10-CM    1  Sprain of lateral ligament of ankle joint  S93 409A    2  Peroneus brevis tendinitis, right  M76 [id number]        Start Time: 1400  Stop Time: 1446  Total time in clinic (min): 46 minutes    Subjective: Justice Mclean reports slight improvement in R ankle pain since previous visit  States she had to cancel last appointment due to being sick but is now feeling better  Objective: See treatment diary below      Assessment: Tolerated treatment well  Verbal cuing required for proper mechanics during sit to stands with good ability to incorporate feedback into performance and demonstrated improved ease following  Challenged with NBOS balance on foam with increased ankle strategy noted but no reports of pain and no need for therapist assist to correct LOB  Patient demonstrated appropriate level of challenge and muscular fatigue throughout session and noted good status at end of visit  Patient demonstrated fatigue post treatment, exhibited good technique with therapeutic exercises and would benefit from continued PT  Plan: Continue per plan of care  Progress treatment as tolerated         Precautions: Fibromyalgia, HDL, dyslipidemia, GERD, HTN, hypothyroidism, paroxysmal afib     *indicates included in HEP  HEP code: [facility]       Manuals         R Ankle PROM  done done        Forefoot mobility           TC distraction                                 Neuro Re-Ed           NBOS balance  EO to EC 2x30" Foam EO :30x3        Tandem balance  Semi 2x30"         SL balance           Tandem walking           Walking with head turns           Arch ups   20x        Toe curls with towel           Toe yoga           Toe splays   20x5"        Arch leans           Rocker board           SL DB handoff           SL airplanes Ther Ex           Bridges nv 2x10 3x10        Clamshells           Bike  nv 5' lvl 1 5' lvl 0        SLR flexion  2x10 3x10 ea        SLR abduction           Heel raises nv 2x10 3x10        Toe raises nv 2x10 3x10        TB DF/PF Pink TB 20x Pink TB 20x OTB 25x        TB inversion/ eversion Pink TB 20x Pink TB 20x OTB 25x                   Patient education done          Ther Activity           Sit to stand  2x10 + airex 2x10        Goblet squat           Leg press           Band resisted side stepping           Monster walks           Forward step ups           Lateral step ups           Deadlift                                                        Gait Training           Single crutch ambulation           Ambulation w/out AD           Stair training                                 Modalities

## 2022-04-18 ENCOUNTER — OFFICE VISIT (OUTPATIENT)
Dept: PHYSICAL THERAPY | Facility: REHABILITATION | Age: 77
End: 2022-04-18
Payer: MEDICARE

## 2022-04-18 DIAGNOSIS — S93.409A SPRAIN OF LATERAL LIGAMENT OF ANKLE JOINT: Primary | ICD-10-CM

## 2022-04-18 DIAGNOSIS — M76.71 PERONEUS BREVIS TENDINITIS, RIGHT: ICD-10-CM

## 2022-04-18 PROCEDURE — 97112 NEUROMUSCULAR REEDUCATION: CPT | Performed by: PHYSICAL THERAPIST

## 2022-04-18 PROCEDURE — 97110 THERAPEUTIC EXERCISES: CPT | Performed by: PHYSICAL THERAPIST

## 2022-04-18 PROCEDURE — 97140 MANUAL THERAPY 1/> REGIONS: CPT | Performed by: PHYSICAL THERAPIST

## 2022-04-21 ENCOUNTER — OFFICE VISIT (OUTPATIENT)
Dept: PHYSICAL THERAPY | Facility: REHABILITATION | Age: 77
End: 2022-04-21
Payer: MEDICARE

## 2022-04-21 DIAGNOSIS — M76.71 PERONEUS BREVIS TENDINITIS, RIGHT: ICD-10-CM

## 2022-04-21 DIAGNOSIS — S93.409A SPRAIN OF LATERAL LIGAMENT OF ANKLE JOINT: Primary | ICD-10-CM

## 2022-04-21 PROCEDURE — 97140 MANUAL THERAPY 1/> REGIONS: CPT | Performed by: PHYSICAL THERAPIST

## 2022-04-21 PROCEDURE — 97112 NEUROMUSCULAR REEDUCATION: CPT | Performed by: PHYSICAL THERAPIST

## 2022-04-21 PROCEDURE — 97110 THERAPEUTIC EXERCISES: CPT | Performed by: PHYSICAL THERAPIST

## 2022-04-21 NOTE — PROGRESS NOTES
Daily Note     Today's date: 2022  Patient name: Cherise Pearce  : 1945  MRN: [id number]  Referring provider: Elizabeth Lyles MD  Dx:   Encounter Diagnosis     ICD-10-CM    1  Sprain of lateral ligament of ankle joint  S93 409A    2  Peroneus brevis tendinitis, right  M76 71        Start Time: 09  Stop Time: 1027  Total time in clinic (min): 57 minutes    Subjective: Maty Willis states she was on her feet a lot yesterday with her  being in the hospital; reports she's normally relaxing with her shoes off for most of the day  Objective: See treatment diary below      Assessment: Tolerated treatment well  Demonstrates good static balance with tandem balance on firm surface with EO  Trialed progression to SL balance; demonstrated good static SL balance bilaterally with occasional fingertip tap for balance; noted R ankle pain with first rep so held additional reps as a result  No reports of symptom provocation throughout session and noted good status at end of visit  Patient demonstrated fatigue post treatment, exhibited good technique with therapeutic exercises and would benefit from continued PT  Plan: Continue per plan of care  Progress treatment as tolerated         Precautions: Fibromyalgia, HDL, dyslipidemia, GERD, HTN, hypothyroidism, paroxysmal afib     *indicates included in HEP  HEP code: [facility]       Manuals       R Ankle PROM  done done done done      Forefoot mobility           TC distraction                                 Neuro Re-Ed           NBOS balance  EO to EC 2x30" Foam EO :30x3 Foam EO :30x2, foam EC :30x2       Tandem balance  Semi 2x30"  floor EO :30x2 ea Floor EO :30x3 ea foam     SL balance     floor EO :30x1 ea      Tandem walking           Walking with head turns           Arch ups*   20x 30x 30x5"      Toe curls with towel*    20x3" 30x5"      Toe yoga           Toe splays*   20x5" 30x5" 30x5"      Arch leans    :30x4 :30x5      Rocker board           SL DB handoff           SL airplanes                                 Ther Ex           Bridges* nv 2x10 3x10 3x12       Clamshells           Bike  nv 5' lvl 1 5' lvl 0 5' lvl 1 5' lvl 1>0      SLR flexion  2x10 3x10 ea 3x10 ea       SLR abduction           Heel raises nv 2x10 3x10 3x10 3x12      Toe raises nv 2x10 3x10 3x10 3x12      TB DF/PF* Pink TB 20x Pink TB 20x OTB 25x GTB 20x GTB 25x      TB inversion/ eversion* Pink TB 20x Pink TB 20x OTB 25x GTB 20x GTB 25x                 Patient education done          Ther Activity           Sit to stand  2x10 + airex 2x10 3x10       Goblet squat           Leg press           Band resisted side stepping           Monster walks           Forward step ups    6" step up/over 10x ea 6" step up/over 2x10 ea      Lateral step ups    6" step 2x10 6" step 2x10      Deadlift                                                        Gait Training           Single crutch ambulation           Ambulation w/out AD           Stair training                                 Modalities

## 2022-04-25 ENCOUNTER — OFFICE VISIT (OUTPATIENT)
Dept: PHYSICAL THERAPY | Facility: REHABILITATION | Age: 77
End: 2022-04-25
Payer: MEDICARE

## 2022-04-25 DIAGNOSIS — M76.71 PERONEUS BREVIS TENDINITIS, RIGHT: ICD-10-CM

## 2022-04-25 DIAGNOSIS — S93.409A SPRAIN OF LATERAL LIGAMENT OF ANKLE JOINT: Primary | ICD-10-CM

## 2022-04-25 PROCEDURE — 97530 THERAPEUTIC ACTIVITIES: CPT

## 2022-04-25 PROCEDURE — 97110 THERAPEUTIC EXERCISES: CPT

## 2022-04-25 NOTE — PROGRESS NOTES
Daily Note     Today's date: 2022  Patient name: Sergio Pizarro  : 1945  MRN: [id number]  Referring provider: Dk Welch MD  Dx:   Encounter Diagnosis     ICD-10-CM    1  Sprain of lateral ligament of ankle joint  S93 409A    2  Peroneus brevis tendinitis, right  M76 71                   Subjective: Pt reports increased pain when on her feet for longer periods of time  Objective: See treatment diary below      Assessment: Pt has difficulty with balance TE  Fatigued at end of tx  Plan: Continue per plan of care        Precautions: Fibromyalgia, HDL, dyslipidemia, GERD, HTN, hypothyroidism, paroxysmal afib     *indicates included in HEP  HEP code: [facility]       Manuals      R Ankle PROM  done done done done Done      Forefoot mobility           TC distraction                                 Neuro Re-Ed           NBOS balance  EO to EC 2x30" Foam EO :30x3 Foam EO :30x2, foam EC :30x2       Tandem balance  Semi 2x30"  floor EO :30x2 ea Floor EO :30x3 ea Foam EO   :30x2 ea     SL balance     floor EO :30x1 ea Floor EO :30x1 ea     Tandem walking           Walking with head turns           Arch ups*   20x 30x 30x5" 30x5"     Toe curls with towel*    20x3" 30x5" 30x5"     Toe yoga           Toe splays*   20x5" 30x5" 30x5" 30x5"     Arch leans    :30x4 :30x5 :30x5     Rocker board           SL DB handoff           SL airplanes                                 Ther Ex           Bridges* nv 2x10 3x10 3x12       Clamshells           Bike  nv 5' lvl 1 5' lvl 0 5' lvl 1 5' lvl 1>0 5'Lvl 0     SLR flexion  2x10 3x10 ea 3x10 ea       SLR abduction           Heel raises nv 2x10 3x10 3x10 3x12 3x12     Toe raises nv 2x10 3x10 3x10 3x12 3x12     TB DF/PF* Pink TB 20x Pink TB 20x OTB 25x GTB 20x GTB 25x GTB x25     TB inversion/ eversion* Pink TB 20x Pink TB 20x OTB 25x GTB 20x GTB 25x GTB 25x                Patient education done          Ther Activity           Sit to stand 2x10 + airex 2x10 3x10       Goblet squat           Leg press           Band resisted side stepping           Monster walks           Forward step ups    6" step up/over 10x ea 6" step up/over 2x10 ea 6" step up/over 2x10 ea     Lateral step ups    6" step 2x10 6" step 2x10 6" step 2x10     Deadlift                                                        Gait Training           Single crutch ambulation           Ambulation w/out AD           Stair training                                 Modalities

## 2022-04-27 ENCOUNTER — OFFICE VISIT (OUTPATIENT)
Dept: PHYSICAL THERAPY | Facility: REHABILITATION | Age: 77
End: 2022-04-27
Payer: MEDICARE

## 2022-04-27 DIAGNOSIS — M76.71 PERONEUS BREVIS TENDINITIS, RIGHT: ICD-10-CM

## 2022-04-27 DIAGNOSIS — S93.409A SPRAIN OF LATERAL LIGAMENT OF ANKLE JOINT: Primary | ICD-10-CM

## 2022-04-27 PROCEDURE — 97530 THERAPEUTIC ACTIVITIES: CPT

## 2022-04-27 PROCEDURE — 97110 THERAPEUTIC EXERCISES: CPT

## 2022-04-27 PROCEDURE — 97112 NEUROMUSCULAR REEDUCATION: CPT

## 2022-04-27 NOTE — PROGRESS NOTES
Daily Note     Today's date: 2022  Patient name: Alfa Miranda  : 1945  MRN: [id number]  Referring provider: Shashi Carter MD  Dx:   Encounter Diagnosis     ICD-10-CM    1  Sprain of lateral ligament of ankle joint  S93 409A    2  Peroneus brevis tendinitis, right  M76 71                   Subjective: Pt reports feeling pretty good today  Objective: See treatment diary below      Assessment: Tolerated treatment well  Patient exhibited good technique with therapeutic exercises      Plan: Continue per plan of care        Precautions: Fibromyalgia, HDL, dyslipidemia, GERD, HTN, hypothyroidism, paroxysmal afib     *indicates included in HEP  HEP code: [facility]       Manuals     R Ankle PROM  done done done done Done      Forefoot mobility           TC distraction                                 Neuro Re-Ed           NBOS balance  EO to EC 2x30" Foam EO :30x3 Foam EO :30x2, foam EC :30x2       Tandem balance  Semi 2x30"  floor EO :30x2 ea Floor EO :30x3 ea Foam EO   :30x2 ea Floor EO :30x2    SL balance     floor EO :30x1 ea Floor EO :30x1 ea Floor EO :30x2     Tandem walking           Walking with head turns           Arch ups*   20x 30x 30x5" 30x5" 30x5"    Toe curls with towel*    20x3" 30x5" 30x5" 30x5"    Toe yoga           Toe splays*   20x5" 30x5" 30x5" 30x5" 30x5"    Arch leans    :30x4 :30x5 :30x5 :30x5    Rocker board           SL DB handoff           SL airplanes                                 Ther Ex           Bridges* nv 2x10 3x10 3x12       Clamshells           Bike  nv 5' lvl 1 5' lvl 0 5' lvl 1 5' lvl 1>0 5'Lvl 0 6' lvl 0    SLR flexion  2x10 3x10 ea 3x10 ea       SLR abduction           Heel raises nv 2x10 3x10 3x10 3x12 3x12 3x12    Toe raises nv 2x10 3x10 3x10 3x12 3x12 3x12    TB DF/PF* Pink TB 20x Pink TB 20x OTB 25x GTB 20x GTB 25x GTB x25 GTB x25    TB inversion/ eversion* Pink TB 20x Pink TB 20x OTB 25x GTB 20x GTB 25x GTB 25x GTB x25 Patient education done          Ther Activity           Sit to stand  2x10 + airex 2x10 3x10       Goblet squat           Leg press           Band resisted side stepping           Monster walks           Forward step ups    6" step up/over 10x ea 6" step up/over 2x10 ea 6" step up/over 2x10 ea 6" step up/over 2x10    Lateral step ups    6" step 2x10 6" step 2x10 6" step 2x10 6"step 2x10    Deadlift                                                        Gait Training           Single crutch ambulation           Ambulation w/out AD           Stair training                                 Modalities

## 2022-05-02 ENCOUNTER — OFFICE VISIT (OUTPATIENT)
Dept: PHYSICAL THERAPY | Facility: REHABILITATION | Age: 77
End: 2022-05-02
Payer: MEDICARE

## 2022-05-02 DIAGNOSIS — S93.409A SPRAIN OF LATERAL LIGAMENT OF ANKLE JOINT: Primary | ICD-10-CM

## 2022-05-02 DIAGNOSIS — M76.71 PERONEUS BREVIS TENDINITIS, RIGHT: ICD-10-CM

## 2022-05-02 PROCEDURE — 97110 THERAPEUTIC EXERCISES: CPT | Performed by: PHYSICAL THERAPIST

## 2022-05-02 PROCEDURE — 97112 NEUROMUSCULAR REEDUCATION: CPT | Performed by: PHYSICAL THERAPIST

## 2022-05-02 PROCEDURE — 97530 THERAPEUTIC ACTIVITIES: CPT | Performed by: PHYSICAL THERAPIST

## 2022-05-02 NOTE — PROGRESS NOTES
Daily Note     Today's date: 2022  Patient name: Grabiel Barragan  : 1945  MRN: [id number]  Referring provider: Curtis Perry MD  Dx:   Encounter Diagnosis     ICD-10-CM    1  Sprain of lateral ligament of ankle joint  S93 409A    2  Peroneus brevis tendinitis, right  M76 71                   Subjective: Wilma Owen reports her ankle feels pretty good today, notes feeling confident with next session being her last visit prior to independent HEP discharge  Objective: See treatment diary below      Assessment: Tolerated treatment well  Patient demonstrated fatigue post treatment, exhibited good technique with therapeutic exercises and would benefit from continued PT      Plan: Continue per plan of care        Precautions: Fibromyalgia, HDL, dyslipidemia, GERD, HTN, hypothyroidism, paroxysmal afib     *indicates included in HEP  HEP code: [facility]       Manuals    R Ankle PROM  done done done done Done      Forefoot mobility           TC distraction                                 Neuro Re-Ed           NBOS balance  EO to EC 2x30" Foam EO :30x3 Foam EO :30x2, foam EC :30x2       Tandem balance  Semi 2x30"  floor EO :30x2 ea Floor EO :30x3 ea Foam EO   :30x2 ea Floor EO :30x2 Floor EO :30x2    SL balance     floor EO :30x1 ea Floor EO :30x1 ea Floor EO :30x2  Floor EO :30x2    Tandem walking           Walking with head turns           Arch ups*   20x 30x 30x5" 30x5" 30x5" 30x5"   Toe curls with towel*    20x3" 30x5" 30x5" 30x5" 30x5"   Toe yoga           Toe splays*   20x5" 30x5" 30x5" 30x5" 30x5" 30x5"   Arch leans    :30x4 :30x5 :30x5 :30x5 :30x5   Rocker board           SL DB handoff           SL airplanes                                 Ther Ex           Bridges* nv 2x10 3x10 3x12       Clamshells           Bike  nv 5' lvl 1 5' lvl 0 5' lvl 1 5' lvl 1>0 5'Lvl 0 6' lvl 0 5' lvl 0    SLR flexion  2x10 3x10 ea 3x10 ea       SLR abduction           Heel raises nv 2x10 3x10 3x10 3x12 3x12 3x12 3x12   Toe raises nv 2x10 3x10 3x10 3x12 3x12 3x12 3x12   TB DF/PF* Pink TB 20x Pink TB 20x OTB 25x GTB 20x GTB 25x GTB x25 GTB x25 GTB x25   TB inversion/ eversion* Pink TB 20x Pink TB 20x OTB 25x GTB 20x GTB 25x GTB 25x GTB x25 GTB x25              Patient education done          Ther Activity           Sit to stand  2x10 + airex 2x10 3x10       Goblet squat           Leg press           Band resisted side stepping           Monster walks           Forward step ups    6" step up/over 10x ea 6" step up/over 2x10 ea 6" step up/over 2x10 ea 6" step up/over 2x10 6" step up/over 2x10   Lateral step ups    6" step 2x10 6" step 2x10 6" step 2x10 6"step 2x10 6"  Step 2x10   Deadlift                                                        Gait Training           Single crutch ambulation           Ambulation w/out AD           Stair training                                 Modalities

## 2022-05-03 ENCOUNTER — OFFICE VISIT (OUTPATIENT)
Dept: OBGYN CLINIC | Facility: CLINIC | Age: 77
End: 2022-05-03
Payer: MEDICARE

## 2022-05-03 VITALS
WEIGHT: 150 LBS | BODY MASS INDEX: 26.58 KG/M2 | HEIGHT: 63 IN | DIASTOLIC BLOOD PRESSURE: 74 MMHG | HEART RATE: 56 BPM | SYSTOLIC BLOOD PRESSURE: 126 MMHG

## 2022-05-03 DIAGNOSIS — S93.409A SPRAIN OF LATERAL LIGAMENT OF ANKLE JOINT: Primary | ICD-10-CM

## 2022-05-03 DIAGNOSIS — M76.71 PERONEUS BREVIS TENDINITIS, RIGHT: ICD-10-CM

## 2022-05-03 PROCEDURE — 99213 OFFICE O/P EST LOW 20 MIN: CPT | Performed by: ORTHOPAEDIC SURGERY

## 2022-05-03 NOTE — PROGRESS NOTES
BEVERLY Sellers  Attending, Orthopaedic Surgery  Foot and [address] [facility] [address] [facility]      ORTHOPAEDIC FOOT AND ANKLE CLINIC VISIT     Assessment:     Encounter Diagnoses   Name Primary?  Sprain of lateral ligament of ankle joint Yes    Peroneus brevis tendinitis, right             Plan:   · The patient verbalized understanding of exam findings and treatment plan  We engaged in the shared decision-making process and treatment options were discussed at length with the patient  Surgical and conservative management discussed today along with risks and benefits  · Patient follows up for right ankle sprain and peroneal tendonitis that is improving  · Continue PT until discharged by therapist, maintain a HEP  · OTC medication PRN for pain  · Supportive footwear recommended  Return if symptoms worsen or fail to improve  History of Present Illness:   Chief Complaint:   Chief Complaint   Patient presents with    Right Ankle - Follow-up     Nely Bailey is a 68 y o  female who is being seen in follow-up for right ankle sprain and peroneal tendonitis  When we last saw she we recommended PT  Pain has improved  Residual pain is localized at the peroneal tendon with minimal radiating and described as sore          Pain/symptom timing:  Worse during the day when active  Pain/symptom context:  Worse with activites and work  Pain/symptom modifying factors:  Rest makes better, activities make worse  Pain/symptom associated signs/symptoms: none    Prior treatment   · NSAIDsNot Asked   · Injections No   · Bracing/Orthotics No    · Physical Therapy Yes     Orthopedic Surgical History:   See below    Past Medical, Surgical and Social History:  Past Medical History:  has a past medical history of Acid reflux, Arthritis, Cataract, Colon polyp, Dyslipidemia (high LDL; low HDL), Fibromyalgia, GERD (gastroesophageal reflux disease), Hyperlipidemia, Hypertension, Hypothyroidism, Palpitation, Paroxysmal atrial fibrillation ([facility] [address] ), and Shingles (09/12/2010)  Problem List: does not have any pertinent problems on file  Past Surgical History:  has a past surgical history that includes Replacement total knee; Tonsillectomy; Hysterectomy (05/2016); Cataract extraction, bilateral (11/2016); Colonoscopy (07/11/2016); Mammo (historical) (4/8/2010 & 2/19/2020); Colonoscopy; and Upper gastrointestinal endoscopy  Family History: family history includes Atrial fibrillation in her father; Cancer in her father; Chronic bronchitis in her mother; Hyperlipidemia in her mother; Hypertension in her mother; Parkinsonism in her mother; Stroke in her father and mother  Social History:  reports that she has never smoked  She has never used smokeless tobacco  She reports that she does not drink alcohol and does not use drugs  Current Medications: has a current medication list which includes the following prescription(s): ergocalciferol, levothyroxine, nitroglycerin, omeprazole, rivaroxaban, and sotalol af  Allergies: is allergic to iodine - food allergy and iodinated diagnostic agents  Review of Systems:  General- denies fever/chills  HEENT- denies hearing loss or sore throat  Eyes- denies eye pain or visual disturbances, denies red eyes  Respiratory- denies cough or SOB  Cardio- denies chest pain or palpitations  GI- denies abdominal pain  Endocrine- denies urinary frequency  Urinary- denies pain with urination  Musculoskeletal- Negative except noted above  Skin- denies rashes or wounds  Neurological- denies dizziness or headache  Psychiatric- denies anxiety or difficulty concentrating    Physical Exam:   /74 (BP Location: Right arm, Patient Position: Sitting, Cuff Size: Adult)   Pulse 56   Ht 5' 3" (1 6 m)   Wt 68 kg (150 lb)   BMI 26 57 kg/m²   General/Constitutional: No apparent distress: well-nourished and well developed    Eyes: normal ocular motion  Lymphatic: No appreciable lymphadenopathy  Respiratory: Non-labored breathing  Vascular: No edema, swelling or tenderness, except as noted in detailed exam   Integumentary: No impressive skin lesions present, except as noted in detailed exam   Neuro: No ataxia or tremors noted  Psych: Normal mood and affect, oriented to person, place and time  Appropriate affect  Musculoskeletal: Normal, except as noted in detailed exam and in HPI  Examination    Right    Gait Normal   Musculoskeletal Tender to palpation at peroneal tendon    Skin Normal       Nails Normal    Range of Motion  20 degrees dorsiflexion, 40 degrees plantarflexion  Subtalar motion: normal    Stability Stable    Muscle Strength 5/5 tibialis anterior  5/5 gastrocnemius-soleus  5/5 posterior tibialis  5/5 peroneal/eversion strength, no pain  5/5 EHL  5/5 FHL    Neurologic Normal    Sensation Intact to light touch throughout sural, saphenous, superficial peroneal, deep peroneal and medial/lateral plantar nerve distributions  Olyphant-Krupa 5 07 filament (10g) testing deferred  Cardiovascular Brisk capillary refill < 2 seconds,intact DP and PT pulses    Special Tests None      Imaging Studies:   No new imaging          James R Lachman, MD  Foot & Ankle Surgery   Department of [address] [place] [address]      I personally performed the service  Talbert Elks Lachman, MD      Scribe Attestation    I,:  Reynaldo Cortes am acting as a scribe while in the presence of the attending physician :       I,:  Jabier Cogan, MD personally performed the services described in this documentation    as scribed in my presence :

## 2022-05-05 ENCOUNTER — EVALUATION (OUTPATIENT)
Dept: PHYSICAL THERAPY | Facility: REHABILITATION | Age: 77
End: 2022-05-05
Payer: MEDICARE

## 2022-05-05 DIAGNOSIS — M76.71 PERONEUS BREVIS TENDINITIS, RIGHT: ICD-10-CM

## 2022-05-05 DIAGNOSIS — S93.409A SPRAIN OF LATERAL LIGAMENT OF ANKLE JOINT: Primary | ICD-10-CM

## 2022-05-05 PROCEDURE — 97530 THERAPEUTIC ACTIVITIES: CPT | Performed by: PHYSICAL THERAPIST

## 2022-05-05 PROCEDURE — 97110 THERAPEUTIC EXERCISES: CPT | Performed by: PHYSICAL THERAPIST

## 2022-05-05 PROCEDURE — 97112 NEUROMUSCULAR REEDUCATION: CPT | Performed by: PHYSICAL THERAPIST

## 2022-05-05 NOTE — PROGRESS NOTES
PT Re-Evaluation  and PT Discharge    Today's date: 2022  Patient name: Sunny Cunningham  : 1945  MRN: [id number]  Referring provider: Rebeka Real MD  Dx:   Encounter Diagnosis     ICD-10-CM    1  Sprain of lateral ligament of ankle joint  S93 409A    2  Peroneus brevis tendinitis, right  [id number]        Start Time: 0800  Stop Time: 0844  Total time in clinic (min): 44 minutes    Assessment  Assessment details: Per patient report and clinical findings, Sunny Cunningham has made good progress since starting skilled physical therapy services  To this date, Sunny Cunningham has completed 9 visits of skilled physical therapy  Noted improvements include improved ankle ROM, improved ankle strength, decreased tenderness to palpation, and decreased pain frequency and intensity  After discussion and mutual agreement with patient, recommend discharge to independent HEP at this time secondary to continued good status, return to PLOF, and achieving goals set at IE  Reviewed HEP with patient; patient verbalized and demonstrated understanding of all exercises  Barriers to therapy: Patient reported plantar fasciitis on R  Understanding of Dx/Px/POC: good   Prognosis: good    Goals  STG (4 weeks):  1  R ankle eversion ROM increased by 10* to promote improved activity tolerance  - met  2  Increase eversion strength to at least 4+/5 for improved activity tolerance  - met  3  Decrease pain at worst from 5/10 to 3/10 or less for improved QoL  - met    LTG (8 weeks):  1  Increased global ankle strength to at least to promote 4+/5 to promote improved activity tolerance  - met  2  Able to stand for at least 30 mintues without symptom exacerbation to promote improved activity tolerance for household tasks  - met  3  Able to walk for 30 minutes without symptom exacerbation to promote improved community ambulation  - met  4   Able to ascend/descend 1 flight of stairs without compensation to promote improved household and community ambulation  - met  5  Independent with HEP  - met      Plan  Plan details: Thank you for referring Estrada Carcamo to Physical Therapy at [facility] and for the opportunity to coordinate care  Patient would benefit from: skilled physical therapy  Planned therapy interventions: home exercise program  Duration in visits: 1  Plan of Care beginning date: 2022  Treatment plan discussed with: patient        Subjective Evaluation    History of Present Illness  Mechanism of injury:   22:  Estrada Carcamo reports feeling she has made 95-98% progress since IE  Sosa Herring reports improvements in pain frequency and intensity, walking, and going up/down the stairs  Does continue to note very slight tenderness in the ankle  States she had a follow up with MD since previous session which went very well  22:  Estrada Carcamo presents to skilled physical therapy with complaints of R ankle pain  Sosa Herring reports tripping over a mop with her R foot and falling on 3/12/22  States she landed on her stomach with her arms and legs out  Patient reports symptoms began a week after the fall; reports R ankle, R wrist, R elbow, R knee  Sosa Herring reports R ankle pain located just distal to the lateral malleolus  Since onset, symptoms have stayed the same  Sosa Herring also reports having plantar fasciitis on the R with pain at the R heel  Currently, Sosa Herring is having difficulty with walking, up/down steps  Patient denies difficulty sleeping secondary to R ankle pain  Patient denies numbness, tingling in right leg and right foot  Next follow up appointment with physician is schedule on 5/3/22      Pain  Current pain ratin  At best pain ratin  At worst pain ratin  Location: R ankle  Relieving factors: rest    Social Support  Steps to enter house: no  Stairs in house: no   Lives in: condominium (ground level)  Lives with: spouse    Employment status: working    Diagnostic Tests  X-ray: normal  Treatments  Current treatment: physical therapy  Patient Goals  Patient goal: "To be able to walk "        Objective     Palpation     Additional Palpation Details  Tenderness along bilateral peroneal muscle groups equally which patient attributes to fibromyalgia    Tenderness     Right Ankle/Foot   No tenderness in the lateral malleolus, medial calcaneus and medial malleolus  Active Range of Motion   Left Ankle/Foot   Dorsiflexion (ke): 10 degrees   Plantar flexion: 45 degrees   Inversion: 55 degrees   Eversion: 20 degrees     Right Ankle/Foot   Dorsiflexion (ke): 15 degrees   Plantar flexion: 45 degrees   Inversion: 50 degrees   Eversion: 30 degrees     Strength/Myotome Testing     Left Ankle/Foot   Dorsiflexion: 5  Plantar flexion: 5  Inversion: 5  Eversion: 5    Right Ankle/Foot   Dorsiflexion: 5  Plantar flexion: 5  Inversion: 5  Eversion: 5    Tests     Right Ankle/Foot   Positive for varus tilt  Negative for anterior drawer, posterior drawer, valgus tilt and windlass              Precautions: Fibromyalgia, HDL, dyslipidemia, GERD, HTN, hypothyroidism, paroxysmal afib     *indicates included in HEP  HEP code: [facility]       Manuals 5/5 4/14 4/18 4/21 4/25 4/27 5/2   R Ankle PROM   done done done Done      Forefoot mobility           TC distraction                                 Neuro Re-Ed           NBOS balance   Foam EO :30x3 Foam EO :30x2, foam EC :30x2       Tandem balance    floor EO :30x2 ea Floor EO :30x3 ea Foam EO   :30x2 ea Floor EO :30x2 Floor EO :30x2    SL balance Floor EO :30x2 ea    floor EO :30x1 ea Floor EO :30x1 ea Floor EO :30x2  Floor EO :30x2    Tandem walking           Walking with head turns           Arch ups* 30x5"  20x 30x 30x5" 30x5" 30x5" 30x5"   Toe curls with towel* 30x5"   20x3" 30x5" 30x5" 30x5" 30x5"   Toe yoga           Toe splays* 30x5"  20x5" 30x5" 30x5" 30x5" 30x5" 30x5"   Arch leans :40x3   :30x4 :30x5 :30x5 :30x5 :30x5   Rocker board           SL DB handoff           SL airplanes Ther Ex           Bridges*   3x10 3x12       Clamshells           Bike  5' lvl 0  5' lvl 0 5' lvl 1 5' lvl 1>0 5'Lvl 0 6' lvl 0 5' lvl 0    SLR flexion   3x10 ea 3x10 ea       SLR abduction           Heel raises 3x12  3x10 3x10 3x12 3x12 3x12 3x12   Toe raises 3x12  3x10 3x10 3x12 3x12 3x12 3x12   TB DF/PF*   OTB 25x GTB 20x GTB 25x GTB x25 GTB x25 GTB x25   TB inversion/ eversion*   OTB 25x GTB 20x GTB 25x GTB 25x GTB x25 GTB x25              Patient education           Ther Activity           Sit to stand   2x10 3x10       Goblet squat           Leg press           Band resisted side stepping           Monster walks           Forward step ups 8" step up/over 2x10   6" step up/over 10x ea 6" step up/over 2x10 ea 6" step up/over 2x10 ea 6" step up/over 2x10 6" step up/over 2x10   Lateral step ups 8" step 2x10   6" step 2x10 6" step 2x10 6" step 2x10 6"step 2x10 6"  Step 2x10   Deadlift                                                        Gait Training           Single crutch ambulation           Ambulation w/out AD           Stair training                                 Modalities

## 2022-06-08 ENCOUNTER — OFFICE VISIT (OUTPATIENT)
Dept: CARDIOLOGY CLINIC | Facility: CLINIC | Age: 77
End: 2022-06-08
Payer: MEDICARE

## 2022-06-08 VITALS
DIASTOLIC BLOOD PRESSURE: 78 MMHG | SYSTOLIC BLOOD PRESSURE: 142 MMHG | WEIGHT: 151.7 LBS | HEART RATE: 55 BPM | HEIGHT: 63 IN | BODY MASS INDEX: 26.88 KG/M2

## 2022-06-08 DIAGNOSIS — I48.0 PAROXYSMAL ATRIAL FIBRILLATION (HCC): Primary | ICD-10-CM

## 2022-06-08 PROCEDURE — 93000 ELECTROCARDIOGRAM COMPLETE: CPT | Performed by: PHYSICIAN ASSISTANT

## 2022-06-08 PROCEDURE — 99214 OFFICE O/P EST MOD 30 MIN: CPT | Performed by: PHYSICIAN ASSISTANT

## 2022-06-08 RX ORDER — SOTALOL HYDROCHLORIDE 80 MG/1
80 TABLET ORAL 2 TIMES DAILY
Qty: 60 TABLET | Refills: 11 | Status: SHIPPED | OUTPATIENT
Start: 2022-06-08

## 2022-06-08 NOTE — PROGRESS NOTES
Electrophysiology Office Note    Estrada Carcamo  1945  [id number]  HEART & VASCULAR [facility] SHERYL Santos [phone]        Assessment/Plan     Primary diagnosis:   1  Persistent atrial fibrillation, symptomatic    * patient presents to [facility] EP office today for routine 6 month follow up, she is a patient of Dr Bita Banda    * today ECG showing sinus bradycardia HR 49-56, QTc 457ms  She did have episode of chest tightness 2 months ago with HR of 44-45BPM on Kardia  Will reduce sotalol to 80mg BID  If she has recurrent afib can discuss PPM implant vs re peat ablation with PWI    * DELANO EF was 60%, marked LA dilation, mild MR    * AC w/ xarelto, this is extremely expensive, will see if she qualifies for assistance with xarelto  No falls or bleeding issues    * she has no modifiable risk factors to reduce AF burden, she is extremely healthy  Congratulated her on her efforts  * f/u in 6 months for routine sotalol management  * of note does have prior diagnosis of TBS/SSS     Secondary diagnosis:   1  GERD   2  HLD   3  Hypothyroidism               Rhythm History:   Atrial fibrillation:   1  Diagnosed with atrial fibrillation prior to 2016   2  Evaluated by Dr Bita Banda - trialed on propafenone - stopped due to propafenone causing more palpitations - 2016   3  No recurrence of AF - xarelto discontinued - 2016   4  Found to be in afib w/ RVR at office visit - Devika Goldstein restarted - sent for DELANO/DCCV - 6-2020  5  DELANO/DCCV successful for restoring NSR - 8/2020  6  Back in atrial fibrillation at office visit - rate controlled strategy - 11/2020   7  Evaluated by dr Bita Banda for rhythm control - AAD + cryo PVI recommended - 8/2021   8  S/p cryo PVI + sotalol 120mg BID loading by Dr Bita Banda - 10/2021  9   Sotalol reduced to 80mg BID due to bradycardia - 6/2022     Atrial flutter:     SVT:     VT/VF/PVC:     Device history:   Pacemaker:    Defibrillator:    BIV PPM:    BIV ICD:    ILR:      Cardiac Testing:     ECHO: Results for orders placed during the hospital encounter of 20    Echo complete with contrast if indicated    Narrative  Derekashley  [facility]  [address]  [phone]    Transthoracic Echocardiogram  2D, M-mode, Doppler, and Color Doppler    Study date:  25-Sep-2020    Patient: Rigoberto Springer  MR number: [id number]  Account number: [de-identified]  : 1945  Age: 76 years  Gender: Female  Status: Outpatient  Location: Echo lab  Height: 63 in  Weight: 156 6 lb  BP: 157/ 87 mmHg    Indications: Atrial fibrillation    Diagnoses: I48 0 - Atrial fibrillation    Sonographer:  YUSUF Barrett  Primary Physician:  Wesly Sanchez MD  Referring Physician:  Misti Perez MD  Group:  [facility]  Interpreting Physician:  Nery Huff MD    SUMMARY    LEFT VENTRICLE:  Systolic function was normal  Ejection fraction was estimated in the range of 55 % to 60 % to be 55 %  There were no regional wall motion abnormalities  Wall thickness was at the upper limits of normal     LEFT ATRIUM:  The atrium was moderately dilated  RIGHT ATRIUM:  The atrium was moderately to markedly dilated  MITRAL VALVE:  There was at least mild to moderate regurgitation  AORTIC VALVE:  There was mild regurgitation  TRICUSPID VALVE:  There was moderate to severe regurgitation  Estimated peak PA pressure was 50 mmHg  IVC, HEPATIC VEINS:  The respirophasic change in diameter was more than 50%  HISTORY: PRIOR HISTORY: A Fib ,Hypothyroidism,HTN,PAC,Dyslipidemia,Arthritis,Fibromyalgia,palpitation  PROCEDURE: The procedure was performed in the echo lab  This was a routine study  The transthoracic approach was used  The study included complete 2D imaging, M-mode, complete spectral Doppler, and color Doppler  The heart rate was 138  bpm, at the start of the study   Images were obtained from the parasternal, apical, subcostal, and suprasternal notch acoustic windows  Intravenous contrast ( 0 4ml Definity in NSS) was administered to enhance Doppler signals  Image quality  was adequate  LEFT VENTRICLE: Size was normal  Systolic function was normal  Ejection fraction was estimated in the range of 55 % to 60 % to be 55 %  There were no regional wall motion abnormalities  Wall thickness was at the upper limits of normal     RIGHT VENTRICLE: The size was normal  Systolic function was normal     LEFT ATRIUM: The atrium was moderately dilated  RIGHT ATRIUM: The atrium was moderately to markedly dilated  MITRAL VALVE: There was normal leaflet separation  DOPPLER: The transmitral velocity was within the normal range  There was no evidence for stenosis  There was at least mild to moderate regurgitation  AORTIC VALVE: The valve was trileaflet  Leaflets exhibited mildly increased thickness and normal cuspal separation  DOPPLER: Transaortic velocity was within the normal range  There was no evidence for stenosis  There was mild  regurgitation  TRICUSPID VALVE: DOPPLER: There was moderate to severe regurgitation  Estimated peak PA pressure was 50 mmHg  PULMONIC VALVE: DOPPLER: There was trace regurgitation  PERICARDIUM: There was no thickening or calcification  There was no pericardial effusion  AORTA: The root exhibited normal size  SYSTEMIC VEINS: IVC: The inferior vena cava was normal in size  The respirophasic change in diameter was more than 50%      SYSTEM MEASUREMENT TABLES    2D mode  AoR Diam 2D: 3 cm  LA Diam (2D): 3 8 cm  LA/Ao (2D): 1 27  FS (2D Teich): 35 1 %  IVSd (2D): 0 93 cm  LVDEV: 87 2 cmï¾³  LVEDV MOD BP: 95 cmï¾³  LVESV: 30 9 cmï¾³  LVIDd(2D): 4 39 cm  LVISd (2D): 2 85 cm  LVOT Area 2D: 2 84 cmï¾²  LVPWd (2D): 0 89 cm  SV (Teich): 56 3 cmï¾³    Apical four chamber  LVEDV MOD A4C: 96 cmï¾³  LVEF A4C: 49 %  LVLD A4C: 7 11 cm    Apical two chamber  LVEF A2C: 61 %    Unspecified Scan Mode  KOMAL Cont Eq (Peak Tc): 2 59 cmï¾²  LVOT (VTI): 21 2 cm  LVOT Diam : 1 9 cm  LVOT Vmax: 1130 mm/s  LVOT Vmax; Mean: 1130 mm/s  Peak Grad ; Mean: 5 mm[Hg]  SV (LVOT): 60 cmï¾³  VTI;Mean: 2 mm[Hg]  MR Vol  (PISA): 98 cmï¾³  MV Peak A Tc: 395 mm/s  MV Peak E Tc  Mean: 854 mm/s  MVA (PHT): 4 4 cmï¾²  Max P mm[Hg]  PHT: 50 ms  V Max: 5600 mm/s  Vmax: 5600 mm/s  Max P mm[Hg]  V Max: 3010 mm/s  Vmax: 3040 mm/s  RA Area: 28 2 cmï¾²  RA Volume: 103 cmï¾³  TAPSE: 2 3 cm    [employer] Accredited Echocardiography Laboratory    Prepared and electronically signed by    Shameka Wray MD  Signed 25-Sep-2020 16:34:04        History of Present Illness     HPI/INTERVAL HISTORY: Cherise Pearce is a 68 y o  female with history as above who presents to B EP office today for routine 6 month follow up  Since last OV she did have an episode of palpitations and chest tightness  She used her [facility] which showed her HR was 45bpm  This resolved after a few minutes with her HR going back into the 50's  This occurred only once a few weeks ago  Does check her [facility] and often has HR's in upper 40's low 50's  Not having any atrial fibrillation  Review of Systems  ROS as noted above, otherwise 12 point review of systems was performed and is negative         Historical Information   Social History     Socioeconomic History    Marital status: /Civil Union     Spouse name: Not on file    Number of children: Not on file    Years of education: Not on file    Highest education level: Not on file   Occupational History    Not on file   Tobacco Use    Smoking status: Never Smoker    Smokeless tobacco: Never Used   Vaping Use    Vaping Use: Never used   Substance and Sexual Activity    Alcohol use: No    Drug use: No    Sexual activity: Not Currently   Other Topics Concern    Not on file   Social History Narrative    Not on file     Social Determinants of Health     Financial Resource Strain: Not on file Food Insecurity: Not on file   Transportation Needs: Not on file   Physical Activity: Not on file   Stress: Not on file   Social Connections: Not on file   Intimate Partner Violence: Not on file   Housing Stability: Not on file     Past Medical History:   Diagnosis Date    Acid reflux     Arthritis     Cataract     Colon polyp     Dyslipidemia (high LDL; low HDL)     Fibromyalgia     GERD (gastroesophageal reflux disease)     Hyperlipidemia     Hypertension     Hypothyroidism     Palpitation     Paroxysmal atrial fibrillation ([address] )     Shingles 09/12/2010    Right C7     Past Surgical History:   Procedure Laterality Date    CATARACT EXTRACTION, BILATERAL  11/2016    COLONOSCOPY  07/11/2016    COLONOSCOPY      HYSTERECTOMY  05/2016    total hysterectomy    MAMMO (HISTORICAL)  4/8/2010 & 2/19/2020    REPLACEMENT TOTAL KNEE      TONSILLECTOMY      UPPER GASTROINTESTINAL ENDOSCOPY       Social History     Substance and Sexual Activity   Alcohol Use No     Social History     Substance and Sexual Activity   Drug Use No     Social History     Tobacco Use   Smoking Status Never Smoker   Smokeless Tobacco Never Used     Family History   Problem Relation Age of Onset    Hypertension Mother     Hyperlipidemia Mother    Dino Abilio Parkinsonism Mother     Stroke Mother     Chronic bronchitis Mother     Atrial fibrillation Father     Stroke Father     Cancer Father         skin cancer    Anuerysm Neg Hx     Clotting disorder Neg Hx        Meds/Allergies       Current Outpatient Medications:     ergocalciferol (VITAMIN D2) 50,000 units, Take 1 capsule (50,000 Units total) by mouth every 14 (fourteen) days, Disp: 7 capsule, Rfl: 3    levothyroxine 75 mcg tablet, Take 1 tablet (75 mcg total) by mouth daily, Disp: 90 tablet, Rfl: 1    nitroglycerin (NITROSTAT) 0 4 mg SL tablet, Place 1 tablet (0 4 mg total) under the tongue every 5 (five) minutes as needed for chest pain, Disp: 15 tablet, Rfl: 1   omeprazole (PriLOSEC) 20 mg delayed release capsule, Take 1 capsule by mouth daily, Disp: , Rfl:     rivaroxaban (XARELTO) 20 mg tablet, Take 1 tablet (20 mg total) by mouth daily with dinner, Disp: 90 tablet, Rfl: 3    sotalol (BETAPACE) 80 mg tablet, Take 1 tablet (80 mg total) by mouth 2 (two) times a day, Disp: 60 tablet, Rfl: 11    Allergies   Allergen Reactions    Iodine - Food Allergy Hives     Severe allergy to IV CT scan dye    Iodinated Diagnostic Agents Hives       Objective   Vitals: Blood pressure 142/78, pulse 55, height 5' 3" (1 6 m), weight 68 8 kg (151 lb 11 2 oz)  Physical Exam  Constitutional:       Appearance: She is well-developed  HENT:      Head: Normocephalic and atraumatic  Eyes:      Pupils: Pupils are equal, round, and reactive to light  Cardiovascular:      Rate and Rhythm: Normal rate and regular rhythm  Pulmonary:      Effort: Pulmonary effort is normal       Breath sounds: Normal breath sounds  Abdominal:      General: Bowel sounds are normal       Palpations: Abdomen is soft  Musculoskeletal:         General: Normal range of motion  Cervical back: Normal range of motion and neck supple  Skin:     General: Skin is warm and dry  Neurological:      Mental Status: She is alert and oriented to person, place, and time  Labs:  No visits with results within 2 Month(s) from this visit     Latest known visit with results is:   Appointment on 03/01/2022   Component Date Value    WBC 03/01/2022 5 45     RBC 03/01/2022 4 03     Hemoglobin 03/01/2022 12 9     Hematocrit 03/01/2022 40 1     MCV 03/01/2022 100 (A)    MCH 03/01/2022 32 0     MCHC 03/01/2022 32 2     RDW 03/01/2022 12 8     MPV 03/01/2022 10 3     Platelets 31/93/6277 277     nRBC 03/01/2022 0     Neutrophils Relative 03/01/2022 54     Immat GRANS % 03/01/2022 0     Lymphocytes Relative 03/01/2022 31     Monocytes Relative 03/01/2022 11     Eosinophils Relative 03/01/2022 3     Basophils Relative 03/01/2022 1     Neutrophils Absolute 03/01/2022 2 93     Immature Grans Absolute 03/01/2022 0 02     Lymphocytes Absolute 03/01/2022 1 66     Monocytes Absolute 03/01/2022 0 61     Eosinophils Absolute 03/01/2022 0 18     Basophils Absolute 03/01/2022 0 05     Sodium 03/01/2022 141     Potassium 03/01/2022 3 9     Chloride 03/01/2022 109 (A)    CO2 03/01/2022 27     ANION GAP 03/01/2022 5     BUN 03/01/2022 20     Creatinine 03/01/2022 0 87     Glucose, Fasting 03/01/2022 86     Calcium 03/01/2022 9 1     AST 03/01/2022 18     ALT 03/01/2022 26     Alkaline Phosphatase 03/01/2022 63     Total Protein 03/01/2022 7 5     Albumin 03/01/2022 3 6     Total Bilirubin 03/01/2022 0 53     eGFR 03/01/2022 64     Color, UA 03/02/2022 Light Yellow     Clarity, UA 03/02/2022 Clear     Specific Gravity, UA 03/02/2022 1 022     pH, UA 03/02/2022 6 0     Leukocytes, UA 03/02/2022 Negative     Nitrite, UA 03/02/2022 Negative     Protein, UA 03/02/2022 Negative     Glucose, UA 03/02/2022 Negative     Ketones, UA 03/02/2022 Negative     Urobilinogen, UA 03/02/2022 <2 0     Bilirubin, UA 03/02/2022 Negative     Blood, UA 03/02/2022 Moderate (A)    RBC, UA 03/02/2022 Innumerable (A)    WBC, UA 03/02/2022 4-10 (A)    Epithelial Cells 03/02/2022 Occasional     Bacteria, UA 03/02/2022 None Seen     MUCUS THREADS 03/02/2022 Occasional (A)    CRP 03/01/2022 <3 0     Cholesterol 03/01/2022 277 (A)    Triglycerides 03/01/2022 83     HDL, Direct 03/01/2022 65     LDL Calculated 03/01/2022 195 (A)    Non-HDL-Chol (CHOL-HDL) 03/01/2022 212     TSH 3RD GENERATON 03/01/2022 1 950     Free T4 03/01/2022 1 19        Imaging: I have personally reviewed pertinent reports

## 2022-06-15 ENCOUNTER — TELEMEDICINE (OUTPATIENT)
Dept: INTERNAL MEDICINE CLINIC | Facility: CLINIC | Age: 77
End: 2022-06-15
Payer: MEDICARE

## 2022-06-15 VITALS — TEMPERATURE: 97.4 F

## 2022-06-15 DIAGNOSIS — U07.1 COVID-19: Primary | ICD-10-CM

## 2022-06-15 PROCEDURE — 99213 OFFICE O/P EST LOW 20 MIN: CPT | Performed by: INTERNAL MEDICINE

## 2022-06-15 NOTE — PROGRESS NOTES
COVID-19 Outpatient Progress Note    Assessment/Plan:    Problem List Items Addressed This Visit    None     Visit Diagnoses     COVID-19    -  Primary         Disposition:     Patient meets criteria for PAXLOVID and they have been counseled appropriately according to EUA documentation released by the FDA  After discussion, patient agrees to treatment  Regina Nicholson is an investigational medicine used to treat mild-to-moderate COVID-19 in adults and children (15years of age and older weighing at least 80 pounds (40 kg)) with positive results of direct SARS-CoV-2 viral testing, and who are at high risk for progression to severe COVID-19, including hospitalization or death  PAXLOVID is investigational because it is still being studied  There is limited information about the safety and effectiveness of using PAXLOVID to treat people with mild-to-moderate COVID-19  The FDA has authorized the emergency use of PAXLOVID for the treatment of mild-tomoderate COVID-19 in adults and children (15years of age and older weighing at least 80 pounds (40 kg)) with a positive test for the virus that causes COVID-19, and who are at high risk for progression to severe COVID-19, including hospitalization or death, under an EUA  What should I tell my healthcare provider before I take PAXLOVID? Tell your healthcare provider if you:  - Have any allergies  - Have liver or kidney disease  - Are pregnant or plan to become pregnant  - Are breastfeeding a child  - Have any serious illnesses    Tell your healthcare provider about all the medicines you take, including prescription and over-the-counter medicines, vitamins, and herbal supplements  Some medicines may interact with PAXLOVID and may cause serious side effects  Keep a list of your medicines to show your healthcare provider and pharmacist when you get a new medicine  You can ask your healthcare provider or pharmacist for a list of medicines that interact with PAXLOVID   Do not start taking a new medicine without telling your healthcare provider  Your healthcare provider can tell you if it is safe to take PAXLOVID with other medicines  Tell your healthcare provider if you are taking combined hormonal contraceptive  PAXLOVID may affect how your birth control pills work  Females who are able to become pregnant should use another effective alternative form of contraception or an additional barrier method of contraception  Talk to your healthcare provider if you have any questions about contraceptive methods that might be right for you  How do I take PAXLOVID? PAXLOVID consists of 2 medicines: nirmatrelvir and ritonavir  - Take 2 pink tablets of nirmatrelvir with 1 white tablet of ritonavir by mouth 2 times each day (in the morning and in the evening) for 5 days  For each dose, take all 3 tablets at the same time  - If you have kidney disease, talk to your healthcare provider  You may need a different dose  - Swallow the tablets whole  Do not chew, break, or crush the tablets  - Take PAXLOVID with or without food  - Do not stop taking PAXLOVID without talking to your healthcare provider, even if you feel better  - If you miss a dose of PAXLOVID within 8 hours of the time it is usually taken, take it as soon as you remember  If you miss a dose by more than 8 hours, skip the missed dose and take the next dose at your regular time  Do not take 2 doses of PAXLOVID at the same time  - If you take too much PAXLOVID, call your healthcare provider or go to the nearest hospital emergency room right away  - If you are taking a ritonavir- or cobicistat-containing medicine to treat hepatitis C or Human Immunodeficiency Virus (HIV), you should continue to take your medicine as prescribed by your healthcare provider   - Talk to your healthcare provider if you do not feel better or if you feel worse after 5 days  Who should generally not take PAXLOVID?     Do not take PAXLOVID if:  You are allergic to nirmatrelvir, ritonavir, or any of the ingredients in PAXLOVID  You are taking any of the following medicines:  - Alfuzosin  - Pethidine, piroxicam, propoxyphene  - Ranolazine  - Amiodarone, dronedarone, flecainide, propafenone, quinidine  - Colchicine  - Lurasidone, pimozide, clozapine  - Dihydroergotamine, ergotamine, methylergonovine  - Lovastatin, simvastatin  - Sildenafil (Revatio®) for pulmonary arterial hypertension (PAH)  - Triazolam, oral midazolam  - Apalutamide  - Carbamazepine, phenobarbital, phenytoin  - Rifampin  - St  Kennys Wort (hypericum perforatum)    What are the important possible side effects of PAXLOVID? Possible side effects of PAXLOVID are:  - Liver Problems  Tell your healthcare provider right away if you have any of these signs and symptoms of liver problems: loss of appetite, yellowing of your skin and the whites of eyes (jaundice), dark-colored urine, pale colored stools and itchy skin, stomach area (abdominal) pain  - Resistance to HIV Medicines  If you have untreated HIV infection, PAXLOVID may lead to some HIV medicines not working as well in the future  - Other possible side effects include: altered sense of taste, diarrhea, high blood pressure, or muscle aches    These are not all the possible side effects of PAXLOVID  Not many people have taken PAXLOVID  Serious and unexpected side effects may happen  Jazmín Anton is still being studied, so it is possible that all of the risks are not known at this time  What other treatment choices are there? Like Mendoza Plascencia may allow for the emergency use of other medicines to treat people with COVID-19  Go to https://FirstString/ for information on the emergency use of other medicines that are authorized by FDA to treat people with COVID-19   Your healthcare provider may talk with you about clinical trials for which you may be eligible  It is your choice to be treated or not to be treated with PAXLOVID  Should you decide not to receive it or for your child not to receive it, it will not change your standard medical care  What if I am pregnant or breastfeeding? There is no experience treating pregnant women or breastfeeding mothers with PAXLOVID  For a mother and unborn baby, the benefit of taking PAXLOVID may be greater than the risk from the treatment  If you are pregnant, discuss your options and specific situation with your healthcare provider  It is recommended that you use effective barrier contraception or do not have sexual activity while taking PAXLOVID  If you are breastfeeding, discuss your options and specific situation with your healthcare provider  How do I report side effects with PAXLOVID? Contact your healthcare provider if you have any side effects that bother you or do not go away  Report side effects to FDA MedWatch at www Meograph gov/medwatch or call 5-486-RIN6771 or you can report side effects to [employer]  at the contact information provided below  Website Fax number Telephone number   [employer] [phone]     How should I store Tedra Peto? Store PAXLOVID tablets at room temperature between 68°F to 77°F (20°C to 25°C)  Full fact sheet for patients, parents, and caregivers can be found at: ResearchNamcinthia ramirez meets criteria for [facility] and they have been counseled according to EUA documentation provided by the FDA  After discussion, patient agrees to treatment      [facility] is an investigational medicine used to treat mild-to-moderate COVID-19 in adults with positive results of direct SARS-CoV-2 viral testing, and who are at high risk for progressing to severe COVID-19 including hospitalization or death, and for whom other COVID-19 treatment options authorized by the FDA are not accessible or clinically appropriate  The FDA has authorized the emergency use of molnupiravir for the treatment of mild-tomoderate COVID-19 in adults under an EUA  Bernadette Katie is not authorized:  - for use in people less than 25years of age   - for prevention of COVID-19   - for people needing hospitalization for COVID-19   - for use for longer than 5 consecutive days    What is the most important information I should know about molnupiravir? Bernadette Katie may cause serious side effects, including:    Bernadette Katie may cause harm to your unborn baby  It is not known if molnupiravir will harm your baby if you take molnupiravir during pregnancy  - Bernadette Katie is not recommended for use in pregnancy  - Bernadette Katie has not been studied in pregnancy  Bernadette Katie was studied in pregnant animals only  When molnupiravir was given to pregnant animals, molnupiravir caused harm to their unborn babies   - You and your healthcare provider may decide that you should take molnupiravir duringpregnancy if there are no other COVID-19 treatment options authorized by the FDA that are accessible or clinically appropriate for you  - If you and your healthcare provider decide that you should take molnupiravir during pregnancy, you and your healthcare provider should discuss the known and potential benefits and the potential risks of taking molnupiravir during pregnancy  For individuals who are able to become pregnant:  - You should use a reliable method of birth control (contraception) consistently and correctly during treatment with molnupiravir and for 4 days after the last dose of molnupiravir  Talk to your healthcare provider about reliable birth control methods  - Before starting treatment with molnupiravir your healthcare provider may do a pregnancy test to see if you are pregnant before starting treatment with molnupiravir    - Tell your healthcare provider right away if you become pregnant or think you may be pregnant during treatment with molnupiravir  Pregnancy Surveillance Program:  - There is a pregnancy surveillance program for individuals who take molnupiravir during pregnancy  The purpose of this program is to collect information about the health of you and your baby  Talk to your healthcare provider about how to take part in this program   - If you take molnupiravir during pregnancy and you agree to participate in the pregnancy surveillance program and allow your healthcare provider to share your information with Penelope Chavez , then your healthcare provider will report your use of molnupiravir during pregnancy to [address] [place] [address]  by calling [phone] or pregnancyreporting msd com  For individuals who are sexually active with partners who are able to become pregnant:  - It is not known if molnupiravir can affect sperm  While the risk is regarded as low, animal studies to fully assess the potential for molnupiravir to affect the babies of males treated with molnupiravir have not been completed  A reliable method of birth control (contraception) should be used consistently and correctly during treatment with molnupiravir and for at least 3 months after the last dose  The risk to sperm beyond 3 months is not known  Studies to understand the risk to sperm beyond 3 months are ongoing  Talk to your healthcare provider about reliable birth control methods  Talk to your healthcare provider if you have questions or concerns about how molnupiravir may affect sperm  How do I take molnupiravir? - Take molnupiravir exactly as your healthcare provider tells you to take it  - Take 4 capsules of molnupiravir every 12 hours (for example, at 8 am and at 8 pm)  - Take molnupiravir for 5 days  It is important that you complete the full 5 days of treatment with molnupiravir  Do not stop taking molnupiravir before you complete the full 5 days of treatment, even if you feel better    - Take molnupiravir with or without food   - You should stay in isolation for as long as your healthcare provider tells you to  Talk to your healthcare provider if you are not sure about how to properly isolate while you have COVID-19   - Swallow molnupiravir capsules whole  Do not open, break, or crush the capsules  If you cannot swallow capsules whole, tell your healthcare provider  What to do if you miss a dose:  - If it has been less than 10 hours since the missed dose, take it as soon as you remember  - If it has been more than 10 hours since the missed dose, skip the missed dose and take your dose at the next scheduled time  - Do not double the dose of molnupiravir to make up for a missed dose  What are the important possible side effects of molnupiravir? Possible side effects of molnupiravir are:  - See, Kaw Ink is the most important information I should know about molnupiravir?   - Diarrhea  - Nausea  - Dizziness    These are not all the possible side effects of molnupiravir  Not many people have taken molnupiravir  Serious and unexpected side effects may happen  This medicine is still being studied, so it is possible that all of the risks are not known at this time  What other treatment choices are there? Like Oraretta Brush may allow for the emergency use of other medicines to treat people with COVID-19  Go to https://Tradono/ for more information  It is your choice to be treated or not to be treated with molnupiravir  Should you decide not to take it, it will not change your standard medical care  What if I am breastfeeding? Breastfeeding is not recommended during treatment with molnupiravir and for 4 days after the last dose of molnupiravir  If you are breastfeeding or plan to breastfeed, talk to your healthcare provider about your options and specific situation before taking molnupiravir      How do I report side effects with molnupiravir? Contact your healthcare provider if you have any side effects that bother you or do not go away  Report side effects to FDA MedWatch at www fda gov/medwatch or call 1-644-EXR-6541 (1-265.627.3383)  How should I store [address]? - Store molnupiravir capsules at room temperature between 68°F to 77°F (20°C to 25°C)  - Keep molnupiravir and all medicines out of the reach of children and pets  Full fact sheet for patients, parents, and caregivers can be found at: [employer]ari    I have spent 25 minutes directly with the patient  Greater than 50% of this time was spent in counseling/coordination of care regarding: diagnostic results, prognosis, risks and benefits of treatment options, instructions for management, patient and family education, importance of treatment compliance, risk factor reductions and impressions  Encounter provider Lissett Hayes MD    Provider located at [address] [address]  [phone]    Recent Visits  No visits were found meeting these conditions  Showing recent visits within past 7 days and meeting all other requirements  Today's Visits  Date Type Provider Dept   06/15/22 Telemedicine Lissett Hayes MD [address] today's visits and meeting all other requirements  Future Appointments  No visits were found meeting these conditions  Showing future appointments within next 150 days and meeting all other requirements       Patient agrees to participate in a virtual check in via telephone or video visit instead of presenting to the office to address urgent/immediate medical needs  Patient is aware this is a billable service  After connecting through [facility], the patient was identified by name and date of birth   Ariel Herring was informed that this was a telemedicine visit and that the exam was being conducted confidentially over secure lines  My office door was closed  No one else was in the room  Braydon Byrd acknowledged consent and understanding of privacy and security of the telemedicine visit  I informed the patient that I have reviewed her record in Epic and presented the opportunity for her to ask any questions regarding the visit today  The patient agreed to participate  Verification of patient location:  Patient is located in the following state in which I hold an active license: [place]    Subjective:   Braydon Byrd is a 68 y o  female who is concerned about COVID-19  Patient's symptoms include sore throat, cough, diarrhea, myalgias and headache   Patient denies fever, chills, fatigue, malaise, congestion, rhinorrhea, anosmia, loss of taste, shortness of breath, chest tightness, abdominal pain, nausea and vomiting        - Date of symptom onset: 6/11/2022      COVID-19 vaccination status: Not vaccinated    Exposure:   Contact with a person who is under investigation (PUI) for or who is positive for COVID-19 within the last 14 days?: No    Hospitalized recently for fever and/or lower respiratory symptoms?: No      Currently a healthcare worker that is involved in direct patient care?: No      Works in a special setting where the risk of COVID-19 transmission may be high? (this may include long-term care, correctional and correction facilities; homeless shelters; assisted-living facilities and group homes ): No      Resident in a special setting where the risk of COVID-19 transmission may be high? (this may include long-term care, correctional and correction facilities; homeless shelters; assisted-living facilities and group homes ): No      Home test positive on 6/14/22 , declines any antibody therapy     Lab Results   Component Value Date    SARSCOV2 Negative 04/28/2021    [facility] Not Detected 12/02/2020     Past Medical History:   Diagnosis Date    Acid reflux     Arthritis     Cataract     Colon polyp     Dyslipidemia (high LDL; low HDL)     Fibromyalgia     GERD (gastroesophageal reflux disease)     Hyperlipidemia     Hypertension     Hypothyroidism     Palpitation     Paroxysmal atrial fibrillation ([facility] [address] )     Shingles 09/12/2010    Right C7     Past Surgical History:   Procedure Laterality Date    CATARACT EXTRACTION, BILATERAL  11/2016    COLONOSCOPY  07/11/2016    COLONOSCOPY      HYSTERECTOMY  05/2016    total hysterectomy    MAMMO (HISTORICAL)  4/8/2010 & 2/19/2020    REPLACEMENT TOTAL KNEE      TONSILLECTOMY      UPPER GASTROINTESTINAL ENDOSCOPY       Current Outpatient Medications   Medication Sig Dispense Refill    ergocalciferol (VITAMIN D2) 50,000 units Take 1 capsule (50,000 Units total) by mouth every 14 (fourteen) days 7 capsule 3    levothyroxine 75 mcg tablet Take 1 tablet (75 mcg total) by mouth daily 90 tablet 1    nitroglycerin (NITROSTAT) 0 4 mg SL tablet Place 1 tablet (0 4 mg total) under the tongue every 5 (five) minutes as needed for chest pain 15 tablet 1    omeprazole (PriLOSEC) 20 mg delayed release capsule Take 1 capsule by mouth daily      rivaroxaban (XARELTO) 20 mg tablet Take 1 tablet (20 mg total) by mouth daily with dinner 90 tablet 3    sotalol (BETAPACE) 80 mg tablet Take 1 tablet (80 mg total) by mouth 2 (two) times a day 60 tablet 11     No current facility-administered medications for this visit  Allergies   Allergen Reactions    Iodine - Food Allergy Hives     Severe allergy to IV CT scan dye    Iodinated Diagnostic Agents Hives       Review of Systems   Constitutional: Negative for activity change, appetite change, chills, diaphoresis, fatigue, fever and unexpected weight change  HENT: Positive for sore throat   Negative for congestion, drooling, ear discharge, ear pain, facial swelling, hearing loss, postnasal drip, rhinorrhea, sinus pressure, sinus pain, sneezing, tinnitus, trouble swallowing and voice change  Eyes: Negative for discharge  Respiratory: Positive for cough  Negative for apnea, choking, chest tightness, shortness of breath, wheezing and stridor  Cardiovascular: Negative for chest pain, palpitations and leg swelling  Gastrointestinal: Positive for diarrhea  Negative for abdominal distention, abdominal pain, anal bleeding, blood in stool, constipation, nausea and vomiting  Genitourinary: Negative for decreased urine volume, difficulty urinating, frequency and urgency  Musculoskeletal: Positive for myalgias  Negative for arthralgias and back pain  Skin: Negative for color change  Neurological: Positive for headaches  Negative for dizziness, light-headedness and numbness  Objective:    Vitals:    06/15/22 1450   Temp: (!) 97 4 °F (36 3 °C)   TempSrc: Oral       Physical Exam  Constitutional:       General: She is not in acute distress  Appearance: Normal appearance  She is not ill-appearing, toxic-appearing or diaphoretic  Neurological:      Mental Status: She is alert and oriented to person, place, and time  VIRTUAL VISIT [address] [place] [address] verbally agrees to participate in [employer]  Pt is aware that [employer] could be limited without vital signs or the ability to perform a full hands-on physical Shiraz Man understands she or the provider may request at any time to terminate the video visit and request the patient to seek care or treatment in person

## 2022-07-08 ENCOUNTER — OFFICE VISIT (OUTPATIENT)
Dept: INTERNAL MEDICINE CLINIC | Facility: CLINIC | Age: 77
End: 2022-07-08

## 2022-07-08 VITALS
HEIGHT: 63 IN | BODY MASS INDEX: 26.05 KG/M2 | HEART RATE: 98 BPM | TEMPERATURE: 98 F | SYSTOLIC BLOOD PRESSURE: 110 MMHG | WEIGHT: 147 LBS | DIASTOLIC BLOOD PRESSURE: 80 MMHG | OXYGEN SATURATION: 87 %

## 2022-07-08 DIAGNOSIS — E55.9 VITAMIN D DEFICIENCY DISEASE: ICD-10-CM

## 2022-07-08 DIAGNOSIS — E03.9 ACQUIRED HYPOTHYROIDISM: ICD-10-CM

## 2022-07-08 DIAGNOSIS — E53.8 B12 DEFICIENCY: ICD-10-CM

## 2022-07-08 DIAGNOSIS — I10 ESSENTIAL HYPERTENSION: Primary | ICD-10-CM

## 2022-07-08 DIAGNOSIS — E55.9 VITAMIN D DEFICIENCY: ICD-10-CM

## 2022-07-08 DIAGNOSIS — L30.8 OTHER ECZEMA: ICD-10-CM

## 2022-07-08 DIAGNOSIS — E78.5 DYSLIPIDEMIA: ICD-10-CM

## 2022-07-08 DIAGNOSIS — M81.0 AGE-RELATED OSTEOPOROSIS WITHOUT CURRENT PATHOLOGICAL FRACTURE: ICD-10-CM

## 2022-07-08 NOTE — PROGRESS NOTES
Assessment/Plan:           1  Essential hypertension  -     CBC and differential; Future  -     Comprehensive metabolic panel; Future  -     Urinalysis with microscopic    2  Acquired hypothyroidism  -     TSH + Free T4; Future    3  Age-related osteoporosis without current pathological fracture    4  Vitamin D deficiency disease    5  Dyslipidemia    6  B12 deficiency  -     Vitamin B12; Future    7  Vitamin D deficiency  -     Vitamin D 25 hydroxy; Future    8  Other eczema  -     triamcinolone (KENALOG) 0 1 % ointment; Apply topically 2 (two) times a day         1  Essential hypertension      2  Acquired hypothyroidism      3  Age-related osteoporosis without current pathological fracture      4  Vitamin D deficiency disease         No problem-specific Assessment & Plan notes found for this encounter  Subjective:      Patient ID: Mari Bustos is a 68 y o  female  HPI    The following portions of the patient's history were reviewed and updated as appropriate: She  has a past medical history of Acid reflux, Arthritis, Cataract, Colon polyp, Disease of thyroid gland, Dyslipidemia (high LDL; low HDL), Fibromyalgia, GERD (gastroesophageal reflux disease), Hyperlipidemia, Hypertension, Hypothyroidism, Palpitation, Paroxysmal atrial fibrillation ([address] ), and Shingles (09/12/2010)  She   Patient Active Problem List    Diagnosis Date Noted   • Fibromyalgia 10/10/2022   • Vitamin D deficiency 10/10/2022   • Age-related osteoporosis without current pathological fracture 10/10/2022   • Acid reflux    • Abnormal ECG 03/03/2021   • Presyncope 01/14/2021   • Acquired hypothyroidism 09/11/2020   • Plantar fasciitis 09/11/2020   • Paroxysmal atrial fibrillation ([address] ) 07/10/2018   • Essential hypertension 07/10/2018   • PAC (premature atrial contraction) 07/10/2018   • Dyslipidemia 07/10/2018     She  has a past surgical history that includes Replacement total knee; Tonsillectomy; Hysterectomy (05/2016);  Cataract extraction, bilateral (11/2016); Colonoscopy (07/11/2016); Mammo (historical) (4/8/2010 & 2/19/2020); Colonoscopy; and Upper gastrointestinal endoscopy  Her family history includes Atrial fibrillation in her father; Cancer in her father; Chronic bronchitis in her mother; Hyperlipidemia in her mother; Hypertension in her mother; Parkinsonism in her mother; Stroke in her father and mother  She  reports that she has never smoked  She has never used smokeless tobacco  She reports that she does not drink alcohol and does not use drugs  Current Outpatient Medications   Medication Sig Dispense Refill   • ergocalciferol (VITAMIN D2) 50,000 units Take 1 capsule (50,000 Units total) by mouth every 14 (fourteen) days 7 capsule 3   • nitroglycerin (NITROSTAT) 0 4 mg SL tablet Place 1 tablet (0 4 mg total) under the tongue every 5 (five) minutes as needed for chest pain (Patient not taking: No sig reported) 15 tablet 1   • omeprazole (PriLOSEC) 20 mg delayed release capsule Take 1 capsule by mouth daily     • sotalol (BETAPACE) 80 mg tablet Take 1 tablet (80 mg total) by mouth 2 (two) times a day 60 tablet 11   • triamcinolone (KENALOG) 0 1 % ointment Apply topically 2 (two) times a day 60 g 0   • BinaxNOW COVID-19 Ag Home Test KIT Use as Directed on the Package (Patient not taking: No sig reported)     • Cyanocobalamin (VITAMIN B 12 PO) Take by mouth     • levothyroxine 75 mcg tablet Take 1 tablet (75 mcg total) by mouth daily 90 tablet 1   • rivaroxaban (XARELTO) 20 mg tablet Take 1 tablet (20 mg total) by mouth daily with dinner 90 tablet 3     No current facility-administered medications for this visit       Current Outpatient Medications on File Prior to Visit   Medication Sig   • ergocalciferol (VITAMIN D2) 50,000 units Take 1 capsule (50,000 Units total) by mouth every 14 (fourteen) days   • nitroglycerin (NITROSTAT) 0 4 mg SL tablet Place 1 tablet (0 4 mg total) under the tongue every 5 (five) minutes as needed for chest pain (Patient not taking: No sig reported)   • omeprazole (PriLOSEC) 20 mg delayed release capsule Take 1 capsule by mouth daily   • sotalol (BETAPACE) 80 mg tablet Take 1 tablet (80 mg total) by mouth 2 (two) times a day     No current facility-administered medications on file prior to visit  She is allergic to iodine - food allergy and iodinated diagnostic agents       Review of Systems   Constitutional: Negative for appetite change, chills, fatigue and fever  HENT: Negative for sore throat and trouble swallowing  Eyes: Negative for redness  Respiratory: Negative for shortness of breath  Cardiovascular: Negative for chest pain and palpitations  Gastrointestinal: Negative for abdominal pain, constipation and diarrhea  Genitourinary: Negative for dysuria and hematuria  Musculoskeletal: Negative for back pain and neck pain  Skin: Negative for rash  Neurological: Negative for seizures, weakness and headaches  Hematological: Negative for adenopathy  Psychiatric/Behavioral: Negative for confusion  The patient is not nervous/anxious  Objective:      /80 (BP Location: Right arm, Patient Position: Sitting, Cuff Size: Standard)   Pulse 98   Temp 98 °F (36 7 °C) (Temporal)   Ht 5' 3" (1 6 m)   Wt 66 7 kg (147 lb)   SpO2 (!) 87%   BMI 26 04 kg/m²     Results Reviewed     None          No results found for this or any previous visit (from the past 1344 hour(s))  Physical Exam  Constitutional:       General: She is not in acute distress  Appearance: Normal appearance  HENT:      Head: Normocephalic and atraumatic  Nose: Nose normal       Mouth/Throat:      Mouth: Mucous membranes are moist    Eyes:      Extraocular Movements: Extraocular movements intact  Pupils: Pupils are equal, round, and reactive to light  Cardiovascular:      Rate and Rhythm: Normal rate and regular rhythm  Pulses: Normal pulses  Heart sounds: Normal heart sounds   No murmur heard  No friction rub  Pulmonary:      Effort: Pulmonary effort is normal  No respiratory distress  Breath sounds: Normal breath sounds  No wheezing  Abdominal:      General: Abdomen is flat  Bowel sounds are normal  There is no distension  Palpations: Abdomen is soft  There is no mass  Tenderness: There is no abdominal tenderness  There is no guarding  Musculoskeletal:         General: Normal range of motion  Cervical back: Normal range of motion  Skin:     General: Skin is warm  Neurological:      General: No focal deficit present  Mental Status: She is alert and oriented to person, place, and time  Mental status is at baseline  Cranial Nerves: No cranial nerve deficit     Psychiatric:         Mood and Affect: Mood normal          Behavior: Behavior normal

## 2022-07-29 ENCOUNTER — APPOINTMENT (OUTPATIENT)
Dept: LAB | Facility: AMBULARY SURGERY CENTER | Age: 77
End: 2022-07-29
Payer: MEDICARE

## 2022-07-29 DIAGNOSIS — E53.8 B12 DEFICIENCY: ICD-10-CM

## 2022-07-29 DIAGNOSIS — E55.9 VITAMIN D DEFICIENCY: ICD-10-CM

## 2022-07-29 DIAGNOSIS — E03.9 ACQUIRED HYPOTHYROIDISM: ICD-10-CM

## 2022-07-29 DIAGNOSIS — I10 ESSENTIAL HYPERTENSION: ICD-10-CM

## 2022-07-29 LAB
25(OH)D3 SERPL-MCNC: 58.8 NG/ML (ref 30–100)
ALBUMIN SERPL BCP-MCNC: 3.8 G/DL (ref 3.5–5)
ALP SERPL-CCNC: 69 U/L (ref 46–116)
ALT SERPL W P-5'-P-CCNC: 23 U/L (ref 12–78)
ANION GAP SERPL CALCULATED.3IONS-SCNC: 0 MMOL/L (ref 4–13)
AST SERPL W P-5'-P-CCNC: 19 U/L (ref 5–45)
BACTERIA UR QL AUTO: ABNORMAL /HPF
BASOPHILS # BLD AUTO: 0.05 THOUSANDS/ΜL (ref 0–0.1)
BASOPHILS NFR BLD AUTO: 1 % (ref 0–1)
BILIRUB SERPL-MCNC: 0.46 MG/DL (ref 0.2–1)
BILIRUB UR QL STRIP: NEGATIVE
BUN SERPL-MCNC: 14 MG/DL (ref 5–25)
CALCIUM SERPL-MCNC: 9.2 MG/DL (ref 8.3–10.1)
CHLORIDE SERPL-SCNC: 108 MMOL/L (ref 96–108)
CLARITY UR: CLEAR
CO2 SERPL-SCNC: 31 MMOL/L (ref 21–32)
COLOR UR: ABNORMAL
CREAT SERPL-MCNC: 0.85 MG/DL (ref 0.6–1.3)
EOSINOPHIL # BLD AUTO: 0.18 THOUSAND/ΜL (ref 0–0.61)
EOSINOPHIL NFR BLD AUTO: 3 % (ref 0–6)
ERYTHROCYTE [DISTWIDTH] IN BLOOD BY AUTOMATED COUNT: 13.2 % (ref 11.6–15.1)
GFR SERPL CREATININE-BSD FRML MDRD: 66 ML/MIN/1.73SQ M
GLUCOSE P FAST SERPL-MCNC: 112 MG/DL (ref 65–99)
GLUCOSE UR STRIP-MCNC: NEGATIVE MG/DL
HCT VFR BLD AUTO: 41.2 % (ref 34.8–46.1)
HGB BLD-MCNC: 13.3 G/DL (ref 11.5–15.4)
HGB UR QL STRIP.AUTO: NEGATIVE
IMM GRANULOCYTES # BLD AUTO: 0.02 THOUSAND/UL (ref 0–0.2)
IMM GRANULOCYTES NFR BLD AUTO: 0 % (ref 0–2)
KETONES UR STRIP-MCNC: NEGATIVE MG/DL
LEUKOCYTE ESTERASE UR QL STRIP: ABNORMAL
LYMPHOCYTES # BLD AUTO: 1.7 THOUSANDS/ΜL (ref 0.6–4.47)
LYMPHOCYTES NFR BLD AUTO: 24 % (ref 14–44)
MCH RBC QN AUTO: 32.5 PG (ref 26.8–34.3)
MCHC RBC AUTO-ENTMCNC: 32.3 G/DL (ref 31.4–37.4)
MCV RBC AUTO: 101 FL (ref 82–98)
MONOCYTES # BLD AUTO: 0.61 THOUSAND/ΜL (ref 0.17–1.22)
MONOCYTES NFR BLD AUTO: 8 % (ref 4–12)
MUCOUS THREADS UR QL AUTO: ABNORMAL
NEUTROPHILS # BLD AUTO: 4.67 THOUSANDS/ΜL (ref 1.85–7.62)
NEUTS SEG NFR BLD AUTO: 64 % (ref 43–75)
NITRITE UR QL STRIP: NEGATIVE
NON-SQ EPI CELLS URNS QL MICRO: ABNORMAL /HPF
NRBC BLD AUTO-RTO: 0 /100 WBCS
PH UR STRIP.AUTO: 7 [PH]
PLATELET # BLD AUTO: 296 THOUSANDS/UL (ref 149–390)
PMV BLD AUTO: 10.1 FL (ref 8.9–12.7)
POTASSIUM SERPL-SCNC: 4.5 MMOL/L (ref 3.5–5.3)
PROT SERPL-MCNC: 7.4 G/DL (ref 6.4–8.4)
PROT UR STRIP-MCNC: ABNORMAL MG/DL
RBC # BLD AUTO: 4.09 MILLION/UL (ref 3.81–5.12)
RBC #/AREA URNS AUTO: ABNORMAL /HPF
SODIUM SERPL-SCNC: 139 MMOL/L (ref 135–147)
SP GR UR STRIP.AUTO: 1.02 (ref 1–1.03)
T4 FREE SERPL-MCNC: 1.33 NG/DL (ref 0.76–1.46)
TSH SERPL DL<=0.05 MIU/L-ACNC: 1.1 UIU/ML (ref 0.45–4.5)
UROBILINOGEN UR STRIP-ACNC: <2 MG/DL
WBC # BLD AUTO: 7.23 THOUSAND/UL (ref 4.31–10.16)
WBC #/AREA URNS AUTO: ABNORMAL /HPF

## 2022-07-29 PROCEDURE — 85025 COMPLETE CBC W/AUTO DIFF WBC: CPT

## 2022-07-29 PROCEDURE — 80053 COMPREHEN METABOLIC PANEL: CPT

## 2022-07-29 PROCEDURE — 82607 VITAMIN B-12: CPT

## 2022-07-29 PROCEDURE — 84439 ASSAY OF FREE THYROXINE: CPT

## 2022-07-29 PROCEDURE — 82306 VITAMIN D 25 HYDROXY: CPT

## 2022-07-29 PROCEDURE — 36415 COLL VENOUS BLD VENIPUNCTURE: CPT

## 2022-07-29 PROCEDURE — 84443 ASSAY THYROID STIM HORMONE: CPT

## 2022-07-30 LAB — VIT B12 SERPL-MCNC: 351 PG/ML (ref 100–900)

## 2022-08-04 NOTE — PROGRESS NOTES
Assessment and Plan:   Patient is a 55-year-old female who presents for rheumatology consult for osteoporosis  She was referred by her family doctor to discuss treatment options  Patient reports she was on treatment many years ago but had issues with increased myalgia and arthralgia on the medication, which she cannot recall the name  She recalls trying more than 1 medication and always had a side effect and so she is not interested in discussing other medication options as she admits she would not take anything for the osteoporosis  She is agreeable to start on calcium so I recommended taking 1200 mg daily  She is already on vitamin-D  I encouraged her to start exercising  We discussed that she is high risk for a fragility fracture particularly in the spine however she would like to take her chances  We discussed that if she changes her mind she can return for follow-up at that point  Plan:  Diagnoses and all orders for this visit:    Age-related osteoporosis without current pathological fracture  -     Ambulatory Referral to Rheumatology    Other orders  -     BinaxNOW COVID-19 Ag Home Test KIT; Use as Directed on the Package        Follow-up plan: PRN       HPI  [place] Soha is a 68 y o   female with hypothyroidism, GERD, Afib on xarelto, HLD, who presents for rheumatology consult by request of Dr Xochilt Thomas for osteoporosis  Reports she is just here to appease her PCP, he would like her to start on treatment for her osteoporosis however she reports that she is not interested  Reports years ago she was on treatment, cannot recall the names of any of the medications, but states that it made her fibromyalgia pain worse when she would take the medications  Therefore she is not interested in trying anything else  She does not want to deal with any side effects  She does take vitamin-D but does not take any calcium  She also does not eat much dairy    She may try "osteoforce" which is a holistic supplement her sister is on  She does not walk much for exercise but can start doing this  Has never had a fragility fracture  She is on omeprazole chronically for reflux  Believes her mother had osteoporosis  Review of Systems  Review of Systems   Constitutional: Negative for fatigue  HENT: Negative for mouth sores  Eyes: Negative for pain  Respiratory: Negative for shortness of breath  Cardiovascular: Negative for leg swelling  Musculoskeletal: Positive for arthralgias and myalgias  Negative for joint swelling  Skin: Negative for rash  Neurological: Negative for weakness  Hematological: Negative for adenopathy  Psychiatric/Behavioral: Negative for sleep disturbance         Allergies  Allergies   Allergen Reactions    Iodine - Food Allergy Hives     Severe allergy to IV CT scan dye    Iodinated Diagnostic Agents Hives       Home Medications    Current Outpatient Medications:     omeprazole (PriLOSEC) 20 mg delayed release capsule, Take 1 capsule by mouth daily, Disp: , Rfl:     sotalol (BETAPACE) 80 mg tablet, Take 1 tablet (80 mg total) by mouth 2 (two) times a day, Disp: 60 tablet, Rfl: 11    BinaxNOW COVID-19 Ag Home Test KIT, Use as Directed on the Package, Disp: , Rfl:     ergocalciferol (VITAMIN D2) 50,000 units, Take 1 capsule (50,000 Units total) by mouth every 14 (fourteen) days, Disp: 7 capsule, Rfl: 3    levothyroxine 75 mcg tablet, Take 1 tablet (75 mcg total) by mouth daily, Disp: 90 tablet, Rfl: 1    nitroglycerin (NITROSTAT) 0 4 mg SL tablet, Place 1 tablet (0 4 mg total) under the tongue every 5 (five) minutes as needed for chest pain (Patient not taking: Reported on 8/5/2022), Disp: 15 tablet, Rfl: 1    rivaroxaban (XARELTO) 20 mg tablet, Take 1 tablet (20 mg total) by mouth daily with dinner, Disp: 90 tablet, Rfl: 3    triamcinolone (KENALOG) 0 1 % ointment, Apply topically 2 (two) times a day, Disp: 60 g, Rfl: 0    Past Medical History  Past Medical History: Diagnosis Date    Acid reflux     Arthritis     Cataract     Colon polyp     Dyslipidemia (high LDL; low HDL)     Fibromyalgia     GERD (gastroesophageal reflux disease)     Hyperlipidemia     Hypertension     Hypothyroidism     Palpitation     Paroxysmal atrial fibrillation ([address] )     Shingles 09/12/2010    Right C7       Past Surgical History   Past Surgical History:   Procedure Laterality Date    CATARACT EXTRACTION, BILATERAL  11/2016    COLONOSCOPY  07/11/2016    COLONOSCOPY      HYSTERECTOMY  05/2016    total hysterectomy    MAMMO (HISTORICAL)  4/8/2010 & 2/19/2020    REPLACEMENT TOTAL KNEE      TONSILLECTOMY      UPPER GASTROINTESTINAL ENDOSCOPY         Family History  No known family history of autoimmune or inflammatory diseases  Family History   Problem Relation Age of Onset    Hypertension Mother     Hyperlipidemia Mother     Parkinsonism Mother     Stroke Mother     Chronic bronchitis Mother     Atrial fibrillation Father     Stroke Father     Cancer Father         skin cancer    Anuerysm Neg Hx     Clotting disorder Neg Hx        Social History  Social History     Substance and Sexual Activity   Alcohol Use No     Social History     Substance and Sexual Activity   Drug Use No     Social History     Tobacco Use   Smoking Status Never Smoker   Smokeless Tobacco Never Used       Objective:    Vitals:    08/05/22 0833   BP: 114/80   Pulse: 61   Weight: 66 7 kg (147 lb)       Physical Exam  Constitutional:       General: She is not in acute distress  HENT:      Head: Normocephalic and atraumatic  Eyes:      Conjunctiva/sclera: Conjunctivae normal    Pulmonary:      Effort: Pulmonary effort is normal  No respiratory distress  Musculoskeletal:      Cervical back: Neck supple  Skin:     Coloration: Skin is not pale  Neurological:      Mental Status: She is alert  Mental status is at baseline     Psychiatric:         Mood and Affect: Mood normal          Behavior: Behavior normal          Imaging:   DEXA 3/8/22:      Labs:   Component      Latest Ref Rng & Units 7/29/2022   WBC      4 31 - 10 16 Thousand/uL 7 23   Red Blood Cell Count      3 81 - 5 12 Million/uL 4 09   Hemoglobin      11 5 - 15 4 g/dL 13 3   HCT      34 8 - 46 1 % 41 2   MCV      82 - 98 fL 101 (H)   MCH      26 8 - 34 3 pg 32 5   MCHC      31 4 - 37 4 g/dL 32 3   RDW      11 6 - 15 1 % 13 2   MPV      8 9 - 12 7 fL 10 1   Platelet Count      823 - 390 Thousands/uL 296   nRBC      /100 WBCs 0   Neutrophils %      43 - 75 % 64   Immat GRANS %      0 - 2 % 0   Lymphocytes Relative      14 - 44 % 24   Monocytes Relative      4 - 12 % 8   Eosinophils      0 - 6 % 3   Basophils Relative      0 - 1 % 1   Absolute Neutrophils      1 85 - 7 62 Thousands/µL 4 67   Immature Grans Absolute      0 00 - 0 20 Thousand/uL 0 02   Lymphocytes Absolute      0 60 - 4 47 Thousands/µL 1 70   Absolute Monocytes      0 17 - 1 22 Thousand/µL 0 61   Absolute Eosinophils      0 00 - 0 61 Thousand/µL 0 18   Basophils Absolute      0 00 - 0 10 Thousands/µL 0 05   Sodium      135 - 147 mmol/L 139   Potassium      3 5 - 5 3 mmol/L 4 5   Chloride      96 - 108 mmol/L 108   CO2      21 - 32 mmol/L 31   Anion Gap      4 - 13 mmol/L 0 (L)   BUN      5 - 25 mg/dL 14   Creatinine      0 60 - 1 30 mg/dL 0 85   GLUCOSE FASTING      65 - 99 mg/dL 112 (H)   Calcium      8 3 - 10 1 mg/dL 9 2   AST      5 - 45 U/L 19   ALT      12 - 78 U/L 23   Alkaline Phosphatase      46 - 116 U/L 69   Total Protein      6 4 - 8 4 g/dL 7 4   Albumin      3 5 - 5 0 g/dL 3 8   TOTAL BILIRUBIN      0 20 - 1 00 mg/dL 0 46   eGFR      ml/min/1 73sq m 66   Vitamin B-12      100 - 900 pg/mL 351   Vit D, 25-Hydroxy      30 0 - 100 0 ng/mL 58 8   Free T4      0 76 - 1 46 ng/dL 1 33   TSH 3RD GENERATON      0 450 - 4 500 uIU/mL 1 100

## 2022-08-05 ENCOUNTER — OFFICE VISIT (OUTPATIENT)
Dept: RHEUMATOLOGY | Facility: CLINIC | Age: 77
End: 2022-08-05
Payer: MEDICARE

## 2022-08-05 VITALS
HEART RATE: 61 BPM | WEIGHT: 147 LBS | SYSTOLIC BLOOD PRESSURE: 114 MMHG | BODY MASS INDEX: 26.04 KG/M2 | DIASTOLIC BLOOD PRESSURE: 80 MMHG

## 2022-08-05 DIAGNOSIS — M81.0 AGE-RELATED OSTEOPOROSIS WITHOUT CURRENT PATHOLOGICAL FRACTURE: Primary | ICD-10-CM

## 2022-08-05 PROCEDURE — 99204 OFFICE O/P NEW MOD 45 MIN: CPT | Performed by: INTERNAL MEDICINE

## 2022-08-05 RX ORDER — COVID-19 MOLECULAR TEST ASSAY
KIT MISCELLANEOUS
COMMUNITY
Start: 2022-06-27

## 2022-10-10 ENCOUNTER — OFFICE VISIT (OUTPATIENT)
Dept: INTERNAL MEDICINE CLINIC | Facility: CLINIC | Age: 77
End: 2022-10-10
Payer: MEDICARE

## 2022-10-10 VITALS
HEIGHT: 63 IN | BODY MASS INDEX: 26.22 KG/M2 | WEIGHT: 148 LBS | HEART RATE: 52 BPM | TEMPERATURE: 98 F | DIASTOLIC BLOOD PRESSURE: 84 MMHG | SYSTOLIC BLOOD PRESSURE: 160 MMHG | OXYGEN SATURATION: 98 %

## 2022-10-10 DIAGNOSIS — M79.7 FIBROMYALGIA: ICD-10-CM

## 2022-10-10 DIAGNOSIS — E55.9 VITAMIN D DEFICIENCY: ICD-10-CM

## 2022-10-10 DIAGNOSIS — E03.9 ACQUIRED HYPOTHYROIDISM: Primary | ICD-10-CM

## 2022-10-10 DIAGNOSIS — I48.0 PAROXYSMAL ATRIAL FIBRILLATION (HCC): ICD-10-CM

## 2022-10-10 DIAGNOSIS — Z23 NEED FOR INFLUENZA VACCINATION: ICD-10-CM

## 2022-10-10 DIAGNOSIS — I10 ESSENTIAL HYPERTENSION: ICD-10-CM

## 2022-10-10 DIAGNOSIS — M81.0 AGE-RELATED OSTEOPOROSIS WITHOUT CURRENT PATHOLOGICAL FRACTURE: ICD-10-CM

## 2022-10-10 PROCEDURE — G0008 ADMIN INFLUENZA VIRUS VAC: HCPCS

## 2022-10-10 PROCEDURE — 90662 IIV NO PRSV INCREASED AG IM: CPT

## 2022-10-10 PROCEDURE — 99214 OFFICE O/P EST MOD 30 MIN: CPT | Performed by: INTERNAL MEDICINE

## 2022-10-10 RX ORDER — LEVOTHYROXINE SODIUM 0.07 MG/1
75 TABLET ORAL DAILY
Qty: 90 TABLET | Refills: 1 | Status: SHIPPED | OUTPATIENT
Start: 2022-10-10 | End: 2022-10-10

## 2022-10-10 RX ORDER — LEVOTHYROXINE SODIUM 0.07 MG/1
75 TABLET ORAL DAILY
Qty: 90 TABLET | Refills: 1 | Status: SHIPPED | OUTPATIENT
Start: 2022-10-10 | End: 2023-01-08

## 2022-10-10 NOTE — PROGRESS NOTES
Assessment/Plan:           1  Acquired hypothyroidism  Comments:  Continue levothyroxine 75 mcgs daily  Orders:  -     levothyroxine 75 mcg tablet; Take 1 tablet (75 mcg total) by mouth daily    2  Paroxysmal atrial fibrillation (HCC)  Comments:  Patient is status post ablation on sotalol and  Xarelto  Orders:  -     rivaroxaban (XARELTO) 20 mg tablet; Take 1 tablet (20 mg total) by mouth daily with dinner    3  Need for influenza vaccination  -     FLUZONE HIGH-DOSE: influenza vaccine, high-dose, preservative-free 0 7 mL    4  Vitamin D deficiency    5  Age-related osteoporosis without current pathological fracture  Comments:  She has declined bisphosphonates and Prolia because of myalgia and body aches  6  Essential hypertension    7  Fibromyalgia         1  Paroxysmal atrial fibrillation (HCC)    - rivaroxaban (XARELTO) 20 mg tablet; Take 1 tablet (20 mg total) by mouth daily with dinner  Dispense: 90 tablet; Refill: 3    2  Acquired hypothyroidism    - levothyroxine 75 mcg tablet; Take 1 tablet (75 mcg total) by mouth daily  Dispense: 90 tablet; Refill: 1    3  Need for influenza vaccination    - FLUZONE HIGH-DOSE: influenza vaccine, high-dose, preservative-free 0 7 mL       No problem-specific Assessment & Plan notes found for this encounter  Subjective:      Patient ID: José Hoyos is a 68 y o  female  HPI    The following portions of the patient's history were reviewed and updated as appropriate: She  has a past medical history of Acid reflux, Arthritis, Cataract, Colon polyp, Disease of thyroid gland, Dyslipidemia (high LDL; low HDL), Fibromyalgia, GERD (gastroesophageal reflux disease), Hyperlipidemia, Hypertension, Hypothyroidism, Palpitation, Paroxysmal atrial fibrillation ([facility] [address] ), and Shingles (09/12/2010)    She   Patient Active Problem List    Diagnosis Date Noted   • Fibromyalgia 10/10/2022   • Vitamin D deficiency 10/10/2022   • Age-related osteoporosis without current pathological fracture 10/10/2022   • Acid reflux    • Abnormal ECG 03/03/2021   • Presyncope 01/14/2021   • Acquired hypothyroidism 09/11/2020   • Plantar fasciitis 09/11/2020   • Paroxysmal atrial fibrillation ([facility] [address] ) 07/10/2018   • Essential hypertension 07/10/2018   • PAC (premature atrial contraction) 07/10/2018   • Dyslipidemia 07/10/2018     She  has a past surgical history that includes Replacement total knee; Tonsillectomy; Hysterectomy (05/2016); Cataract extraction, bilateral (11/2016); Colonoscopy (07/11/2016); Mammo (historical) (4/8/2010 & 2/19/2020); Colonoscopy; and Upper gastrointestinal endoscopy  Her family history includes Atrial fibrillation in her father; Cancer in her father; Chronic bronchitis in her mother; Hyperlipidemia in her mother; Hypertension in her mother; Parkinsonism in her mother; Stroke in her father and mother  She  reports that she has never smoked  She has never used smokeless tobacco  She reports that she does not drink alcohol and does not use drugs    Current Outpatient Medications   Medication Sig Dispense Refill   • ergocalciferol (VITAMIN D2) 50,000 units Take 1 capsule (50,000 Units total) by mouth every 14 (fourteen) days 7 capsule 3   • levothyroxine 75 mcg tablet Take 1 tablet (75 mcg total) by mouth daily 90 tablet 1   • omeprazole (PriLOSEC) 20 mg delayed release capsule Take 1 capsule by mouth daily     • rivaroxaban (XARELTO) 20 mg tablet Take 1 tablet (20 mg total) by mouth daily with dinner 90 tablet 3   • sotalol (BETAPACE) 80 mg tablet Take 1 tablet (80 mg total) by mouth 2 (two) times a day 60 tablet 11   • triamcinolone (KENALOG) 0 1 % ointment Apply topically 2 (two) times a day 60 g 0   • BinaxNOW COVID-19 Ag Home Test KIT Use as Directed on the Package (Patient not taking: No sig reported)     • nitroglycerin (NITROSTAT) 0 4 mg SL tablet Place 1 tablet (0 4 mg total) under the tongue every 5 (five) minutes as needed for chest pain (Patient not taking: No sig reported) 15 tablet 1     No current facility-administered medications for this visit  Current Outpatient Medications on File Prior to Visit   Medication Sig   • ergocalciferol (VITAMIN D2) 50,000 units Take 1 capsule (50,000 Units total) by mouth every 14 (fourteen) days   • omeprazole (PriLOSEC) 20 mg delayed release capsule Take 1 capsule by mouth daily   • sotalol (BETAPACE) 80 mg tablet Take 1 tablet (80 mg total) by mouth 2 (two) times a day   • triamcinolone (KENALOG) 0 1 % ointment Apply topically 2 (two) times a day   • [DISCONTINUED] levothyroxine 75 mcg tablet Take 1 tablet (75 mcg total) by mouth daily   • [DISCONTINUED] rivaroxaban (XARELTO) 20 mg tablet Take 1 tablet (20 mg total) by mouth daily with dinner   • BinaxNOW COVID-19 Ag Home Test KIT Use as Directed on the Package (Patient not taking: No sig reported)   • nitroglycerin (NITROSTAT) 0 4 mg SL tablet Place 1 tablet (0 4 mg total) under the tongue every 5 (five) minutes as needed for chest pain (Patient not taking: No sig reported)     No current facility-administered medications on file prior to visit  She is allergic to iodine - food allergy and iodinated diagnostic agents       Review of Systems   Constitutional: Negative for appetite change, chills, fatigue and fever  HENT: Negative for sore throat and trouble swallowing  Eyes: Negative for redness  Respiratory: Negative for shortness of breath  Cardiovascular: Negative for chest pain and palpitations  Gastrointestinal: Negative for abdominal pain, constipation and diarrhea  Genitourinary: Negative for dysuria and hematuria  Musculoskeletal: Negative for back pain and neck pain  Skin: Negative for rash  Neurological: Negative for seizures, weakness and headaches  Hematological: Negative for adenopathy  Psychiatric/Behavioral: Negative for confusion  The patient is not nervous/anxious            Objective:      /84 (BP Location: Left arm, Patient Position: Sitting, Cuff Size: Standard)   Pulse (!) 52   Temp 98 °F (36 7 °C) (Temporal)   Ht 5' 3" (1 6 m)   Wt 67 1 kg (148 lb)   SpO2 98% Comment: RA  BMI 26 22 kg/m²     Results Reviewed     None          No results found for this or any previous visit (from the past 1344 hour(s))  Physical Exam  Constitutional:       General: She is not in acute distress  Appearance: Normal appearance  HENT:      Head: Normocephalic and atraumatic  Nose: Nose normal       Mouth/Throat:      Mouth: Mucous membranes are moist    Eyes:      Extraocular Movements: Extraocular movements intact  Pupils: Pupils are equal, round, and reactive to light  Cardiovascular:      Rate and Rhythm: Normal rate and regular rhythm  Pulses: Normal pulses  Heart sounds: Normal heart sounds  No murmur heard  No friction rub  Pulmonary:      Effort: Pulmonary effort is normal  No respiratory distress  Breath sounds: Normal breath sounds  No wheezing  Abdominal:      General: Abdomen is flat  Bowel sounds are normal  There is no distension  Palpations: Abdomen is soft  There is no mass  Tenderness: There is no abdominal tenderness  There is no guarding  Musculoskeletal:         General: Normal range of motion  Cervical back: Normal range of motion  Neurological:      General: No focal deficit present  Mental Status: She is alert and oriented to person, place, and time  Mental status is at baseline  Cranial Nerves: No cranial nerve deficit     Psychiatric:         Mood and Affect: Mood normal          Behavior: Behavior normal

## 2022-11-08 NOTE — PROGRESS NOTES
Cardiology  Acute  Office Visit Note  Jesus Pearson   68 y o    female   MRN: [id number]  [address]  [address] [facility] [address] [facility] [place] [address]  [phone]    PCP: Nancy Betancourt MD  Cardiologist:  Dr Rylie Porras  BP: Dr Margy Parker            Summary of recommendations  DASH diet  1 Week ZIO patch  Assess heart rate on sotalol 80 mg b i d  Given dizziness  Continue to hydrate as she has been  Periodic home blood pressure monitoring  She is advised to call us within 1-2 weeks if her blood pressure at home is not consistently less than 130/80  Lipids not addressed today, as this was an acute visit  Follow up will be scheduled with Dr Rylie Porras 4-8 weeks          Assessment/plan  Dizziness  EKG today:  Sinus bradycardia 56 beats per minute   milliseconds  -will order a 1 week ZIO patch to assess heart rates  She has had a rare palpitation  Hx symptomatic persistent atrial fibrillation, status post cryoablation with pulmonary vein isolation 9/28/2021  [id number] ( age, female HTN)   ·  sotalol initiation, 9/28/21    Sotalol reduced to 80mg BID due to bradycardia - 6/2022   · intolerance to propafenone, and flecainide  itching/ dizziness)    ·  prior cardioversion 8/2020 with subsequent reversion back to afib several months later  · maintained on 4 [facility] with Xarelto 20 mg/d   Tachy-indy syndrome, with baseline sinus bradycardia as well as periods of rapid ventricular response  Palpitations-sx correlated with PACs in the past  Valvular heart disease  DELANO September 2021  · Mitral regurgitation, mild  · Tricuspid regurgitation, mild-to-moderate  Hyperlipidemia  · Lipid profile June 2017:      , non    · Lipid profile August 2020:  , non   Placed on pitavastatin 1 mg daily  Tolerated it, but could not continue due to $$$  Will start zetia 10 mg/d  · Lipid profile March 2022:  , non     Not addressed today  · Several Statin intolerance-myalgias  · Colesevelam intolerance-myalgias  Hypertension, essential   /92  on sotalol 80 mg b i d  Periodic home blood pressure monitoring  May need to add medication  Hypothyroidism on replacement  Fibromyalgia  Cardiac testing  · Holter 2/14:minimum HR 40 bpm, maximum 169 bpm, average 63 bpm  320 PVCs, multiple short episodes of SVT, longest lasting about 14 seconds, which appeared to be atrial fibrillation at a rate of 169 bpm  Longest R-R interval was 1 9 seconds  · TTE 9/40 normal LV systolic and diastolic function, EF 89%, left atrium mildly dilated, mildly thickened mitral valve with mild mitral regurgitation, normal PASP, with normal RV size and function  · Stress echo 5/7/14 Francis protocol x 9 mins, 89% of MPHR, echo and EKG showed no inducible ischemia  · 7 day event monitor January 2017 minimum HR 47 BPM, maximum HR 94 BPM, average HR 60 BPM  No definitive afib seen, some PACs, PVCs, atrial couplet, short run of PSVT  · 7 day event monitor July 2019- ordered but not completed  · TTE 9/25/20  EF 55-60%  No RWMA  Moderate LAE  Moderate to markedly dilated right atrium  At least mild to moderate MR  Mild AI  Moderate severe TR  Peak PA pressure 50 mm Hg  · NM myocardial SPECT 3/5/21 Normal study after pharmacologic stress  Myocardial perfusion imaging was normal at rest and with stress  Left ventricular systolic function was normal   · DELANO 9/28/21  EF 60%  No RWMA  Marked left atrial dilatation  No thrombus was identified  Left atrial appendage function mildly reduced with moderately reduced emptying velocity  No thrombus  No ASD or PFO  Right atrium markedly dilated  Mild MR   Mild-to-moderate TR  PASP at least 16 mm Hg                   [facility]  Urban Creed is a 76 y  o  woman with hypertension, hyperlipidemia , palpitations and paroxysmal atrial fibrillation  In the past she was on higher doses of metoprolol for rate control and palpitations    She had issues with lightheadedness and could not tolerate high doses  She had also did not tolerate flecainide  She did see EPS/Dr Ajit Krishnan  October 14, 2014  She had a 7 day event monitor which not show any recurrence of AFib only PACs  He gave her propafenone to try  She felt palpitations and felt worse so she stopped it  In the past she was on Xarelto  Due to the very minimal atrial fibrillation that she had, lasting less than 30 seconds, Xarelto was stopped as per Dr Ajit Krishnan ( he noted it is debatable if stroke risk may be overestimated by [id number] given the brevity of afib (<30seconds)  He did note she was on verapamil and metoprolol and did complain of some fatigue  He tried her on propafenone however this was not tolerable and subsequently was discontinued She has followed with Dr Ericka Torres routinely  The last visit was June 2019:  She was on metoprolol and verapamil  She felt the palpitations were very rare lasting a few seconds  She was walking regularly and denies any exertional chest pain shortness of breath  She started taking CBD oil for fibromyalgia and reports that it was helping      6/24/2020  She presents today for routine cardiology follow-up  She complains of achey chest pain, shortness of breath, palpitations and fatigue  She has been feeling poorly for “a while” She complains of lightheadedness  She has some pleuritic pain  She tells me last week she took a trip to [place], and drove, which is 10 hours each way  Her EKG shows she is in atrial fibrillation at 75 beats per minute  It is not clear when she went into AFib  She is compliant with her Toprol and verapamil  /60  She is not volume overloaded  She is symptomatic   A rhythm control strategy is recommended  She has a severe IV dye allergy  We cannot do a CT scan with IV contrast   Will order a V/Q and lower extremity duplex  Will start Xarelto 20 mg daily    Will get her per short prescription to a local pharmacy as well as a 90 day supply     Will schedule for DELANO guided cardioversion in about 5 weeks  In the past, many years ago she was on flecainide  She cannot recall what the intolerance was  She was also on propafenone and was intolerant as well  Will schedule an appointment with a new cardiologist in 2 months  We agreed if any testing was abnormal I will call her  Interval history  11/2/20 OV with cardiologist Dr Twila Pfeiffer   She was in atrial fibrillation; unaware she was in AFib   placed on a rate control strategy for her AFib  She tolerated b i d  verapamil poorly, is back to 1 a day metoprolol twice a day    1/7/2021  Followup- couldn't get in to see her Dr Tawanda Pinto  None at rest    EKG today atrial fibrillation 73 beats per minute    periodicially feels throbbing feeling in her neck- every other day  I do not think this is her AFib    1 week before Aly, she had a spell-lasted 5-10 minutes: got up to go to bed, couldn't walk, was speaking nonsense, witnessed by , who asked her why she was not making any sense  Was aware of what was happening  Returned to normal fairly quickly  Did not go to ER,; didn't see PCP  No recurrence    Not taking pitavastatin- too costly  Her LDL was 170, checked a few months ago  Agreeable to starting ezetimibe, if not too costly  Did discuss possible PCSK9 inhibitor  There is also bempedoic acid, to reduce her LDL  Likely this is too costly    Blood pressure by the medical assistant:  154/96  Repeat by me 134/102 I recommend she adhere to a low-salt diet  Monitor home blood pressure  Goal blood pressure less than 130/80  Last week systolic 517, another 203- on home monitor ( was validated by PCP)  Today 134/80    Interval history  3/3/21 office visit Dr Twila Pfeiffer    8/27/21  OV Dr Rosina Bateman 9/29-10/1/21 AFib ablation    Initiated on sotalol    1/6/22 office visit Dr Twila Pfeiffer    10/26/21, 11 30 21 office visit EP Dr Selina Fall  6/8/22 Stephanie Espinoza Friend TERRENCE/ EPS   Sotalol reduced to 80 b i d  given bradycardia  Recommend EP follow-up in 6 months      11/9/22  Acute visit  At times she knows her heart rate is slow  Occasionally dizzy  EKG today sinus bradycardia 56 beats per minute  Minimal voltage criteria for LVH may be normal variant   milliseconds  /82  She reports she hydrates and drinks water regularly throughout her day  Her blood pressure has been up and down at home  Today, she agrees to a 1 week ZIO patch  I recommend periodic home blood pressure monitoring  Goal blood pressure less than 130/80  She will call us in 1-2 weeks if it is not consistently within this range  Will need to add medication  She will follow with her cardiologist in a short interval        I have spent 25 minutes with Patient  today in which greater than 50% of this time was spent in counseling/coordination of care regarding Patient and family education, Importance of tx compliance and Risk factor reductions  Assessment  Diagnoses and all orders for this visit:    [facility]  -     POCT ECG  -     AMB extended holter monitor; Future    Essential hypertension    PAC (premature atrial contraction)    Dyslipidemia    Dizziness  -     AMB extended holter monitor; Future    Other orders  -     Cyanocobalamin (VITAMIN B 12 PO); Take by mouth          Past Medical History:   Diagnosis Date   • Acid reflux    • Arthritis    • Cataract    • Colon polyp    • Disease of thyroid gland    • Dyslipidemia (high LDL; low HDL)    • Fibromyalgia    • GERD (gastroesophageal reflux disease)    • Hyperlipidemia    • Hypertension    • Hypothyroidism    • Palpitation    • Paroxysmal atrial fibrillation ([facility] [address] )    • Shingles 09/12/2010    Right C7       Review of Systems   Constitutional: Negative for chills and malaise/fatigue     Cardiovascular: Negative for chest pain, claudication, cyanosis, dyspnea on exertion, irregular heartbeat, leg swelling, near-syncope, orthopnea, palpitations, paroxysmal nocturnal dyspnea and syncope  Respiratory: Negative for cough and shortness of breath  Gastrointestinal: Negative for heartburn and nausea  Neurological: Positive for dizziness  Negative for focal weakness, headaches, light-headedness and weakness  All other systems reviewed and are negative  Allergies   Allergen Reactions   • Iodine - Food Allergy Hives     Severe allergy to IV CT scan dye   • Iodinated Diagnostic Agents Hives           Current Outpatient Medications:   •  Cyanocobalamin (VITAMIN B 12 PO), Take by mouth, Disp: , Rfl:   •  ergocalciferol (VITAMIN D2) 50,000 units, Take 1 capsule (50,000 Units total) by mouth every 14 (fourteen) days, Disp: 7 capsule, Rfl: 3  •  levothyroxine 75 mcg tablet, Take 1 tablet (75 mcg total) by mouth daily, Disp: 90 tablet, Rfl: 1  •  omeprazole (PriLOSEC) 20 mg delayed release capsule, Take 1 capsule by mouth daily, Disp: , Rfl:   •  rivaroxaban (XARELTO) 20 mg tablet, Take 1 tablet (20 mg total) by mouth daily with dinner, Disp: 90 tablet, Rfl: 3  •  sotalol (BETAPACE) 80 mg tablet, Take 1 tablet (80 mg total) by mouth 2 (two) times a day, Disp: 60 tablet, Rfl: 11  •  triamcinolone (KENALOG) 0 1 % ointment, Apply topically 2 (two) times a day, Disp: 60 g, Rfl: 0  •  BinaxNOW COVID-19 Ag Home Test KIT, Use as Directed on the Package (Patient not taking: No sig reported), Disp: , Rfl:   •  nitroglycerin (NITROSTAT) 0 4 mg SL tablet, Place 1 tablet (0 4 mg total) under the tongue every 5 (five) minutes as needed for chest pain (Patient not taking: No sig reported), Disp: 15 tablet, Rfl: 1        Social History     Socioeconomic History   • Marital status: /Civil Union     Spouse name: Not on file   • Number of children: Not on file   • Years of education: Not on file   • Highest education level: Not on file   Occupational History   • Not on file   Tobacco Use   • Smoking status: Never Smoker   • Smokeless tobacco: Never Used   Vaping Use   • Vaping Use: Never used   Substance and Sexual Activity   • Alcohol use: No   • Drug use: No   • Sexual activity: Not Currently   Other Topics Concern   • Not on file   Social History Narrative   • Not on file     Social Determinants of Health     Financial Resource Strain: Not on file   Food Insecurity: Not on file   Transportation Needs: Not on file   Physical Activity: Not on file   Stress: Not on file   Social Connections: Not on file   Intimate Partner Violence: Not on file   Housing Stability: Not on file       Family History   Problem Relation Age of Onset   • Hypertension Mother    • Hyperlipidemia Mother    • Parkinsonism Mother    • Stroke Mother    • Chronic bronchitis Mother    • Atrial fibrillation Father    • Stroke Father    • Cancer Father         skin cancer   • Anuerysm Neg Hx    • Clotting disorder Neg Hx        Physical Exam  Vitals and nursing note reviewed  Constitutional:       General: She is not in acute distress  Appearance: She is not diaphoretic  HENT:      Head: Normocephalic and atraumatic  Eyes:      Conjunctiva/sclera: Conjunctivae normal    Cardiovascular:      Rate and Rhythm: Normal rate and regular rhythm  Pulses: Intact distal pulses  Heart sounds: Murmur heard  High-pitched blowing holosystolic murmur is present with a grade of 2/6 at the apex  Pulmonary:      Effort: Pulmonary effort is normal       Breath sounds: Normal breath sounds  Abdominal:      General: Bowel sounds are normal       Palpations: Abdomen is soft  Musculoskeletal:         General: Normal range of motion  Cervical back: Normal range of motion and neck supple  Skin:     General: Skin is warm and dry  Neurological:      Mental Status: She is alert and oriented to person, place, and time  Vitals: Blood pressure 148/92, pulse 55, resp  rate 18, height 5' 3" (1 6 m), weight 68 2 kg (150 lb 6 oz), SpO2 98 %     Wt Readings from Last 3 Encounters:   11/09/22 68 2 kg (150 lb 6 oz)   10/10/22 67 1 kg (148 lb)   08/05/22 66 7 kg (147 lb)         Labs & Results:  Lab Results   Component Value Date    WBC 7 23 07/29/2022    HGB 13 3 07/29/2022    HCT 41 2 07/29/2022     (H) 07/29/2022     07/29/2022     No results found for: BNP  No components found for: CHEM  Troponin I   Date Value Ref Range Status   02/15/2021 <0 02 <=0 04 ng/mL Final     Comment:     Autovalidation override  [employer] Chemistry analyzer 99% cutoff is > 0 04 ng/mL in network labs     o cTnI 99% cutoff is useful only when applied to patients in the clinical setting of myocardial ischemia   o cTnI 99% cutoff should be interpreted in the context of clinical history, ECG findings and possibly cardiac imaging to establish correct diagnosis  o cTnI 99% cutoff may be suggestive but clearly not indicative of a coronary event without the clinical setting of myocardial ischemia  No results found for this or any previous visit  No results found for this or any previous visit  This note was completed in part utilizing [employer] direct voice recognition software  Grammatical errors, random word insertion, spelling mistakes, and incomplete sentences may be an occasional consequence of the system secondary to software limitations, ambient noise and hardware issues  At the time of dictation, efforts were made to edit, clarify and /or correct errors  Please read the chart carefully and recognize, using context, where substitutions have occurred    If you have any questions or concerns about the context, text or information contained within the body of this dictation, please contact myself, the provider, for further clarification

## 2022-11-09 ENCOUNTER — OFFICE VISIT (OUTPATIENT)
Dept: CARDIOLOGY CLINIC | Facility: CLINIC | Age: 77
End: 2022-11-09

## 2022-11-09 VITALS
HEIGHT: 63 IN | SYSTOLIC BLOOD PRESSURE: 148 MMHG | OXYGEN SATURATION: 98 % | RESPIRATION RATE: 18 BRPM | BODY MASS INDEX: 26.64 KG/M2 | WEIGHT: 150.38 LBS | DIASTOLIC BLOOD PRESSURE: 92 MMHG | HEART RATE: 55 BPM

## 2022-11-09 DIAGNOSIS — R42 DIZZINESS: ICD-10-CM

## 2022-11-09 DIAGNOSIS — E78.5 DYSLIPIDEMIA: ICD-10-CM

## 2022-11-09 DIAGNOSIS — I10 ESSENTIAL HYPERTENSION: ICD-10-CM

## 2022-11-09 DIAGNOSIS — I48.0 PAROXYSMAL ATRIAL FIBRILLATION (HCC): Primary | ICD-10-CM

## 2022-11-09 DIAGNOSIS — I49.1 PAC (PREMATURE ATRIAL CONTRACTION): ICD-10-CM

## 2022-11-09 NOTE — LETTER
November 9, 2022     Jacqueline Morales, [place]  [address] [place] [address] St  [address] [place] [address]    Patient: Kamaljit Cadena   YOB: 1945   Date of Visit: 11/9/2022       Dear Dr Mg Boyle:    Thank you for referring Donna Bray to me for evaluation  Below are my notes for this consultation  If you have questions, please do not hesitate to call me  I look forward to following your patient along with you  Sincerely,        CHRISTINE Zuniga        CC: MD Miranda Mayers, [place]  11/9/2022  8:46 AM  Sign when Signing Visit  Cardiology  Acute  Office Visit Note  Kamaljit Cadena   68 y o    female   MRN: [id number]  [address]  [address] [id number] [phone]    PCP: Jacqueline Morales MD  Cardiologist:  Dr Mercedes Juarez  BP: Dr Shira Marquez            Summary of recommendations  DASH diet  1 Week ZIO patch  Assess heart rate on sotalol 80 mg b i d  Given dizziness  Continue to hydrate as she has been  Periodic home blood pressure monitoring  She is advised to call us within 1-2 weeks if her blood pressure at home is not consistently less than 130/80  Lipids not addressed today, as this was an acute visit  Follow up will be scheduled with Dr Mercedes Juarez 4-8 weeks          Assessment/plan  Dizziness  EKG today:  Sinus bradycardia 56 beats per minute   milliseconds  -will order a 1 week ZIO patch to assess heart rates  She has had a rare palpitation  Hx symptomatic persistent atrial fibrillation, status post cryoablation with pulmonary vein isolation 9/28/2021  [id number] ( age, female HTN)   ·  sotalol initiation, 9/28/21    Sotalol reduced to 80mg BID due to bradycardia - 6/2022   · intolerance to propafenone, and flecainide  itching/ dizziness)    ·  prior cardioversion 8/2020 with subsequent reversion back to afib several months later  · maintained on [address] with Xarelto 20 mg/d   Tachy-indy syndrome, with baseline sinus bradycardia as well as periods of rapid ventricular response  Palpitations-sx correlated with PACs in the past  Valvular heart disease  DELANO September 2021  · Mitral regurgitation, mild  · Tricuspid regurgitation, mild-to-moderate  Hyperlipidemia  · Lipid profile June 2017:      , non    · Lipid profile August 2020:  , non   Placed on pitavastatin 1 mg daily  Tolerated it, but could not continue due to $$$  Will start zetia 10 mg/d  · Lipid profile March 2022:  , non   Not addressed today  · Several Statin intolerance-myalgias  · Colesevelam intolerance-myalgias  Hypertension, essential   /92  on sotalol 80 mg b i d  Periodic home blood pressure monitoring  May need to add medication  Hypothyroidism on replacement  Fibromyalgia  Cardiac testing  · Holter 2/14:minimum HR 40 bpm, maximum 169 bpm, average 63 bpm  320 PVCs, multiple short episodes of SVT, longest lasting about 14 seconds, which appeared to be atrial fibrillation at a rate of 169 bpm  Longest R-R interval was 1 9 seconds  · TTE 5/95 normal LV systolic and diastolic function, EF 34%, left atrium mildly dilated, mildly thickened mitral valve with mild mitral regurgitation, normal PASP, with normal RV size and function  · Stress echo 5/7/14 Francis protocol x 9 mins, 89% of MPHR, echo and EKG showed no inducible ischemia  · 7 day event monitor January 2017 minimum HR 47 BPM, maximum HR 94 BPM, average HR 60 BPM  No definitive afib seen, some PACs, PVCs, atrial couplet, short run of PSVT  · 7 day event monitor July 2019- ordered but not completed  · TTE 9/25/20  EF 55-60%  No RWMA  Moderate LAE  Moderate to markedly dilated right atrium  At least mild to moderate MR  Mild AI  Moderate severe TR  Peak PA pressure 50 mm Hg  · NM myocardial SPECT 3/5/21 Normal study after pharmacologic stress  Myocardial perfusion imaging was normal at rest and with stress   Left ventricular systolic function was normal   · DELANO 9/28/21  EF 60%  No RWMA  Marked left atrial dilatation  No thrombus was identified  Left atrial appendage function mildly reduced with moderately reduced emptying velocity  No thrombus  No ASD or PFO  Right atrium markedly dilated  Mild MR   Mild-to-moderate TR  PASP at least 16 mm Hg                   NOHEMI Rolon is a 76 y  o  woman with hypertension, hyperlipidemia , palpitations and paroxysmal atrial fibrillation  In the past she was on higher doses of metoprolol for rate control and palpitations  She had issues with lightheadedness and could not tolerate high doses  She had also did not tolerate flecainide  She did see EPS/Dr Vishal Del Valle  October 14, 2014  She had a 7 day event monitor which not show any recurrence of AFib only PACs  He gave her propafenone to try  She felt palpitations and felt worse so she stopped it  In the past she was on Xarelto  Due to the very minimal atrial fibrillation that she had, lasting less than 30 seconds, Xarelto was stopped as per Dr Vishal Del Valle ( he noted it is debatable if stroke risk may be overestimated by [id number] given the brevity of afib (<30seconds)  He did note she was on verapamil and metoprolol and did complain of some fatigue  He tried her on propafenone however this was not tolerable and subsequently was discontinued She has followed with Dr Franco Hence routinely  The last visit was June 2019:  She was on metoprolol and verapamil  She felt the palpitations were very rare lasting a few seconds  She was walking regularly and denies any exertional chest pain shortness of breath  She started taking CBD oil for fibromyalgia and reports that it was helping      6/24/2020  She presents today for routine cardiology follow-up  She complains of achey chest pain, shortness of breath, palpitations and fatigue  She has been feeling poorly for “a while” She complains of lightheadedness  She has some pleuritic pain    She tells me last week she took a trip to [place], and drove, which is 10 hours each way  Her EKG shows she is in atrial fibrillation at 75 beats per minute  It is not clear when she went into AFib  She is compliant with her Toprol and verapamil  /60  She is not volume overloaded  She is symptomatic   A rhythm control strategy is recommended  She has a severe IV dye allergy  We cannot do a CT scan with IV contrast   Will order a V/Q and lower extremity duplex  Will start Xarelto 20 mg daily  Will get her per short prescription to a local pharmacy as well as a 90 day supply     Will schedule for DELANO guided cardioversion in about 5 weeks  In the past, many years ago she was on flecainide  She cannot recall what the intolerance was  She was also on propafenone and was intolerant as well  Will schedule an appointment with a new cardiologist in 2 months  We agreed if any testing was abnormal I will call her  Interval history  11/2/20 OV with cardiologist Dr Khloe Valiente   She was in atrial fibrillation; unaware she was in AFib   placed on a rate control strategy for her AFib  She tolerated b i d  verapamil poorly, is back to 1 a day metoprolol twice a day    1/7/2021  Followup- couldn't get in to see her Dr Tanmay Jade  None at rest    EKG today atrial fibrillation 73 beats per minute    periodicially feels throbbing feeling in her neck- every other day  I do not think this is her AFib    1 week before Aly, she had a spell-lasted 5-10 minutes: got up to go to bed, couldn't walk, was speaking nonsense, witnessed by , who asked her why she was not making any sense  Was aware of what was happening  Returned to normal fairly quickly  Did not go to ER,; didn't see PCP  No recurrence    Not taking pitavastatin- too costly  Her LDL was 170, checked a few months ago  Agreeable to starting ezetimibe, if not too costly  Did discuss possible PCSK9 inhibitor    There is also bempedoic acid, to reduce her LDL  Likely this is too costly    Blood pressure by the medical assistant:  154/96  Repeat by me 134/102 I recommend she adhere to a low-salt diet  Monitor home blood pressure  Goal blood pressure less than 130/80  Last week systolic 683, another 486- on home monitor ( was validated by PCP)  Today 134/80    Interval history  3/3/21 office visit Dr Alberta Swain    8/27/21  OV Dr Rashida Farr 9/29-10/1/21 AFib ablation  Initiated on sotalol    1/6/22 office visit Dr Alberta Swain    10/26/21, 11 30 21 office visit EP Dr Turner Code  6/8/22 OV R Friend PA-C/ EPS  Sotalol reduced to 80 b i d  given bradycardia  Recommend EP follow-up in 6 months      11/9/22  Acute visit  At times she knows her heart rate is slow  Occasionally dizzy  EKG today sinus bradycardia 56 beats per minute  Minimal voltage criteria for LVH may be normal variant   milliseconds  /82  She reports she hydrates and drinks water regularly throughout her day  Her blood pressure has been up and down at home  Today, she agrees to a 1 week ZIO patch  I recommend periodic home blood pressure monitoring  Goal blood pressure less than 130/80  She will call us in 1-2 weeks if it is not consistently within this range  Will need to add medication  She will follow with her cardiologist in a short interval        I have spent 25 minutes with Patient  today in which greater than 50% of this time was spent in counseling/coordination of care regarding Patient and family education, Importance of tx compliance and Risk factor reductions  Assessment  Diagnoses and all orders for this visit:    Paroxysmal atrial fibrillation ([address] )  -     POCT ECG  -     AMB extended holter monitor; Future    Essential hypertension    PAC (premature atrial contraction)    Dyslipidemia    Dizziness  -     AMB extended holter monitor; Future    Other orders  -     Cyanocobalamin (VITAMIN B 12 PO);  Take by mouth          Past Medical History:   Diagnosis Date   • Acid reflux • Arthritis    • Cataract    • Colon polyp    • Disease of thyroid gland    • Dyslipidemia (high LDL; low HDL)    • Fibromyalgia    • GERD (gastroesophageal reflux disease)    • Hyperlipidemia    • Hypertension    • Hypothyroidism    • Palpitation    • Paroxysmal atrial fibrillation ([facility] [address] )    • Shingles 09/12/2010    Right C7       Review of Systems   Constitutional: Negative for chills and malaise/fatigue  Cardiovascular: Negative for chest pain, claudication, cyanosis, dyspnea on exertion, irregular heartbeat, leg swelling, near-syncope, orthopnea, palpitations, paroxysmal nocturnal dyspnea and syncope  Respiratory: Negative for cough and shortness of breath  Gastrointestinal: Negative for heartburn and nausea  Neurological: Positive for dizziness  Negative for focal weakness, headaches, light-headedness and weakness  All other systems reviewed and are negative  Allergies   Allergen Reactions   • Iodine - Food Allergy Hives     Severe allergy to IV CT scan dye   • Iodinated Diagnostic Agents Hives           Current Outpatient Medications:   •  Cyanocobalamin (VITAMIN B 12 PO), Take by mouth, Disp: , Rfl:   •  ergocalciferol (VITAMIN D2) 50,000 units, Take 1 capsule (50,000 Units total) by mouth every 14 (fourteen) days, Disp: 7 capsule, Rfl: 3  •  levothyroxine 75 mcg tablet, Take 1 tablet (75 mcg total) by mouth daily, Disp: 90 tablet, Rfl: 1  •  omeprazole (PriLOSEC) 20 mg delayed release capsule, Take 1 capsule by mouth daily, Disp: , Rfl:   •  rivaroxaban (XARELTO) 20 mg tablet, Take 1 tablet (20 mg total) by mouth daily with dinner, Disp: 90 tablet, Rfl: 3  •  sotalol (BETAPACE) 80 mg tablet, Take 1 tablet (80 mg total) by mouth 2 (two) times a day, Disp: 60 tablet, Rfl: 11  •  triamcinolone (KENALOG) 0 1 % ointment, Apply topically 2 (two) times a day, Disp: 60 g, Rfl: 0  •  BinaxNOW COVID-19 Ag Home Test KIT, Use as Directed on the Package (Patient not taking: No sig reported), Disp: , Rfl: •  nitroglycerin (NITROSTAT) 0 4 mg SL tablet, Place 1 tablet (0 4 mg total) under the tongue every 5 (five) minutes as needed for chest pain (Patient not taking: No sig reported), Disp: 15 tablet, Rfl: 1        Social History     Socioeconomic History   • Marital status: /Civil Union     Spouse name: Not on file   • Number of children: Not on file   • Years of education: Not on file   • Highest education level: Not on file   Occupational History   • Not on file   Tobacco Use   • Smoking status: Never Smoker   • Smokeless tobacco: Never Used   Vaping Use   • Vaping Use: Never used   Substance and Sexual Activity   • Alcohol use: No   • Drug use: No   • Sexual activity: Not Currently   Other Topics Concern   • Not on file   Social History Narrative   • Not on file     Social Determinants of Health     Financial Resource Strain: Not on file   Food Insecurity: Not on file   Transportation Needs: Not on file   Physical Activity: Not on file   Stress: Not on file   Social Connections: Not on file   Intimate Partner Violence: Not on file   Housing Stability: Not on file       Family History   Problem Relation Age of Onset   • Hypertension Mother    • Hyperlipidemia Mother    • Parkinsonism Mother    • Stroke Mother    • Chronic bronchitis Mother    • Atrial fibrillation Father    • Stroke Father    • Cancer Father         skin cancer   • Anuerysm Neg Hx    • Clotting disorder Neg Hx        Physical Exam  Vitals and nursing note reviewed  Constitutional:       General: She is not in acute distress  Appearance: She is not diaphoretic  HENT:      Head: Normocephalic and atraumatic  Eyes:      Conjunctiva/sclera: Conjunctivae normal    Cardiovascular:      Rate and Rhythm: Normal rate and regular rhythm  Pulses: Intact distal pulses  Heart sounds: Murmur heard  High-pitched blowing holosystolic murmur is present with a grade of 2/6 at the apex    Pulmonary:      Effort: Pulmonary effort is normal       Breath sounds: Normal breath sounds  Abdominal:      General: Bowel sounds are normal       Palpations: Abdomen is soft  Musculoskeletal:         General: Normal range of motion  Cervical back: Normal range of motion and neck supple  Skin:     General: Skin is warm and dry  Neurological:      Mental Status: She is alert and oriented to person, place, and time  Vitals: Blood pressure 148/92, pulse 55, resp  rate 18, height 5' 3" (1 6 m), weight 68 2 kg (150 lb 6 oz), SpO2 98 %  Wt Readings from Last 3 Encounters:   11/09/22 68 2 kg (150 lb 6 oz)   10/10/22 67 1 kg (148 lb)   08/05/22 66 7 kg (147 lb)         Labs & Results:  Lab Results   Component Value Date    WBC 7 23 07/29/2022    HGB 13 3 07/29/2022    HCT 41 2 07/29/2022     (H) 07/29/2022     07/29/2022     No results found for: BNP  No components found for: CHEM  Troponin I   Date Value Ref Range Status   02/15/2021 <0 02 <=0 04 ng/mL Final     Comment:     Autovalidation override  [employer] Chemistry analyzer 99% cutoff is > 0 04 ng/mL in network labs     o cTnI 99% cutoff is useful only when applied to patients in the clinical setting of myocardial ischemia   o cTnI 99% cutoff should be interpreted in the context of clinical history, ECG findings and possibly cardiac imaging to establish correct diagnosis  o cTnI 99% cutoff may be suggestive but clearly not indicative of a coronary event without the clinical setting of myocardial ischemia  No results found for this or any previous visit  No results found for this or any previous visit  This note was completed in part utilizing [employer] direct voice recognition software  Grammatical errors, random word insertion, spelling mistakes, and incomplete sentences may be an occasional consequence of the system secondary to software limitations, ambient noise and hardware issues   At the time of dictation, efforts were made to edit, clarify and /or correct errors  Please read the chart carefully and recognize, using context, where substitutions have occurred    If you have any questions or concerns about the context, text or information contained within the body of this dictation, please contact myself, the provider, for further clarification

## 2022-11-09 NOTE — PATIENT INSTRUCTIONS

## 2022-11-22 ENCOUNTER — CLINICAL SUPPORT (OUTPATIENT)
Dept: CARDIOLOGY CLINIC | Facility: CLINIC | Age: 77
End: 2022-11-22

## 2022-11-22 DIAGNOSIS — I48.0 PAROXYSMAL ATRIAL FIBRILLATION (HCC): ICD-10-CM

## 2022-11-22 DIAGNOSIS — R42 DIZZINESS: ICD-10-CM

## 2022-12-29 ENCOUNTER — RA CDI HCC (OUTPATIENT)
Dept: OTHER | Facility: HOSPITAL | Age: 77
End: 2022-12-29

## 2022-12-29 NOTE — PROGRESS NOTES
[address]  coding opportunities       Chart reviewed, no opportunity found: CHART REVIEWED, NO OPPORTUNITY FOUND        Patients Insurance     Medicare Insurance: Medicare

## 2023-01-09 ENCOUNTER — OFFICE VISIT (OUTPATIENT)
Dept: INTERNAL MEDICINE CLINIC | Facility: CLINIC | Age: 78
End: 2023-01-09

## 2023-01-09 VITALS
DIASTOLIC BLOOD PRESSURE: 84 MMHG | OXYGEN SATURATION: 99 % | HEART RATE: 54 BPM | SYSTOLIC BLOOD PRESSURE: 150 MMHG | HEIGHT: 63 IN | TEMPERATURE: 98.5 F | WEIGHT: 148 LBS | BODY MASS INDEX: 26.22 KG/M2

## 2023-01-09 DIAGNOSIS — I10 ESSENTIAL HYPERTENSION: Primary | ICD-10-CM

## 2023-01-09 DIAGNOSIS — M79.7 FIBROMYALGIA: ICD-10-CM

## 2023-01-09 DIAGNOSIS — E03.9 ACQUIRED HYPOTHYROIDISM: ICD-10-CM

## 2023-01-09 DIAGNOSIS — E55.9 VITAMIN D DEFICIENCY: ICD-10-CM

## 2023-01-09 DIAGNOSIS — I48.0 PAROXYSMAL ATRIAL FIBRILLATION (HCC): ICD-10-CM

## 2023-01-09 DIAGNOSIS — M81.0 AGE-RELATED OSTEOPOROSIS WITHOUT CURRENT PATHOLOGICAL FRACTURE: ICD-10-CM

## 2023-01-09 RX ORDER — LOSARTAN POTASSIUM 25 MG/1
25 TABLET ORAL DAILY
Qty: 30 TABLET | Refills: 1 | Status: SHIPPED | OUTPATIENT
Start: 2023-01-09

## 2023-01-09 RX ORDER — NITROGLYCERIN 0.4 MG/1
0.4 TABLET SUBLINGUAL
Qty: 15 TABLET | Refills: 1 | Status: CANCELLED | OUTPATIENT
Start: 2023-01-09

## 2023-01-09 NOTE — PROGRESS NOTES
Assessment/Plan:           1  Essential hypertension  Comments:  Start losartan 50 mg daily check and monitor potassium  Orders:  -     losartan (COZAAR) 25 mg tablet; Take 1 tablet (25 mg total) by mouth daily  -     CBC and differential; Future  -     Comprehensive metabolic panel; Future    2  Acquired hypothyroidism  Comments:  Continue levothyroxine  3  Paroxysmal atrial fibrillation (HCC)  Comments:  Stable on sotalol Xarelto  4  Vitamin D deficiency    5  Fibromyalgia    6  Age-related osteoporosis without current pathological fracture         1  Acquired hypothyroidism      2  Other chest pain         No problem-specific Assessment & Plan notes found for this encounter  Subjective:      Patient ID: Rylan Mcpherson is a 68 y o  female  HPI    The following portions of the patient's history were reviewed and updated as appropriate: She  has a past medical history of Acid reflux, Arthritis, Cataract, Colon polyp, Disease of thyroid gland, Dyslipidemia (high LDL; low HDL), Fibromyalgia, GERD (gastroesophageal reflux disease), Hyperlipidemia, Hypertension, Hypothyroidism, Palpitation, Paroxysmal atrial fibrillation ([address] ), and Shingles (09/12/2010)  She   Patient Active Problem List    Diagnosis Date Noted   • Fibromyalgia 10/10/2022   • Vitamin D deficiency 10/10/2022   • Age-related osteoporosis without current pathological fracture 10/10/2022   • Acid reflux    • Abnormal ECG 03/03/2021   • Presyncope 01/14/2021   • Acquired hypothyroidism 09/11/2020   • Plantar fasciitis 09/11/2020   • Paroxysmal atrial fibrillation ([address] ) 07/10/2018   • Essential hypertension 07/10/2018   • PAC (premature atrial contraction) 07/10/2018   • Dyslipidemia 07/10/2018     She  has a past surgical history that includes Replacement total knee; Tonsillectomy; Hysterectomy (05/2016); Cataract extraction, bilateral (11/2016); Colonoscopy (07/11/2016); Mammo (historical) (4/8/2010 & 2/19/2020);  Colonoscopy; and Upper gastrointestinal endoscopy  Her family history includes Atrial fibrillation in her father; Cancer in her father; Chronic bronchitis in her mother; Hyperlipidemia in her mother; Hypertension in her mother; Parkinsonism in her mother; Stroke in her father and mother  She  reports that she has never smoked  She has never used smokeless tobacco  She reports that she does not drink alcohol and does not use drugs  Current Outpatient Medications   Medication Sig Dispense Refill   • Cyanocobalamin (VITAMIN B 12 PO) Take by mouth 2 (two) times a week     • ergocalciferol (VITAMIN D2) 50,000 units Take 1 capsule (50,000 Units total) by mouth every 14 (fourteen) days 7 capsule 3   • levothyroxine 75 mcg tablet Take 1 tablet (75 mcg total) by mouth daily 90 tablet 1   • losartan (COZAAR) 25 mg tablet Take 1 tablet (25 mg total) by mouth daily 30 tablet 1   • omeprazole (PriLOSEC) 20 mg delayed release capsule Take 1 capsule by mouth daily     • rivaroxaban (XARELTO) 20 mg tablet Take 1 tablet (20 mg total) by mouth daily with dinner 90 tablet 3   • sotalol (BETAPACE) 80 mg tablet Take 1 tablet (80 mg total) by mouth 2 (two) times a day 60 tablet 11   • triamcinolone (KENALOG) 0 1 % ointment Apply topically 2 (two) times a day 60 g 0   • BinaxNOW COVID-19 Ag Home Test KIT Use as Directed on the Package (Patient not taking: Reported on 10/10/2022)     • nitroglycerin (NITROSTAT) 0 4 mg SL tablet Place 1 tablet (0 4 mg total) under the tongue every 5 (five) minutes as needed for chest pain (Patient not taking: Reported on 8/5/2022) 15 tablet 1     No current facility-administered medications for this visit       Current Outpatient Medications on File Prior to Visit   Medication Sig   • Cyanocobalamin (VITAMIN B 12 PO) Take by mouth 2 (two) times a week   • ergocalciferol (VITAMIN D2) 50,000 units Take 1 capsule (50,000 Units total) by mouth every 14 (fourteen) days   • levothyroxine 75 mcg tablet Take 1 tablet (75 mcg total) by mouth daily   • omeprazole (PriLOSEC) 20 mg delayed release capsule Take 1 capsule by mouth daily   • rivaroxaban (XARELTO) 20 mg tablet Take 1 tablet (20 mg total) by mouth daily with dinner   • sotalol (BETAPACE) 80 mg tablet Take 1 tablet (80 mg total) by mouth 2 (two) times a day   • triamcinolone (KENALOG) 0 1 % ointment Apply topically 2 (two) times a day   • BinaxNOW COVID-19 Ag Home Test KIT Use as Directed on the Package (Patient not taking: Reported on 10/10/2022)   • nitroglycerin (NITROSTAT) 0 4 mg SL tablet Place 1 tablet (0 4 mg total) under the tongue every 5 (five) minutes as needed for chest pain (Patient not taking: Reported on 8/5/2022)     No current facility-administered medications on file prior to visit  She is allergic to iodine - food allergy and iodinated contrast media       Review of Systems   Constitutional: Negative for appetite change, chills, fatigue and fever  HENT: Negative for sore throat and trouble swallowing  Eyes: Negative for redness  Respiratory: Negative for shortness of breath  Cardiovascular: Negative for chest pain and palpitations  Gastrointestinal: Negative for abdominal pain, constipation and diarrhea  Genitourinary: Negative for dysuria and hematuria  Musculoskeletal: Negative for back pain and neck pain  Skin: Negative for rash  Neurological: Negative for seizures, weakness and headaches  Hematological: Negative for adenopathy  Psychiatric/Behavioral: Negative for confusion  The patient is not nervous/anxious  Objective:      /84 (BP Location: Left arm, Patient Position: Sitting, Cuff Size: Large)   Pulse (!) 54   Temp 98 5 °F (36 9 °C) (Temporal)   Ht 5' 3" (1 6 m)   Wt 67 1 kg (148 lb)   SpO2 99% Comment: RA  BMI 26 22 kg/m²     Results Reviewed     None          No results found for this or any previous visit (from the past 1344 hour(s))       Physical Exam  Constitutional:       General: She is not in acute distress  Appearance: Normal appearance  HENT:      Head: Normocephalic and atraumatic  Nose: Nose normal       Mouth/Throat:      Mouth: Mucous membranes are moist    Eyes:      Extraocular Movements: Extraocular movements intact  Pupils: Pupils are equal, round, and reactive to light  Cardiovascular:      Rate and Rhythm: Normal rate and regular rhythm  Pulses: Normal pulses  Heart sounds: Normal heart sounds  No murmur heard  No friction rub  Pulmonary:      Effort: Pulmonary effort is normal  No respiratory distress  Breath sounds: Normal breath sounds  No wheezing  Abdominal:      General: Abdomen is flat  Bowel sounds are normal  There is no distension  Palpations: Abdomen is soft  There is no mass  Tenderness: There is no abdominal tenderness  There is no guarding  Musculoskeletal:         General: Normal range of motion  Cervical back: Normal range of motion  Neurological:      General: No focal deficit present  Mental Status: She is alert and oriented to person, place, and time  Mental status is at baseline  Cranial Nerves: No cranial nerve deficit     Psychiatric:         Mood and Affect: Mood normal          Behavior: Behavior normal

## 2023-01-10 ENCOUNTER — OFFICE VISIT (OUTPATIENT)
Dept: CARDIOLOGY CLINIC | Facility: CLINIC | Age: 78
End: 2023-01-10

## 2023-01-10 VITALS
BODY MASS INDEX: 26.58 KG/M2 | DIASTOLIC BLOOD PRESSURE: 72 MMHG | OXYGEN SATURATION: 98 % | SYSTOLIC BLOOD PRESSURE: 138 MMHG | HEART RATE: 61 BPM | WEIGHT: 150 LBS | HEIGHT: 63 IN

## 2023-01-10 DIAGNOSIS — I49.1 PAC (PREMATURE ATRIAL CONTRACTION): ICD-10-CM

## 2023-01-10 DIAGNOSIS — R94.31 ABNORMAL ECG: ICD-10-CM

## 2023-01-10 DIAGNOSIS — E78.5 DYSLIPIDEMIA: ICD-10-CM

## 2023-01-10 DIAGNOSIS — I10 ESSENTIAL HYPERTENSION: ICD-10-CM

## 2023-01-10 DIAGNOSIS — I48.0 PAROXYSMAL ATRIAL FIBRILLATION (HCC): Primary | ICD-10-CM

## 2023-01-10 NOTE — PROGRESS NOTES
[facility]  Follow up note  Rylan Mcpherson 68 y o  female MRN: [id number]        Problems    1  Paroxysmal atrial fibrillation (HCC)  POCT ECG      2  PAC (premature atrial contraction)        3  Essential hypertension        4  Dyslipidemia        5  Abnormal ECG            Impression:    Paroxysmal atrial fibrillation  Ablation + Sotalol 9/21 for persistent symptomatic afib  Qtc normal  No recurrence despite recent symptoms since 1/1/2023, Stephanie Camara personally reviewed revealing episodes of short runs of SVT, consistent with her prior Zio patch 11/22  Chest pain/shoulder pain  Her symptoms resolved  She did get sent for ischemic testing 3/21 which was normal by stress Myoview  Valvular heart disease  MR Had been mild-to-moderate, especially in the presence of AFib but found to be only mild by DELANO 9/21  TR Had been moderate to severe especially in the midst of AFib but found to be mild-to-moderate on DELANO 9/21  No significant murmur on exam today  Hyperlipidemia  Uncontrolled but not interested in PCSK9 inhibitor therapy, and intolerant to statins and Zetia  Hypertension  Blood pressure has been high through the fall 2022  Her PCP prescribed losartan yesterday  Fibromyalgia  For this reason she is intolerant to statin    Plan:    Her breakthrough palpitations are due to short runs of PAT or SVT, her Collene Hoa device confirms this and recent Zio patch 11/22 confirms this    No sign of recurrent atrial fibrillation  QTC is stable on sotalol  No other medication issues or intolerance  Her bradycardia limits up titration of either sotalol or addition of AV cait blocker therapy  Usual follow-up recommended      HPI:   Rylan Mcpherson is a 68y o  year old female with hypertension, hyperlipidemia, PACs, paroxysmal symptomatic atrial fibrillation, PSVT, status post cardioversion in 2020 and ultimately ablation in 2021, who returns with increased palpitations since the first of the year, Kardia device revealing episodes of PSVT, nonsustained, no evidence of A  fib, having also seen Anisa back in October/November 2022, at that time undergoing Zio patch again confirming presence of short runs of SVT, but no episodes of atrial fibrillation  She is compliant with medical therapy, QTC today is stable on sotalol, she is on appropriate anticoagulation with no prior history of a stroke  Blood pressure has been elevated, PCP started losartan yesterday, she has not picked it up yet  Review of Systems   Constitutional: Negative for appetite change, diaphoresis, fatigue and fever  Respiratory: Negative for chest tightness, shortness of breath and wheezing  Cardiovascular: Positive for palpitations  Negative for chest pain and leg swelling  Gastrointestinal: Negative for abdominal pain and blood in stool  Musculoskeletal: Negative for arthralgias and joint swelling  Skin: Negative for rash  Neurological: Negative for dizziness, syncope and light-headedness  Past Medical History:   Diagnosis Date   • Acid reflux    • Arthritis    • Cataract    • Colon polyp    • Disease of thyroid gland    • Dyslipidemia (high LDL; low HDL)    • Fibromyalgia    • GERD (gastroesophageal reflux disease)    • Hyperlipidemia    • Hypertension    • Hypothyroidism    • Palpitation    • Paroxysmal atrial fibrillation (HCC)    • Shingles 09/12/2010    Right C7     Social History     Substance and Sexual Activity   Alcohol Use No     Social History     Substance and Sexual Activity   Drug Use No     Social History     Tobacco Use   Smoking Status Never   Smokeless Tobacco Never       Allergies:   Allergies   Allergen Reactions   • Iodine - Food Allergy Hives     Severe allergy to IV CT scan dye   • Iodinated Contrast Media Hives       Medications:     Current Outpatient Medications:   •  Cyanocobalamin (VITAMIN B 12 PO), Take by mouth 2 (two) times a week, Disp: , Rfl:   •  ergocalciferol (VITAMIN D2) 50,000 units, Take 1 capsule (50,000 Units total) by mouth every 14 (fourteen) days, Disp: 7 capsule, Rfl: 3  •  levothyroxine 75 mcg tablet, Take 1 tablet (75 mcg total) by mouth daily, Disp: 90 tablet, Rfl: 1  •  nitroglycerin (NITROSTAT) 0 4 mg SL tablet, Place 1 tablet (0 4 mg total) under the tongue every 5 (five) minutes as needed for chest pain, Disp: 15 tablet, Rfl: 1  •  omeprazole (PriLOSEC) 20 mg delayed release capsule, Take 1 capsule by mouth daily, Disp: , Rfl:   •  rivaroxaban (XARELTO) 20 mg tablet, Take 1 tablet (20 mg total) by mouth daily with dinner, Disp: 90 tablet, Rfl: 3  •  sotalol (BETAPACE) 80 mg tablet, Take 1 tablet (80 mg total) by mouth 2 (two) times a day, Disp: 60 tablet, Rfl: 11  •  triamcinolone (KENALOG) 0 1 % ointment, Apply topically 2 (two) times a day, Disp: 60 g, Rfl: 0  •  BinaxNOW COVID-19 Ag Home Test KIT, Use as Directed on the Package (Patient not taking: Reported on 10/10/2022), Disp: , Rfl:   •  losartan (COZAAR) 25 mg tablet, Take 1 tablet (25 mg total) by mouth daily (Patient not taking: Reported on 1/10/2023), Disp: 30 tablet, Rfl: 1      Vitals:    01/10/23 1000   BP: 138/72   Pulse: 61   SpO2: 98%     Weight (last 2 days)     Date/Time Weight    01/10/23 1000 68 (150)        Physical Exam  Constitutional:       General: She is not in acute distress  Appearance: She is not diaphoretic  HENT:      Head: Normocephalic and atraumatic  Eyes:      General: No scleral icterus  Conjunctiva/sclera: Conjunctivae normal    Neck:      Vascular: No JVD  Cardiovascular:      Rate and Rhythm: Normal rate and regular rhythm  Heart sounds: Normal heart sounds  No murmur heard  Pulmonary:      Effort: Pulmonary effort is normal  No respiratory distress  Breath sounds: Normal breath sounds  No wheezing, rhonchi or rales  Musculoskeletal:         General: No tenderness  Cervical back: Normal range of motion  Right lower leg: Normal  No edema  Left lower leg: Normal  No edema     Skin: General: Skin is warm and dry  Laboratory Studies:    Labs personally reviewed    Cardiac testing:     EKG reviewed personally:   Results for orders placed or performed in visit on 01/10/23   POCT ECG    Impression    Sinus rhythm with PACs           Echocardiogram:  8/20-DELANO-EF normal, severely dilated left atrium    Stress tests:      Catheterization:      Holter:         Jeanna Conrad MD    Portions of the record may have been created with voice recognition software  Occasional wrong word or "sound a like" substitutions may have occurred due to the inherent limitations of voice recognition software  Read the chart carefully and recognize, using context, where substitutions have occurred

## 2023-01-23 ENCOUNTER — APPOINTMENT (OUTPATIENT)
Dept: LAB | Facility: AMBULARY SURGERY CENTER | Age: 78
End: 2023-01-23

## 2023-01-23 DIAGNOSIS — I10 ESSENTIAL HYPERTENSION: ICD-10-CM

## 2023-01-23 LAB
ALBUMIN SERPL BCP-MCNC: 3.9 G/DL (ref 3.5–5)
ALP SERPL-CCNC: 68 U/L (ref 46–116)
ALT SERPL W P-5'-P-CCNC: 22 U/L (ref 12–78)
ANION GAP SERPL CALCULATED.3IONS-SCNC: 4 MMOL/L (ref 4–13)
AST SERPL W P-5'-P-CCNC: 20 U/L (ref 5–45)
BASOPHILS # BLD AUTO: 0.04 THOUSANDS/ÂΜL (ref 0–0.1)
BASOPHILS NFR BLD AUTO: 1 % (ref 0–1)
BILIRUB SERPL-MCNC: 0.58 MG/DL (ref 0.2–1)
BUN SERPL-MCNC: 17 MG/DL (ref 5–25)
CALCIUM SERPL-MCNC: 9.1 MG/DL (ref 8.3–10.1)
CHLORIDE SERPL-SCNC: 107 MMOL/L (ref 96–108)
CO2 SERPL-SCNC: 28 MMOL/L (ref 21–32)
CREAT SERPL-MCNC: 0.85 MG/DL (ref 0.6–1.3)
EOSINOPHIL # BLD AUTO: 0.19 THOUSAND/ÂΜL (ref 0–0.61)
EOSINOPHIL NFR BLD AUTO: 3 % (ref 0–6)
ERYTHROCYTE [DISTWIDTH] IN BLOOD BY AUTOMATED COUNT: 12.9 % (ref 11.6–15.1)
GFR SERPL CREATININE-BSD FRML MDRD: 66 ML/MIN/1.73SQ M
GLUCOSE P FAST SERPL-MCNC: 110 MG/DL (ref 65–99)
HCT VFR BLD AUTO: 40.3 % (ref 34.8–46.1)
HGB BLD-MCNC: 13.3 G/DL (ref 11.5–15.4)
IMM GRANULOCYTES # BLD AUTO: 0.02 THOUSAND/UL (ref 0–0.2)
IMM GRANULOCYTES NFR BLD AUTO: 0 % (ref 0–2)
LYMPHOCYTES # BLD AUTO: 1.99 THOUSANDS/ÂΜL (ref 0.6–4.47)
LYMPHOCYTES NFR BLD AUTO: 32 % (ref 14–44)
MCH RBC QN AUTO: 32.2 PG (ref 26.8–34.3)
MCHC RBC AUTO-ENTMCNC: 33 G/DL (ref 31.4–37.4)
MCV RBC AUTO: 98 FL (ref 82–98)
MONOCYTES # BLD AUTO: 0.57 THOUSAND/ÂΜL (ref 0.17–1.22)
MONOCYTES NFR BLD AUTO: 9 % (ref 4–12)
NEUTROPHILS # BLD AUTO: 3.34 THOUSANDS/ÂΜL (ref 1.85–7.62)
NEUTS SEG NFR BLD AUTO: 55 % (ref 43–75)
NRBC BLD AUTO-RTO: 0 /100 WBCS
PLATELET # BLD AUTO: 316 THOUSANDS/UL (ref 149–390)
PMV BLD AUTO: 10.3 FL (ref 8.9–12.7)
POTASSIUM SERPL-SCNC: 4.3 MMOL/L (ref 3.5–5.3)
PROT SERPL-MCNC: 7.6 G/DL (ref 6.4–8.4)
RBC # BLD AUTO: 4.13 MILLION/UL (ref 3.81–5.12)
SODIUM SERPL-SCNC: 139 MMOL/L (ref 135–147)
WBC # BLD AUTO: 6.15 THOUSAND/UL (ref 4.31–10.16)

## 2023-01-24 DIAGNOSIS — I48.0 PAROXYSMAL ATRIAL FIBRILLATION (HCC): ICD-10-CM

## 2023-01-24 RX ORDER — SOTALOL HYDROCHLORIDE 80 MG/1
80 TABLET ORAL 2 TIMES DAILY
Qty: 60 TABLET | Refills: 11 | Status: SHIPPED | OUTPATIENT
Start: 2023-01-24

## 2023-02-06 ENCOUNTER — OFFICE VISIT (OUTPATIENT)
Dept: INTERNAL MEDICINE CLINIC | Facility: CLINIC | Age: 78
End: 2023-02-06

## 2023-02-06 VITALS
HEART RATE: 54 BPM | WEIGHT: 149 LBS | DIASTOLIC BLOOD PRESSURE: 68 MMHG | HEIGHT: 63 IN | SYSTOLIC BLOOD PRESSURE: 132 MMHG | OXYGEN SATURATION: 98 % | TEMPERATURE: 97.9 F | BODY MASS INDEX: 26.4 KG/M2

## 2023-02-06 DIAGNOSIS — I10 ESSENTIAL HYPERTENSION: ICD-10-CM

## 2023-02-06 DIAGNOSIS — Z00.00 MEDICARE ANNUAL WELLNESS VISIT, SUBSEQUENT: ICD-10-CM

## 2023-02-06 DIAGNOSIS — I48.0 PAROXYSMAL ATRIAL FIBRILLATION (HCC): ICD-10-CM

## 2023-02-06 DIAGNOSIS — E03.9 ACQUIRED HYPOTHYROIDISM: ICD-10-CM

## 2023-02-06 DIAGNOSIS — I10 ESSENTIAL HYPERTENSION: Primary | ICD-10-CM

## 2023-02-06 DIAGNOSIS — Z12.31 ENCOUNTER FOR SCREENING MAMMOGRAM FOR BREAST CANCER: ICD-10-CM

## 2023-02-06 RX ORDER — LOSARTAN POTASSIUM 25 MG/1
25 TABLET ORAL DAILY
Qty: 30 TABLET | Refills: 1 | Status: SHIPPED | OUTPATIENT
Start: 2023-02-06

## 2023-02-06 NOTE — PROGRESS NOTES
Assessment and Plan:     Problem List Items Addressed This Visit        Endocrine    Acquired hypothyroidism    Relevant Orders    T4, free    TSH, 3rd generation       Cardiovascular and Mediastinum    Paroxysmal atrial fibrillation (HCC)    Relevant Medications    rivaroxaban (XARELTO) 20 mg tablet    Essential hypertension - Primary    Relevant Medications    losartan (COZAAR) 25 mg tablet    Other Relevant Orders    T4, free    TSH, 3rd generation    CBC and differential    Basic metabolic panel   Other Visit Diagnoses     Encounter for screening mammogram for breast cancer        Relevant Orders    Mammo screening bilateral w 3d & cad    Medicare annual wellness visit, subsequent               Preventive health issues were discussed with patient, and age appropriate screening tests were ordered as noted in patient's After Visit Summary  Personalized health advice and appropriate referrals for health education or preventive services given if needed, as noted in patient's After Visit Summary  History of Present Illness:     Patient presents for a Medicare Wellness Visit    Is a 59-year-old lady with history of hypothyroidism hypertension osteoporosis fibromyalgia hyperlipidemia and atrial fibrillation  Currently she denies any chest pain or shortness of breath  Patient Care Team:  Taurus Nascimento MD as PCP - MD Rani Workman MD     Review of Systems:     Review of Systems   Constitutional: Negative for appetite change, chills, fatigue and fever  HENT: Negative for sore throat and trouble swallowing  Eyes: Negative for redness  Respiratory: Negative for shortness of breath  Cardiovascular: Negative for chest pain and palpitations  Gastrointestinal: Negative for abdominal pain, constipation and diarrhea  Genitourinary: Negative for dysuria and hematuria  Musculoskeletal: Negative for back pain and neck pain  Skin: Negative for rash     Neurological: Negative for seizures, weakness and headaches  Hematological: Negative for adenopathy  Psychiatric/Behavioral: Negative for confusion  The patient is not nervous/anxious           Problem List:     Patient Active Problem List   Diagnosis   • Paroxysmal atrial fibrillation (HCC)   • Essential hypertension   • PAC (premature atrial contraction)   • Dyslipidemia   • Acquired hypothyroidism   • Plantar fasciitis   • Presyncope   • Abnormal ECG   • Acid reflux   • Fibromyalgia   • Vitamin D deficiency   • Age-related osteoporosis without current pathological fracture      Past Medical and Surgical History:     Past Medical History:   Diagnosis Date   • Acid reflux    • Arthritis    • Cataract    • Colon polyp    • Disease of thyroid gland    • Dyslipidemia (high LDL; low HDL)    • Fibromyalgia    • GERD (gastroesophageal reflux disease)    • Hyperlipidemia    • Hypertension    • Hypothyroidism    • Palpitation    • Paroxysmal atrial fibrillation ([facility] [address] )    • Shingles 09/12/2010    Right C7     Past Surgical History:   Procedure Laterality Date   • CATARACT EXTRACTION, BILATERAL  11/2016   • COLONOSCOPY  07/11/2016   • COLONOSCOPY     • HYSTERECTOMY  05/2016    total hysterectomy   • MAMMO (HISTORICAL)  4/8/2010 & 2/19/2020   • REPLACEMENT TOTAL KNEE     • TONSILLECTOMY     • UPPER GASTROINTESTINAL ENDOSCOPY        Family History:     Family History   Problem Relation Age of Onset   • Hypertension Mother    • Hyperlipidemia Mother    • Parkinsonism Mother    • Stroke Mother    • Chronic bronchitis Mother    • Atrial fibrillation Father    • Stroke Father    • Cancer Father         skin cancer   • Anuerysm Neg Hx    • Clotting disorder Neg Hx       Social History:     Social History     Socioeconomic History   • Marital status: /Civil Union     Spouse name: None   • Number of children: None   • Years of education: None   • Highest education level: None   Occupational History   • None   Tobacco Use   • Smoking status: Never   • Smokeless tobacco: Never   Vaping Use   • Vaping Use: Never used   Substance and Sexual Activity   • Alcohol use: No   • Drug use: No   • Sexual activity: Not Currently   Other Topics Concern   • None   Social History Narrative   • None     Social Determinants of Health     Financial Resource Strain: Low Risk    • Difficulty of Paying Living Expenses: Not hard at all   Food Insecurity: Not on file   Transportation Needs: No Transportation Needs   • Lack of Transportation (Medical): No   • Lack of Transportation (Non-Medical): No   Physical Activity: Not on file   Stress: Not on file   Social Connections: Not on file   Intimate Partner Violence: Not on file   Housing Stability: Not on file      Medications and Allergies:     Current Outpatient Medications   Medication Sig Dispense Refill   • Cyanocobalamin (VITAMIN B 12 PO) Take by mouth 2 (two) times a week     • ergocalciferol (VITAMIN D2) 50,000 units Take 1 capsule (50,000 Units total) by mouth every 14 (fourteen) days 7 capsule 3   • levothyroxine 75 mcg tablet Take 1 tablet (75 mcg total) by mouth daily 90 tablet 1   • losartan (COZAAR) 25 mg tablet Take 1 tablet (25 mg total) by mouth daily 30 tablet 1   • omeprazole (PriLOSEC) 20 mg delayed release capsule Take 1 capsule by mouth daily     • rivaroxaban (XARELTO) 20 mg tablet Take 1 tablet (20 mg total) by mouth daily with dinner 90 tablet 3   • sotalol (BETAPACE) 80 mg tablet Take 1 tablet (80 mg total) by mouth 2 (two) times a day 60 tablet 11   • triamcinolone (KENALOG) 0 1 % ointment Apply topically 2 (two) times a day 60 g 0   • BinaxNOW COVID-19 Ag Home Test KIT Use as Directed on the Package (Patient not taking: Reported on 10/10/2022)     • nitroglycerin (NITROSTAT) 0 4 mg SL tablet Place 1 tablet (0 4 mg total) under the tongue every 5 (five) minutes as needed for chest pain (Patient not taking: Reported on 2/6/2023) 15 tablet 1     No current facility-administered medications for this visit  Allergies   Allergen Reactions   • Iodine - Food Allergy Hives     Severe allergy to IV CT scan dye   • Iodinated Contrast Media Hives      Immunizations:     Immunization History   Administered Date(s) Administered   • INFLUENZA 09/11/2020, 10/10/2022   • Influenza, high dose seasonal 0 7 mL 11/17/2021, 10/10/2022   • Pneumococcal Conjugate 13-Valent 05/27/2015   • Td (adult), Unspecified 07/25/2007   • Td (adult), adsorbed 07/25/2007   • Tdap 09/01/2015   • influenza, trivalent, adjuvanted 09/11/2020      Health Maintenance:         Topic Date Due   • Breast Cancer Screening: Mammogram  03/08/2023   • DXA SCAN  03/08/2027   • Hepatitis C Screening  Completed         Topic Date Due   • COVID-19 Vaccine (1) Never done   • Pneumococcal Vaccine: 65+ Years (2 - PPSV23 if available, else PCV20) 05/27/2016      Medicare Screening Tests and Risk Assessments:     Humera Rodriguez is here for her Subsequent Wellness visit  Last Medicare Wellness visit information reviewed, patient interviewed and updates made to the record today  Health Risk Assessment:   Patient rates overall health as very good  Patient feels that their physical health rating is same  Patient is satisfied with their life  Eyesight was rated as slightly worse  Hearing was rated as same  Patient feels that their emotional and mental health rating is same  Patients states they are never, rarely angry  Patient states they are sometimes unusually tired/fatigued  Pain experienced in the last 7 days has been none  Patient states that she has experienced no weight loss or gain in last 6 months  Depression Screening:   PHQ-2 Score: 0      Fall Risk Screening: In the past year, patient has experienced: no history of falling in past year      Urinary Incontinence Screening:   Patient has not leaked urine accidently in the last six months  Home Safety:  Patient does not have trouble with stairs inside or outside of their home   Patient has working smoke alarms and has working carbon monoxide detector  Home safety hazards include: none  Nutrition:   Current diet is Regular  Medications:   Patient is currently taking over-the-counter supplements  OTC medications include: see medication list  Patient is able to manage medications  Activities of Daily Living (ADLs)/Instrumental Activities of Daily Living (IADLs):   Walk and transfer into and out of bed and chair?: Yes  Dress and groom yourself?: Yes    Bathe or shower yourself?: Yes    Feed yourself? Yes  Do your laundry/housekeeping?: Yes  Manage your money, pay your bills and track your expenses?: Yes  Make your own meals?: Yes    Do your own shopping?: Yes    Previous Hospitalizations:   Any hospitalizations or ED visits within the last 12 months?: No      Advance Care Planning:   Living will: No    Durable POA for healthcare: No    Advanced directive: No      PREVENTIVE SCREENINGS      Cardiovascular Screening:    General: Screening Current      Diabetes Screening:     General: Screening Current      Breast Cancer Screening:     General: Screening Current      Cervical Cancer Screening:    General: Screening Not Indicated      Osteoporosis Screening:    General: Screening Not Indicated and History Osteoporosis      Lung Cancer Screening:     General: Screening Not Indicated      Hepatitis C Screening:    General: Screening Current    Screening, Brief Intervention, and Referral to Treatment (SBIRT)    Screening  Typical number of drinks in a day: 0  Typical number of drinks in a week: 0  Interpretation: Low risk drinking behavior  Other Counseling Topics:   Car/seat belt/driving safety, skin self-exam, sunscreen and regular weightbearing exercise and calcium and vitamin D intake  No results found       Physical Exam:     /68 (BP Location: Right arm, Patient Position: Sitting, Cuff Size: Adult)   Pulse (!) 54   Temp 97 9 °F (36 6 °C) (Temporal)   Ht 5' 3" (1 6 m)   Wt 67 6 kg (149 lb) SpO2 98%   BMI 26 39 kg/m²     Physical Exam  Vitals and nursing note reviewed  Constitutional:       General: She is not in acute distress  Appearance: She is well-developed  HENT:      Head: Normocephalic and atraumatic  Right Ear: Tympanic membrane normal       Left Ear: Tympanic membrane normal    Eyes:      Conjunctiva/sclera: Conjunctivae normal    Cardiovascular:      Rate and Rhythm: Normal rate and regular rhythm  Heart sounds: No murmur heard  Pulmonary:      Effort: Pulmonary effort is normal  No respiratory distress  Breath sounds: Normal breath sounds  Abdominal:      General: Abdomen is flat  Palpations: Abdomen is soft  Tenderness: There is no abdominal tenderness  Musculoskeletal:         General: No swelling  Normal range of motion  Cervical back: Normal range of motion and neck supple  Skin:     General: Skin is warm and dry  Capillary Refill: Capillary refill takes less than 2 seconds  Neurological:      General: No focal deficit present  Mental Status: She is alert     Psychiatric:         Mood and Affect: Mood normal           Mary Wei MD

## 2023-03-09 DIAGNOSIS — Z12.31 ENCOUNTER FOR SCREENING MAMMOGRAM FOR BREAST CANCER: ICD-10-CM

## 2023-03-21 DIAGNOSIS — I48.0 PAROXYSMAL ATRIAL FIBRILLATION (HCC): ICD-10-CM

## 2023-03-22 RX ORDER — SOTALOL HYDROCHLORIDE 80 MG/1
TABLET ORAL
Qty: 180 TABLET | Refills: 4 | Status: SHIPPED | OUTPATIENT
Start: 2023-03-22

## 2023-03-31 DIAGNOSIS — I10 ESSENTIAL HYPERTENSION: ICD-10-CM

## 2023-03-31 DIAGNOSIS — E03.9 ACQUIRED HYPOTHYROIDISM: ICD-10-CM

## 2023-03-31 DIAGNOSIS — E55.9 VITAMIN D DEFICIENCY DISEASE: ICD-10-CM

## 2023-03-31 RX ORDER — ERGOCALCIFEROL 1.25 MG/1
50000 CAPSULE ORAL
Qty: 7 CAPSULE | Refills: 3 | Status: SHIPPED | OUTPATIENT
Start: 2023-03-31 | End: 2023-07-01

## 2023-03-31 RX ORDER — LOSARTAN POTASSIUM 25 MG/1
25 TABLET ORAL DAILY
Qty: 30 TABLET | Refills: 1 | Status: SHIPPED | OUTPATIENT
Start: 2023-03-31

## 2023-03-31 RX ORDER — LEVOTHYROXINE SODIUM 0.07 MG/1
75 TABLET ORAL DAILY
Qty: 90 TABLET | Refills: 1 | Status: SHIPPED | OUTPATIENT
Start: 2023-03-31 | End: 2023-06-29

## 2023-05-02 ENCOUNTER — OFFICE VISIT (OUTPATIENT)
Dept: INTERNAL MEDICINE CLINIC | Facility: CLINIC | Age: 78
End: 2023-05-02

## 2023-05-02 ENCOUNTER — HOSPITAL ENCOUNTER (OUTPATIENT)
Dept: RADIOLOGY | Facility: HOSPITAL | Age: 78
Discharge: HOME | End: 2023-05-02

## 2023-05-02 VITALS
OXYGEN SATURATION: 96 % | DIASTOLIC BLOOD PRESSURE: 80 MMHG | HEIGHT: 63 IN | TEMPERATURE: 98.8 F | SYSTOLIC BLOOD PRESSURE: 132 MMHG | WEIGHT: 144 LBS | HEART RATE: 102 BPM | BODY MASS INDEX: 25.52 KG/M2

## 2023-05-02 DIAGNOSIS — R05.9 COUGH, UNSPECIFIED TYPE: Primary | ICD-10-CM

## 2023-05-02 DIAGNOSIS — I48.0 PAROXYSMAL ATRIAL FIBRILLATION (HCC): ICD-10-CM

## 2023-05-02 DIAGNOSIS — R05.9 COUGH, UNSPECIFIED TYPE: ICD-10-CM

## 2023-05-02 DIAGNOSIS — I10 ESSENTIAL HYPERTENSION: ICD-10-CM

## 2023-05-02 DIAGNOSIS — J04.10 TRACHEITIS: ICD-10-CM

## 2023-05-02 DIAGNOSIS — R07.89 OTHER CHEST PAIN: ICD-10-CM

## 2023-05-02 LAB
SARS-COV-2 AG UPPER RESP QL IA: NEGATIVE
VALID CONTROL: NORMAL

## 2023-05-02 RX ORDER — FLUTICASONE PROPIONATE 50 MCG
SPRAY, SUSPENSION (ML) NASAL
COMMUNITY
Start: 2023-04-29

## 2023-05-02 RX ORDER — GUAIFENESIN AND CODEINE PHOSPHATE 100; 10 MG/5ML; MG/5ML
5 SOLUTION ORAL 3 TIMES DAILY PRN
Qty: 200 ML | Refills: 0 | Status: SHIPPED | OUTPATIENT
Start: 2023-05-02

## 2023-05-02 RX ORDER — NITROGLYCERIN 0.4 MG/1
0.4 TABLET SUBLINGUAL
Qty: 15 TABLET | Refills: 1 | Status: SHIPPED | OUTPATIENT
Start: 2023-05-02

## 2023-05-02 NOTE — PROGRESS NOTES
Assessment/Plan:           1  Cough, unspecified type  Comments:  Cough medicine prescribed and COVID test was obtained  Chest x-ray ordered  Orders:  -     POCT Rapid Covid Ag  -     Covid/Flu- Office Collect  -     guaifenesin-codeine (GUAIFENESIN AC) 100-10 MG/5ML liquid; Take 5 mL by mouth 3 (three) times a day as needed for cough  -     XR chest pa & lateral; Future; Expected date: 05/02/2023    2  Other chest pain  -     nitroglycerin (NITROSTAT) 0 4 mg SL tablet; Place 1 tablet (0 4 mg total) under the tongue every 5 (five) minutes as needed for chest pain    3  Essential hypertension  Comments:  Pressure is controlled and patient is hemodynamically stable    4  Paroxysmal atrial fibrillation (HCC)  Comments:  Currently in A-fib after taking plain Claritin (without decongestant )    5  Tracheitis  Comments:  Oxygen saturations stable  Chest x-ray stable         1  Other chest pain    - nitroglycerin (NITROSTAT) 0 4 mg SL tablet; Place 1 tablet (0 4 mg total) under the tongue every 5 (five) minutes as needed for chest pain  Dispense: 15 tablet; Refill: 1    2  Cough, unspecified type    - POCT Rapid Covid Ag  - Covid/Flu- Office Collect       No problem-specific Assessment & Plan notes found for this encounter  Subjective:      Patient ID: Fiona Montalvo is a 66 y o  female  HPI    The following portions of the patient's history were reviewed and updated as appropriate: She  has a past medical history of Acid reflux, Arthritis, Cataract, Colon polyp, Disease of thyroid gland, Dyslipidemia (high LDL; low HDL), Fibromyalgia, GERD (gastroesophageal reflux disease), Hyperlipidemia, Hypertension, Hypothyroidism, Palpitation, Paroxysmal atrial fibrillation ([facility] [address] ), and Shingles (09/12/2010)    She   Patient Active Problem List    Diagnosis Date Noted    Fibromyalgia 10/10/2022    Vitamin D deficiency 10/10/2022    Age-related osteoporosis without current pathological fracture 10/10/2022    Acid reflux  Abnormal ECG 03/03/2021    Presyncope 01/14/2021    Acquired hypothyroidism 09/11/2020    Plantar fasciitis 09/11/2020    Paroxysmal atrial fibrillation ([address] ) 07/10/2018    Essential hypertension 07/10/2018    PAC (premature atrial contraction) 07/10/2018    Dyslipidemia 07/10/2018     She  has a past surgical history that includes Replacement total knee; Tonsillectomy; Hysterectomy (05/2016); Cataract extraction, bilateral (11/2016); Colonoscopy (07/11/2016); Mammo (historical) (4/8/2010 & 2/19/2020); Colonoscopy; and Upper gastrointestinal endoscopy  Her family history includes Atrial fibrillation in her father; Cancer in her father; Chronic bronchitis in her mother; Hyperlipidemia in her mother; Hypertension in her mother; Parkinsonism in her mother; Stroke in her father and mother  She  reports that she has never smoked  She has never used smokeless tobacco  She reports that she does not drink alcohol and does not use drugs    Current Outpatient Medications   Medication Sig Dispense Refill    Cyanocobalamin (VITAMIN B 12 PO) Take by mouth 2 (two) times a week      ergocalciferol (VITAMIN D2) 50,000 units Take 1 capsule (50,000 Units total) by mouth every 14 (fourteen) days 7 capsule 3    guaifenesin-codeine (GUAIFENESIN AC) 100-10 MG/5ML liquid Take 5 mL by mouth 3 (three) times a day as needed for cough 200 mL 0    levothyroxine 75 mcg tablet Take 1 tablet (75 mcg total) by mouth daily 90 tablet 1    losartan (COZAAR) 25 mg tablet Take 1 tablet (25 mg total) by mouth daily 30 tablet 1    nitroglycerin (NITROSTAT) 0 4 mg SL tablet Place 1 tablet (0 4 mg total) under the tongue every 5 (five) minutes as needed for chest pain 15 tablet 1    omeprazole (PriLOSEC) 20 mg delayed release capsule Take 1 capsule by mouth daily      rivaroxaban (XARELTO) 20 mg tablet Take 1 tablet (20 mg total) by mouth daily with dinner 90 tablet 3    sotalol (BETAPACE) 80 mg tablet TAKE 1 TABLET BY MOUTH 2 TIMES A DAY  180 tablet 4    triamcinolone (KENALOG) 0 1 % ointment Apply topically 2 (two) times a day 60 g 0    BinaxNOW COVID-19 Ag Home Test KIT Use as Directed on the Package (Patient not taking: Reported on 10/10/2022)      fluticasone (FLONASE) 50 mcg/act nasal spray  (Patient not taking: Reported on 5/2/2023)       No current facility-administered medications for this visit  Current Outpatient Medications on File Prior to Visit   Medication Sig    Cyanocobalamin (VITAMIN B 12 PO) Take by mouth 2 (two) times a week    ergocalciferol (VITAMIN D2) 50,000 units Take 1 capsule (50,000 Units total) by mouth every 14 (fourteen) days    levothyroxine 75 mcg tablet Take 1 tablet (75 mcg total) by mouth daily    losartan (COZAAR) 25 mg tablet Take 1 tablet (25 mg total) by mouth daily    omeprazole (PriLOSEC) 20 mg delayed release capsule Take 1 capsule by mouth daily    rivaroxaban (XARELTO) 20 mg tablet Take 1 tablet (20 mg total) by mouth daily with dinner    sotalol (BETAPACE) 80 mg tablet TAKE 1 TABLET BY MOUTH 2 TIMES A DAY   triamcinolone (KENALOG) 0 1 % ointment Apply topically 2 (two) times a day    [DISCONTINUED] nitroglycerin (NITROSTAT) 0 4 mg SL tablet Place 1 tablet (0 4 mg total) under the tongue every 5 (five) minutes as needed for chest pain    BinaxNOW COVID-19 Ag Home Test KIT Use as Directed on the Package (Patient not taking: Reported on 10/10/2022)    fluticasone (FLONASE) 50 mcg/act nasal spray  (Patient not taking: Reported on 5/2/2023)     No current facility-administered medications on file prior to visit  She is allergic to iodine - food allergy and iodinated contrast media       Review of Systems   Constitutional: Negative for appetite change, chills, fatigue and fever  HENT: Negative for sore throat and trouble swallowing  Eyes: Negative for redness  Respiratory: Positive for cough  Negative for shortness of breath      Cardiovascular: Negative for chest pain and "palpitations  Gastrointestinal: Negative for abdominal pain, constipation and diarrhea  Genitourinary: Negative for dysuria and hematuria  Musculoskeletal: Negative for back pain and neck pain  Skin: Negative for rash  Neurological: Negative for seizures, weakness and headaches  Hematological: Negative for adenopathy  Psychiatric/Behavioral: Negative for confusion  The patient is not nervous/anxious  Objective:      /80 (BP Location: Left arm, Patient Position: Sitting, Cuff Size: Large)   Pulse 102   Temp 98 8 °F (37 1 °C) (Temporal)   Ht 5' 3\" (1 6 m)   Wt 65 3 kg (144 lb)   SpO2 96% Comment: RA  BMI 25 51 kg/m²     Results Reviewed     None          Recent Results (from the past 1344 hour(s))   POCT Rapid Covid Ag    Collection Time: 05/02/23  3:18 PM   Result Value Ref Range    POCT SARS-CoV-2 Ag Negative Negative    VALID CONTROL Valid         Physical Exam  Constitutional:       General: She is not in acute distress  Appearance: Normal appearance  HENT:      Head: Normocephalic and atraumatic  Nose: Nose normal       Mouth/Throat:      Mouth: Mucous membranes are moist    Eyes:      Extraocular Movements: Extraocular movements intact  Pupils: Pupils are equal, round, and reactive to light  Cardiovascular:      Rate and Rhythm: Normal rate  Rhythm irregular  Pulses: Normal pulses  Heart sounds: Normal heart sounds  No murmur heard  No friction rub  Pulmonary:      Effort: Pulmonary effort is normal  No respiratory distress  Breath sounds: Normal breath sounds  No wheezing  Abdominal:      General: Abdomen is flat  Bowel sounds are normal  There is no distension  Palpations: Abdomen is soft  There is no mass  Tenderness: There is no abdominal tenderness  There is no guarding  Musculoskeletal:         General: Normal range of motion  Cervical back: Normal range of motion     Neurological:      General: No focal deficit " present  Mental Status: She is alert and oriented to person, place, and time  Mental status is at baseline  Cranial Nerves: No cranial nerve deficit     Psychiatric:         Mood and Affect: Mood normal          Behavior: Behavior normal

## 2023-05-03 ENCOUNTER — HOSPITAL ENCOUNTER (EMERGENCY)
Facility: HOSPITAL | Age: 78
Discharge: HOME | End: 2023-05-03
Attending: EMERGENCY MEDICINE

## 2023-05-03 ENCOUNTER — TELEPHONE (OUTPATIENT)
Dept: INTERNAL MEDICINE CLINIC | Facility: CLINIC | Age: 78
End: 2023-05-03

## 2023-05-03 VITALS
DIASTOLIC BLOOD PRESSURE: 65 MMHG | TEMPERATURE: 98.3 F | HEART RATE: 62 BPM | RESPIRATION RATE: 18 BRPM | OXYGEN SATURATION: 95 % | SYSTOLIC BLOOD PRESSURE: 131 MMHG

## 2023-05-03 DIAGNOSIS — I48.91 ATRIAL FIBRILLATION (HCC): Primary | ICD-10-CM

## 2023-05-03 DIAGNOSIS — I48.19 PERSISTENT ATRIAL FIBRILLATION (HCC): Primary | ICD-10-CM

## 2023-05-03 LAB
ALBUMIN SERPL BCP-MCNC: 3.3 G/DL (ref 3.5–5)
ALP SERPL-CCNC: 80 U/L (ref 46–116)
ALT SERPL W P-5'-P-CCNC: 17 U/L (ref 12–78)
ANION GAP SERPL CALCULATED.3IONS-SCNC: 2 MMOL/L (ref 4–13)
AST SERPL W P-5'-P-CCNC: 17 U/L (ref 5–45)
ATRIAL RATE: 208 BPM
ATRIAL RATE: 60 BPM
ATRIAL RATE: 63 BPM
BASOPHILS # BLD AUTO: 0.04 THOUSANDS/ÂΜL (ref 0–0.1)
BASOPHILS NFR BLD AUTO: 0 % (ref 0–1)
BILIRUB SERPL-MCNC: 0.43 MG/DL (ref 0.2–1)
BUN SERPL-MCNC: 12 MG/DL (ref 5–25)
CALCIUM ALBUM COR SERPL-MCNC: 9.9 MG/DL (ref 8.3–10.1)
CALCIUM SERPL-MCNC: 9.3 MG/DL (ref 8.3–10.1)
CARDIAC TROPONIN I PNL SERPL HS: 5 NG/L
CHLORIDE SERPL-SCNC: 101 MMOL/L (ref 96–108)
CO2 SERPL-SCNC: 31 MMOL/L (ref 21–32)
CREAT SERPL-MCNC: 0.88 MG/DL (ref 0.6–1.3)
EOSINOPHIL # BLD AUTO: 0.1 THOUSAND/ÂΜL (ref 0–0.61)
EOSINOPHIL NFR BLD AUTO: 1 % (ref 0–6)
ERYTHROCYTE [DISTWIDTH] IN BLOOD BY AUTOMATED COUNT: 12.6 % (ref 11.6–15.1)
FLUAV RNA RESP QL NAA+PROBE: NEGATIVE
FLUBV RNA RESP QL NAA+PROBE: NEGATIVE
GFR SERPL CREATININE-BSD FRML MDRD: 63 ML/MIN/1.73SQ M
GLUCOSE SERPL-MCNC: 136 MG/DL (ref 65–140)
HCT VFR BLD AUTO: 41.3 % (ref 34.8–46.1)
HGB BLD-MCNC: 13.4 G/DL (ref 11.5–15.4)
IMM GRANULOCYTES # BLD AUTO: 0.04 THOUSAND/UL (ref 0–0.2)
IMM GRANULOCYTES NFR BLD AUTO: 0 % (ref 0–2)
LYMPHOCYTES # BLD AUTO: 1.57 THOUSANDS/ÂΜL (ref 0.6–4.47)
LYMPHOCYTES NFR BLD AUTO: 17 % (ref 14–44)
MCH RBC QN AUTO: 31.8 PG (ref 26.8–34.3)
MCHC RBC AUTO-ENTMCNC: 32.4 G/DL (ref 31.4–37.4)
MCV RBC AUTO: 98 FL (ref 82–98)
MONOCYTES # BLD AUTO: 1.21 THOUSAND/ÂΜL (ref 0.17–1.22)
MONOCYTES NFR BLD AUTO: 13 % (ref 4–12)
NEUTROPHILS # BLD AUTO: 6.37 THOUSANDS/ÂΜL (ref 1.85–7.62)
NEUTS SEG NFR BLD AUTO: 69 % (ref 43–75)
NRBC BLD AUTO-RTO: 0 /100 WBCS
P AXIS: 72 DEGREES
P AXIS: 73 DEGREES
PLATELET # BLD AUTO: 430 THOUSANDS/UL (ref 149–390)
PMV BLD AUTO: 9.6 FL (ref 8.9–12.7)
POTASSIUM SERPL-SCNC: 3.3 MMOL/L (ref 3.5–5.3)
PR INTERVAL: 162 MS
PR INTERVAL: 168 MS
PROT SERPL-MCNC: 7.9 G/DL (ref 6.4–8.4)
QRS AXIS: 44 DEGREES
QRS AXIS: 45 DEGREES
QRS AXIS: 49 DEGREES
QRSD INTERVAL: 66 MS
QRSD INTERVAL: 68 MS
QRSD INTERVAL: 74 MS
QT INTERVAL: 348 MS
QT INTERVAL: 402 MS
QT INTERVAL: 416 MS
QTC INTERVAL: 406 MS
QTC INTERVAL: 411 MS
QTC INTERVAL: 449 MS
RBC # BLD AUTO: 4.22 MILLION/UL (ref 3.81–5.12)
SARS-COV-2 RNA RESP QL NAA+PROBE: NEGATIVE
SODIUM SERPL-SCNC: 134 MMOL/L (ref 135–147)
T WAVE AXIS: -81 DEGREES
T WAVE AXIS: -88 DEGREES
T WAVE AXIS: 245 DEGREES
VENTRICULAR RATE: 63 BPM
VENTRICULAR RATE: 70 BPM
VENTRICULAR RATE: 82 BPM
WBC # BLD AUTO: 9.33 THOUSAND/UL (ref 4.31–10.16)

## 2023-05-03 RX ORDER — PROPOFOL 10 MG/ML
60 INJECTION, EMULSION INTRAVENOUS ONCE
Status: COMPLETED | OUTPATIENT
Start: 2023-05-03 | End: 2023-05-03

## 2023-05-03 RX ADMIN — PROPOFOL 50 MG: 10 INJECTION, EMULSION INTRAVENOUS at 12:10

## 2023-05-03 NOTE — ED PROVIDER NOTES
"History  Chief Complaint   Patient presents with    Atrial Fibrillation     Pt reports cardiology sent her in for afib, states she's been in afib for 11 days approx, told they would \"zap her back to a normal rhythm\"     HPI    Prior to Admission Medications   Prescriptions Last Dose Informant Patient Reported? Taking? BinaxNOW COVID-19 Ag Home Test KIT   Yes No   Sig: Use as Directed on the Package   Patient not taking: Reported on 10/10/2022   Cyanocobalamin (VITAMIN B 12 PO)   Yes No   Sig: Take by mouth 2 (two) times a week   ergocalciferol (VITAMIN D2) 50,000 units   No No   Sig: Take 1 capsule (50,000 Units total) by mouth every 14 (fourteen) days   fluticasone (FLONASE) 50 mcg/act nasal spray   Yes No   Patient not taking: Reported on 5/2/2023   guaifenesin-codeine (GUAIFENESIN AC) 100-10 MG/5ML liquid   No No   Sig: Take 5 mL by mouth 3 (three) times a day as needed for cough   levothyroxine 75 mcg tablet   No No   Sig: Take 1 tablet (75 mcg total) by mouth daily   losartan (COZAAR) 25 mg tablet   No No   Sig: Take 1 tablet (25 mg total) by mouth daily   nitroglycerin (NITROSTAT) 0 4 mg SL tablet   No No   Sig: Place 1 tablet (0 4 mg total) under the tongue every 5 (five) minutes as needed for chest pain   omeprazole (PriLOSEC) 20 mg delayed release capsule   Yes No   Sig: Take 1 capsule by mouth daily   rivaroxaban (XARELTO) 20 mg tablet   No No   Sig: Take 1 tablet (20 mg total) by mouth daily with dinner   sotalol (BETAPACE) 80 mg tablet   No No   Sig: TAKE 1 TABLET BY MOUTH 2 TIMES A DAY     triamcinolone (KENALOG) 0 1 % ointment   No No   Sig: Apply topically 2 (two) times a day      Facility-Administered Medications: None       Past Medical History:   Diagnosis Date    Acid reflux     Arthritis     Cataract     Colon polyp     Disease of thyroid gland     Dyslipidemia (high LDL; low HDL)     Fibromyalgia     GERD (gastroesophageal reflux disease)     Hyperlipidemia     Hypertension     " Hypothyroidism     Palpitation     Paroxysmal atrial fibrillation ([facility] [address] )     Shingles 09/12/2010    Right C7       Past Surgical History:   Procedure Laterality Date    CATARACT EXTRACTION, BILATERAL  11/2016    COLONOSCOPY  07/11/2016    COLONOSCOPY      HYSTERECTOMY  05/2016    total hysterectomy    MAMMO (HISTORICAL)  4/8/2010 & 2/19/2020    REPLACEMENT TOTAL KNEE      TONSILLECTOMY      UPPER GASTROINTESTINAL ENDOSCOPY         Family History   Problem Relation Age of Onset    Hypertension Mother     Hyperlipidemia Mother     Parkinsonism Mother     Stroke Mother     Chronic bronchitis Mother     Atrial fibrillation Father     Stroke Father     Cancer Father         skin cancer    Anuerysm Neg Hx     Clotting disorder Neg Hx      I have reviewed and agree with the history as documented      E-Cigarette/Vaping    E-Cigarette Use Never User      E-Cigarette/Vaping Substances    Nicotine No     THC No     CBD No     Flavoring No     Unknown No      Social History     Tobacco Use    Smoking status: Never    Smokeless tobacco: Never   Vaping Use    Vaping Use: Never used   Substance Use Topics    Alcohol use: No    Drug use: No       Review of Systems    Physical Exam  Physical Exam    Vital Signs  ED Triage Vitals [05/03/23 1044]   Temperature Pulse Respirations Blood Pressure SpO2   98 3 °F (36 8 °C) 90 18 113/72 95 %      Temp Source Heart Rate Source Patient Position - Orthostatic VS BP Location FiO2 (%)   Temporal Monitor Sitting Left arm --      Pain Score       No Pain           Vitals:    05/03/23 1044 05/03/23 1115   BP: 113/72 134/81   Pulse: 90 86   Patient Position - Orthostatic VS: Sitting Lying         Visual Acuity      ED Medications  Medications   propofol (DIPRIVAN) 200 MG/20ML bolus injection 60 mg (has no administration in time range)       Diagnostic Studies  Results Reviewed     Procedure Component Value Units Date/Time    CBC and differential [[id number]]  (Abnormal) Collected: 05/03/23 1122    Lab Status: Final result Specimen: Blood from Arm, Right Updated: 05/03/23 1130     WBC 9 33 Thousand/uL      RBC 4 22 Million/uL      Hemoglobin 13 4 g/dL      Hematocrit 41 3 %      MCV 98 fL      MCH 31 8 pg      MCHC 32 4 g/dL      RDW 12 6 %      MPV 9 6 fL      Platelets 581 Thousands/uL      nRBC 0 /100 WBCs      Neutrophils Relative 69 %      Immat GRANS % 0 %      Lymphocytes Relative 17 %      Monocytes Relative 13 %      Eosinophils Relative 1 %      Basophils Relative 0 %      Neutrophils Absolute 6 37 Thousands/µL      Immature Grans Absolute 0 04 Thousand/uL      Lymphocytes Absolute 1 57 Thousands/µL      Monocytes Absolute 1 21 Thousand/µL      Eosinophils Absolute 0 10 Thousand/µL      Basophils Absolute 0 04 Thousands/µL     Comprehensive metabolic panel [[id number]] Collected: 05/03/23 1122    Lab Status: In process Specimen: Blood from Arm, Right Updated: 05/03/23 1126    HS Troponin 0hr (reflex protocol) [[id number]] Collected: 05/03/23 1122    Lab Status: In process Specimen: Blood from Arm, Right Updated: 05/03/23 1126                 No orders to display              Procedures  Pre-Procedural Sedation  Performed by: Colton Funk MD  Authorized by: Colton Funk MD     Consent:     Consent obtained:  Verbal and written    Consent given by:  Patient    Risks discussed: Allergic reaction, dysrhythmia, inadequate sedation, nausea, vomiting, respiratory compromise necessitating ventilatory assistance and intubation, prolonged sedation necessitating reversal and prolonged hypoxia resulting in organ damage    Alternatives discussed:  Analgesia without sedation  Universal protocol:     Procedure explained and questions answered to patient or proxy's satisfaction: yes      Relevant documents present and verified: yes      Test results available and properly labeled: yes      Radiology Images displayed and confirmed    If images not available, report reviewed: yes      Required blood products, implants, devices, and special equipment available: yes      Site/side marked: yes      Immediately prior to procedure a time out was called: yes      Patient identity confirmation method:  Verbally with patient, arm band and provided demographic data  Indications:     Sedation purpose:  Cardioversion    Procedure necessitating sedation performed by:  Physician performing sedation    Intended level of sedation:  Deep  Pre-sedation assessment:     NPO status caution: unable to specify NPO status      ASA classification: class 2 - patient with mild systemic disease      Neck mobility: normal      Mouth opening:  3 or more finger widths    Mallampati score:  II - soft palate, uvula, fauces visible    Pre-sedation assessments completed and reviewed: airway patency, cardiovascular function, mental status, nausea/vomiting and respiratory function      Pre-sedation assessments completed and reviewed: hydration status not reviewed, pain level not reviewed and temperature not reviewed      History of difficult intubation: no      Pre-sedation assessment completed:  5/3/2023 12:25 PM    Procedural Sedation    Date/Time: 5/3/2023 12:27 PM  Performed by: Diana Estrada MD  Authorized by: Diana Estrada MD     Immediate pre-procedure details:     Reassessment: Patient reassessed immediately prior to procedure      Reviewed: vital signs, relevant labs/tests and NPO status      Verified: bag valve mask available, emergency equipment available, intubation equipment available, IV patency confirmed, oxygen available and suction available    Procedure details (see MAR for exact dosages):     Sedation start time:  5/3/2023 12:27 PM    Preoxygenation:  Nasal cannula    Sedation:  Propofol    Intra-procedure monitoring:  Blood pressure monitoring, continuous capnometry, frequent LOC assessments, cardiac monitor, continuous pulse oximetry and frequent vital sign checks    Intra-procedure events: none      Total sedation time (minutes):  10  Post-procedure details:     Attendance: Constant attendance by certified staff until patient recovered      Recovery: Patient returned to pre-procedure baseline      Post-sedation assessments completed and reviewed: airway patency, cardiovascular function, hydration status, mental status, nausea/vomiting, pain level and respiratory function      Post-sedation assessments completed and reviewed: post-procedure temperature not reviewed      Patient is stable for discharge or admission: yes      Patient tolerance: Tolerated well, no immediate complications  Cardioversion    Date/Time: 5/3/2023 12:29 PM  Performed by: Valerie Holland MD  Authorized by: Valerie Holland MD     Verbal consent obtained?: Yes    Written consent obtained?: Yes    Risks and benefits: Risks, benefits and alternatives were discussed    Consent given by:  Patient  Patient states understanding of procedure being performed: Yes    Patient's understanding of procedure matches consent: Yes    Procedure consent matches procedure scheduled: Yes    Relevant documents present and verified: Yes    Test results available and properly labeled: Yes    Site marked: Yes    Radiology Images displayed and confirmed   If images not available, report reviewed: Yes    Required items: Required blood products, implants, devices and special equipment available    Patient identity confirmed:  Verbally with patient, arm band and provided demographic data  Time out: Immediately prior to the procedure a time out was called    Patient sedated: Yes    Sedation type: moderate (conscious) sedation    Sedation:  Propofol  Vital signs: Vital signs monitored during sedation    Cardioversion basis:  Elective  Elective indications: failure of anti-arrhythmic medications    Pre-procedure rhythm:  Atrial fibrillation  Position: Patient was placed in a supine position    Chest area exposed: Chest area was exposed    Electrodes: Pads  Electrodes placed:  Anterior-posterior  Number of attempts:  1  Attempt 1:     Attempt 1 mode:  Synchronous    Attempt 1 waveform:  Monophasic    Attempt 1 shock (Joules):  200    Attempt 1 outcome:  Conversion to normal sinus rhythm  Post-procedure rhythm:  Normal sinus rhythm  Patient tolerance:  Patient tolerated the procedure well with no immediate complications             ED Course               Identification of Seniors at Risk    Flowsheet Row Most Recent Value   (ISAR) Identification of Seniors at Risk    Before the illness or injury that brought you to the Emergency, did you need someone to help you on a regular basis? 0 Filed at: 05/03/2023 1045   In the last 24 hours, have you needed more help than usual? --   Have you been hospitalized for one or more nights during the past 6 months? 0 Filed at: 05/03/2023 1045   In general, do you see well? 0 Filed at: 05/03/2023 1045   In general, do you have serious problems with your memory? 0 Filed at: 05/03/2023 1045   Do you take more than three different medications every day? 1 Filed at: 05/03/2023 1045   ISAR Score 1 Filed at: 05/03/2023 1045                      SBIRT 20yo+    Flowsheet Row Most Recent Value   Initial Alcohol Screen: US AUDIT-C     1  How often do you have a drink containing alcohol? 0 Filed at: 05/03/2023 1046   2  How many drinks containing alcohol do you have on a typical day you are drinking? 0 Filed at: 05/03/2023 1046   3b  FEMALE Any Age, or MALE 65+: How often do you have 4 or more drinks on one occassion? 0 Filed at: 05/03/2023 1046   Audit-C Score 0 Filed at: 05/03/2023 1046   SANDIP: How many times in the past year have you    Used an illegal drug or used a prescription medication for non-medical reasons?  Never Filed at: 05/03/2023 1046                    MDM    Disposition  Final diagnoses:   None     ED Disposition     None      Follow-up Information    None         Patient's Medications   Discharge Prescriptions    No medications on file       No discharge procedures on file      PDMP Review       Value Time User    PDMP Reviewed  Yes 5/2/2023  3:28 PM Fabiola Wright MD          ED Provider  Electronically Signed by procedure a time out was called    Patient sedated: Yes    Sedation type: moderate (conscious) sedation    Sedation:  Propofol  Vital signs: Vital signs monitored during sedation    Cardioversion basis:  Elective  Elective indications: failure of anti-arrhythmic medications    Pre-procedure rhythm:  Atrial fibrillation  Position: Patient was placed in a supine position    Chest area exposed: Chest area was exposed    Electrodes:  Pads  Electrodes placed:  Anterior-posterior  Number of attempts:  1  Attempt 1:     Attempt 1 mode:  Synchronous    Attempt 1 waveform:  Monophasic    Attempt 1 shock (Joules):  200    Attempt 1 outcome:  Conversion to normal sinus rhythm  Post-procedure rhythm:  Normal sinus rhythm  Patient tolerance:  Patient tolerated the procedure well with no immediate complications             ED Course       Patient recovered from sedation and cardioversion procedure without immediate complications  Review of cardiac monitoring patient remains in sinus rhythm  Patient reports improvement of symptoms will discharge patient home as planned with outpatient follow-up with electrophysiology  Identification of Seniors at [address] [place] [address] Most Recent Value   (ISAR) Identification of Seniors at Risk    Before the illness or injury that brought you to the Emergency, did you need someone to help you on a regular basis? 0 Filed at: 05/03/2023 1045   In the last 24 hours, have you needed more help than usual? --   Have you been hospitalized for one or more nights during the past 6 months? 0 Filed at: 05/03/2023 1045   In general, do you see well? 0 Filed at: 05/03/2023 1045   In general, do you have serious problems with your memory? 0 Filed at: 05/03/2023 1045   Do you take more than three different medications every day?  1 Filed at: 05/03/2023 1045   ISAR Score 1 Filed at: 05/03/2023 1045                      SBIRT 22yo+    Flowsheet Row Most Recent Value   Initial Alcohol Screen: US AUDIT-C 1  How often do you have a drink containing alcohol? 0 Filed at: 05/03/2023 1046   2  How many drinks containing alcohol do you have on a typical day you are drinking? 0 Filed at: 05/03/2023 1046   3b  FEMALE Any Age, or MALE 65+: How often do you have 4 or more drinks on one occassion? 0 Filed at: 05/03/2023 1046   Audit-C Score 0 Filed at: 05/03/2023 1046   SANDIP: How many times in the past year have you    Used an illegal drug or used a prescription medication for non-medical reasons? Never Filed at: 05/03/2023 1046                    Medical Decision Making  Patient presents with episodes of intermittent palpitations and atrial fibrillation over past 11 days  Patient has history of atrial fibrillation  Patient reports compliance with all of her antiarrhythmic medications and anticoagulation regimen  Differential diagnosis:  atrial fibrillation, alternate arrhythmia    Plan to check ECG and proceed with electrical cardioversion    Atrial fibrillation [facility]): chronic illness or injury with exacerbation, progression, or side effects of treatment     Details: Patient presents with an acute exacerbation of atrial fibrillation despite compliance with  her oral antiarrhythmic medications  Amount and/or Complexity of Data Reviewed  External Data Reviewed: notes  Details: Patient with recent office visit and chest x-ray performed as outpatient unremarkable  Labs: ordered  ECG/medicine tests: ordered and independent interpretation performed  Details: ECG independently interpreted by me: Atrial fibrillation with a ventricular rate of 82  Nonspecific T wave abnormality present  Impression abnormal ECG      ECG independently interpreted by me post cardioversion: Sinus rhythm with sinus arrhythmia     Discussion of management or test interpretation with external provider(s): Case discussed with patient's electrophysiologist   Patient successfully cardioverted in the emergency department and stable for discharge to home with follow-up with electrophysiologist in office for reevaluation  Risk  Prescription drug management  Risk Details: Patient will undergo elective electrical cardioversion in emergency department with procedural sedation  Please refer to procedure notes        Disposition  Final diagnoses:   Atrial fibrillation ([address] )     Time reflects when diagnosis was documented in both MDM as applicable and the Disposition within this note     Time User Action Codes Description Comment    5/3/2023  1:35 PM Lavern Carrillo [[id number]] Atrial fibrillation [facility])       ED Disposition     ED Disposition   Discharge    Condition   Stable    Date/Time   Wed May 3, 2023  1:35 PM    [address] discharge to home/self care                 Follow-up Information     Follow up With Specialties Details Why Contact Info Additional Information    Bryon Newell MD Internal Medicine  As needed [address] [facility] [address]  [address]  [address] [facility] [address]        Enzo Cordova MD Cardiology Schedule an appointment as soon as possible for a visit in 1 week  [address]        [address] [place] [address] [address] [facility] Emergency Department Emergency Medicine  If symptoms worsen [address] [id number]  [address] [facility] [address] [facility] Emergency Department, [address], [place], [address]          Discharge Medication List as of 5/3/2023  1:37 PM      CONTINUE these medications which have NOT CHANGED    Details   BinaxNOW COVID-19 Ag Home Test KIT Use as Directed on the Package, Historical Med      Cyanocobalamin (VITAMIN B 12 PO) Take by mouth 2 (two) times a week, Historical Med      ergocalciferol (VITAMIN D2) 50,000 units Take 1 capsule (50,000 Units total) by mouth every 14 (fourteen) days, Starting Fri 3/31/2023, Until Sat 7/1/2023, Normal      fluticasone (FLONASE) 50 mcg/act nasal spray Starting Sat 4/29/2023, Historical Med      guaifenesin-codeine (GUAIFENESIN AC) 100-10 MG/5ML liquid Take 5 mL by mouth 3 (three) times a day as needed for cough, Starting Tue 5/2/2023, Normal      levothyroxine 75 mcg tablet Take 1 tablet (75 mcg total) by mouth daily, Starting Fri 3/31/2023, Until Thu 6/29/2023, Normal      losartan (COZAAR) 25 mg tablet Take 1 tablet (25 mg total) by mouth daily, Starting Fri 3/31/2023, Normal      nitroglycerin (NITROSTAT) 0 4 mg SL tablet Place 1 tablet (0 4 mg total) under the tongue every 5 (five) minutes as needed for chest pain, Starting Tue 5/2/2023, Normal      omeprazole (PriLOSEC) 20 mg delayed release capsule Take 1 capsule by mouth daily, Historical Med      sotalol (BETAPACE) 80 mg tablet TAKE 1 TABLET BY MOUTH 2 TIMES A DAY , Normal      triamcinolone (KENALOG) 0 1 % ointment Apply topically 2 (two) times a day, Starting Fri 7/8/2022, Normal      rivaroxaban (XARELTO) 20 mg tablet Take 1 tablet (20 mg total) by mouth daily with dinner, Starting Mon 2/6/2023, Until Sun 5/7/2023, Print             No discharge procedures on file      PDMP Review       Value Time User    PDMP Reviewed  Yes 5/2/2023  3:28 PM Eulalia Simpson MD          ED Provider  Electronically Signed by           Aster Manning MD  05/08/23 6330

## 2023-05-03 NOTE — TELEPHONE ENCOUNTER
Called left a message for patient to call back the office Dr Sherlon Schilder stated her chest ex ray was  Krishna Bones he just  wanted her to know

## 2023-05-03 NOTE — PROGRESS NOTES
Recommend ER eval, she believes stuck in afib. She is compliant with Xarelto >30 days. Please do DCCV in ER.

## 2023-05-04 ENCOUNTER — CLINICAL SUPPORT (OUTPATIENT)
Dept: CARDIOLOGY CLINIC | Facility: CLINIC | Age: 78
End: 2023-05-04

## 2023-05-04 ENCOUNTER — TELEPHONE (OUTPATIENT)
Dept: CARDIOLOGY CLINIC | Facility: CLINIC | Age: 78
End: 2023-05-04

## 2023-05-04 DIAGNOSIS — I49.1 PAC (PREMATURE ATRIAL CONTRACTION): Primary | ICD-10-CM

## 2023-05-04 DIAGNOSIS — I48.19 PERSISTENT ATRIAL FIBRILLATION (HCC): ICD-10-CM

## 2023-05-04 DIAGNOSIS — I48.0 PAROXYSMAL ATRIAL FIBRILLATION (HCC): ICD-10-CM

## 2023-05-04 NOTE — PROGRESS NOTES
Pt presents to the office under the direction of Dr Ermelinda Smart for a 2 week Zio XT  Patch applied and all instructions given  Patient verbalized understanding

## 2023-05-04 NOTE — TELEPHONE ENCOUNTER
patient called and needs her prescription for xarelto 20 mg 30 day supply sent to [address] [facility]     Last ovs 05/02/2023   Next ovs 06/30/2023

## 2023-05-04 NOTE — TELEPHONE ENCOUNTER
This pt was cardioverted yesterday and on her after visit summary it states to follow up with you  Dr Rivera Plascencia in one week however we have no openings available in one week with you or a PA  Is it necessary for her to be seen?

## 2023-05-21 ENCOUNTER — NURSE TRIAGE (OUTPATIENT)
Dept: OTHER | Facility: OTHER | Age: 78
End: 2023-05-21

## 2023-05-21 NOTE — TELEPHONE ENCOUNTER
Pt's daughter called that patient is in [place] and HR jumps between 118 and 99 - Attempted call to pt however went straight to   Advised to call back with any questions and to go to the closest [facility] or ED in [place] for any concerns  Pt's daughter called back - pt has been in Afib for about 1 hour  Pt had cardioversion done about 2 weeks ago and this is the first time she has gone back into Afib since  Advised to go to the closest ED and be evaluated while out of [place] at this time     Reason for Disposition  • Message left on identified voice mail    Protocols used: NO CONTACT OR DUPLICATE CONTACT CALL-ADULT-AH

## 2023-05-21 NOTE — TELEPHONE ENCOUNTER
"Regarding: A-fib/ 118 bpm  ----- Message from Wilmer Palmer sent at 5/21/2023 12:15 AM EDT -----  Pt's daughter called, \" my mom's a-fib  Her heart rate was 118  Now it's 99  She is not home, she is in [place], and we'd like to know what to do  \"    "

## 2023-05-24 ENCOUNTER — CLINICAL SUPPORT (OUTPATIENT)
Dept: CARDIOLOGY CLINIC | Facility: CLINIC | Age: 78
End: 2023-05-24

## 2023-05-24 DIAGNOSIS — I48.19 PERSISTENT ATRIAL FIBRILLATION (HCC): ICD-10-CM

## 2023-05-29 DIAGNOSIS — I10 ESSENTIAL HYPERTENSION: ICD-10-CM

## 2023-05-30 RX ORDER — LOSARTAN POTASSIUM 25 MG/1
25 TABLET ORAL DAILY
Qty: 30 TABLET | Refills: 0 | Status: SHIPPED | OUTPATIENT
Start: 2023-05-30

## 2023-06-01 ENCOUNTER — CLINICAL SUPPORT (OUTPATIENT)
Dept: CARDIOLOGY CLINIC | Facility: CLINIC | Age: 78
End: 2023-06-01

## 2023-06-01 DIAGNOSIS — I48.19 PERSISTENT ATRIAL FIBRILLATION (HCC): Primary | ICD-10-CM

## 2023-06-01 NOTE — PROGRESS NOTES
Pt presents to the office under the direction of Dr Dana Cheng for a 1 week ECG Post Sotalol increase  Pt increased her Sotalol to 120mg BID per Dr Dana Cheng  Pt states she experiences palpitations 1-2 daily for a few secs, lightheadedness when her HR decreases to 51-53, and headaches 5/30 and 5/31 due to eye migraines  Pt states her HR has primarily been 60s and low 70s  /78  HR 51    Per Dr Dana Cheng, QT interval is okay but heart appears slow  Dr Dana Cheng requested a 1 week Zio XT  Patch was applied and all instructions were given

## 2023-06-13 NOTE — PROGRESS NOTES
Electrophysiology Follow Up    [address] 1938  [facility] [address]  : 1945  MRN: [id number]    Date of Visit: 2023       Assessment/Plan     1  Persistent atrial fibrillation (HCC)  POCT ECG      2  Paroxysmal atrial fibrillation (HCC)  Echo complete w/ contrast if indicated      3  PAC (premature atrial contraction)  Echo complete w/ contrast if indicated      4  Essential hypertension            ASSESSMENT:  1  Symptomatic persistent atrial fibrillation, with recent cardioversion in the ER 5/3/2023 due to recurrence   A )  Long history of PAF (likely dating back to ), tried on flecainide and propafenone in the past, discontinued due to intolerance   B )  Became more persistent 2020, eventually cardioverted 2020 with subsequent reversion back to afib several months later   C )  Due to ongoing symptoms, underwent cryoablation with pulmonary vein isolation 2021              D )  Started on sotalol antiarrhythmic therapy in the post ablation, dose reduced to 80 mg BID 2022 due to sinus bradycardia   E )  Maintained on Xarelto anticoagulation, not on other AV cait agents due to sinus bradycardia  2  Tachy-indy syndrome, with baseline sinus bradycardia as well as periods of rapid ventricular response and SVT  3  Preserved LV systolic function with EF 55-60% per echo 2020              A )  Nonischemic nuclear stress test 3/2021              B )  Known dilated left atrium  4  Hypertension, well controlled today  5  Moderate tricuspid regurgitation, mild moderate regurgitation  6  Fibromyalgia  7  Hyperlipidemia      DISCUSSION/SUMMARY:  Over the past several months, she has had an increased burden of paroxysmal atrial tachycardia and likely paroxysmal atrial fibrillation  She is highly symptomatic with these episodes, with an element of tachybrady syndrome    We discussed different treatment options, including pacemaker with uptitration of medications as well as a repeat ablation  After discussing with Dr Kimmie Alvarez, he is recommending repeat A-fib ablation given that she is still having breakthrough episodes on higher doses of sotalol  She understands that if she still has arrhythmias in the post ablation setting or if she becomes too bradycardic, that she still may require pacemaker implantation moving forward  However for now, we think she would be better served with a repeat ablation  She understands and agrees with this plan  In the meantime, she will continue her current regimen of sotalol 120 mg twice daily  EKG today shows stable QTc without significant bradycardia  We will try to arrange her ablation in the near future given that she is so symptomatic  She is maintained on Xarelto, and she will hold her dose the morning of the procedure  Our schedulers will reach out to her to arrange, otherwise she knows to contact us with any questions, concerns, or changes in symptoms  History of Present Illness     CHIEF COMPLAINT: Follow-up ER visit with cardioversion, review event monitor results    HPI/INTERVAL HISTORY: Danilo Phipps is a 66 y o  female with symptomatic persistent atrial fibrillation, tachy-indy syndrome with known baseline sinus bradycardia, preserved LV systolic function with EF 55-60%, hyperlipidemia, hypertension, valvular heart disease with moderate TR/mild MR, and fibromyalgia   She typically follows with Dr Rashmi Schmitz has a long history of paroxysmal atrial fibrillation, likely dating back to 2014  Suzie Oreilly chart review, she was tried on flecainide and propafenone, which she did not tolerate   For many years she was maintained on metoprolol and verapamil   She was initially on Xarelto, however it was discontinued due to low overall burden   When she was seen 06/2020, she was found to be in asymptomatic atrial fibrillation and she was restarted on Xarelto   She subsequently underwent successful DELANO/cardioversion 08/2020, however at that time it was noted that her left atrium was dilated   She maintained sinus rhythm for several months, however when she was seen 11/2020 she was again noted to be back in atrial fibrillation   At that time, rate control therapy was discussed rather than trying to pursue sinus rhythm   When she continued to have symptoms of feeling unwell in her now persistent atrial fibrillation, she was seen in EP consultation by Dr Tutu Vieira and antiarrhythmic initiation was recommended, and in 9/2021 she underwent cryoablation with pulmonary vein isolation along with sotalol initiation  Her other AV cait agents were discontinued due to baseline sinus bradycardia  She was seen in EP follow-up 6/2022, she was bradycardic with rates in the 40s and thus her sotalol was decreased to 80 mg twice daily  More recently, she contacted our office reporting recurrent atrial fibrillation  She was sent to the emergency room in early 5/2023, where she underwent successful cardioversion  She subsequently underwent an event monitor, and she presents today to review these findings  Since then, she also went to the emergency room while in [place] due to ongoing palpitations  At that time, her sotalol dose was increased back to 120 mg twice daily by Dr Tutu Stevenson  Since then, she reports no changes in her symptoms  She continues to have frequent palpitations on a daily basis  She notes when her heart rate is elevated, but she also feels lightheaded and fatigued when she is bradycardic with rates in the 40s  She denies chest pain or pressure, but admits to occasional shortness of breath, more so when she is bradycardic  Review of Systems   Constitutional: Positive for fatigue  Negative for fever  Respiratory: Positive for shortness of breath  Negative for chest tightness  Cardiovascular: Positive for palpitations   Negative for chest pain and leg swelling  Neurological: Negative for dizziness, syncope and light-headedness  All other systems reviewed and are negative  ROS as noted above, otherwise 12 point review of systems was performed and is negative  Historical Information  - I have personally reviewed and updated this information, including social history, medical history, and family history  Social History     Socioeconomic History   • Marital status: /Civil Union     Spouse name: Not on file   • Number of children: Not on file   • Years of education: Not on file   • Highest education level: Not on file   Occupational History   • Not on file   Tobacco Use   • Smoking status: Never   • Smokeless tobacco: Never   Vaping Use   • Vaping Use: Never used   Substance and Sexual Activity   • Alcohol use: No   • Drug use: No   • Sexual activity: Not Currently   Other Topics Concern   • Not on file   Social History Narrative   • Not on file     Social Determinants of Health     Financial Resource Strain: Low Risk  (2/6/2023)    Overall Financial Resource Strain (CARDIA)    • Difficulty of Paying Living Expenses: Not hard at all   Food Insecurity: Not on file   Transportation Needs: No Transportation Needs (2/6/2023)    PRAPARE - Transportation    • Lack of Transportation (Medical): No    • Lack of Transportation (Non-Medical):  No   Physical Activity: Not on file   Stress: Not on file   Social Connections: Not on file   Intimate Partner Violence: Not on file   Housing Stability: Not on file     Past Medical History:   Diagnosis Date   • Acid reflux    • Arthritis    • Cataract    • Colon polyp    • Disease of thyroid gland    • Dyslipidemia (high LDL; low HDL)    • Fibromyalgia    • GERD (gastroesophageal reflux disease)    • Hyperlipidemia    • Hypertension    • Hypothyroidism    • Palpitation    • Paroxysmal atrial fibrillation ([facility] [address] )    • Shingles 09/12/2010    Right C7     Past Surgical History:   Procedure Laterality Date   • CATARACT EXTRACTION, BILATERAL  11/2016   • COLONOSCOPY  07/11/2016   • COLONOSCOPY     • HYSTERECTOMY  05/2016    total hysterectomy   • MAMMO (HISTORICAL)  4/8/2010 & 2/19/2020   • REPLACEMENT TOTAL KNEE     • TONSILLECTOMY     • UPPER GASTROINTESTINAL ENDOSCOPY       Social History     Substance and Sexual Activity   Alcohol Use No     Social History     Substance and Sexual Activity   Drug Use No     Social History     Tobacco Use   Smoking Status Never   Smokeless Tobacco Never     Family History   Problem Relation Age of Onset   • Hypertension Mother    • Hyperlipidemia Mother    • Parkinsonism Mother    • Stroke Mother    • Chronic bronchitis Mother    • Atrial fibrillation Father    • Stroke Father    • Cancer Father         skin cancer   • Anuerysm Neg Hx    • Clotting disorder Neg Hx        Meds/Allergies       Current Outpatient Medications:   •  Cyanocobalamin (VITAMIN B 12 PO), Take by mouth 2 (two) times a week, Disp: , Rfl:   •  ergocalciferol (VITAMIN D2) 50,000 units, Take 1 capsule (50,000 Units total) by mouth every 14 (fourteen) days, Disp: 7 capsule, Rfl: 3  •  levothyroxine 75 mcg tablet, Take 1 tablet (75 mcg total) by mouth daily, Disp: 90 tablet, Rfl: 1  •  losartan (COZAAR) 25 mg tablet, Take 1 tablet (25 mg total) by mouth daily, Disp: 30 tablet, Rfl: 0  •  nitroglycerin (NITROSTAT) 0 4 mg SL tablet, Place 1 tablet (0 4 mg total) under the tongue every 5 (five) minutes as needed for chest pain, Disp: 15 tablet, Rfl: 1  •  omeprazole (PriLOSEC) 20 mg delayed release capsule, Take 1 capsule by mouth daily, Disp: , Rfl:   •  rivaroxaban (XARELTO) 20 mg tablet, Take 1 tablet (20 mg total) by mouth daily with dinner, Disp: 90 tablet, Rfl: 3  •  sotalol (BETAPACE) 80 mg tablet, TAKE 1 TABLET BY MOUTH 2 TIMES A DAY   (Patient taking differently: Take 120 mg by mouth 2 (two) times a day Taking 120mg twice a day per Dr Fabiola Dickens ), Disp: 180 tablet, Rfl: 4  •  triamcinolone (KENALOG) 0 1 % ointment, Apply "topically 2 (two) times a day, Disp: 60 g, Rfl: 0    Allergies   Allergen Reactions   • Iodine - Food Allergy Hives     Severe allergy to IV CT scan dye   • Iodinated Contrast Media Hives       Objective   Vitals: Blood pressure 132/80, pulse 59, height 5' 3\" (1 6 m), weight 67 kg (147 lb 9 6 oz)          Physical Exam  GEN: NAD, alert and oriented x 3, well appearing  SKIN: dry without significant lesions or rashes  HEENT: NCAT, PERRL, EOMs intact  NECK: No JVD appreciated  CARDIOVASCULAR: RRR, normal S1, S2 without murmurs, rubs, or gallops appreciated  LUNGS: Clear to auscultation bilaterally without wheezes, rhonchi, or rales  ABDOMEN: Soft, nontender, nondistended  EXTREMITIES/VASCULAR: perfused without clubbing, cyanosis, or LE edema b/l  PSYCH: Normal mood and affect  NEURO: CN ll-Xll grossly intact        Labs:  Admission on 05/03/2023, Discharged on 05/03/2023   Component Date Value   • Ventricular Rate 05/03/2023 82    • Atrial Rate 05/03/2023 208    • QRSD Interval 05/03/2023 74    • QT Interval 05/03/2023 348    • QTC Interval 05/03/2023 406    • QRS Axis 05/03/2023 44    • T Wave Axis 05/03/2023 -88    • WBC 05/03/2023 9 33    • RBC 05/03/2023 4 22    • Hemoglobin 05/03/2023 13 4    • Hematocrit 05/03/2023 41 3    • MCV 05/03/2023 98    • MCH 05/03/2023 31 8    • MCHC 05/03/2023 32 4    • RDW 05/03/2023 12 6    • MPV 05/03/2023 9 6    • Platelets 52/31/4469 430 (H)    • nRBC 05/03/2023 0    • Neutrophils Relative 05/03/2023 69    • Immat GRANS % 05/03/2023 0    • Lymphocytes Relative 05/03/2023 17    • Monocytes Relative 05/03/2023 13 (H)    • Eosinophils Relative 05/03/2023 1    • Basophils Relative 05/03/2023 0    • Neutrophils Absolute 05/03/2023 6 37    • Immature Grans Absolute 05/03/2023 0 04    • Lymphocytes Absolute 05/03/2023 1 57    • Monocytes Absolute 05/03/2023 1 21    • Eosinophils Absolute 05/03/2023 0 10    • Basophils Absolute 05/03/2023 0 04    • Sodium 05/03/2023 134 (L)    • Potassium " 05/03/2023 3 3 (L)    • Chloride 05/03/2023 101    • CO2 05/03/2023 31    • ANION GAP 05/03/2023 2 (L)    • BUN 05/03/2023 12    • Creatinine 05/03/2023 0 88    • Glucose 05/03/2023 136    • Calcium 05/03/2023 9 3    • Corrected Calcium 05/03/2023 9 9    • AST 05/03/2023 17    • ALT 05/03/2023 17    • Alkaline Phosphatase 05/03/2023 80    • Total Protein 05/03/2023 7 9    • Albumin 05/03/2023 3 3 (L)    • Total Bilirubin 05/03/2023 0 43    • eGFR 05/03/2023 63    • hs TnI 0hr 05/03/2023 5    • Ventricular Rate 05/03/2023 70    • Atrial Rate 05/03/2023 60    • VA Interval 05/03/2023 168    • QRSD Interval 05/03/2023 66    • QT Interval 05/03/2023 416    • QTC Interval 05/03/2023 449    • P Axis 05/03/2023 73    • QRS Axis 05/03/2023 45    • T Wave [place] 05/03/2023 245    • Ventricular Rate 05/03/2023 63    • Atrial Rate 05/03/2023 63    • VA Interval 05/03/2023 162    • QRSD Interval 05/03/2023 68    • QT Interval 05/03/2023 402    • QTC Interval 05/03/2023 411    • P Axis 05/03/2023 72    • QRS Axis 05/03/2023 49    • T Wave Axis 05/03/2023 -81    Office Visit on 05/02/2023   Component Date Value   • POCT SARS-CoV-2 Ag 05/02/2023 Negative    • VALID CONTROL 05/02/2023 Valid    • SARS-CoV-2 05/02/2023 Negative    • INFLUENZA A PCR 05/02/2023 Negative    • INFLUENZA B PCR 05/02/2023 Negative    Appointment on 01/23/2023   Component Date Value   • WBC 01/23/2023 6 15    • RBC 01/23/2023 4 13    • Hemoglobin 01/23/2023 13 3    • Hematocrit 01/23/2023 40 3    • MCV 01/23/2023 98    • MCH 01/23/2023 32 2    • MCHC 01/23/2023 33 0    • RDW 01/23/2023 12 9    • MPV 01/23/2023 10 3    • Platelets 30/92/9642 316    • nRBC 01/23/2023 0    • Neutrophils Relative 01/23/2023 55    • Immat GRANS % 01/23/2023 0    • Lymphocytes Relative 01/23/2023 32    • Monocytes Relative 01/23/2023 9    • Eosinophils Relative 01/23/2023 3    • Basophils Relative 01/23/2023 1    • Neutrophils Absolute 01/23/2023 3 34    • Immature Grans Absolute 2023 0 02    • Lymphocytes Absolute 2023 1 99    • Monocytes Absolute 2023 0 57    • Eosinophils Absolute 2023 0 19    • Basophils Absolute 2023 0 04    • Sodium 2023 139    • Potassium 2023 4 3    • Chloride 2023 107    • CO2 2023 28    • ANION GAP 2023 4    • BUN 2023 17    • Creatinine 2023 0 85    • Glucose, Fasting 2023 110 (H)    • Calcium 2023 9 1    • AST 2023 20    • ALT 2023 22    • Alkaline Phosphatase 2023 68    • Total Protein 2023 7 6    • Albumin 2023 3 9    • Total Bilirubin 2023 0 58    • eGFR 2023 66          Imaging: I have personally reviewed pertinent reports  ECHO: Results for orders placed during the hospital encounter of 20    Echo complete with contrast if indicated    Narrative  [address]  [address] [facility]  [phone]    Transthoracic Echocardiogram  2D, M-mode, Doppler, and Color Doppler    Study date:  25-Sep-2020    Patient: Ileana Durham  MR number: [id number]  Account number: [de-identified]  : 1945  Age: 76 years  Gender: Female  Status: Outpatient  Location: Echo lab  Height: 63 in  Weight: 156 6 lb  BP: 157/ 87 mmHg    Indications: Atrial fibrillation    Diagnoses: I48 0 - Atrial fibrillation    Sonographer:  YUSUF Prieto  Primary Physician:  Barby Marquez MD  Referring Physician:  Kerrie Lopez MD  Group:  [facility]  Interpreting Physician:  Lilli Councilman, MD    SUMMARY    LEFT VENTRICLE:  Systolic function was normal  Ejection fraction was estimated in the range of 55 % to 60 % to be 55 %  There were no regional wall motion abnormalities  Wall thickness was at the upper limits of normal     LEFT ATRIUM:  The atrium was moderately dilated  RIGHT ATRIUM:  The atrium was moderately to markedly dilated      MITRAL VALVE:  There was at least mild to moderate regurgitation  AORTIC VALVE:  There was mild regurgitation  TRICUSPID VALVE:  There was moderate to severe regurgitation  Estimated peak PA pressure was 50 mmHg  IVC, HEPATIC VEINS:  The respirophasic change in diameter was more than 50%  HISTORY: PRIOR HISTORY: A Fib ,Hypothyroidism,HTN,PAC,Dyslipidemia,Arthritis,Fibromyalgia,palpitation  PROCEDURE: The procedure was performed in the echo lab  This was a routine study  The transthoracic approach was used  The study included complete 2D imaging, M-mode, complete spectral Doppler, and color Doppler  The heart rate was 138  bpm, at the start of the study  Images were obtained from the parasternal, apical, subcostal, and suprasternal notch acoustic windows  Intravenous contrast ( 0 4ml Definity in NSS) was administered to enhance Doppler signals  Image quality  was adequate  LEFT VENTRICLE: Size was normal  Systolic function was normal  Ejection fraction was estimated in the range of 55 % to 60 % to be 55 %  There were no regional wall motion abnormalities  Wall thickness was at the upper limits of normal     RIGHT VENTRICLE: The size was normal  Systolic function was normal     LEFT ATRIUM: The atrium was moderately dilated  RIGHT ATRIUM: The atrium was moderately to markedly dilated  MITRAL VALVE: There was normal leaflet separation  DOPPLER: The transmitral velocity was within the normal range  There was no evidence for stenosis  There was at least mild to moderate regurgitation  AORTIC VALVE: The valve was trileaflet  Leaflets exhibited mildly increased thickness and normal cuspal separation  DOPPLER: Transaortic velocity was within the normal range  There was no evidence for stenosis  There was mild  regurgitation  TRICUSPID VALVE: DOPPLER: There was moderate to severe regurgitation  Estimated peak PA pressure was 50 mmHg  PULMONIC VALVE: DOPPLER: There was trace regurgitation      PERICARDIUM: There was no thickening or calcification  There was no pericardial effusion  AORTA: The root exhibited normal size  SYSTEMIC VEINS: IVC: The inferior vena cava was normal in size  The respirophasic change in diameter was more than 50%  SYSTEM MEASUREMENT TABLES    2D mode  AoR Diam 2D: 3 cm  LA Diam (2D): 3 8 cm  LA/Ao (2D): 1 27  FS (2D Teich): 35 1 %  IVSd (2D): 0 93 cm  LVDEV: 87 2 cmï¾³  LVEDV MOD BP: 95 cmï¾³  LVESV: 30 9 cmï¾³  LVIDd(2D): 4 39 cm  LVISd (2D): 2 85 cm  LVOT Area 2D: 2 84 cmï¾²  LVPWd (2D): 0 89 cm  SV (Teich): 56 3 cmï¾³    Apical four chamber  LVEDV MOD A4C: 96 cmï¾³  LVEF A4C: 49 %  LVLD A4C: 7 11 cm    Apical two chamber  LVEF A2C: 61 %    Unspecified Scan Mode  KOMAL Cont Eq (Peak Tc): 2 59 cmï¾²  LVOT (VTI): 21 2 cm  LVOT Diam : 1 9 cm  LVOT Vmax: 1130 mm/s  LVOT Vmax; Mean: 1130 mm/s  Peak Grad ; Mean: 5 mm[Hg]  SV (LVOT): 60 cmï¾³  VTI;Mean: 2 mm[Hg]  MR Vol  (PISA): 98 cmï¾³  MV Peak A Tc: 395 mm/s  MV Peak E Tc  Mean: 854 mm/s  MVA (PHT): 4 4 cmï¾²  Max P mm[Hg]  PHT: 50 ms  V Max: 5600 mm/s  Vmax: 5600 mm/s  Max P mm[Hg]  V Max: 3010 mm/s  Vmax: 3040 mm/s  RA Area: 28 2 cmï¾²  RA Volume: 103 cmï¾³  TAPSE: 2 3 cm    [employer] Accredited Echocardiography Laboratory    Prepared and electronically signed by    Jose Manuel Khoury MD  Signed 25-Sep-2020 16:34:04      No results found for this or any previous visit  NUCLEAR STRESS TEST 3/2021: SUMMARY:  -  Stress results: Target heart rate was achieved  There was no chest pain during stress  -  ECG conclusions: The stress ECG was normal   -  Perfusion imaging: There were no perfusion defects   -  Gated SPECT: The calculated left ventricular ejection fraction was 74 %  Left ventricular ejection fraction was within normal limits by visual estimate  There was no left ventricular regional abnormality      IMPRESSIONS: Normal study after pharmacologic stress  Myocardial perfusion imaging was normal at rest and with stress   Left ventricular systolic function was normal       EVENT MONITOR 5/2023:        EKG: Normal sinus rhythm with frequent runs of PAT around 100 bpm, baseline heart rate around 60 bpm, QTc around 450 ms

## 2023-06-14 ENCOUNTER — OFFICE VISIT (OUTPATIENT)
Dept: CARDIOLOGY CLINIC | Facility: CLINIC | Age: 78
End: 2023-06-14
Payer: MEDICARE

## 2023-06-14 VITALS
HEART RATE: 59 BPM | DIASTOLIC BLOOD PRESSURE: 80 MMHG | BODY MASS INDEX: 26.15 KG/M2 | HEIGHT: 63 IN | WEIGHT: 147.6 LBS | SYSTOLIC BLOOD PRESSURE: 132 MMHG

## 2023-06-14 DIAGNOSIS — I49.1 PAC (PREMATURE ATRIAL CONTRACTION): ICD-10-CM

## 2023-06-14 DIAGNOSIS — I10 ESSENTIAL HYPERTENSION: ICD-10-CM

## 2023-06-14 DIAGNOSIS — I47.1 ATRIAL TACHYCARDIA (HCC): ICD-10-CM

## 2023-06-14 DIAGNOSIS — I48.19 PERSISTENT ATRIAL FIBRILLATION (HCC): Primary | ICD-10-CM

## 2023-06-14 DIAGNOSIS — I48.0 PAROXYSMAL ATRIAL FIBRILLATION (HCC): ICD-10-CM

## 2023-06-14 PROCEDURE — 99213 OFFICE O/P EST LOW 20 MIN: CPT | Performed by: PHYSICIAN ASSISTANT

## 2023-06-14 PROCEDURE — 93000 ELECTROCARDIOGRAM COMPLETE: CPT | Performed by: PHYSICIAN ASSISTANT

## 2023-06-15 ENCOUNTER — TELEPHONE (OUTPATIENT)
Dept: CARDIOLOGY CLINIC | Facility: CLINIC | Age: 78
End: 2023-06-15

## 2023-06-15 DIAGNOSIS — I48.0 PAROXYSMAL ATRIAL FIBRILLATION (HCC): ICD-10-CM

## 2023-06-15 DIAGNOSIS — I48.0 PAROXYSMAL ATRIAL FIBRILLATION (HCC): Primary | ICD-10-CM

## 2023-06-15 RX ORDER — SOTALOL HYDROCHLORIDE 120 MG/1
60 TABLET ORAL 2 TIMES DAILY
Qty: 60 TABLET | Refills: 3 | Status: SHIPPED | OUTPATIENT
Start: 2023-06-15

## 2023-06-15 NOTE — TELEPHONE ENCOUNTER
Patient scheduled for DELANO/A fib at [facility] on 07/21/2023  with Dr Dany Arroyo  Patient aware of general instructions, labs test required  Meds holds: XARELTO:To hold the morning of the procedure  SOTALOL: To hold the day prior to the procedure and the morning of  Last dose on Wed 7/19/2023      Can we please check insurance for approval

## 2023-06-15 NOTE — TELEPHONE ENCOUNTER
----- Message from Patricia Sultana sent at 6/15/2023 12:05 PM EDT -----  Called and gave pt phone [phone] for scheduling the Echo  I can't schedule if the pt is not in front of me as the schedulers ask patient specific questions   ----- Message -----  From: Osei Montalvo MA  Sent: 6/14/2023   3:03 PM EDT  To: Cardiology Ep Clerical    Good afternoon,  Can someone please help the patient to schedule the ECHO test?  Thank you     ----- Message -----  From: Jem Neal PA-C  Sent: 6/14/2023   2:43 PM EDT  To: Cardiology Surgery Coordinator    Hello! This is a patient of Dr Philip Houston and she needs a repeat DELANO/atrial fibrillation ablation with NAVX  She is on Xarelto, and she will need to hold it the day of the ablation (OK to take the day before)  He would also like her to hold Sotalol the entire day prior to the procedure and the morning of  He is asking that she also have a repeat echocardiogram prior, and I placed that order  I did not tell her that during our visit, so if you can mention it to her and have her call central scheduling that would be great  I tried to place the orders for DELANO/afib  She is aware of these recommendations and is awaiting your call  Thanks!   Rody Sage

## 2023-06-16 ENCOUNTER — CLINICAL SUPPORT (OUTPATIENT)
Dept: CARDIOLOGY CLINIC | Facility: CLINIC | Age: 78
End: 2023-06-16
Payer: MEDICARE

## 2023-06-16 DIAGNOSIS — I48.19 PERSISTENT ATRIAL FIBRILLATION (HCC): ICD-10-CM

## 2023-06-16 PROCEDURE — 93244 EXT ECG>48HR<7D REV&INTERPJ: CPT | Performed by: INTERNAL MEDICINE

## 2023-06-20 ENCOUNTER — HOSPITAL ENCOUNTER (OUTPATIENT)
Dept: NON INVASIVE DIAGNOSTICS | Facility: CLINIC | Age: 78
Discharge: HOME | End: 2023-06-20
Payer: MEDICARE

## 2023-06-20 VITALS
DIASTOLIC BLOOD PRESSURE: 80 MMHG | HEART RATE: 55 BPM | SYSTOLIC BLOOD PRESSURE: 132 MMHG | HEIGHT: 63 IN | BODY MASS INDEX: 26.05 KG/M2 | WEIGHT: 147 LBS

## 2023-06-20 DIAGNOSIS — I49.1 PAC (PREMATURE ATRIAL CONTRACTION): ICD-10-CM

## 2023-06-20 DIAGNOSIS — I48.0 PAROXYSMAL ATRIAL FIBRILLATION (HCC): ICD-10-CM

## 2023-06-20 LAB
AORTIC ROOT: 3 CM
APICAL FOUR CHAMBER EJECTION FRACTION: 57 %
ASCENDING AORTA: 2.9 CM
E WAVE DECELERATION TIME: 145 MS
FRACTIONAL SHORTENING: 31 (ref 28–44)
INTERVENTRICULAR SEPTUM IN DIASTOLE (PARASTERNAL SHORT AXIS VIEW): 0.8 CM
INTERVENTRICULAR SEPTUM: 0.8 CM (ref 0.6–1.1)
LAAS-AP2: 26.3 CM2
LAAS-AP4: 26.1 CM2
LEFT ATRIUM SIZE: 4.2 CM
LEFT INTERNAL DIMENSION IN SYSTOLE: 3.3 CM (ref 2.1–4)
LEFT VENTRICULAR INTERNAL DIMENSION IN DIASTOLE: 4.8 CM (ref 3.5–6)
LEFT VENTRICULAR POSTERIOR WALL IN END DIASTOLE: 0.8 CM
LEFT VENTRICULAR STROKE VOLUME: 63 ML
LVSV (TEICH): 63 ML
MV E'TISSUE VEL-SEP: 10 CM/S
MV PEAK A VEL: 0.33 M/S
MV PEAK E VEL: 84 CM/S
MV STENOSIS PRESSURE HALF TIME: 42 MS
MV VALVE AREA P 1/2 METHOD: 5.24
PISA MRMAX VEL: 0.42 M/S
PISA RADIUS: 0.5 CM
RA PRESSURE ESTIMATED: 5 MMHG
RIGHT ATRIUM AREA SYSTOLE A4C: 16.8 CM2
RIGHT VENTRICLE ID DIMENSION: 5.1 CM
RV PSP: 54 MMHG
SL CV LEFT ATRIUM LENGTH A2C: 6.8 CM
SL CV LV EF: 57
SL CV PED ECHO LEFT VENTRICLE DIASTOLIC VOLUME (MOD BIPLANE) 2D: 109 ML
SL CV PED ECHO LEFT VENTRICLE SYSTOLIC VOLUME (MOD BIPLANE) 2D: 46 ML
TR MAX PG: 49 MMHG
TR PEAK VELOCITY: 3.5 M/S
TRICUSPID ANNULAR PLANE SYSTOLIC EXCURSION: 2.2 CM
TRICUSPID VALVE PEAK REGURGITATION VELOCITY: 3.51 M/S

## 2023-06-20 PROCEDURE — 93306 TTE W/DOPPLER COMPLETE: CPT | Performed by: INTERNAL MEDICINE

## 2023-06-20 PROCEDURE — 93306 TTE W/DOPPLER COMPLETE: CPT

## 2023-06-30 ENCOUNTER — OFFICE VISIT (OUTPATIENT)
Dept: INTERNAL MEDICINE CLINIC | Facility: CLINIC | Age: 78
End: 2023-06-30
Payer: MEDICARE

## 2023-06-30 VITALS
HEIGHT: 63 IN | WEIGHT: 148.6 LBS | OXYGEN SATURATION: 98 % | HEART RATE: 54 BPM | DIASTOLIC BLOOD PRESSURE: 72 MMHG | SYSTOLIC BLOOD PRESSURE: 124 MMHG | TEMPERATURE: 97.8 F | BODY MASS INDEX: 26.33 KG/M2

## 2023-06-30 DIAGNOSIS — E03.9 ACQUIRED HYPOTHYROIDISM: ICD-10-CM

## 2023-06-30 DIAGNOSIS — I10 ESSENTIAL HYPERTENSION: ICD-10-CM

## 2023-06-30 DIAGNOSIS — M81.0 AGE-RELATED OSTEOPOROSIS WITHOUT CURRENT PATHOLOGICAL FRACTURE: ICD-10-CM

## 2023-06-30 DIAGNOSIS — E55.9 VITAMIN D DEFICIENCY: ICD-10-CM

## 2023-06-30 DIAGNOSIS — I48.0 PAROXYSMAL ATRIAL FIBRILLATION (HCC): Primary | ICD-10-CM

## 2023-06-30 RX ORDER — LOSARTAN POTASSIUM 25 MG/1
25 TABLET ORAL DAILY
Qty: 90 TABLET | Refills: 0 | Status: CANCELLED | OUTPATIENT
Start: 2023-06-30

## 2023-06-30 RX ORDER — LOSARTAN POTASSIUM 25 MG/1
25 TABLET ORAL DAILY
Qty: 30 TABLET | Refills: 4 | Status: SHIPPED | OUTPATIENT
Start: 2023-06-30 | End: 2023-07-03 | Stop reason: SDUPTHER

## 2023-06-30 NOTE — PROGRESS NOTES
Assessment/Plan:           1  Paroxysmal atrial fibrillation (HCC)  Comments:  Has been in and out of A-fib  She is scheduled for an ablation in July 2  Acquired hypothyroidism  Comments:  TSH and free T4 pending  Orders:  -     T4, free; Future  -     TSH, 3rd generation; Future    3  Vitamin D deficiency    4  Age-related osteoporosis without current pathological fracture    5  Essential hypertension  Comments:  Continue losartan 25 mg daily  Orders:  -     losartan (COZAAR) 25 mg tablet; Take 1 tablet (25 mg total) by mouth daily           1  Paroxysmal atrial fibrillation (HCC)      2  Acquired hypothyroidism    - T4, free; Future  - TSH, 3rd generation; Future       No problem-specific Assessment & Plan notes found for this encounter  Subjective:      Patient ID: Duane Poag is a 66 y o  female  HPI    The following portions of the patient's history were reviewed and updated as appropriate: She  has a past medical history of Acid reflux, Arthritis, Cataract, Colon polyp, Disease of thyroid gland, Dyslipidemia (high LDL; low HDL), Fibromyalgia, GERD (gastroesophageal reflux disease), Hyperlipidemia, Hypertension, Hypothyroidism, Palpitation, Paroxysmal atrial fibrillation ([address] ), and Shingles (09/12/2010)  She   Patient Active Problem List    Diagnosis Date Noted   • Fibromyalgia 10/10/2022   • Vitamin D deficiency 10/10/2022   • Age-related osteoporosis without current pathological fracture 10/10/2022   • Acid reflux    • Abnormal ECG 03/03/2021   • Presyncope 01/14/2021   • Acquired hypothyroidism 09/11/2020   • Plantar fasciitis 09/11/2020   • Paroxysmal atrial fibrillation ([address] ) 07/10/2018   • Essential hypertension 07/10/2018   • PAC (premature atrial contraction) 07/10/2018   • Dyslipidemia 07/10/2018     She  has a past surgical history that includes Replacement total knee; Tonsillectomy; Hysterectomy (05/2016); Cataract extraction, bilateral (11/2016); Colonoscopy (07/11/2016);  Mammo (historical) (4/8/2010 & 2/19/2020); Colonoscopy; and Upper gastrointestinal endoscopy  Her family history includes Atrial fibrillation in her father; Cancer in her father; Chronic bronchitis in her mother; Hyperlipidemia in her mother; Hypertension in her mother; Parkinsonism in her mother; Stroke in her father and mother  She  reports that she has never smoked  She has never used smokeless tobacco  She reports that she does not drink alcohol and does not use drugs  Current Outpatient Medications   Medication Sig Dispense Refill   • Cyanocobalamin (VITAMIN B 12 PO) Take by mouth 2 (two) times a week     • ergocalciferol (VITAMIN D2) 50,000 units Take 1 capsule (50,000 Units total) by mouth every 14 (fourteen) days 7 capsule 3   • levothyroxine 75 mcg tablet Take 1 tablet (75 mcg total) by mouth daily 90 tablet 1   • losartan (COZAAR) 25 mg tablet Take 1 tablet (25 mg total) by mouth daily 30 tablet 4   • nitroglycerin (NITROSTAT) 0 4 mg SL tablet Place 1 tablet (0 4 mg total) under the tongue every 5 (five) minutes as needed for chest pain 15 tablet 1   • omeprazole (PriLOSEC) 20 mg delayed release capsule Take 1 capsule by mouth daily     • rivaroxaban (XARELTO) 20 mg tablet Take 1 tablet (20 mg total) by mouth daily with dinner 90 tablet 3   • sotalol (BETAPACE) 120 mg tablet Take 0 5 tablets (60 mg total) by mouth 2 (two) times a day (Patient taking differently: Take 120 mg by mouth 2 (two) times a day) 60 tablet 3   • triamcinolone (KENALOG) 0 1 % ointment Apply topically 2 (two) times a day 60 g 0     No current facility-administered medications for this visit       Current Outpatient Medications on File Prior to Visit   Medication Sig   • Cyanocobalamin (VITAMIN B 12 PO) Take by mouth 2 (two) times a week   • ergocalciferol (VITAMIN D2) 50,000 units Take 1 capsule (50,000 Units total) by mouth every 14 (fourteen) days   • levothyroxine 75 mcg tablet Take 1 tablet (75 mcg total) by mouth daily   • "nitroglycerin (NITROSTAT) 0 4 mg SL tablet Place 1 tablet (0 4 mg total) under the tongue every 5 (five) minutes as needed for chest pain   • omeprazole (PriLOSEC) 20 mg delayed release capsule Take 1 capsule by mouth daily   • rivaroxaban (XARELTO) 20 mg tablet Take 1 tablet (20 mg total) by mouth daily with dinner   • sotalol (BETAPACE) 120 mg tablet Take 0 5 tablets (60 mg total) by mouth 2 (two) times a day (Patient taking differently: Take 120 mg by mouth 2 (two) times a day)   • triamcinolone (KENALOG) 0 1 % ointment Apply topically 2 (two) times a day   • [DISCONTINUED] losartan (COZAAR) 25 mg tablet Take 1 tablet (25 mg total) by mouth daily     No current facility-administered medications on file prior to visit  She is allergic to iodine - food allergy and iodinated contrast media       Review of Systems   Constitutional: Negative for appetite change, chills, fatigue and fever  HENT: Negative for sore throat and trouble swallowing  Eyes: Negative for redness  Respiratory: Negative for shortness of breath  Cardiovascular: Negative for chest pain and palpitations  Gastrointestinal: Negative for abdominal pain, constipation and diarrhea  Genitourinary: Negative for dysuria and hematuria  Musculoskeletal: Negative for back pain and neck pain  Skin: Negative for rash  Neurological: Negative for seizures, weakness and headaches  Hematological: Negative for adenopathy  Psychiatric/Behavioral: Negative for confusion  The patient is not nervous/anxious            Objective:      /72   Pulse (!) 54   Temp 97 8 °F (36 6 °C) (Temporal)   Ht 5' 3\" (1 6 m)   Wt 67 4 kg (148 lb 9 6 oz)   SpO2 98%   BMI 26 32 kg/m²     Results Reviewed     None          Recent Results (from the past 1344 hour(s))   Echo complete w/ contrast if indicated    Collection Time: 06/20/23  1:35 PM   Result Value Ref Range    A4C EF 57 %    LVIDd 4 80 cm    LVIDS 3 30 cm    IVSd 0 80 cm    LVPWd 0 80 cm    FS " 31 28 - 44    MV E' Tissue Velocity Septal 10 cm/s    E wave deceleration time 145 ms    MV Peak E Tc 84 cm/s    MV Peak A Tc 0 33 m/s    RVID d 5 1 (A) cm    Tricuspid annular plane systolic excursion 5 99 cm    LA size 4 2 cm    LA length (A2C) 6 80 cm    RAA A4C 16 8 cm2    MV stenosis pressure 1/2 time 42 ms    MV valve area p 1/2 method 5 24     Radius 0 5 cm    MR max velocity 0 42 m/s    TR Peak Tc 3 5 m/s    Triscuspid Valve Regurgitation Peak Gradient 49 0 mmHg    Ao root 3 00 cm    Asc Ao 2 9 cm    Tricuspid valve peak regurgitation velocity 3 51 m/s    Left ventricular stroke volume (2D) 63 00 mL    IVS 0 8 cm    LEFT VENTRICLE SYSTOLIC VOLUME (MOD BIPLANE) 2D 46 mL    LV DIASTOLIC VOLUME (MOD BIPLANE) 2D 109 mL    Left Atrium Area-systolic Four Chamber 70 8 cm2    Left Atrium Area-systolic Apical Two Chamber 26 3 cm2    LVSV, 2D 63 mL    Est  RA pres 5 0 mmHg    Right Ventricular Peak Systolic Pressure 99 57 mmHg    LV EF 57         Physical Exam  Constitutional:       General: She is not in acute distress  Appearance: Normal appearance  HENT:      Head: Normocephalic and atraumatic  Nose: Nose normal       Mouth/Throat:      Mouth: Mucous membranes are moist    Eyes:      Extraocular Movements: Extraocular movements intact  Pupils: Pupils are equal, round, and reactive to light  Cardiovascular:      Rate and Rhythm: Normal rate and regular rhythm  Pulses: Normal pulses  Heart sounds: Normal heart sounds  No murmur heard  No friction rub  Pulmonary:      Effort: Pulmonary effort is normal  No respiratory distress  Breath sounds: Normal breath sounds  No wheezing  Abdominal:      General: Abdomen is flat  Bowel sounds are normal  There is no distension  Palpations: Abdomen is soft  There is no mass  Tenderness: There is no abdominal tenderness  There is no guarding  Musculoskeletal:         General: Normal range of motion        Cervical back: Normal range of motion  Neurological:      General: No focal deficit present  Mental Status: She is alert and oriented to person, place, and time  Mental status is at baseline  Cranial Nerves: No cranial nerve deficit  Psychiatric:         Mood and Affect: Mood normal          Behavior: Behavior normal        BMI Counseling: Body mass index is 26 32 kg/m²  The BMI is above normal  Nutrition recommendations include reducing portion sizes

## 2023-07-03 RX ORDER — LOSARTAN POTASSIUM 25 MG/1
25 TABLET ORAL DAILY
Qty: 90 TABLET | Refills: 1 | Status: SHIPPED | OUTPATIENT
Start: 2023-07-03

## 2023-07-11 ENCOUNTER — APPOINTMENT (OUTPATIENT)
Dept: LAB | Facility: AMBULARY SURGERY CENTER | Age: 78
End: 2023-07-11
Payer: MEDICARE

## 2023-07-11 DIAGNOSIS — E03.9 ACQUIRED HYPOTHYROIDISM: ICD-10-CM

## 2023-07-11 LAB
ALBUMIN SERPL BCP-MCNC: 3.7 G/DL (ref 3.5–5)
ALP SERPL-CCNC: 69 U/L (ref 46–116)
ALT SERPL W P-5'-P-CCNC: 19 U/L (ref 12–78)
ANION GAP SERPL CALCULATED.3IONS-SCNC: 2 MMOL/L
AST SERPL W P-5'-P-CCNC: 21 U/L (ref 5–45)
BASOPHILS # BLD AUTO: 0.05 THOUSANDS/ÂΜL (ref 0–0.1)
BASOPHILS NFR BLD AUTO: 1 % (ref 0–1)
BILIRUB SERPL-MCNC: 0.59 MG/DL (ref 0.2–1)
BUN SERPL-MCNC: 21 MG/DL (ref 5–25)
CALCIUM SERPL-MCNC: 9.4 MG/DL (ref 8.3–10.1)
CHLORIDE SERPL-SCNC: 108 MMOL/L (ref 96–108)
CO2 SERPL-SCNC: 28 MMOL/L (ref 21–32)
CREAT SERPL-MCNC: 0.86 MG/DL (ref 0.6–1.3)
EOSINOPHIL # BLD AUTO: 0.22 THOUSAND/ÂΜL (ref 0–0.61)
EOSINOPHIL NFR BLD AUTO: 4 % (ref 0–6)
ERYTHROCYTE [DISTWIDTH] IN BLOOD BY AUTOMATED COUNT: 13.4 % (ref 11.6–15.1)
GFR SERPL CREATININE-BSD FRML MDRD: 64 ML/MIN/1.73SQ M
GLUCOSE P FAST SERPL-MCNC: 89 MG/DL (ref 65–99)
HCT VFR BLD AUTO: 41.1 % (ref 34.8–46.1)
HGB BLD-MCNC: 13 G/DL (ref 11.5–15.4)
IMM GRANULOCYTES # BLD AUTO: 0.01 THOUSAND/UL (ref 0–0.2)
IMM GRANULOCYTES NFR BLD AUTO: 0 % (ref 0–2)
LYMPHOCYTES # BLD AUTO: 1.9 THOUSANDS/ÂΜL (ref 0.6–4.47)
LYMPHOCYTES NFR BLD AUTO: 31 % (ref 14–44)
MCH RBC QN AUTO: 31.3 PG (ref 26.8–34.3)
MCHC RBC AUTO-ENTMCNC: 31.6 G/DL (ref 31.4–37.4)
MCV RBC AUTO: 99 FL (ref 82–98)
MONOCYTES # BLD AUTO: 0.66 THOUSAND/ÂΜL (ref 0.17–1.22)
MONOCYTES NFR BLD AUTO: 11 % (ref 4–12)
NEUTROPHILS # BLD AUTO: 3.25 THOUSANDS/ÂΜL (ref 1.85–7.62)
NEUTS SEG NFR BLD AUTO: 53 % (ref 43–75)
NRBC BLD AUTO-RTO: 0 /100 WBCS
PLATELET # BLD AUTO: 311 THOUSANDS/UL (ref 149–390)
PMV BLD AUTO: 10.3 FL (ref 8.9–12.7)
POTASSIUM SERPL-SCNC: 4.4 MMOL/L (ref 3.5–5.3)
PROT SERPL-MCNC: 7.4 G/DL (ref 6.4–8.4)
RBC # BLD AUTO: 4.16 MILLION/UL (ref 3.81–5.12)
SODIUM SERPL-SCNC: 138 MMOL/L (ref 135–147)
T4 FREE SERPL-MCNC: 1.08 NG/DL (ref 0.61–1.12)
TSH SERPL DL<=0.05 MIU/L-ACNC: 1.68 UIU/ML (ref 0.45–4.5)
WBC # BLD AUTO: 6.09 THOUSAND/UL (ref 4.31–10.16)

## 2023-07-11 PROCEDURE — 84443 ASSAY THYROID STIM HORMONE: CPT

## 2023-07-11 PROCEDURE — 84439 ASSAY OF FREE THYROXINE: CPT

## 2023-07-17 DIAGNOSIS — I48.0 PAROXYSMAL ATRIAL FIBRILLATION (HCC): Primary | ICD-10-CM

## 2023-07-17 RX ORDER — SOTALOL HYDROCHLORIDE 120 MG/1
120 TABLET ORAL 2 TIMES DAILY
Qty: 60 TABLET | Refills: 3 | Status: SHIPPED | OUTPATIENT
Start: 2023-07-17

## 2023-07-24 ENCOUNTER — TELEPHONE (OUTPATIENT)
Dept: CARDIOLOGY CLINIC | Facility: CLINIC | Age: 78
End: 2023-07-24

## 2023-07-24 ENCOUNTER — PREP FOR PROCEDURE (OUTPATIENT)
Dept: CARDIOLOGY CLINIC | Facility: CLINIC | Age: 78
End: 2023-07-24

## 2023-07-24 DIAGNOSIS — I48.0 PAROXYSMAL ATRIAL FIBRILLATION (HCC): Primary | ICD-10-CM

## 2023-07-24 DIAGNOSIS — I49.1 PAC (PREMATURE ATRIAL CONTRACTION): Primary | ICD-10-CM

## 2023-07-24 DIAGNOSIS — I49.1 PAC (PREMATURE ATRIAL CONTRACTION): ICD-10-CM

## 2023-07-24 NOTE — TELEPHONE ENCOUNTER
----- Message from Omar Torres MD sent at 7/21/2023  5:02 PM EDT -----  Afib ablaiton was cancelled today. Talked w her. HR running slow in 40s. We will plan pacemaker instead. Can reschedule her for [employer]onic dual chamber pacemaker. HOld xarelto day before.     Thank you

## 2023-07-24 NOTE — TELEPHONE ENCOUNTER
Patient scheduled for Dual chamber pacer implant at [facility] on  08/16/2023  with Dr Betito Borja. Patient aware of general instructions, mail out with labs order. Meds holds: Xarelto to hold day prior. Losartan to hold 24hrs prior. Patient cover under medicare. Patient present DYE allergy, premedications called in to her retail RX [facility] on [address] ([place]).

## 2023-08-02 NOTE — TELEPHONE ENCOUNTER
Sabine Allan is not required. Verified that patient has Medicare primary with a supplement secondary.

## 2023-08-04 ENCOUNTER — TELEPHONE (OUTPATIENT)
Dept: CARDIOLOGY CLINIC | Facility: CLINIC | Age: 78
End: 2023-08-04

## 2023-08-04 NOTE — TELEPHONE ENCOUNTER
Pt concerned BP this morning on left arm 154/107, 153/101   When I call back she took BP in right arm 141/89, 142/79. She is scheduled for a PM implant 8/16/23.   Denies any dizziness, lightheadedness, CP  She will continue to monitor

## 2023-08-08 DIAGNOSIS — I48.0 PAROXYSMAL ATRIAL FIBRILLATION (HCC): ICD-10-CM

## 2023-08-09 RX ORDER — SOTALOL HYDROCHLORIDE 120 MG/1
120 TABLET ORAL 2 TIMES DAILY
Qty: 180 TABLET | Refills: 2 | Status: SHIPPED | OUTPATIENT
Start: 2023-08-09

## 2023-08-15 ENCOUNTER — ANESTHESIA EVENT (OUTPATIENT)
Dept: NON INVASIVE DIAGNOSTICS | Facility: HOSPITAL | Age: 78
End: 2023-08-15
Payer: MEDICARE

## 2023-08-16 ENCOUNTER — ANESTHESIA (OUTPATIENT)
Dept: NON INVASIVE DIAGNOSTICS | Facility: HOSPITAL | Age: 78
End: 2023-08-16
Payer: MEDICARE

## 2023-08-16 ENCOUNTER — APPOINTMENT (OUTPATIENT)
Dept: RADIOLOGY | Facility: HOSPITAL | Age: 78
End: 2023-08-16
Payer: MEDICARE

## 2023-08-16 ENCOUNTER — HOSPITAL ENCOUNTER (OUTPATIENT)
Facility: HOSPITAL | Age: 78
Setting detail: OUTPATIENT SURGERY
Discharge: HOME | End: 2023-08-16
Attending: INTERNAL MEDICINE | Admitting: INTERNAL MEDICINE
Payer: MEDICARE

## 2023-08-16 VITALS
HEIGHT: 63 IN | BODY MASS INDEX: 26.22 KG/M2 | HEART RATE: 70 BPM | RESPIRATION RATE: 18 BRPM | DIASTOLIC BLOOD PRESSURE: 64 MMHG | OXYGEN SATURATION: 95 % | WEIGHT: 148 LBS | SYSTOLIC BLOOD PRESSURE: 132 MMHG | TEMPERATURE: 97.9 F

## 2023-08-16 DIAGNOSIS — I49.1 PAC (PREMATURE ATRIAL CONTRACTION): ICD-10-CM

## 2023-08-16 DIAGNOSIS — I48.0 PAROXYSMAL ATRIAL FIBRILLATION (HCC): ICD-10-CM

## 2023-08-16 DIAGNOSIS — Z95.0 PACEMAKER: Primary | ICD-10-CM

## 2023-08-16 PROBLEM — R00.1 BRADYCARDIA: Status: ACTIVE | Noted: 2023-08-16

## 2023-08-16 LAB
ANION GAP SERPL CALCULATED.3IONS-SCNC: 5 MMOL/L
ATRIAL RATE: 59 BPM
BASOPHILS # BLD AUTO: 0.02 THOUSANDS/ÂΜL (ref 0–0.1)
BASOPHILS NFR BLD AUTO: 0 % (ref 0–1)
BUN SERPL-MCNC: 20 MG/DL (ref 5–25)
CALCIUM SERPL-MCNC: 9.7 MG/DL (ref 8.3–10.1)
CHLORIDE SERPL-SCNC: 110 MMOL/L (ref 96–108)
CO2 SERPL-SCNC: 25 MMOL/L (ref 21–32)
CREAT SERPL-MCNC: 0.9 MG/DL (ref 0.6–1.3)
EOSINOPHIL # BLD AUTO: 0 THOUSAND/ÂΜL (ref 0–0.61)
EOSINOPHIL NFR BLD AUTO: 0 % (ref 0–6)
ERYTHROCYTE [DISTWIDTH] IN BLOOD BY AUTOMATED COUNT: 13.2 % (ref 11.6–15.1)
GFR SERPL CREATININE-BSD FRML MDRD: 61 ML/MIN/1.73SQ M
GLUCOSE P FAST SERPL-MCNC: 181 MG/DL (ref 65–99)
GLUCOSE SERPL-MCNC: 181 MG/DL (ref 65–140)
HCT VFR BLD AUTO: 40.3 % (ref 34.8–46.1)
HGB BLD-MCNC: 13.4 G/DL (ref 11.5–15.4)
IMM GRANULOCYTES # BLD AUTO: 0.03 THOUSAND/UL (ref 0–0.2)
IMM GRANULOCYTES NFR BLD AUTO: 1 % (ref 0–2)
INR PPP: 1.02 (ref 0.84–1.19)
LYMPHOCYTES # BLD AUTO: 1.02 THOUSANDS/ÂΜL (ref 0.6–4.47)
LYMPHOCYTES NFR BLD AUTO: 15 % (ref 14–44)
MCH RBC QN AUTO: 31.9 PG (ref 26.8–34.3)
MCHC RBC AUTO-ENTMCNC: 33.3 G/DL (ref 31.4–37.4)
MCV RBC AUTO: 96 FL (ref 82–98)
MONOCYTES # BLD AUTO: 0.06 THOUSAND/ÂΜL (ref 0.17–1.22)
MONOCYTES NFR BLD AUTO: 1 % (ref 4–12)
NEUTROPHILS # BLD AUTO: 5.48 THOUSANDS/ÂΜL (ref 1.85–7.62)
NEUTS SEG NFR BLD AUTO: 83 % (ref 43–75)
NRBC BLD AUTO-RTO: 0 /100 WBCS
P AXIS: 70 DEGREES
PLATELET # BLD AUTO: 342 THOUSANDS/UL (ref 149–390)
PMV BLD AUTO: 9.9 FL (ref 8.9–12.7)
POTASSIUM SERPL-SCNC: 4.2 MMOL/L (ref 3.5–5.3)
PR INTERVAL: 168 MS
PROTHROMBIN TIME: 13.6 SECONDS (ref 11.6–14.5)
QRS AXIS: 16 DEGREES
QRSD INTERVAL: 74 MS
QT INTERVAL: 460 MS
QTC INTERVAL: 455 MS
RBC # BLD AUTO: 4.2 MILLION/UL (ref 3.81–5.12)
SODIUM SERPL-SCNC: 140 MMOL/L (ref 135–147)
T WAVE AXIS: 51 DEGREES
VENTRICULAR RATE: 59 BPM
WBC # BLD AUTO: 6.61 THOUSAND/UL (ref 4.31–10.16)

## 2023-08-16 PROCEDURE — 93010 ELECTROCARDIOGRAM REPORT: CPT | Performed by: INTERNAL MEDICINE

## 2023-08-16 PROCEDURE — 71045 X-RAY EXAM CHEST 1 VIEW: CPT

## 2023-08-16 PROCEDURE — 85025 COMPLETE CBC W/AUTO DIFF WBC: CPT | Performed by: PHYSICIAN ASSISTANT

## 2023-08-16 PROCEDURE — 93005 ELECTROCARDIOGRAM TRACING: CPT

## 2023-08-16 PROCEDURE — 85610 PROTHROMBIN TIME: CPT | Performed by: PHYSICIAN ASSISTANT

## 2023-08-16 PROCEDURE — NC001 PR NO CHARGE: Performed by: PHYSICIAN ASSISTANT

## 2023-08-16 PROCEDURE — C1892 INTRO/SHEATH,FIXED,PEEL-AWAY: HCPCS | Performed by: INTERNAL MEDICINE

## 2023-08-16 PROCEDURE — C1898 LEAD, PMKR, OTHER THAN TRANS: HCPCS | Performed by: INTERNAL MEDICINE

## 2023-08-16 PROCEDURE — 80048 BASIC METABOLIC PNL TOTAL CA: CPT | Performed by: PHYSICIAN ASSISTANT

## 2023-08-16 PROCEDURE — 33208 INSRT HEART PM ATRIAL & VENT: CPT | Performed by: INTERNAL MEDICINE

## 2023-08-16 PROCEDURE — C1769 GUIDE WIRE: HCPCS | Performed by: INTERNAL MEDICINE

## 2023-08-16 DEVICE — LEAD 3830 US MKT/ 69CM MRI LBBAP
Type: IMPLANTABLE DEVICE | Site: HEART | Status: FUNCTIONAL
Brand: SELECTSECURE™ MRI SURESCAN™

## 2023-08-16 DEVICE — LEAD 457445 MRI US BI RCMCRD MVC
Type: IMPLANTABLE DEVICE | Site: HEART | Status: FUNCTIONAL
Brand: CAPSURE SENSE MRI™ SURESCAN™

## 2023-08-16 DEVICE — ENVELOPE CMRM6122 ABSORB MED MR
Type: IMPLANTABLE DEVICE | Site: HEART | Status: FUNCTIONAL
Brand: TYRX™

## 2023-08-16 RX ORDER — DIPHENHYDRAMINE HYDROCHLORIDE 50 MG/ML
25 INJECTION INTRAMUSCULAR; INTRAVENOUS ONCE
Status: DISCONTINUED | OUTPATIENT
Start: 2023-08-16 | End: 2023-08-16 | Stop reason: HOSPADM

## 2023-08-16 RX ORDER — FAMOTIDINE 10 MG/ML
20 INJECTION, SOLUTION INTRAVENOUS ONCE
Status: DISCONTINUED | OUTPATIENT
Start: 2023-08-16 | End: 2023-08-16 | Stop reason: HOSPADM

## 2023-08-16 RX ORDER — FENTANYL CITRATE 50 UG/ML
INJECTION, SOLUTION INTRAMUSCULAR; INTRAVENOUS AS NEEDED
Status: DISCONTINUED | OUTPATIENT
Start: 2023-08-16 | End: 2023-08-16

## 2023-08-16 RX ORDER — SODIUM CHLORIDE 9 MG/ML
INJECTION, SOLUTION INTRAVENOUS CONTINUOUS PRN
Status: DISCONTINUED | OUTPATIENT
Start: 2023-08-16 | End: 2023-08-16

## 2023-08-16 RX ORDER — PREDNISONE 20 MG/1
TABLET ORAL ONCE
COMMUNITY

## 2023-08-16 RX ORDER — ACETAMINOPHEN 325 MG/1
650 TABLET ORAL EVERY 4 HOURS PRN
Status: DISCONTINUED | OUTPATIENT
Start: 2023-08-16 | End: 2023-08-16 | Stop reason: HOSPADM

## 2023-08-16 RX ORDER — CEFAZOLIN SODIUM 1 G/50ML
1000 SOLUTION INTRAVENOUS ONCE
Status: COMPLETED | OUTPATIENT
Start: 2023-08-16 | End: 2023-08-16

## 2023-08-16 RX ORDER — PROPOFOL 10 MG/ML
INJECTION, EMULSION INTRAVENOUS CONTINUOUS PRN
Status: DISCONTINUED | OUTPATIENT
Start: 2023-08-16 | End: 2023-08-16

## 2023-08-16 RX ORDER — GENTAMICIN SULFATE 40 MG/ML
INJECTION, SOLUTION INTRAMUSCULAR; INTRAVENOUS CODE/TRAUMA/SEDATION MEDICATION
Status: DISCONTINUED | OUTPATIENT
Start: 2023-08-16 | End: 2023-08-16 | Stop reason: HOSPADM

## 2023-08-16 RX ORDER — FAMOTIDINE 20 MG/1
20 TABLET, FILM COATED ORAL ONCE
COMMUNITY

## 2023-08-16 RX ORDER — LIDOCAINE HYDROCHLORIDE 10 MG/ML
INJECTION, SOLUTION EPIDURAL; INFILTRATION; INTRACAUDAL; PERINEURAL CODE/TRAUMA/SEDATION MEDICATION
Status: DISCONTINUED | OUTPATIENT
Start: 2023-08-16 | End: 2023-08-16 | Stop reason: HOSPADM

## 2023-08-16 RX ORDER — DIPHENHYDRAMINE HCL 25 MG
25 CAPSULE ORAL ONCE
COMMUNITY

## 2023-08-16 RX ADMIN — ACETAMINOPHEN 650 MG: 325 TABLET, FILM COATED ORAL at 10:50

## 2023-08-16 RX ADMIN — FENTANYL CITRATE 25 MCG: 50 INJECTION INTRAMUSCULAR; INTRAVENOUS at 09:11

## 2023-08-16 RX ADMIN — FENTANYL CITRATE 50 MCG: 50 INJECTION INTRAMUSCULAR; INTRAVENOUS at 08:28

## 2023-08-16 RX ADMIN — CEFAZOLIN SODIUM 1000 MG: 1 SOLUTION INTRAVENOUS at 08:19

## 2023-08-16 RX ADMIN — SODIUM CHLORIDE: 0.9 INJECTION, SOLUTION INTRAVENOUS at 08:22

## 2023-08-16 RX ADMIN — PROPOFOL 90 MCG/KG/MIN: 10 INJECTION, EMULSION INTRAVENOUS at 08:28

## 2023-08-16 RX ADMIN — FENTANYL CITRATE 25 MCG: 50 INJECTION INTRAMUSCULAR; INTRAVENOUS at 09:37

## 2023-08-16 NOTE — ANESTHESIA POSTPROCEDURE EVALUATION
Post-Op Assessment Note    CV Status:  Stable    Pain management: adequate     Mental Status:  Alert and awake   Hydration Status:  Euvolemic   PONV Controlled:  Controlled   Airway Patency:  Patent      Post Op Vitals Reviewed: Yes      Staff: CRNA         No notable events documented.     BP   148/68   Temp      Pulse  69   Resp   12   SpO2   97

## 2023-08-16 NOTE — ANESTHESIA PREPROCEDURE EVALUATION
Procedure:  Cardiac pacer implant (Chest)    Relevant Problems   CARDIO   (+) Essential hypertension   (+) PAC (premature atrial contraction)   (+) Paroxysmal atrial fibrillation (HCC)      ENDO   (+) Acquired hypothyroidism      GI/HEPATIC   (+) Acid reflux      MUSCULOSKELETAL   (+) Fibromyalgia      NEURO/PSYCH   (+) Fibromyalgia      TTE: LVEF 55-60%  Denies any SOB on exertion. Occasional chest pressure with slow HR. ET > 4 mets    Had a bout of stomach flu 2 weeks ago but has since resolved  Physical Exam    Airway    Mallampati score: I  TM Distance: >3 FB  Neck ROM: full     Dental   Comment: Upper dentures are hard to remove, upper dentures and lower dentures,     Cardiovascular  Rhythm: irregular, Rate: normal,     Pulmonary  Breath sounds clear to auscultation,     Other Findings        Anesthesia Plan  ASA Score- 3     Anesthesia Type- IV sedation with anesthesia with ASA Monitors.          Additional Monitors:   Airway Plan:     Comment: Recent labs personally reviewed:  Lab Results       Component                Value               Date                       WBC                      5.97                09/28/2021                 HGB                      14.5                09/28/2021                 PLT                      335                 09/28/2021            Lab Results       Component                Value               Date                       NA                       134 (L)             03/08/2015                 K                        3.9                 09/28/2021                 BUN                      21                  09/28/2021                 CREATININE               0.88                09/28/2021                 GLUCOSE                  115                 03/08/2015            No results found for: PTT   Lab Results       Component                Value               Date                       INR                      1.17                09/28/2021              Blood type Ezio Del Cid MD, have personally seen and evaluated the patient prior to anesthetic care. I have reviewed the pre-anesthetic record, medical history, allergies, medications and any other medical records if appropriate to the anesthetic care. If a CRNA is involved in the case, I have reviewed the CRNA assessment, if present, and agree. I consented the patient for general anesthesia with appropriate airway support as indicated. We reviewed the risks associated including PONV, sore throat, allergic reaction to anesthetics and management plan to address these issues. We discussed the indication and risks associated with any invasive monitors that would be placed. We discussed post op pain control and expectations. We discussed rare complications including hypoxia, perioperative cardiac and neurologic events, and death based on the patient's baseline risk. All questions and concerns were addressed. .       Plan Factors-Exercise tolerance (METS): >4 METS. Chart reviewed. EKG reviewed. Imaging results reviewed. Existing labs reviewed. Patient summary reviewed. Patient is not a current smoker. Patient did not smoke on day of surgery. Obstructive sleep apnea risk education given perioperatively. Induction- intravenous. Postoperative Plan-     Informed Consent- Anesthetic plan and risks discussed with patient and spouse. I personally reviewed this patient with the CRNA. Discussed and agreed on the Anesthesia Plan with the CRNA. Janis Alvarez

## 2023-08-16 NOTE — H&P
H&P Exam - Electrophysiology - Cardiology   Facundo Person 66 y.o. female MRN: [id number]  Unit/Bed#: BE CATH LAB ROOM Encounter: [id number]    Assessment/Plan     Assessment:  1. Symptomatic bradycardia  -- resting heart rates in 40-50s at home  2. Tachy-indy syndrome with baseline sinus bradycardia and periods of RVR and SVT  3. Symptomatic paroxysmal atrial fibrillation, with recent cardioversion in the ER 5/3/2023 due to recurrence              A.)  Long history of PAF (likely dating back to 2014), tried on flecainide and propafenone in the past, discontinued due to intolerance              B.)  Became more persistent 6/2020, eventually cardioverted 8/2020 with subsequent reversion back to afib several months later              C.)  Due to ongoing symptoms, underwent cryoablation with pulmonary vein isolation 9/2021              D.)  Started on sotalol antiarrhythmic therapy in the post ablation, dose reduced to 80 mg BID 6/2022 due to sinus bradycardia              E.)  Maintained on Xarelto anticoagulation, not on other AV cait agents due to sinus bradycardia  4. Preserved LV systolic function with EF 55-60% per echo 9/2020              A.)  Nonischemic nuclear stress test 3/2021              B.)  Known dilated left atrium  5. Hypertension, controlled   6. Moderate tricuspid regurgitation, mild moderate regurgitation  7. Fibromyalgia  8. Hyperlipidemia    Plan:  -- NPO for implantation of a dual chamber PPM today  -- chest chest x-ray and device interrogation post procedure  -- arm restrictions x 6 weeks  -- pair remote monitor  -- F/U in device clinic 2 weeks      History of Present Illness   HPI:  Facundo Person is a 66 y.o. female with symptomatic paroxysmal atrial fibrillation, tachy-indy syndrome with baseline sinus bradycardia, preserved LV systolic function with EF 55-60%, hyperlipidemia, hypertension, valvular heart disease with moderate TR/mild MR, and fibromyalgia.  She typically follows with Dr. Rajeev Lindsey has a long history of paroxysmal atrial fibrillation, likely dating back to 2014. Margarita Arce chart review, she was tried on flecainide and propafenone, which she did not tolerate.  For many years she was maintained on metoprolol and verapamil.  She was initially on Xarelto, however it was discontinued due to low overall burden.  When she was seen 06/2020, she was found to be in asymptomatic atrial fibrillation and she was restarted on Xarelto.  She subsequently underwent successful DELANO/cardioversion 08/2020, however at that time it was noted that her left atrium was dilated.  She maintained sinus rhythm for several months, however when she was seen 11/2020 she was again noted to be back in atrial fibrillation.  At that time, rate control therapy was discussed rather than trying to pursue sinus rhythm.  When she continued to have symptoms of feeling unwell in her now persistent atrial fibrillation, she was seen in EP consultation by Dr. Chad Wall. Inocencio Canales and antiarrhythmic initiation was recommended, and in 9/2021 she underwent cryoablation with pulmonary vein isolation along with sotalol initiation. Her other AV cait agents were discontinued due to baseline sinus bradycardia. She was seen in EP follow-up 6/2022, she was bradycardic with rates in the 40s and thus her sotalol was decreased to 80 mg twice daily.     More recently, she contacted our office reporting recurrent atrial fibrillation. She was sent to the emergency room in early 5/2023, where she underwent successful cardioversion. She subsequently underwent an event monitor. Since then, she also went to the emergency room while in [place] due to ongoing palpitations. At that time, her sotalol dose was increased back to 120 mg twice daily by Dr. Chad Wall. She continues to have frequent palpitations on a daily basis.   She notes when her heart rate is elevated, but she also feels lightheaded and fatigued when she is bradycardic with rates in the 40s. Redo Afib ablation was recommended, however when she presented for the procedure, her heart rate was in the 40s and the procedure was cancelled. Instead, PPM was recommended. She presents today for PPM implantation. She has been feeling well. She denies chest pain, SOB, lightheadedness, dizziness, orthopnea, or PND. She is NPO for the procedure today.      EKG: sinus bradycardia HR 59 bpm    Review of Systems   Constitutional: Negative. HENT: Negative. Eyes: Negative. Respiratory: Negative for chest tightness and shortness of breath. Cardiovascular: Negative for chest pain and palpitations. Gastrointestinal: Negative for abdominal pain, nausea and vomiting. Endocrine: Negative. Genitourinary: Negative. Musculoskeletal: Negative. Skin: Negative for rash. Neurological: Negative for dizziness, syncope and weakness. Hematological: Negative. Psychiatric/Behavioral: Negative. ROS as noted above, otherwise 12 point review of systems was performed and is negative.      Historical Information   Past Medical History:   Diagnosis Date   • Acid reflux    • Arthritis    • Bradycardia 8/16/2023   • Cataract    • Colon polyp    • Disease of thyroid gland    • Dyslipidemia (high LDL; low HDL)    • Fibromyalgia    • GERD (gastroesophageal reflux disease)    • Hyperlipidemia    • Hypertension    • Hypothyroidism    • Palpitation    • Paroxysmal atrial fibrillation ([address])    • Shingles 09/12/2010    Right C7     Past Surgical History:   Procedure Laterality Date   • CATARACT EXTRACTION, BILATERAL  11/2016   • COLONOSCOPY  07/11/2016   • COLONOSCOPY     • HYSTERECTOMY  05/2016    total hysterectomy   • MAMMO (HISTORICAL)  4/8/2010 & 2/19/2020   • REPLACEMENT TOTAL KNEE     • TONSILLECTOMY     • UPPER GASTROINTESTINAL ENDOSCOPY       Family History:   Family History   Problem Relation Age of Onset   • Hypertension Mother    • Hyperlipidemia Mother    • Parkinsonism Mother    • Stroke Mother    • Chronic bronchitis Mother    • Atrial fibrillation Father    • Stroke Father    • Cancer Father         skin cancer   • Anuerysm Neg Hx    • Clotting disorder Neg Hx        Social History   Social History     Substance and Sexual Activity   Alcohol Use No     Social History     Substance and Sexual Activity   Drug Use No     Social History     Tobacco Use   Smoking Status Never   Smokeless Tobacco Never       Meds/Allergies   all medications and allergies reviewed  Home Medications:   Medications Prior to Admission   Medication   • Cyanocobalamin (VITAMIN B 12 PO)   • diphenhydrAMINE (BENADRYL) 25 mg capsule   • ergocalciferol (VITAMIN D2) 50,000 units   • famotidine (PEPCID) 20 mg tablet   • levothyroxine 75 mcg tablet   • losartan (COZAAR) 25 mg tablet   • omeprazole (PriLOSEC) 20 mg delayed release capsule   • predniSONE 20 mg tablet   • rivaroxaban (XARELTO) 20 mg tablet   • sotalol (BETAPACE) 120 mg tablet   • triamcinolone (KENALOG) 0.1 % ointment   • nitroglycerin (NITROSTAT) 0.4 mg SL tablet       Allergies   Allergen Reactions   • Iodine - Food Allergy Hives     Severe allergy to IV CT scan dye   • Iodinated Contrast Media Hives       Objective   Vitals: Blood pressure 161/76, pulse 60, temperature 97.7 °F (36.5 °C), temperature source Oral, resp. rate 16, height 5' 3" (1.6 m), weight 67.1 kg (148 lb), SpO2 96 %.   Orthostatic Blood Pressures    Flowsheet Row Most Recent Value   Blood Pressure 161/76 filed at 08/16/2023 0720   Patient Position - Orthostatic VS Lying filed at 08/16/2023 0720          No intake or output data in the 24 hours ending 08/16/23 0952    Invasive Devices     Peripheral Intravenous Line  Duration           Peripheral IV 08/16/23 Right Antecubital <1 day                Physical Exam   GEN: NAD, alert and oriented, well appearing  SKIN: dry without significant lesions or rashes  HEENT: NCAT  NECK: No JVD or carotid bruits appreciated  CARDIOVASCULAR: RRR, normal S1, S2 without murmurs, rubs, or gallops appreciated  LUNGS: Clear to auscultation bilaterally without wheezes, rhonchi, or rales  ABDOMEN: Soft, nontender, nondistended  EXTREMITIES/VASCULAR: perfused without clubbing, cyanosis, or edema b/l  PSYCH: Normal mood and affect  NEURO: CN ll-Xll grossly intact    Lab Results: I have personally reviewed pertinent lab results. Results from last 7 days   Lab Units 23  0715   WBC Thousand/uL 6.61   HEMOGLOBIN g/dL 13.4   HEMATOCRIT % 40.3   PLATELETS Thousands/uL 342     Results from last 7 days   Lab Units 23  0715   POTASSIUM mmol/L 4.2   CHLORIDE mmol/L 110*   CO2 mmol/L 25   BUN mg/dL 20   CREATININE mg/dL 0.90   CALCIUM mg/dL 9.7     Results from last 7 days   Lab Units 23  0715   INR  1.02         Imaging: I have personally reviewed pertinent reports. Results for orders placed during the hospital encounter of 20    Echo complete with contrast if indicated    Narrative  [address] [place] [address]  [phone]    Transthoracic Echocardiogram  2D, M-mode, Doppler, and Color Doppler    Study date:  25-Sep-2020    Patient: Lambert Pena  MR number: [id number]  Account number: [de-identified]  : 1945  Age: 76 years  Gender: Female  Status: Outpatient  Location: Echo lab  Height: 63 in  Weight: 156.6 lb  BP: 157/ 87 mmHg    Indications: Atrial fibrillation    Diagnoses: I48.0 - Atrial fibrillation    Sonographer:  YUSUF Campa  Primary Physician:  Shiloh Merino MD  Referring Physician:  Kishan Coello MD  Group:  [facility]  Interpreting Physician:  Mirella Cope MD    SUMMARY    LEFT VENTRICLE:  Systolic function was normal. Ejection fraction was estimated in the range of 55 % to 60 % to be 55 %. There were no regional wall motion abnormalities. Wall thickness was at the upper limits of normal.    LEFT ATRIUM:  The atrium was moderately dilated.     RIGHT ATRIUM:  The atrium was moderately to markedly dilated. MITRAL VALVE:  There was at least mild to moderate regurgitation. AORTIC VALVE:  There was mild regurgitation. TRICUSPID VALVE:  There was moderate to severe regurgitation. Estimated peak PA pressure was 50 mmHg. IVC, HEPATIC VEINS:  The respirophasic change in diameter was more than 50%. HISTORY: PRIOR HISTORY: A.Fib.,Hypothyroidism,HTN,PAC,Dyslipidemia,Arthritis,Fibromyalgia,palpitation. PROCEDURE: The procedure was performed in the echo lab. This was a routine study. The transthoracic approach was used. The study included complete 2D imaging, M-mode, complete spectral Doppler, and color Doppler. The heart rate was 138  bpm, at the start of the study. Images were obtained from the parasternal, apical, subcostal, and suprasternal notch acoustic windows. Intravenous contrast ( 0.4ml Definity in NSS) was administered to enhance Doppler signals. Image quality  was adequate. LEFT VENTRICLE: Size was normal. Systolic function was normal. Ejection fraction was estimated in the range of 55 % to 60 % to be 55 %. There were no regional wall motion abnormalities. Wall thickness was at the upper limits of normal.    RIGHT VENTRICLE: The size was normal. Systolic function was normal.    LEFT ATRIUM: The atrium was moderately dilated. RIGHT ATRIUM: The atrium was moderately to markedly dilated. MITRAL VALVE: There was normal leaflet separation. DOPPLER: The transmitral velocity was within the normal range. There was no evidence for stenosis. There was at least mild to moderate regurgitation. AORTIC VALVE: The valve was trileaflet. Leaflets exhibited mildly increased thickness and normal cuspal separation. DOPPLER: Transaortic velocity was within the normal range. There was no evidence for stenosis. There was mild  regurgitation. TRICUSPID VALVE: DOPPLER: There was moderate to severe regurgitation.  Estimated peak PA pressure was 50 mmHg.    PULMONIC VALVE: DOPPLER: There was trace regurgitation. PERICARDIUM: There was no thickening or calcification. There was no pericardial effusion. AORTA: The root exhibited normal size. SYSTEMIC VEINS: IVC: The inferior vena cava was normal in size. The respirophasic change in diameter was more than 50%. SYSTEM MEASUREMENT TABLES    2D mode  AoR Diam 2D: 3 cm  LA Diam (2D): 3.8 cm  LA/Ao (2D): 1.27  FS (2D Teich): 35.1 %  IVSd (2D): 0.93 cm  LVDEV: 87.2 cmï¾³  LVEDV MOD BP: 95 cmï¾³  LVESV: 30.9 cmï¾³  LVIDd(2D): 4.39 cm  LVISd (2D): 2.85 cm  LVOT Area 2D: 2.84 cmï¾²  LVPWd (2D): 0.89 cm  SV (Teich): 56.3 cmï¾³    Apical four chamber  LVEDV MOD A4C: 96 cmï¾³  LVEF A4C: 49 %  LVLD A4C: 7.11 cm    Apical two chamber  LVEF A2C: 61 %    Unspecified Scan Mode  KOMAL Cont Eq (Peak Tc): 2.59 cmï¾²  LVOT (VTI): 21.2 cm  LVOT Diam.: 1.9 cm  LVOT Vmax: 1130 mm/s  LVOT Vmax; Mean: 1130 mm/s  Peak Grad.; Mean: 5 mm[Hg]  SV (LVOT): 60 cmï¾³  VTI;Mean: 2 mm[Hg]  MR Vol. (PISA): 98 cmï¾³  MV Peak A Tc: 395 mm/s  MV Peak E Tc. Mean: 854 mm/s  MVA (PHT): 4.4 cmï¾²  Max P mm[Hg]  PHT: 50 ms  V Max: 5600 mm/s  Vmax: 5600 mm/s  Max P mm[Hg]  V Max: 3010 mm/s  Vmax: 3040 mm/s  RA Area: 28.2 cmï¾²  RA Volume: 103 cmï¾³  TAPSE: 2.3 cm    [employer] Accredited Echocardiography Laboratory    Prepared and electronically signed by    Albin Simmonds, MD  Signed 25-Sep-2020 16:34:04      Code Status: Level 1 - Full Code    ** Please Note: Dictation voice to text software may have been used in the creation of this document.  **

## 2023-08-16 NOTE — DISCHARGE INSTR - AVS FIRST PAGE
Please refer to post pacemaker implantation discharge instructions and restrictions and your pacemaker booklet/temporary card. Keep incision dry for one week. Do not use lotions/powders/creams on incision. Leave outer bandage in place for 1 week - it is water proof, and as long as it is fully adhered to your skin you may shower with it. If it appears as though the bandage is coming off and/or there is any communication to the area of device incision, please then keep the whole area dry for the remaining week. After 1 week, please remove by pulling all edges away from the center of the bandage. No overhead reaching/pushing/pulling/lifting greater than 5-10lbs with left arm for six weeks. Please call the office if you notice redness, swelling, bleeding, or drainage from incision or if you develop fevers. AFTER PACEMAKER CARE:    If you have any questions, please call [phone] to speak with a nurse (8:30am-4pm, or [phone] after hours). For appointments, please call [phone]. WHAT YOU SHOULD KNOW:   A pacemaker is a small, battery-powered device that is placed under your skin in your upper chest area with wires placed through a vein that lead directly into the heart. It helps regulate your heart rate and prevent your heart from beating too slowly. AFTER YOU LEAVE:     Medicines:     Pain medicine: You may need medicine to take away or decrease pain. Learn how to take your medicine. Ask what medicine and how much you should take. Be sure you know how, when, and how often to take it. Usually Over the counter pain medicine is sufficient to control pain (Acetominophen or Ibuprofen) Ask your doctor if you may take these. If this does not control your pain, narcotic pain killers may be prescribed, please call if you need prescription. Do not wait until the pain is severe before you take your medicine. Tell caregivers if your pain does not decrease.     Pain medicine can make you dizzy or sleepy. Prevent falls by calling someone when you get out of bed or if you need help. Take your medicine as directed. Call your healthcare provider if you think your medicine is not helping or if you have side effects. Tell him if you are allergic to any medicine. Follow up with your cardiologist after your procedure: You will need a follow-up visit approximately 2 weeks after you leave the hospital. Your cardiologist will check your wound and make sure that your pacemaker is working correctly. Follow the instructions to check your pacemaker: Your cardiologist or primary healthcare provider will check your pacemaker and the battery regularly. He will use a computer to check your pacemaker over the telephone or wireless device which will be given to you. Pacemaker batteries usually last 8 to 10 years. The pacemaker unit will be replaced when the battery gets low. This is a simpler procedure than the original one to implant your pacemaker. Wound care:  Keep your incision dry for one week. Do not use lotions/powders/creams on incision. Leave outer bandage in place for 1 week - it is water proof, and as long as it is fully adhered to your skin you may shower with it. If it appears as though the bandage is coming off and/or there is any communication to the area of device incision, please then keep the whole area dry for the remaining week. After 1 week, please remove by pulling all edges away from the center of the bandage. Please call the office if you notice redness, swelling, bleeding, or drainage from incision or if you develop fevers. Activity:   Arm movement and lifting:  Be careful using the arm on the side of your pacemaker. Do not move your arm for the first 24 hours after your procedure. Do not  lift your arm above your shoulder or lift more than 10 pounds for one month after your procedure.  Avoid pushing, pulling, or repetitive arm movements for one month. This helps the leads stay in place and helps your wound heal. Ask your caregiver when you can drive after your procedure. You may move your arm side to side without lifting above your shoulder, and do not need to wear a sling at home. Driving: you are ok to drive 48 hours after pacemaker is implanted   Sports:  Ask your caregiver when it is okay to play tennis, golf, basketball, or any sport that requires you to lift your arms. Do not play full contact sports, such as football, that could damage your pacemaker. Ask your cardiologist or primary healthcare provider how much and what kinds of physical activity are safe for you. Living with a pacemaker:   Tell all caregivers you have a pacemaker: This includes surgeons, radiologists, and medical technicians. You may want to wear a medical alert ID bracelet or necklace that states that you have a pacemaker. Carry your pacemaker ID card: Make sure you receive a pacemaker ID card. Carry it with you at all times. It lists important information about your pacemaker. Show it to airport security if you travel. Avoid electrical interference:  Avoid welding equipment and other equipment with large magnets or electric fields. These things could interfere with how your pacemaker works. Use your cell phone on the ear opposite from your pacemaker. Do not carry your cell phone in your shirt pocket over your chest.     Some Pacemakers are MRI safe. Ask you doctor if it is safe to proceed with MRI and let the radiologist and staff know you have a pacemaker. Do not touch the skin around your pacemaker: This can cause damage to the lead wires or move the pacemaker unit from where it should be. Contact your cardiologist or primary healthcare provider if:   The area around your pacemaker has increasing amount of pain after surgery. The pain should improve over first few days after implantation.      The skin around your stitches has increasing redness, swelling, or has drainage. This may mean that you have an infection. You have a fever. You have chills, a cough, and feel weak or achy. These are also signs of infection. Your feet or ankles are more swollen than your baseline. Your Heart rate is less than 50 beats per minute     Seek care immediately if:   Your bandage becomes soaked with blood. Your pacemaker is swelling rapidly    Your stitches open up. You feel your heart suddenly beating very slowly or quickly. You become too weak or dizzy to stand, or you pass out. Your arm or leg feels warm, tender, and painful. It may look swollen and red. You have chest pain that does not go away with rest or medicine. You feel lightheaded, short of breath, and have chest pain. You cough up blood. © 2014 [facility] is for End User's use only and may not be sold, redistributed or otherwise used for commercial purposes. All illustrations and images included in CareNotes® are the copyrighted property of [employer].D.A.M., Inc. or Eloy Conley. The above information is an  only. It is not intended as medical advice for individual conditions or treatments. Talk to your doctor, nurse or pharmacist before following any medical regimen to see if it is safe and effective for you.

## 2023-08-17 LAB
ATRIAL RATE: 71 BPM
P AXIS: 88 DEGREES
PR INTERVAL: 188 MS
QRS AXIS: 21 DEGREES
QRSD INTERVAL: 80 MS
QT INTERVAL: 426 MS
QTC INTERVAL: 462 MS
T WAVE AXIS: 51 DEGREES
VENTRICULAR RATE: 71 BPM

## 2023-08-17 PROCEDURE — 93010 ELECTROCARDIOGRAM REPORT: CPT | Performed by: INTERNAL MEDICINE

## 2023-08-28 ENCOUNTER — IN-CLINIC DEVICE VISIT (OUTPATIENT)
Dept: CARDIOLOGY CLINIC | Facility: CLINIC | Age: 78
End: 2023-08-28

## 2023-08-28 DIAGNOSIS — Z95.0 PACEMAKER: Primary | ICD-10-CM

## 2023-08-28 PROCEDURE — RECHECK

## 2023-08-28 NOTE — PROGRESS NOTES
Results for orders placed or performed in visit on 08/28/23   Cardiac EP device report    Narrative    MDT DUAL PM/ ACTIVE SYSTEM IS MRI CONDITIONAL  DEVICE INTERROGATED IN THE [place] OFFICE: WOUND CHECK: INCISION CLEAN AND DRY WITH EDGES APPROXIMATED; WOUND CARE AND RESTRICTIONS REVIEWED WITH PATIENT. BATTERY VOLTAGE ADEQUATE (11.5 YRS). AP 91.4%, VS <0.1%. (AAIR-DDDR 70). ALL AVAILABLE LEAD PARAMETERS WITHIN NORMAL LIMITS. NO SIGNIFICANT HIGH RATE EPISODES.   PACEMAKER FUNCTIONING APPROPRIATELY. ps/es

## 2023-09-04 NOTE — PROGRESS NOTES
Electrophysiology Follow Up    [address]  [address] [place] [address]  : 1945  MRN: [id number]    Date of Visit: 2023       Assessment/Plan     1. Paroxysmal atrial fibrillation (HCC)  POCT ECG      2. Pacemaker  POCT ECG      3. Encounter for monitoring sotalol therapy            ASSESSMENT:  1. Tachy-indy syndrome, with baseline sinus bradycardia as well as periods of afib with rapid ventricular response and SVT --- status post Medtronic dual chamber pacemaker implantation 2023  2. Symptomatic paroxysmal atrial fibrillation, with prior cardioversion in the ER 2023 due to recurrence              A.)  Long history of PAF (likely dating back to ), tried on flecainide and propafenone in the past, discontinued due to intolerance   B.)  Became more persistent 2020, eventually cardioverted 2020 with subsequent reversion back to afib several months later              C.)  Due to ongoing symptoms, underwent cryoablation with pulmonary vein isolation 2021              D.)  Started on sotalol antiarrhythmic therapy in the post ablation, dose reduced to 80 mg BID 2022 due to sinus bradycardia   E.)  Dose eventually increased back to 120 mg BID due to breakthrough              F.)  Maintained on Xarelto anticoagulation, not on other AV cait agents due to sinus bradycardia  3. Preserved LV systolic function with EF 55-60% per echo 2020              A.)  Nonischemic nuclear stress test 3/2021              B.)  Known dilated left atrium  4. Hypertension, well controlled today  5. Moderate tricuspid regurgitation, mild moderate regurgitation  6. Fibromyalgia  7. Hyperlipidemia      DISCUSSION/SUMMARY:  I reviewed her recent [email or web address] mobile strip from when she was symptomatic, and it did appear as though she was in atrial fibrillation.   I personally interrogated her pacemaker, which has not shown any recurrent atrial fibrillation over the past week. I tested atrial sensing, which is 2.3 mV in sinus rhythm, otherwise all lead parameters were stable. I would assume that her recent episode was too short to be stored by the device, but I gave her reassurance that she has not had significant arrhythmias over the recent past.    She is doing well on sotalol and Xarelto, her QTc today is 430 ms. Most recent creatinine from 8/16/2023 was 0.90. No further testing needed at this time, but will continue to monitor moving forward. She is going to continue to monitor her symptoms at home, and check her [facility] mobile when she is experiencing symptoms. I asked her to try to monitor how long her episodes are lasting to see if this correlates with findings on her pacemaker. I want to ensure that her device is not under sensing episodes, but at this time all testing appears within normal limits. I have asked her to see Dr. Stuart Monroe again in several months for ongoing arrhythmia and medication management. If she is having recurrent atrial fibrillation and ongoing breakthrough despite sotalol, we can again discuss the role of repeat ablation. At this time, this is not necessary and we will continue her current regimen. She knows to contact us in the meantime with any questions, concerns, or changes in symptoms. History of Present Illness     CHIEF COMPLAINT: post hospital follow up    HPI/INTERVAL HISTORY: Elizabeth Calvo is a 66 y.o. female with symptomatic persistent atrial fibrillation, tachy-indy syndrome with known baseline sinus bradycardia, preserved LV systolic function with EF 55-60%, hyperlipidemia, hypertension, valvular heart disease with moderate TR/mild MR, and fibromyalgia.  She typically follows with Dr. Dwight Mejiaet has a long history of paroxysmal atrial fibrillation, likely dating back to 2014. Rosaline Nice chart review, she was tried on flecainide and propafenone, which she did not tolerate.  For many years she was maintained on metoprolol and verapamil.  She was initially on Xarelto, however it was discontinued due to low overall burden.  When she was seen 06/2020, she was found to be in asymptomatic atrial fibrillation and she was restarted on Xarelto.  She subsequently underwent successful DELANO/cardioversion 08/2020, however at that time it was noted that her left atrium was dilated.  She maintained sinus rhythm for several months, however when she was seen 11/2020 she was again noted to be back in atrial fibrillation.  At that time, rate control therapy was discussed rather than trying to pursue sinus rhythm.  When she continued to have symptoms of feeling unwell in her now persistent atrial fibrillation, she was seen in EP consultation by Dr. Eugenie Real. Aretta Cutting and antiarrhythmic initiation was recommended, and in 9/2021 she underwent cryoablation with pulmonary vein isolation along with sotalol initiation. Her other AV cait agents were discontinued due to baseline sinus bradycardia. She was seen in EP follow-up 6/2022, she was bradycardic with rates in the 40s and thus her sotalol was decreased to 80 mg twice daily.     More recently, she contacted our office reporting recurrent atrial fibrillation. She was sent to the emergency room in early 5/2023, where she underwent successful cardioversion. She subsequently underwent an event monitor, which showed <1% afib burden, average heart rate 60 bpm with minimum of 30. She subsequently went to the emergency room while in [place] due to ongoing palpitations. At that time, her sotalol dose was increased back to 120 mg twice daily by Dr. Eugenie Real. A repeat monitor shortly thereafter showed no atrial fibrillation but with frequent symptomatic PAT/SVT, average heart rate 55 bpm. She was seen in the office 6/2023, at which time she reported ongoing symptomatic episodes despite being on a higher dose of Sotalol.   After discussing with Dr. Eugenie Real, he thus recommended repeat atrial fibrillation ablation. When she presented for that procedure, she reported a recent GI illness and thus her procedure was canceled. She was also significantly bradycardic with rates in the 40s. After lengthy discussion with Dr. Stuart Monroe, her ablation was cancelled and instead a dual chamber pacemaker implantation was recommended for her known tachy-indy syndrome. She underwent this procedure 8/2023, without medication adjustments made in the post device setting. She presents today for post procedure follow up. She has been feeling very well since her device is implanted, however over the past 2 days she has noted recurrent episodes of atrial fibrillation. Her home [employer] mobile showed possible atrial fibrillation, and she shared this strip with me this morning. These episodes she feels palpitations and dizziness. Other than the past 2 days, she has felt well without recurrent episodes. She has occasional chest aching, however this is not exertionally related and resolves on its own. She otherwise denies chest pain or pressure, dizziness, lightheadedness, presyncope, syncope, or issues with edema. She is tolerating her medications well. She had mild pain following pacemaker implantation but this has been improving. Review of Systems  ROS as noted above, otherwise 12 point review of systems was performed and is negative. Historical Information  - I have personally reviewed and updated this information, including social history, medical history, and family history.   Social History     Socioeconomic History   • Marital status: /Civil Union     Spouse name: Not on file   • Number of children: Not on file   • Years of education: Not on file   • Highest education level: Not on file   Occupational History   • Not on file   Tobacco Use   • Smoking status: Never   • Smokeless tobacco: Never   Vaping Use   • Vaping Use: Never used   Substance and Sexual Activity   • Alcohol use: No   • Drug use: No   • Sexual activity: Not Currently   Other Topics Concern   • Not on file   Social History Narrative   • Not on file     Social Determinants of Health     Financial Resource Strain: Low Risk  (2/6/2023)    Overall Financial Resource Strain (CARDIA)    • Difficulty of Paying Living Expenses: Not hard at all   Food Insecurity: Not on file   Transportation Needs: No Transportation Needs (2/6/2023)    PRAPARE - Transportation    • Lack of Transportation (Medical): No    • Lack of Transportation (Non-Medical): No   Physical Activity: Not on file   Stress: Not on file   Social Connections: Not on file   Intimate Partner Violence: Not on file   Housing Stability: Not on file     Past Medical History:   Diagnosis Date   • Acid reflux    • Arthritis    • Bradycardia 8/16/2023   • Cataract    • Colon polyp    • Disease of thyroid gland    • Dyslipidemia (high LDL; low HDL)    • Fibromyalgia    • GERD (gastroesophageal reflux disease)    • Hyperlipidemia    • Hypertension    • Hypothyroidism    • Palpitation    • Paroxysmal atrial fibrillation (HCC)    • s/p Medtronic dual chamber PPM with left bundle pacing lead 8/16/2023 8/16/2023   • Shingles 09/12/2010    Right C7     Past Surgical History:   Procedure Laterality Date   • CARDIAC ELECTROPHYSIOLOGY PROCEDURE N/A 8/16/2023    Procedure: Cardiac pacer implant;  Surgeon: Keven Jett MD;  Location: [facility];   Service: Cardiology   • CATARACT EXTRACTION, BILATERAL  11/2016   • COLONOSCOPY  07/11/2016   • COLONOSCOPY     • HYSTERECTOMY  05/2016    total hysterectomy   • MAMMO (HISTORICAL)  4/8/2010 & 2/19/2020   • REPLACEMENT TOTAL KNEE     • TONSILLECTOMY     • UPPER GASTROINTESTINAL ENDOSCOPY       Social History     Substance and Sexual Activity   Alcohol Use No     Social History     Substance and Sexual Activity   Drug Use No     Social History     Tobacco Use   Smoking Status Never   Smokeless Tobacco Never     Family History   Problem Relation Age of Onset   • Hypertension Mother    • Hyperlipidemia Mother    • Parkinsonism Mother    • Stroke Mother    • Chronic bronchitis Mother    • Atrial fibrillation Father    • Stroke Father    • Cancer Father         skin cancer   • Anuerysm Neg Hx    • Clotting disorder Neg Hx        Meds/Allergies       Current Outpatient Medications:   •  Cyanocobalamin (VITAMIN B 12 PO), Take by mouth 2 (two) times a week, Disp: , Rfl:   •  ergocalciferol (VITAMIN D2) 50,000 units, Take 1 capsule (50,000 Units total) by mouth every 14 (fourteen) days, Disp: 7 capsule, Rfl: 3  •  levothyroxine 75 mcg tablet, Take 1 tablet (75 mcg total) by mouth daily, Disp: 90 tablet, Rfl: 1  •  losartan (COZAAR) 25 mg tablet, Take 1 tablet (25 mg total) by mouth daily, Disp: 90 tablet, Rfl: 1  •  nitroglycerin (NITROSTAT) 0.4 mg SL tablet, Place 1 tablet (0.4 mg total) under the tongue every 5 (five) minutes as needed for chest pain, Disp: 15 tablet, Rfl: 1  •  omeprazole (PriLOSEC) 20 mg delayed release capsule, Take 1 capsule by mouth daily, Disp: , Rfl:   •  rivaroxaban (XARELTO) 20 mg tablet, Take 1 tablet (20 mg total) by mouth daily with dinner, Disp: 90 tablet, Rfl: 3  •  sotalol (BETAPACE) 120 mg tablet, TAKE 1 TABLET BY MOUTH 2 TIMES A DAY., Disp: 180 tablet, Rfl: 2  •  triamcinolone (KENALOG) 0.1 % ointment, Apply topically 2 (two) times a day, Disp: 60 g, Rfl: 0  •  diphenhydrAMINE (BENADRYL) 25 mg capsule, Take 25 mg by mouth 1 (one) time 1 8/15pm and 18/16 am (Patient not taking: Reported on 9/5/2023), Disp: , Rfl:   •  famotidine (PEPCID) 20 mg tablet, Take 20 mg by mouth 1 (one) time 1 8/15 pm and 1 8/16 am (Patient not taking: Reported on 9/5/2023), Disp: , Rfl:   •  predniSONE 20 mg tablet, Take by mouth 1 (one) time 3 tablets 8/15pm and 3 tablets 8/16 am (Patient not taking: Reported on 9/5/2023), Disp: , Rfl:     Allergies   Allergen Reactions   • Iodine - Food Allergy Hives     Severe allergy to IV CT scan dye   • Iodinated Contrast Media Hives       Objective   Vitals: Blood pressure 122/70, pulse 71, height 5' 3" (1.6 m), weight 65.3 kg (143 lb 14.4 oz).         Physical Exam  GEN: NAD, alert and oriented x 3, well appearing  SKIN: dry without significant lesions or rashes  HEENT: NCAT, PERRL, EOMs intact  NECK: No JVD appreciated  CARDIOVASCULAR: RRR, normal S1, S2 without murmurs, rubs, or gallops appreciated  LUNGS: Clear to auscultation bilaterally without wheezes, rhonchi, or rales  ABDOMEN: Soft, nontender, nondistended  EXTREMITIES/VASCULAR: perfused without clubbing, cyanosis, or LE edema b/l  PSYCH: Normal mood and affect  NEURO: CN ll-Xll grossly intact        Labs:  Admission on 08/16/2023, Discharged on 08/16/2023   Component Date Value   • Sodium 08/16/2023 140    • Potassium 08/16/2023 4.2    • Chloride 08/16/2023 110 (H)    • CO2 08/16/2023 25    • ANION GAP 08/16/2023 5    • BUN 08/16/2023 20    • Creatinine 08/16/2023 0.90    • Glucose 08/16/2023 181 (H)    • Glucose, Fasting 08/16/2023 181 (H)    • Calcium 08/16/2023 9.7    • eGFR 08/16/2023 61    • WBC 08/16/2023 6.61    • RBC 08/16/2023 4.20    • Hemoglobin 08/16/2023 13.4    • Hematocrit 08/16/2023 40.3    • MCV 08/16/2023 96    • MCH 08/16/2023 31.9    • MCHC 08/16/2023 33.3    • RDW 08/16/2023 13.2    • MPV 08/16/2023 9.9    • Platelets 84/81/1702 342    • nRBC 08/16/2023 0    • Neutrophils Relative 08/16/2023 83 (H)    • Immat GRANS % 08/16/2023 1    • Lymphocytes Relative 08/16/2023 15    • Monocytes Relative 08/16/2023 1 (L)    • Eosinophils Relative 08/16/2023 0    • Basophils Relative 08/16/2023 0    • Neutrophils Absolute 08/16/2023 5.48    • Immature Grans Absolute 08/16/2023 0.03    • Lymphocytes Absolute 08/16/2023 1.02    • Monocytes Absolute 08/16/2023 0.06 (L)    • Eosinophils Absolute 08/16/2023 0.00    • Basophils Absolute 08/16/2023 0.02    • Protime 08/16/2023 13.6    • INR 08/16/2023 1.02    • Ventricular Rate 08/16/2023 59    • Atrial Rate 2023 59    • WI Interval 2023 168    • QRSD Interval 2023 74    • QT Interval 2023 460    • QTC Interval 2023 455    • P Axis 2023 70    • QRS Axis 2023 16    • T Wave Axis 2023 51    • Ventricular Rate 2023 71    • Atrial Rate 2023 71    • WI Interval 2023 188    • QRSD Interval 2023 80    • QT Interval 2023 426    • QTC Interval 2023 462    • P Axis 2023 88    • QRS Axis 2023 21    • T Wave Axis 2023 51    Appointment on 2023   Component Date Value   • Free T4 2023 1.08    • TSH 3RD GENERATON 2023 1.683          Imaging: I have personally reviewed pertinent reports. ECHO: Results for orders placed during the hospital encounter of 20    Echo complete with contrast if indicated    Narrative  [address] [place] [address].  [address]  [phone]    Transthoracic Echocardiogram  2D, M-mode, Doppler, and Color Doppler    Study date:  25-Sep-2020    Patient: Tatiana Valdez  MR number: [id number]  Account number: [de-identified]  : 1945  Age: 76 years  Gender: Female  Status: Outpatient  Location: Echo lab  Height: 63 in  Weight: 156.6 lb  BP: 157/ 87 mmHg    Indications: Atrial fibrillation    Diagnoses: I48.0 - Atrial fibrillation    Sonographer:  Darlin Garcia, [facility]  Primary Physician:  Ester Estrada MD  Referring Physician:  Kristine Choudhury MD  Group:  [facility]  Interpreting Physician:  Carisa Price MD    SUMMARY    LEFT VENTRICLE:  Systolic function was normal. Ejection fraction was estimated in the range of 55 % to 60 % to be 55 %. There were no regional wall motion abnormalities. Wall thickness was at the upper limits of normal.    LEFT ATRIUM:  The atrium was moderately dilated. RIGHT ATRIUM:  The atrium was moderately to markedly dilated.     MITRAL VALVE:  There was at least mild to moderate regurgitation. AORTIC VALVE:  There was mild regurgitation. TRICUSPID VALVE:  There was moderate to severe regurgitation. Estimated peak PA pressure was 50 mmHg. IVC, HEPATIC VEINS:  The respirophasic change in diameter was more than 50%. HISTORY: PRIOR HISTORY: A.Fib.,Hypothyroidism,HTN,PAC,Dyslipidemia,Arthritis,Fibromyalgia,palpitation. PROCEDURE: The procedure was performed in the echo lab. This was a routine study. The transthoracic approach was used. The study included complete 2D imaging, M-mode, complete spectral Doppler, and color Doppler. The heart rate was 138  bpm, at the start of the study. Images were obtained from the parasternal, apical, subcostal, and suprasternal notch acoustic windows. Intravenous contrast ( 0.4ml Definity in NSS) was administered to enhance Doppler signals. Image quality  was adequate. LEFT VENTRICLE: Size was normal. Systolic function was normal. Ejection fraction was estimated in the range of 55 % to 60 % to be 55 %. There were no regional wall motion abnormalities. Wall thickness was at the upper limits of normal.    RIGHT VENTRICLE: The size was normal. Systolic function was normal.    LEFT ATRIUM: The atrium was moderately dilated. RIGHT ATRIUM: The atrium was moderately to markedly dilated. MITRAL VALVE: There was normal leaflet separation. DOPPLER: The transmitral velocity was within the normal range. There was no evidence for stenosis. There was at least mild to moderate regurgitation. AORTIC VALVE: The valve was trileaflet. Leaflets exhibited mildly increased thickness and normal cuspal separation. DOPPLER: Transaortic velocity was within the normal range. There was no evidence for stenosis. There was mild  regurgitation. TRICUSPID VALVE: DOPPLER: There was moderate to severe regurgitation. Estimated peak PA pressure was 50 mmHg. PULMONIC VALVE: DOPPLER: There was trace regurgitation.     PERICARDIUM: There was no thickening or calcification. There was no pericardial effusion. AORTA: The root exhibited normal size. SYSTEMIC VEINS: IVC: The inferior vena cava was normal in size. The respirophasic change in diameter was more than 50%. SYSTEM MEASUREMENT TABLES    2D mode  AoR Diam 2D: 3 cm  LA Diam (2D): 3.8 cm  LA/Ao (2D): 1.27  FS (2D Teich): 35.1 %  IVSd (2D): 0.93 cm  LVDEV: 87.2 cmï¾³  LVEDV MOD BP: 95 cmï¾³  LVESV: 30.9 cmï¾³  LVIDd(2D): 4.39 cm  LVISd (2D): 2.85 cm  LVOT Area 2D: 2.84 cmï¾²  LVPWd (2D): 0.89 cm  SV (Teich): 56.3 cmï¾³    Apical four chamber  LVEDV MOD A4C: 96 cmï¾³  LVEF A4C: 49 %  LVLD A4C: 7.11 cm    Apical two chamber  LVEF A2C: 61 %    Unspecified Scan Mode  KOMAL Cont Eq (Peak Tc): 2.59 cmï¾²  LVOT (VTI): 21.2 cm  LVOT Diam.: 1.9 cm  LVOT Vmax: 1130 mm/s  LVOT Vmax; Mean: 1130 mm/s  Peak Grad.; Mean: 5 mm[Hg]  SV (LVOT): 60 cmï¾³  VTI;Mean: 2 mm[Hg]  MR Vol. (PISA): 98 cmï¾³  MV Peak A Tc: 395 mm/s  MV Peak E Tc. Mean: 854 mm/s  MVA (PHT): 4.4 cmï¾²  Max P mm[Hg]  PHT: 50 ms  V Max: 5600 mm/s  Vmax: 5600 mm/s  Max P mm[Hg]  V Max: 3010 mm/s  Vmax: 3040 mm/s  RA Area: 28.2 cmï¾²  RA Volume: 103 cmï¾³  TAPSE: 2.3 cm    [employer] Accredited Echocardiography Laboratory    Prepared and electronically signed by    Austin Gowers, MD  Signed 25-Sep-2020 16:34:04      Results for orders placed during the hospital encounter of 23    Echo complete w/ contrast if indicated    Interpretation Summary  •  Left Ventricle: Left ventricular cavity size is normal. Wall thickness is normal. The left ventricular ejection fraction is 57%. Systolic function is normal. Wall motion is normal. Diastolic function is normal.  •  Right Ventricle: Right ventricular cavity size is moderately dilated. Systolic function is normal.  •  Left Atrium: The atrium is moderately dilated. •  Mitral Valve: There is moderate regurgitation. •  Tricuspid Valve: There is moderate regurgitation.  The right ventricular systolic pressure is moderately elevated. The estimated right ventricular systolic pressure is 22.69 mmHg. NUCLEAR STRESS TEST 3/2021:   SUMMARY:  -  Stress results: Target heart rate was achieved. There was no chest pain during stress. -  ECG conclusions: The stress ECG was normal.  -  Perfusion imaging: There were no perfusion defects.  -  Gated SPECT: The calculated left ventricular ejection fraction was 74 %. Left ventricular ejection fraction was within normal limits by visual estimate. There was no left ventricular regional abnormality.     IMPRESSIONS: Normal study after pharmacologic stress. Myocardial perfusion imaging was normal at rest and with stress.  Left ventricular systolic function was normal.      EKG: Normal sinus rhythm, atrial pacing with intact ventricular conduction, heart rate 71 bpm, QTc 430 ms

## 2023-09-05 ENCOUNTER — OFFICE VISIT (OUTPATIENT)
Dept: CARDIOLOGY CLINIC | Facility: CLINIC | Age: 78
End: 2023-09-05
Payer: MEDICARE

## 2023-09-05 VITALS
HEART RATE: 71 BPM | DIASTOLIC BLOOD PRESSURE: 70 MMHG | SYSTOLIC BLOOD PRESSURE: 122 MMHG | WEIGHT: 143.9 LBS | BODY MASS INDEX: 25.5 KG/M2 | HEIGHT: 63 IN

## 2023-09-05 DIAGNOSIS — I48.0 PAROXYSMAL ATRIAL FIBRILLATION (HCC): Primary | ICD-10-CM

## 2023-09-05 DIAGNOSIS — Z79.899 ENCOUNTER FOR MONITORING SOTALOL THERAPY: ICD-10-CM

## 2023-09-05 DIAGNOSIS — Z95.0 PACEMAKER: ICD-10-CM

## 2023-09-05 DIAGNOSIS — Z51.81 ENCOUNTER FOR MONITORING SOTALOL THERAPY: ICD-10-CM

## 2023-09-05 PROCEDURE — 93000 ELECTROCARDIOGRAM COMPLETE: CPT | Performed by: PHYSICIAN ASSISTANT

## 2023-09-05 PROCEDURE — 99213 OFFICE O/P EST LOW 20 MIN: CPT | Performed by: PHYSICIAN ASSISTANT

## 2023-09-07 ENCOUNTER — APPOINTMENT (OUTPATIENT)
Dept: LAB | Facility: AMBULARY SURGERY CENTER | Age: 78
End: 2023-09-07
Payer: MEDICARE

## 2023-09-07 DIAGNOSIS — I10 ESSENTIAL HYPERTENSION: ICD-10-CM

## 2023-09-07 DIAGNOSIS — E03.9 ACQUIRED HYPOTHYROIDISM: ICD-10-CM

## 2023-09-07 LAB
ANION GAP SERPL CALCULATED.3IONS-SCNC: 7 MMOL/L
BASOPHILS # BLD AUTO: 0.04 THOUSANDS/ÂΜL (ref 0–0.1)
BASOPHILS NFR BLD AUTO: 1 % (ref 0–1)
BUN SERPL-MCNC: 16 MG/DL (ref 5–25)
CALCIUM SERPL-MCNC: 9.2 MG/DL (ref 8.4–10.2)
CHLORIDE SERPL-SCNC: 105 MMOL/L (ref 96–108)
CO2 SERPL-SCNC: 28 MMOL/L (ref 21–32)
CREAT SERPL-MCNC: 0.75 MG/DL (ref 0.6–1.3)
EOSINOPHIL # BLD AUTO: 0.27 THOUSAND/ÂΜL (ref 0–0.61)
EOSINOPHIL NFR BLD AUTO: 5 % (ref 0–6)
ERYTHROCYTE [DISTWIDTH] IN BLOOD BY AUTOMATED COUNT: 13.2 % (ref 11.6–15.1)
GFR SERPL CREATININE-BSD FRML MDRD: 76 ML/MIN/1.73SQ M
GLUCOSE P FAST SERPL-MCNC: 99 MG/DL (ref 65–99)
HCT VFR BLD AUTO: 38.3 % (ref 34.8–46.1)
HGB BLD-MCNC: 12.9 G/DL (ref 11.5–15.4)
IMM GRANULOCYTES # BLD AUTO: 0.01 THOUSAND/UL (ref 0–0.2)
IMM GRANULOCYTES NFR BLD AUTO: 0 % (ref 0–2)
LYMPHOCYTES # BLD AUTO: 1.93 THOUSANDS/ÂΜL (ref 0.6–4.47)
LYMPHOCYTES NFR BLD AUTO: 37 % (ref 14–44)
MCH RBC QN AUTO: 32.5 PG (ref 26.8–34.3)
MCHC RBC AUTO-ENTMCNC: 33.7 G/DL (ref 31.4–37.4)
MCV RBC AUTO: 97 FL (ref 82–98)
MONOCYTES # BLD AUTO: 0.52 THOUSAND/ÂΜL (ref 0.17–1.22)
MONOCYTES NFR BLD AUTO: 10 % (ref 4–12)
NEUTROPHILS # BLD AUTO: 2.48 THOUSANDS/ÂΜL (ref 1.85–7.62)
NEUTS SEG NFR BLD AUTO: 47 % (ref 43–75)
NRBC BLD AUTO-RTO: 0 /100 WBCS
PLATELET # BLD AUTO: 325 THOUSANDS/UL (ref 149–390)
PMV BLD AUTO: 9.8 FL (ref 8.9–12.7)
POTASSIUM SERPL-SCNC: 4.1 MMOL/L (ref 3.5–5.3)
RBC # BLD AUTO: 3.97 MILLION/UL (ref 3.81–5.12)
SODIUM SERPL-SCNC: 140 MMOL/L (ref 135–147)
T4 FREE SERPL-MCNC: 1.03 NG/DL (ref 0.61–1.12)
TSH SERPL DL<=0.05 MIU/L-ACNC: 1.95 UIU/ML (ref 0.45–4.5)
WBC # BLD AUTO: 5.25 THOUSAND/UL (ref 4.31–10.16)

## 2023-09-07 PROCEDURE — 85025 COMPLETE CBC W/AUTO DIFF WBC: CPT

## 2023-09-07 PROCEDURE — 36415 COLL VENOUS BLD VENIPUNCTURE: CPT

## 2023-09-07 PROCEDURE — 80048 BASIC METABOLIC PNL TOTAL CA: CPT

## 2023-09-07 PROCEDURE — 84443 ASSAY THYROID STIM HORMONE: CPT

## 2023-09-07 PROCEDURE — 84439 ASSAY OF FREE THYROXINE: CPT

## 2023-09-12 ENCOUNTER — RA CDI HCC (OUTPATIENT)
Dept: OTHER | Facility: HOSPITAL | Age: 78
End: 2023-09-12

## 2023-09-15 ENCOUNTER — OFFICE VISIT (OUTPATIENT)
Dept: INTERNAL MEDICINE CLINIC | Facility: CLINIC | Age: 78
End: 2023-09-15
Payer: MEDICARE

## 2023-09-15 VITALS
TEMPERATURE: 97.3 F | SYSTOLIC BLOOD PRESSURE: 130 MMHG | OXYGEN SATURATION: 99 % | HEIGHT: 63 IN | HEART RATE: 76 BPM | BODY MASS INDEX: 26.22 KG/M2 | WEIGHT: 148 LBS | DIASTOLIC BLOOD PRESSURE: 70 MMHG

## 2023-09-15 DIAGNOSIS — I10 ESSENTIAL HYPERTENSION: Primary | ICD-10-CM

## 2023-09-15 DIAGNOSIS — Z23 NEEDS FLU SHOT: ICD-10-CM

## 2023-09-15 DIAGNOSIS — E03.9 ACQUIRED HYPOTHYROIDISM: ICD-10-CM

## 2023-09-15 DIAGNOSIS — I48.0 PAROXYSMAL ATRIAL FIBRILLATION (HCC): ICD-10-CM

## 2023-09-15 DIAGNOSIS — M81.0 AGE-RELATED OSTEOPOROSIS WITHOUT CURRENT PATHOLOGICAL FRACTURE: ICD-10-CM

## 2023-09-15 PROCEDURE — 99214 OFFICE O/P EST MOD 30 MIN: CPT | Performed by: INTERNAL MEDICINE

## 2023-09-15 PROCEDURE — 90662 IIV NO PRSV INCREASED AG IM: CPT

## 2023-09-15 PROCEDURE — G0008 ADMIN INFLUENZA VIRUS VAC: HCPCS

## 2023-10-14 DIAGNOSIS — E03.9 ACQUIRED HYPOTHYROIDISM: ICD-10-CM

## 2023-10-17 RX ORDER — LEVOTHYROXINE SODIUM 0.07 MG/1
75 TABLET ORAL DAILY
Qty: 90 TABLET | Refills: 1 | Status: SHIPPED | OUTPATIENT
Start: 2023-10-17 | End: 2024-01-15

## 2023-12-04 ENCOUNTER — IN-CLINIC DEVICE VISIT (OUTPATIENT)
Dept: CARDIOLOGY CLINIC | Facility: CLINIC | Age: 78
End: 2023-12-04
Payer: MEDICARE

## 2023-12-04 DIAGNOSIS — Z95.0 PACEMAKER: Primary | ICD-10-CM

## 2023-12-04 PROCEDURE — 93280 PM DEVICE PROGR EVAL DUAL: CPT | Performed by: INTERNAL MEDICINE

## 2023-12-04 NOTE — PROGRESS NOTES
Results for orders placed or performed in visit on 12/04/23   Cardiac EP device report    Narrative    MDT DUAL PM/ ACTIVE SYSTEM IS MRI CONDITIONAL  DEVICE INTERROGATED IN THE [facility] OFFICE: BATTERY VOLTAGE ADEQUATE (11.5 YRS). AP 97.7%  0.2% (AAIR-DDDR 70). ALL LEAD PARAMETERS WITHIN NORMAL LIMITS. 1 AT/AF EPISODE TREATED WITH REACTIVE ATP (0/1/ 0%) EPISODE DURATION WAS 1:27 HR; 1 MONITORED AT/AF DURATION 38 SECS WITH BOTH EGM'S SHOWING PAF. AT/AF BURDEN <0.1%. PT TAKING XARELTO AND SOTALOL. DECREASE MADE TO RA AMPLITUDE TO PROMOTE DEVICE LONGEVITY WHILE MAINTAINING AN APPROPRIATE SAFETY MARGIN. PACEMAKER FUNCTIONING APPROPRIATELY.  PS/ES

## 2024-01-04 DIAGNOSIS — I10 ESSENTIAL HYPERTENSION: ICD-10-CM

## 2024-01-04 RX ORDER — LOSARTAN POTASSIUM 25 MG/1
25 TABLET ORAL DAILY
Qty: 90 TABLET | Refills: 0 | Status: SHIPPED | OUTPATIENT
Start: 2024-01-04

## 2024-01-20 NOTE — PROGRESS NOTES
Assessment/Plan:           1. Essential hypertension  Comments:  Pertinent consults,  labs and other investigations were reviewed and discussed with patient. Continue current management except as noted.    2. Paroxysmal atrial fibrillation (HCC)  Comments:  Pertinent consults,  labs and other investigations were reviewed and discussed with patient. Continue current management except as noted.    3. Age-related osteoporosis without current pathological fracture  Comments:  Pertinent consults,  labs and other investigations were reviewed and discussed with patient. Continue current management except as noted.    4. Acquired hypothyroidism  Comments:  Pertinent consults,  labs and other investigations were reviewed and discussed with patient. Continue current management except as noted.    5. Needs flu shot  Comments:  Flu shot given in office today  Orders:  -     influenza vaccine, high-dose, PF 0.7 mL (FLUZONE HIGH-DOSE)           1. Essential hypertension    2. Paroxysmal atrial fibrillation (HCC)      3. Age-related osteoporosis without current pathological fracture      4. Acquired hypothyroidism      5. Needs flu shot    - influenza vaccine, high-dose, PF 0.7 mL (FLUZONE HIGH-DOSE)       No problem-specific Assessment & Plan notes found for this encounter.           Subjective:      Patient ID: Lavern Hardin is a 78 y.o. female.    HPI    The following portions of the patient's history were reviewed and updated as appropriate: She  has a past medical history of Acid reflux, Arthritis, Bradycardia (8/16/2023), Cataract, Colon polyp, Disease of thyroid gland, Dyslipidemia (high LDL; low HDL), Fibromyalgia, GERD (gastroesophageal reflux disease), Hyperlipidemia, Hypertension, Hypothyroidism, Palpitation, Paroxysmal atrial fibrillation (HCC), s/p Medtronic dual chamber PPM with left bundle pacing lead 8/16/2023 (8/16/2023), and Shingles (09/12/2010).  She   Patient Active Problem List    Diagnosis Date Noted     Encounter for monitoring sotalol therapy 09/05/2023    Bradycardia 08/16/2023    s/p Medtronic dual chamber PPM with left bundle pacing lead 8/16/2023 08/16/2023    Fibromyalgia 10/10/2022    Vitamin D deficiency 10/10/2022    Age-related osteoporosis without current pathological fracture 10/10/2022    Acid reflux     Abnormal ECG 03/03/2021    Presyncope 01/14/2021    Acquired hypothyroidism 09/11/2020    Plantar fasciitis 09/11/2020    Paroxysmal atrial fibrillation (HCC) 07/10/2018    Essential hypertension 07/10/2018    PAC (premature atrial contraction) 07/10/2018    Dyslipidemia 07/10/2018     She  has a past surgical history that includes Replacement total knee; Tonsillectomy; Hysterectomy (05/2016); Cataract extraction, bilateral (11/2016); Colonoscopy (07/11/2016); Mammo (historical) (4/8/2010 & 2/19/2020); Colonoscopy; Upper gastrointestinal endoscopy; and Cardiac electrophysiology procedure (N/A, 8/16/2023).  Her family history includes Atrial fibrillation in her father; Cancer in her father; Chronic bronchitis in her mother; Hyperlipidemia in her mother; Hypertension in her mother; Parkinsonism in her mother; Stroke in her father and mother.  She  reports that she has never smoked. She has never used smokeless tobacco. She reports that she does not drink alcohol and does not use drugs.  Current Outpatient Medications   Medication Sig Dispense Refill    Cyanocobalamin (VITAMIN B 12 PO) Take by mouth 2 (two) times a week      ergocalciferol (VITAMIN D2) 50,000 units Take 1 capsule (50,000 Units total) by mouth every 14 (fourteen) days 7 capsule 3    nitroglycerin (NITROSTAT) 0.4 mg SL tablet Place 1 tablet (0.4 mg total) under the tongue every 5 (five) minutes as needed for chest pain 15 tablet 1    omeprazole (PriLOSEC) 20 mg delayed release capsule Take 1 capsule by mouth daily      rivaroxaban (XARELTO) 20 mg tablet Take 1 tablet (20 mg total) by mouth daily with dinner 90 tablet 3    sotalol (BETAPACE)  120 mg tablet TAKE 1 TABLET BY MOUTH 2 TIMES A DAY. 180 tablet 2    triamcinolone (KENALOG) 0.1 % ointment Apply topically 2 (two) times a day 60 g 0    diphenhydrAMINE (BENADRYL) 25 mg capsule Take 25 mg by mouth 1 (one) time 1 8/15pm and 18/16 am (Patient not taking: Reported on 9/5/2023)      famotidine (PEPCID) 20 mg tablet Take 20 mg by mouth 1 (one) time 1 8/15 pm and 1 8/16 am (Patient not taking: Reported on 9/5/2023)      levothyroxine 75 mcg tablet Take 1 tablet (75 mcg total) by mouth daily 90 tablet 1    losartan (COZAAR) 25 mg tablet Take 1 tablet (25 mg total) by mouth daily 90 tablet 0     No current facility-administered medications for this visit.     Current Outpatient Medications on File Prior to Visit   Medication Sig    Cyanocobalamin (VITAMIN B 12 PO) Take by mouth 2 (two) times a week    ergocalciferol (VITAMIN D2) 50,000 units Take 1 capsule (50,000 Units total) by mouth every 14 (fourteen) days    nitroglycerin (NITROSTAT) 0.4 mg SL tablet Place 1 tablet (0.4 mg total) under the tongue every 5 (five) minutes as needed for chest pain    omeprazole (PriLOSEC) 20 mg delayed release capsule Take 1 capsule by mouth daily    rivaroxaban (XARELTO) 20 mg tablet Take 1 tablet (20 mg total) by mouth daily with dinner    sotalol (BETAPACE) 120 mg tablet TAKE 1 TABLET BY MOUTH 2 TIMES A DAY.    triamcinolone (KENALOG) 0.1 % ointment Apply topically 2 (two) times a day    diphenhydrAMINE (BENADRYL) 25 mg capsule Take 25 mg by mouth 1 (one) time 1 8/15pm and 18/16 am (Patient not taking: Reported on 9/5/2023)    famotidine (PEPCID) 20 mg tablet Take 20 mg by mouth 1 (one) time 1 8/15 pm and 1 8/16 am (Patient not taking: Reported on 9/5/2023)     No current facility-administered medications on file prior to visit.     Medications Discontinued During This Encounter   Medication Reason    predniSONE 20 mg tablet       She is allergic to iodine - food allergy and iodinated contrast media..    Review of  "Systems   Constitutional:  Negative for appetite change, chills, fatigue and fever.   HENT:  Negative for sore throat and trouble swallowing.    Eyes:  Negative for redness.   Respiratory:  Negative for shortness of breath.    Cardiovascular:  Negative for chest pain and palpitations.   Gastrointestinal:  Negative for abdominal pain, constipation and diarrhea.   Genitourinary:  Negative for dysuria and hematuria.   Musculoskeletal:  Negative for back pain and neck pain.   Skin:  Negative for rash.   Neurological:  Negative for seizures, weakness and headaches.   Hematological:  Negative for adenopathy.   Psychiatric/Behavioral:  Negative for confusion. The patient is not nervous/anxious.          Objective:      /70   Pulse 76   Temp (!) 97.3 °F (36.3 °C) (Temporal)   Ht 5' 3\" (1.6 m)   Wt 67.1 kg (148 lb)   SpO2 99%   BMI 26.22 kg/m²     Results Reviewed       None            No results found for this or any previous visit (from the past 1344 hour(s)).     Physical Exam  Constitutional:       General: She is not in acute distress.     Appearance: Normal appearance.   HENT:      Head: Normocephalic and atraumatic.      Nose: Nose normal.      Mouth/Throat:      Mouth: Mucous membranes are moist.   Eyes:      Extraocular Movements: Extraocular movements intact.      Pupils: Pupils are equal, round, and reactive to light.   Cardiovascular:      Rate and Rhythm: Normal rate and regular rhythm.      Pulses: Normal pulses.      Heart sounds: Normal heart sounds. No murmur heard.     No friction rub.   Pulmonary:      Effort: Pulmonary effort is normal. No respiratory distress.      Breath sounds: Normal breath sounds. No wheezing.   Abdominal:      General: Abdomen is flat. Bowel sounds are normal. There is no distension.      Palpations: Abdomen is soft. There is no mass.      Tenderness: There is no abdominal tenderness. There is no guarding.   Musculoskeletal:         General: Normal range of motion.      " Cervical back: Neck supple.   Skin:     General: Skin is warm.   Neurological:      General: No focal deficit present.      Mental Status: She is alert and oriented to person, place, and time. Mental status is at baseline.      Cranial Nerves: No cranial nerve deficit.   Psychiatric:         Mood and Affect: Mood normal.         Behavior: Behavior normal.

## 2024-02-09 ENCOUNTER — OFFICE VISIT (OUTPATIENT)
Dept: INTERNAL MEDICINE CLINIC | Facility: CLINIC | Age: 79
End: 2024-02-09
Payer: MEDICARE

## 2024-02-09 VITALS
DIASTOLIC BLOOD PRESSURE: 64 MMHG | SYSTOLIC BLOOD PRESSURE: 116 MMHG | WEIGHT: 145 LBS | TEMPERATURE: 98.1 F | BODY MASS INDEX: 25.69 KG/M2 | OXYGEN SATURATION: 97 % | HEART RATE: 70 BPM | HEIGHT: 63 IN

## 2024-02-09 DIAGNOSIS — M81.0 AGE-RELATED OSTEOPOROSIS WITHOUT CURRENT PATHOLOGICAL FRACTURE: ICD-10-CM

## 2024-02-09 DIAGNOSIS — Z00.00 MEDICARE ANNUAL WELLNESS VISIT, SUBSEQUENT: ICD-10-CM

## 2024-02-09 DIAGNOSIS — K90.89 OTHER SPECIFIED INTESTINAL MALABSORPTION: ICD-10-CM

## 2024-02-09 DIAGNOSIS — E78.5 DYSLIPIDEMIA: ICD-10-CM

## 2024-02-09 DIAGNOSIS — E03.9 ACQUIRED HYPOTHYROIDISM: ICD-10-CM

## 2024-02-09 DIAGNOSIS — I48.0 PAROXYSMAL ATRIAL FIBRILLATION (HCC): Primary | ICD-10-CM

## 2024-02-09 DIAGNOSIS — E55.9 VITAMIN D DEFICIENCY: ICD-10-CM

## 2024-02-09 PROCEDURE — G0439 PPPS, SUBSEQ VISIT: HCPCS | Performed by: INTERNAL MEDICINE

## 2024-02-09 PROCEDURE — 99214 OFFICE O/P EST MOD 30 MIN: CPT | Performed by: INTERNAL MEDICINE

## 2024-02-09 NOTE — PROGRESS NOTES
Assessment and Plan:     Problem List Items Addressed This Visit          Cardiovascular and Mediastinum    Paroxysmal atrial fibrillation (HCC) - Primary    Relevant Orders    CBC and differential    Comprehensive metabolic panel    Urinalysis with microscopic    Vitamin D 25 hydroxy    TSH, 3rd generation       Endocrine    Acquired hypothyroidism       Musculoskeletal and Integument    Age-related osteoporosis without current pathological fracture       Other    Dyslipidemia    Vitamin D deficiency     Other Visit Diagnoses       Medicare annual wellness visit, subsequent        Other specified intestinal malabsorption        Relevant Orders    Vitamin B12    Vitamin D 25 hydroxy          1. Paroxysmal atrial fibrillation (HCC)  CBC and differential    Comprehensive metabolic panel    Urinalysis with microscopic    Vitamin D 25 hydroxy    TSH, 3rd generation    Follows with cardiology stable continue current regimen.      2. Age-related osteoporosis without current pathological fracture      Rheumatology follow-up discussed.      3. Vitamin D deficiency      Continue supplementation.      4. Dyslipidemia        5. Medicare annual wellness visit, subsequent        6. Other specified intestinal malabsorption  Vitamin B12    Vitamin D 25 hydroxy      7. Acquired hypothyroidism      Stable continue levothyroxine.            Preventive health issues were discussed with patient, and age appropriate screening tests were ordered as noted in patient's After Visit Summary.  Personalized health advice and appropriate referrals for health education or preventive services given if needed, as noted in patient's After Visit Summary.     History of Present Illness:     Patient presents for a Medicare Wellness Visit    Is a 79-year-old lady with a history of atrial fibrillation hypothyroidism hypertension GERD dyslipidemia.  She denies any chest pain or shortness of breath.       Patient Care Team:  Donald Cohn MD as PCP -  General  MD Aleksandra Malhotra MD     Review of Systems:     Review of Systems   Constitutional:  Negative for appetite change, chills, fatigue and fever.   HENT:  Negative for sore throat and trouble swallowing.    Eyes:  Negative for redness.   Respiratory:  Negative for shortness of breath.    Cardiovascular:  Negative for chest pain and palpitations.   Gastrointestinal:  Negative for abdominal pain, constipation and diarrhea.   Genitourinary:  Negative for dysuria and hematuria.   Musculoskeletal:  Negative for back pain and neck pain.   Skin:  Negative for rash.   Neurological:  Negative for seizures, weakness and headaches.   Hematological:  Negative for adenopathy.   Psychiatric/Behavioral:  Negative for confusion. The patient is not nervous/anxious.         Problem List:     Patient Active Problem List   Diagnosis    Paroxysmal atrial fibrillation (HCC)    Essential hypertension    PAC (premature atrial contraction)    Dyslipidemia    Acquired hypothyroidism    Plantar fasciitis    Acid reflux    Fibromyalgia    Vitamin D deficiency    Age-related osteoporosis without current pathological fracture    s/p Medtronic dual chamber PPM with left bundle pacing lead 8/16/2023    Encounter for monitoring sotalol therapy    SVT (supraventricular tachycardia)    Nonrheumatic mitral valve regurgitation      Past Medical and Surgical History:     Past Medical History:   Diagnosis Date    Acid reflux     Arthritis     Bradycardia 8/16/2023    Cataract     Colon polyp     Disease of thyroid gland     Dyslipidemia (high LDL; low HDL)     Fibromyalgia     GERD (gastroesophageal reflux disease)     Hyperlipidemia     Hypertension     Hypothyroidism     Palpitation     Paroxysmal atrial fibrillation (HCC)     s/p Medtronic dual chamber PPM with left bundle pacing lead 8/16/2023 8/16/2023    Shingles 09/12/2010    Right C7     Past Surgical History:   Procedure Laterality Date    CARDIAC ELECTROPHYSIOLOGY PROCEDURE  N/A 8/16/2023    Procedure: Cardiac pacer implant;  Surgeon: Wally Lamas MD;  Location: [facility];  Service: Cardiology    CATARACT EXTRACTION, BILATERAL  11/2016    COLONOSCOPY  07/11/2016    COLONOSCOPY      HYSTERECTOMY  05/2016    total hysterectomy    MAMMO (HISTORICAL)  4/8/2010 & 2/19/2020    REPLACEMENT TOTAL KNEE      TONSILLECTOMY      UPPER GASTROINTESTINAL ENDOSCOPY        Family History:     Family History   Problem Relation Age of Onset    Breast cancer Mother 75    Hypertension Mother     Hyperlipidemia Mother     Parkinsonism Mother     Stroke Mother     Chronic bronchitis Mother     Atrial fibrillation Father     Stroke Father     Cancer Father         skin cancer    Prostate cancer Father     Breast cancer Maternal Aunt     Anuerysm Neg Hx     Clotting disorder Neg Hx       Social History:     Social History     Socioeconomic History    Marital status: /Civil Union     Spouse name: None    Number of children: None    Years of education: None    Highest education level: None   Occupational History    None   Tobacco Use    Smoking status: Never    Smokeless tobacco: Never   Vaping Use    Vaping status: Never Used   Substance and Sexual Activity    Alcohol use: No    Drug use: No    Sexual activity: Not Currently   Other Topics Concern    None   Social History Narrative    None     Social Determinants of Health     Financial Resource Strain: Low Risk  (2/9/2024)    Overall Financial Resource Strain (CARDIA)     Difficulty of Paying Living Expenses: Not hard at all   Food Insecurity: Not on file   Transportation Needs: No Transportation Needs (2/9/2024)    PRAPARE - Transportation     Lack of Transportation (Medical): No     Lack of Transportation (Non-Medical): No   Physical Activity: Not on file   Stress: Not on file   Social Connections: Not on file   Intimate Partner Violence: Not on file   Housing Stability: Not on file      Medications and Allergies:     Current Outpatient  Medications   Medication Sig Dispense Refill    Cyanocobalamin (VITAMIN B 12 PO) Take by mouth 2 (two) times a week      ergocalciferol (VITAMIN D2) 50,000 units Take 1 capsule (50,000 Units total) by mouth every 14 (fourteen) days 7 capsule 3    famotidine (PEPCID) 20 mg tablet Take 20 mg by mouth 1 (one) time 1 8/15 pm and 1 8/16 am      omeprazole (PriLOSEC) 20 mg delayed release capsule Take 1 capsule by mouth daily      rivaroxaban (XARELTO) 20 mg tablet Take 1 tablet (20 mg total) by mouth daily with dinner 90 tablet 3    sotalol (BETAPACE) 120 mg tablet TAKE 1 TABLET BY MOUTH 2 TIMES A DAY. 180 tablet 2    diphenhydrAMINE (BENADRYL) 25 mg capsule Take 25 mg by mouth 1 (one) time 1 8/15pm and 18/16 am (Patient not taking: Reported on 4/4/2024)      levothyroxine 75 mcg tablet Take 1 tablet (75 mcg total) by mouth daily 90 tablet 0    losartan (COZAAR) 25 mg tablet Take 1 tablet (25 mg total) by mouth daily 90 tablet 0    nitroglycerin (NITROSTAT) 0.4 mg SL tablet Place 1 tablet (0.4 mg total) under the tongue every 5 (five) minutes as needed for chest pain 15 tablet 1    triamcinolone (KENALOG) 0.1 % ointment Apply topically 2 (two) times a day 60 g 0     No current facility-administered medications for this visit.     Allergies   Allergen Reactions    Iodine - Food Allergy Hives     Severe allergy to IV CT scan dye    Iodinated Contrast Media Hives      Immunizations:     Immunization History   Administered Date(s) Administered    INFLUENZA 09/11/2020, 10/10/2022    Influenza, high dose seasonal 0.7 mL 11/17/2021, 10/10/2022, 09/15/2023    Pneumococcal Conjugate 13-Valent 05/27/2015    Td (adult), Unspecified 07/25/2007    Td (adult), adsorbed 07/25/2007    Tdap 09/01/2015    influenza, trivalent, adjuvanted 09/11/2020      Health Maintenance:         Topic Date Due    Breast Cancer Screening: Mammogram  04/17/2025    DXA SCAN  03/08/2027    Hepatitis C Screening  Completed         Topic Date Due     Pneumococcal Vaccine: 65+ Years (2 of 2 - PPSV23 or PCV20) 05/27/2016    COVID-19 Vaccine (1 - 2023-24 season) Never done      Medicare Screening Tests and Risk Assessments:     Lavern is here for her Subsequent Wellness visit. Last Medicare Wellness visit information reviewed, patient interviewed and updates made to the record today.      Health Risk Assessment:   Patient rates overall health as very good. Patient feels that their physical health rating is much better. Patient is very satisfied with their life. Eyesight was rated as same. Hearing was rated as same. Patient feels that their emotional and mental health rating is much better. Patients states they are never, rarely angry. Patient states they are sometimes unusually tired/fatigued. Pain experienced in the last 7 days has been none. Patient states that she has experienced no weight loss or gain in last 6 months.     Depression Screening:   PHQ-2 Score: 0      Fall Risk Screening:   In the past year, patient has experienced: no history of falling in past year      Urinary Incontinence Screening:   Patient has not leaked urine accidently in the last six months.     Home Safety:  Patient does not have trouble with stairs inside or outside of their home. Patient has working smoke alarms and has working carbon monoxide detector. Home safety hazards include: none.     Nutrition:   Current diet is Regular.     Medications:   Patient is currently taking over-the-counter supplements. OTC medications include: see medication list. Patient is able to manage medications.     Activities of Daily Living (ADLs)/Instrumental Activities of Daily Living (IADLs):   Walk and transfer into and out of bed and chair?: Yes  Dress and groom yourself?: Yes    Bathe or shower yourself?: Yes    Feed yourself? Yes  Do your laundry/housekeeping?: Yes  Manage your money, pay your bills and track your expenses?: Yes  Make your own meals?: Yes    Do your own shopping?: Yes    Previous  "Hospitalizations:   Any hospitalizations or ED visits within the last 12 months?: Yes      Advance Care Planning:   Living will: No    Durable POA for healthcare: No    Advanced directive: No      PREVENTIVE SCREENINGS      Cardiovascular Screening:    General: Screening Current      Diabetes Screening:     General: Screening Current      Breast Cancer Screening:     General: Screening Current      Cervical Cancer Screening:    General: Screening Not Indicated      Osteoporosis Screening:    General: Screening Not Indicated and History Osteoporosis      Lung Cancer Screening:     General: Screening Not Indicated      Hepatitis C Screening:    General: Screening Current    Screening, Brief Intervention, and Referral to Treatment (SBIRT)    Screening  Typical number of drinks in a day: 0  Typical number of drinks in a week: 0  Interpretation: Low risk drinking behavior.    Single Item Drug Screening:  How often have you used an illegal drug (including marijuana) or a prescription medication for non-medical reasons in the past year? never    Single Item Drug Screen Score: 0  Interpretation: Negative screen for possible drug use disorder    Other Counseling Topics:   Car/seat belt/driving safety, skin self-exam, sunscreen and calcium and vitamin D intake and regular weightbearing exercise.     No results found.     Physical Exam:     /64 (BP Location: Left arm, Patient Position: Sitting, Cuff Size: Standard)   Pulse 70   Temp 98.1 °F (36.7 °C) (Temporal)   Ht 5' 3\" (1.6 m)   Wt 65.8 kg (145 lb)   SpO2 97%   BMI 25.69 kg/m²     Physical Exam  Vitals and nursing note reviewed.   Constitutional:       General: She is not in acute distress.     Appearance: She is well-developed.   HENT:      Head: Normocephalic and atraumatic.      Right Ear: Tympanic membrane normal.      Left Ear: Tympanic membrane normal.      Mouth/Throat:      Mouth: Mucous membranes are moist.   Eyes:      Conjunctiva/sclera: Conjunctivae " normal.   Cardiovascular:      Rate and Rhythm: Normal rate. Rhythm irregular.      Heart sounds: No murmur heard.  Pulmonary:      Effort: Pulmonary effort is normal. No respiratory distress.      Breath sounds: Normal breath sounds.   Abdominal:      Palpations: Abdomen is soft.      Tenderness: There is no abdominal tenderness.   Musculoskeletal:         General: No swelling.      Cervical back: Normal range of motion and neck supple.   Skin:     General: Skin is warm and dry.      Capillary Refill: Capillary refill takes less than 2 seconds.   Neurological:      General: No focal deficit present.      Mental Status: She is alert.   Psychiatric:         Mood and Affect: Mood normal.          Donald Cohn MD

## 2024-03-06 ENCOUNTER — REMOTE DEVICE CLINIC VISIT (OUTPATIENT)
Dept: CARDIOLOGY CLINIC | Facility: CLINIC | Age: 79
End: 2024-03-06
Payer: MEDICARE

## 2024-03-06 DIAGNOSIS — Z95.0 CARDIAC PACEMAKER IN SITU: Primary | ICD-10-CM

## 2024-03-06 PROCEDURE — 93294 REM INTERROG EVL PM/LDLS PM: CPT | Performed by: INTERNAL MEDICINE

## 2024-03-06 PROCEDURE — 93296 REM INTERROG EVL PM/IDS: CPT | Performed by: INTERNAL MEDICINE

## 2024-03-06 NOTE — PROGRESS NOTES
Results for orders placed or performed in visit on 03/06/24   Cardiac EP device report    Narrative    MDT DUAL PM/ ACTIVE SYSTEM IS MRI CONDITIONAL  CARELINK TRANSMISSION: BATTERY VOLTAGE ADEQUATE (12.7 YRS). AP-93%, -0.1%. ALL AVAILABLE LEAD PARAMETERS WITHIN NORMAL LIMITS. 1 NSVT EPISODE FOR 13 BEATS, AVG CL~315MS. EF-57% (ECHO 6/20/23). 1 AFLUTTER EPISODE LASTING 23 SEC. PT ON XARELTO & SOTALOL. NORMAL DEVICE FUNCTION. GV

## 2024-03-23 DIAGNOSIS — L30.8 OTHER ECZEMA: ICD-10-CM

## 2024-03-23 DIAGNOSIS — E03.9 ACQUIRED HYPOTHYROIDISM: ICD-10-CM

## 2024-03-23 DIAGNOSIS — I10 ESSENTIAL HYPERTENSION: ICD-10-CM

## 2024-03-25 RX ORDER — LEVOTHYROXINE SODIUM 0.07 MG/1
75 TABLET ORAL DAILY
Qty: 90 TABLET | Refills: 0 | Status: SHIPPED | OUTPATIENT
Start: 2024-03-25 | End: 2024-06-23

## 2024-03-25 RX ORDER — TRIAMCINOLONE ACETONIDE 1 MG/G
OINTMENT TOPICAL 2 TIMES DAILY
Qty: 60 G | Refills: 0 | Status: SHIPPED | OUTPATIENT
Start: 2024-03-25

## 2024-03-25 RX ORDER — LOSARTAN POTASSIUM 25 MG/1
25 TABLET ORAL DAILY
Qty: 90 TABLET | Refills: 0 | Status: SHIPPED | OUTPATIENT
Start: 2024-03-25

## 2024-04-04 ENCOUNTER — OFFICE VISIT (OUTPATIENT)
Dept: CARDIOLOGY CLINIC | Facility: CLINIC | Age: 79
End: 2024-04-04
Payer: MEDICARE

## 2024-04-04 VITALS
DIASTOLIC BLOOD PRESSURE: 82 MMHG | SYSTOLIC BLOOD PRESSURE: 134 MMHG | HEIGHT: 63 IN | BODY MASS INDEX: 26.44 KG/M2 | OXYGEN SATURATION: 95 % | HEART RATE: 74 BPM | WEIGHT: 149.2 LBS

## 2024-04-04 DIAGNOSIS — I49.1 PAC (PREMATURE ATRIAL CONTRACTION): ICD-10-CM

## 2024-04-04 DIAGNOSIS — I10 ESSENTIAL HYPERTENSION: ICD-10-CM

## 2024-04-04 DIAGNOSIS — I47.10 SVT (SUPRAVENTRICULAR TACHYCARDIA): ICD-10-CM

## 2024-04-04 DIAGNOSIS — E78.5 DYSLIPIDEMIA: ICD-10-CM

## 2024-04-04 DIAGNOSIS — I34.0 NONRHEUMATIC MITRAL VALVE REGURGITATION: ICD-10-CM

## 2024-04-04 DIAGNOSIS — I48.0 PAROXYSMAL ATRIAL FIBRILLATION (HCC): Primary | ICD-10-CM

## 2024-04-04 DIAGNOSIS — R00.1 BRADYCARDIA: ICD-10-CM

## 2024-04-04 DIAGNOSIS — Z95.0 PACEMAKER: ICD-10-CM

## 2024-04-04 PROBLEM — R42 POSTURAL DIZZINESS WITH PRESYNCOPE: Status: RESOLVED | Noted: 2021-01-14 | Resolved: 2024-04-04

## 2024-04-04 PROBLEM — R94.31 ABNORMAL ECG: Status: RESOLVED | Noted: 2021-03-03 | Resolved: 2024-04-04

## 2024-04-04 PROBLEM — R55 POSTURAL DIZZINESS WITH PRESYNCOPE: Status: RESOLVED | Noted: 2021-01-14 | Resolved: 2024-04-04

## 2024-04-04 PROCEDURE — 99214 OFFICE O/P EST MOD 30 MIN: CPT | Performed by: INTERNAL MEDICINE

## 2024-04-04 PROCEDURE — 93000 ELECTROCARDIOGRAM COMPLETE: CPT | Performed by: INTERNAL MEDICINE

## 2024-04-04 NOTE — PROGRESS NOTES
[facility] Cardiology  Follow up note  Lavern Hardin 79 y.o. female MRN: [id number]        1. Paroxysmal atrial fibrillation (HCC)  Assessment & Plan:  Atrial fibrillation ablation 9/21 for persistent symptomatic atrial fibrillation  Developed bradycardia in 2023, PPM placed.  Has rare breakthrough on Sotalol  QTC normal  Now that she has a PPM, adding rate control agent for increase in symptoms in the future is a possibility    Orders:  -     POCT ECG    2. PAC (premature atrial contraction)    3. Bradycardia    4. Dyslipidemia  Assessment & Plan:  Fibromyalgia related statin intolerant      5. Essential hypertension  Assessment & Plan:  Controlled      6. s/p Medtronic dual chamber PPM with left bundle pacing lead 8/16/2023    7. SVT (supraventricular tachycardia)    8. Nonrheumatic mitral valve regurgitation  Assessment & Plan:  Moderate by last echo 6/23  Prior to that mild  No murmur on exam  No dyspnea on exertion        Plan:    No change in medications at this time  Annual follow-up  She alternates between seeing Dr. Lamas with electrophysiology and myself      HPI:   Lavern Hardin is a 79 y.o. year old female with hypertension, hyperlipidemia but statin intolerance, PACs, paroxysmal symptomatic atrial fibrillation, PSVT, status post cardioversion in 2020 and ultimately ablation in 2021, with intermittent recurrent palpitations by Kardia device typically showing SVT, but bradycardia on sotalol precluding escalation of medical therapy, ultimately underwent pacemaker in 2023 with left bundle branch pacing, as a result of symptoms related to bradycardia when she was out visiting family in [place].  She is doing well, she has rare palpitations, pacemaker device check showed rare recurrence of atrial flutter, her last significant episode was back in November, she had an hour and a half of A-fib.  She continues on Xarelto, sotalol, she is in an atrial paced rhythm today, QTc is normal.  Pressure is  well-controlled.  She has dyslipidemia    Review of Systems   Constitutional:  Negative for appetite change, diaphoresis, fatigue and fever.   Respiratory:  Negative for chest tightness, shortness of breath and wheezing.    Cardiovascular:  Positive for palpitations. Negative for chest pain and leg swelling.   Gastrointestinal:  Negative for abdominal pain and blood in stool.   Musculoskeletal:  Negative for arthralgias and joint swelling.   Skin:  Negative for rash.   Neurological:  Negative for dizziness, syncope and light-headedness.       Past Medical History:   Diagnosis Date   • Acid reflux    • Arthritis    • Bradycardia 8/16/2023   • Cataract    • Colon polyp    • Disease of thyroid gland    • Dyslipidemia (high LDL; low HDL)    • Fibromyalgia    • GERD (gastroesophageal reflux disease)    • Hyperlipidemia    • Hypertension    • Hypothyroidism    • Palpitation    • Paroxysmal atrial fibrillation (HCC)    • s/p Medtronic dual chamber PPM with left bundle pacing lead 8/16/2023 8/16/2023   • Shingles 09/12/2010    Right C7     Social History     Substance and Sexual Activity   Alcohol Use No     Social History     Substance and Sexual Activity   Drug Use No     Social History     Tobacco Use   Smoking Status Never   Smokeless Tobacco Never       Allergies:  Allergies   Allergen Reactions   • Iodine - Food Allergy Hives     Severe allergy to IV CT scan dye   • Iodinated Contrast Media Hives       Medications:     Current Outpatient Medications:   •  Cyanocobalamin (VITAMIN B 12 PO), Take by mouth 2 (two) times a week, Disp: , Rfl:   •  ergocalciferol (VITAMIN D2) 50,000 units, Take 1 capsule (50,000 Units total) by mouth every 14 (fourteen) days, Disp: 7 capsule, Rfl: 3  •  famotidine (PEPCID) 20 mg tablet, Take 20 mg by mouth 1 (one) time 1 8/15 pm and 1 8/16 am, Disp: , Rfl:   •  levothyroxine 75 mcg tablet, Take 1 tablet (75 mcg total) by mouth daily, Disp: 90 tablet, Rfl: 0  •  losartan (COZAAR) 25 mg  tablet, Take 1 tablet (25 mg total) by mouth daily, Disp: 90 tablet, Rfl: 0  •  nitroglycerin (NITROSTAT) 0.4 mg SL tablet, Place 1 tablet (0.4 mg total) under the tongue every 5 (five) minutes as needed for chest pain, Disp: 15 tablet, Rfl: 1  •  omeprazole (PriLOSEC) 20 mg delayed release capsule, Take 1 capsule by mouth daily, Disp: , Rfl:   •  rivaroxaban (XARELTO) 20 mg tablet, Take 1 tablet (20 mg total) by mouth daily with dinner, Disp: 90 tablet, Rfl: 3  •  sotalol (BETAPACE) 120 mg tablet, TAKE 1 TABLET BY MOUTH 2 TIMES A DAY., Disp: 180 tablet, Rfl: 2  •  triamcinolone (KENALOG) 0.1 % ointment, Apply topically 2 (two) times a day, Disp: 60 g, Rfl: 0  •  diphenhydrAMINE (BENADRYL) 25 mg capsule, Take 25 mg by mouth 1 (one) time 1 8/15pm and 18/16 am (Patient not taking: Reported on 4/4/2024), Disp: , Rfl:       Vitals:    04/04/24 0949   BP: 134/82   Pulse: 74   SpO2: 95%     Weight (last 2 days)     Date/Time Weight    04/04/24 0949 67.7 (149.2)        Physical Exam  Constitutional:       General: She is not in acute distress.     Appearance: Normal appearance. She is not diaphoretic.   HENT:      Head: Normocephalic and atraumatic.   Eyes:      General: No scleral icterus.     Conjunctiva/sclera: Conjunctivae normal.   Neck:      Vascular: No JVD.   Cardiovascular:      Rate and Rhythm: Normal rate and regular rhythm.      Heart sounds: Normal heart sounds. No murmur heard.  Pulmonary:      Effort: Pulmonary effort is normal. No respiratory distress.      Breath sounds: Normal breath sounds. No wheezing, rhonchi or rales.   Musculoskeletal:         General: No tenderness.      Right lower leg: Normal. No edema.      Left lower leg: Normal. No edema.   Skin:     General: Skin is warm and dry.   Neurological:      Mental Status: She is alert. Mental status is at baseline.         Laboratory Studies:    Labs personally reviewed    Cardiac testing:     EKG reviewed personally:   Results for orders placed or  "performed in visit on 04/04/24   POCT ECG    Impression    Atrial paced, qtc 457ms             Echocardiogram:  8/20-DELANO-EF normal, severely dilated left atrium    Stress tests:      Catheterization:      Holter:         Chaitanya Walsh MD    Portions of the record may have been created with voice recognition software.  Occasional wrong word or \"sound a like\" substitutions may have occurred due to the inherent limitations of voice recognition software.  Read the chart carefully and recognize, using context, where substitutions have occurred.  "

## 2024-04-04 NOTE — ASSESSMENT & PLAN NOTE
Atrial fibrillation ablation 9/21 for persistent symptomatic atrial fibrillation  Developed bradycardia in 2023, PPM placed.  Has rare breakthrough on Sotalol  QTC normal  Now that she has a PPM, adding rate control agent for increase in symptoms in the future is a possibility

## 2024-04-17 ENCOUNTER — HOSPITAL ENCOUNTER (OUTPATIENT)
Dept: RADIOLOGY | Facility: HOSPITAL | Age: 79
Discharge: HOME | End: 2024-04-17
Payer: MEDICARE

## 2024-04-17 VITALS — WEIGHT: 148 LBS | HEIGHT: 63 IN | BODY MASS INDEX: 26.22 KG/M2

## 2024-04-17 DIAGNOSIS — Z12.31 ENCOUNTER FOR SCREENING MAMMOGRAM FOR MALIGNANT NEOPLASM OF BREAST: ICD-10-CM

## 2024-04-17 PROCEDURE — 77067 SCR MAMMO BI INCL CAD: CPT

## 2024-04-17 PROCEDURE — 77063 BREAST TOMOSYNTHESIS BI: CPT

## 2024-04-26 ENCOUNTER — HOSPITAL ENCOUNTER (EMERGENCY)
Facility: HOSPITAL | Age: 79
Discharge: HOME | End: 2024-04-27
Attending: EMERGENCY MEDICINE
Payer: MEDICARE

## 2024-04-26 DIAGNOSIS — R31.9 HEMATURIA: Primary | ICD-10-CM

## 2024-04-26 DIAGNOSIS — N20.1 RIGHT URETERAL STONE: ICD-10-CM

## 2024-04-26 LAB
BACTERIA UR QL AUTO: ABNORMAL /HPF
BILIRUB UR QL STRIP: NEGATIVE
CLARITY UR: ABNORMAL
COLOR UR: ABNORMAL
GLUCOSE UR STRIP-MCNC: NEGATIVE MG/DL
HGB UR QL STRIP.AUTO: ABNORMAL
KETONES UR STRIP-MCNC: NEGATIVE MG/DL
LEUKOCYTE ESTERASE UR QL STRIP: NEGATIVE
NITRITE UR QL STRIP: NEGATIVE
NON-SQ EPI CELLS URNS QL MICRO: ABNORMAL /HPF
PH UR STRIP.AUTO: 6 [PH]
PROT UR STRIP-MCNC: ABNORMAL MG/DL
RBC #/AREA URNS AUTO: ABNORMAL /HPF
SP GR UR STRIP.AUTO: 1.01 (ref 1–1.03)
UROBILINOGEN UR STRIP-ACNC: <2 MG/DL
WBC #/AREA URNS AUTO: ABNORMAL /HPF

## 2024-04-26 PROCEDURE — 99284 EMERGENCY DEPT VISIT MOD MDM: CPT | Performed by: EMERGENCY MEDICINE

## 2024-04-26 PROCEDURE — 99283 EMERGENCY DEPT VISIT LOW MDM: CPT

## 2024-04-26 PROCEDURE — 81001 URINALYSIS AUTO W/SCOPE: CPT | Performed by: EMERGENCY MEDICINE

## 2024-04-26 RX ORDER — ONDANSETRON 2 MG/ML
4 INJECTION INTRAMUSCULAR; INTRAVENOUS ONCE
Status: COMPLETED | OUTPATIENT
Start: 2024-04-27 | End: 2024-04-27

## 2024-04-27 ENCOUNTER — APPOINTMENT (EMERGENCY)
Dept: CT IMAGING | Facility: HOSPITAL | Age: 79
End: 2024-04-27
Payer: MEDICARE

## 2024-04-27 VITALS
OXYGEN SATURATION: 96 % | HEART RATE: 71 BPM | SYSTOLIC BLOOD PRESSURE: 165 MMHG | RESPIRATION RATE: 16 BRPM | DIASTOLIC BLOOD PRESSURE: 79 MMHG | TEMPERATURE: 97.9 F

## 2024-04-27 LAB
ALBUMIN SERPL BCP-MCNC: 4.2 G/DL (ref 3.5–5)
ALP SERPL-CCNC: 60 U/L (ref 34–104)
ALT SERPL W P-5'-P-CCNC: 11 U/L (ref 7–52)
ANION GAP SERPL CALCULATED.3IONS-SCNC: 6 MMOL/L (ref 4–13)
APTT PPP: 40 SECONDS (ref 23–37)
AST SERPL W P-5'-P-CCNC: 18 U/L (ref 13–39)
BASOPHILS # BLD AUTO: 0.04 THOUSANDS/ÂΜL (ref 0–0.1)
BASOPHILS NFR BLD AUTO: 0 % (ref 0–1)
BILIRUB SERPL-MCNC: 0.45 MG/DL (ref 0.2–1)
BUN SERPL-MCNC: 21 MG/DL (ref 5–25)
CALCIUM SERPL-MCNC: 9.4 MG/DL (ref 8.4–10.2)
CHLORIDE SERPL-SCNC: 106 MMOL/L (ref 96–108)
CO2 SERPL-SCNC: 26 MMOL/L (ref 21–32)
CREAT SERPL-MCNC: 0.62 MG/DL (ref 0.6–1.3)
EOSINOPHIL # BLD AUTO: 0.3 THOUSAND/ÂΜL (ref 0–0.61)
EOSINOPHIL NFR BLD AUTO: 3 % (ref 0–6)
ERYTHROCYTE [DISTWIDTH] IN BLOOD BY AUTOMATED COUNT: 13.1 % (ref 11.6–15.1)
GFR SERPL CREATININE-BSD FRML MDRD: 86 ML/MIN/1.73SQ M
GLUCOSE SERPL-MCNC: 105 MG/DL (ref 65–140)
HCT VFR BLD AUTO: 38.4 % (ref 34.8–46.1)
HGB BLD-MCNC: 12.5 G/DL (ref 11.5–15.4)
IMM GRANULOCYTES # BLD AUTO: 0.02 THOUSAND/UL (ref 0–0.2)
IMM GRANULOCYTES NFR BLD AUTO: 0 % (ref 0–2)
INR PPP: 1.65 (ref 0.84–1.19)
LYMPHOCYTES # BLD AUTO: 1.92 THOUSANDS/ÂΜL (ref 0.6–4.47)
LYMPHOCYTES NFR BLD AUTO: 21 % (ref 14–44)
MCH RBC QN AUTO: 31.9 PG (ref 26.8–34.3)
MCHC RBC AUTO-ENTMCNC: 32.6 G/DL (ref 31.4–37.4)
MCV RBC AUTO: 98 FL (ref 82–98)
MONOCYTES # BLD AUTO: 0.94 THOUSAND/ÂΜL (ref 0.17–1.22)
MONOCYTES NFR BLD AUTO: 10 % (ref 4–12)
NEUTROPHILS # BLD AUTO: 6.04 THOUSANDS/ÂΜL (ref 1.85–7.62)
NEUTS SEG NFR BLD AUTO: 66 % (ref 43–75)
NRBC BLD AUTO-RTO: 0 /100 WBCS
PLATELET # BLD AUTO: 288 THOUSANDS/UL (ref 149–390)
PMV BLD AUTO: 9.8 FL (ref 8.9–12.7)
POTASSIUM SERPL-SCNC: 3.9 MMOL/L (ref 3.5–5.3)
PROT SERPL-MCNC: 6.9 G/DL (ref 6.4–8.4)
PROTHROMBIN TIME: 20.3 SECONDS (ref 11.6–14.5)
RBC # BLD AUTO: 3.92 MILLION/UL (ref 3.81–5.12)
SODIUM SERPL-SCNC: 138 MMOL/L (ref 135–147)
WBC # BLD AUTO: 9.26 THOUSAND/UL (ref 4.31–10.16)

## 2024-04-27 PROCEDURE — 96374 THER/PROPH/DIAG INJ IV PUSH: CPT

## 2024-04-27 PROCEDURE — 36415 COLL VENOUS BLD VENIPUNCTURE: CPT | Performed by: EMERGENCY MEDICINE

## 2024-04-27 PROCEDURE — 80053 COMPREHEN METABOLIC PANEL: CPT | Performed by: EMERGENCY MEDICINE

## 2024-04-27 PROCEDURE — 85025 COMPLETE CBC W/AUTO DIFF WBC: CPT | Performed by: EMERGENCY MEDICINE

## 2024-04-27 PROCEDURE — 85730 THROMBOPLASTIN TIME PARTIAL: CPT | Performed by: EMERGENCY MEDICINE

## 2024-04-27 PROCEDURE — 74176 CT ABD & PELVIS W/O CONTRAST: CPT

## 2024-04-27 PROCEDURE — 85610 PROTHROMBIN TIME: CPT | Performed by: EMERGENCY MEDICINE

## 2024-04-27 PROCEDURE — 96360 HYDRATION IV INFUSION INIT: CPT

## 2024-04-27 RX ADMIN — ONDANSETRON 4 MG: 2 INJECTION INTRAMUSCULAR; INTRAVENOUS at 00:13

## 2024-04-27 RX ADMIN — SODIUM CHLORIDE 1000 ML: 0.9 INJECTION, SOLUTION INTRAVENOUS at 00:41

## 2024-04-27 NOTE — ED PROVIDER NOTES
History  Chief Complaint   Patient presents with    Blood in Urine     Pt reports two episodes of bright red blood in urine this afternoon. Denies lightheaded/dizzy/weak.        History provided by:  Patient and spouse   used: No    Blood in Urine  Irritative symptoms do not include frequency. Associated symptoms include nausea. Pertinent negatives include no abdominal pain, chills, dysuria, fever, flank pain or vomiting.     79-year-old female with history of atrial fibrillation on Xarelto presented for evaluation of 2 episodes of painless gross hematuria this evening.  No history of similar symptoms.  No back pain, flank pain, abdominal pain, dysuria, frequency.  No lightheadedness, dizziness, weakness, chest pain, shortness of breath.  She does feel bit nauseous but feels like it is her nerves after seeing blood in the toilet.  Denies any rectal bleeding or vaginal bleeding.  Hypertensive on arrival.  Unremarkable exam.  No abdominal tenderness.  No CVA tenderness.  Plan UA, labs, CT imaging.  Patient has a reported severe IV contrast allergy.  Will dry scan.  May need follow-up urinary system ultrasound for further evaluation.      Prior to Admission Medications   Prescriptions Last Dose Informant Patient Reported? Taking?   Cyanocobalamin (VITAMIN B 12 PO)  Self Yes No   Sig: Take by mouth 2 (two) times a week   diphenhydrAMINE (BENADRYL) 25 mg capsule  Self Yes No   Sig: Take 25 mg by mouth 1 (one) time 1 8/15pm and 18/16 am   Patient not taking: Reported on 4/4/2024   ergocalciferol (VITAMIN D2) 50,000 units  Self No No   Sig: Take 1 capsule (50,000 Units total) by mouth every 14 (fourteen) days   famotidine (PEPCID) 20 mg tablet  Self Yes No   Sig: Take 20 mg by mouth 1 (one) time 1 8/15 pm and 1 8/16 am   levothyroxine 75 mcg tablet  Self No No   Sig: Take 1 tablet (75 mcg total) by mouth daily   losartan (COZAAR) 25 mg tablet  Self No No   Sig: Take 1 tablet (25 mg total) by mouth daily    nitroglycerin (NITROSTAT) 0.4 mg SL tablet  Self No No   Sig: Place 1 tablet (0.4 mg total) under the tongue every 5 (five) minutes as needed for chest pain   omeprazole (PriLOSEC) 20 mg delayed release capsule  Self Yes No   Sig: Take 1 capsule by mouth daily   rivaroxaban (XARELTO) 20 mg tablet  Self No No   Sig: Take 1 tablet (20 mg total) by mouth daily with dinner   sotalol (BETAPACE) 120 mg tablet  Self No No   Sig: TAKE 1 TABLET BY MOUTH 2 TIMES A DAY.   triamcinolone (KENALOG) 0.1 % ointment  Self No No   Sig: Apply topically 2 (two) times a day      Facility-Administered Medications: None       Past Medical History:   Diagnosis Date    Acid reflux     Arthritis     Bradycardia 8/16/2023    Cataract     Colon polyp     Disease of thyroid gland     Dyslipidemia (high LDL; low HDL)     Fibromyalgia     GERD (gastroesophageal reflux disease)     Hyperlipidemia     Hypertension     Hypothyroidism     Palpitation     Paroxysmal atrial fibrillation (HCC)     s/p Medtronic dual chamber PPM with left bundle pacing lead 8/16/2023 8/16/2023    Shingles 09/12/2010    Right C7       Past Surgical History:   Procedure Laterality Date    CARDIAC ELECTROPHYSIOLOGY PROCEDURE N/A 8/16/2023    Procedure: Cardiac pacer implant;  Surgeon: Wally Lamas MD;  Location: [facility];  Service: Cardiology    CATARACT EXTRACTION, BILATERAL  11/2016    COLONOSCOPY  07/11/2016    COLONOSCOPY      HYSTERECTOMY  05/2016    total hysterectomy    MAMMO (HISTORICAL)  4/8/2010 & 2/19/2020    REPLACEMENT TOTAL KNEE      TONSILLECTOMY      UPPER GASTROINTESTINAL ENDOSCOPY         Family History   Problem Relation Age of Onset    Breast cancer Mother 75    Hypertension Mother     Hyperlipidemia Mother     Parkinsonism Mother     Stroke Mother     Chronic bronchitis Mother     Atrial fibrillation Father     Stroke Father     Cancer Father         skin cancer    Prostate cancer Father     Breast cancer Maternal Aunt     Anuerysm Neg  Hx     Clotting disorder Neg Hx      I have reviewed and agree with the history as documented.    E-Cigarette/Vaping    E-Cigarette Use Never User      E-Cigarette/Vaping Substances    Nicotine No     THC No     CBD No     Flavoring No     Unknown No      Social History     Tobacco Use    Smoking status: Never    Smokeless tobacco: Never   Vaping Use    Vaping status: Never Used   Substance Use Topics    Alcohol use: No    Drug use: No       Review of Systems   Constitutional:  Negative for activity change, appetite change, chills and fever.   Respiratory:  Negative for shortness of breath.    Cardiovascular:  Negative for chest pain.   Gastrointestinal:  Positive for nausea. Negative for abdominal pain, blood in stool, constipation and vomiting.   Genitourinary:  Positive for hematuria. Negative for difficulty urinating, dysuria, flank pain, frequency and vaginal bleeding.   Musculoskeletal:  Negative for back pain and neck pain.   Skin:  Negative for color change and pallor.   Neurological:  Negative for dizziness, weakness, light-headedness and headaches.   All other systems reviewed and are negative.      Physical Exam  Physical Exam  Vitals and nursing note reviewed.   Constitutional:       Appearance: Normal appearance.   HENT:      Head: Normocephalic and atraumatic.      Mouth/Throat:      Mouth: Mucous membranes are moist.      Pharynx: Oropharynx is clear.   Cardiovascular:      Rate and Rhythm: Normal rate and regular rhythm.   Pulmonary:      Effort: Pulmonary effort is normal. No respiratory distress.   Abdominal:      General: There is no distension.      Palpations: Abdomen is soft.      Tenderness: There is no abdominal tenderness. There is no right CVA tenderness or left CVA tenderness.   Musculoskeletal:         General: No swelling. Normal range of motion.      Cervical back: Normal range of motion and neck supple.   Skin:     General: Skin is warm and dry.      Capillary Refill: Capillary refill  takes less than 2 seconds.      Coloration: Skin is not pale.      Findings: No bruising or erythema.   Neurological:      General: No focal deficit present.      Mental Status: She is alert and oriented to person, place, and time.   Psychiatric:         Mood and Affect: Mood normal.         Behavior: Behavior normal.         Vital Signs  ED Triage Vitals   Temperature Pulse Respirations Blood Pressure SpO2   04/26/24 2242 04/26/24 2242 04/26/24 2242 04/26/24 2242 04/26/24 2242   97.9 °F (36.6 °C) 78 18 (!) 193/97 99 %      Temp Source Heart Rate Source Patient Position - Orthostatic VS BP Location FiO2 (%)   04/26/24 2242 04/26/24 2242 04/27/24 0042 04/26/24 2242 --   Oral Monitor Lying Right arm       Pain Score       --                  Vitals:    04/26/24 2242 04/27/24 0042   BP: (!) 193/97 165/79   Pulse: 78 74   Patient Position - Orthostatic VS:  Lying         Visual Acuity      ED Medications  Medications   ondansetron (ZOFRAN) injection 4 mg (4 mg Intravenous Given 4/27/24 0013)   sodium chloride 0.9 % bolus 1,000 mL (1,000 mL Intravenous New Bag 4/27/24 0041)       Diagnostic Studies  Results Reviewed       Procedure Component Value Units Date/Time    Comprehensive metabolic panel [[id number]] Collected: 04/27/24 0014    Lab Status: Final result Specimen: Blood from Arm, Right Updated: 04/27/24 0045     Sodium 138 mmol/L      Potassium 3.9 mmol/L      Chloride 106 mmol/L      CO2 26 mmol/L      ANION GAP 6 mmol/L      BUN 21 mg/dL      Creatinine 0.62 mg/dL      Glucose 105 mg/dL      Calcium 9.4 mg/dL      AST 18 U/L      ALT 11 U/L      Alkaline Phosphatase 60 U/L      Total Protein 6.9 g/dL      Albumin 4.2 g/dL      Total Bilirubin 0.45 mg/dL      eGFR 86 ml/min/1.73sq m     Narrative:      National Kid[employer] Disease Foundation guidelines for Chronic Kidney Disease (CKD):     Stage 1 with normal or high GFR (GFR > 90 mL/min/1.73 square meters)    Stage 2 Mild CKD (GFR = 60-89 mL/min/1.73 square meters)     Stage 3A Moderate CKD (GFR = 45-59 mL/min/1.73 square meters)    Stage 3B Moderate CKD (GFR = 30-44 mL/min/1.73 square meters)    Stage 4 Severe CKD (GFR = 15-29 mL/min/1.73 square meters)    Stage 5 End Stage CKD (GFR <15 mL/min/1.73 square meters)  Note: GFR calculation is accurate only with a steady state creatinine    Protime-INR [[id number]]  (Abnormal) Collected: 04/27/24 0014    Lab Status: Final result Specimen: Blood from Arm, Right Updated: 04/27/24 0041     Protime 20.3 seconds      INR 1.65    APTT [[id number]]  (Abnormal) Collected: 04/27/24 0014    Lab Status: Final result Specimen: Blood from Arm, Right Updated: 04/27/24 0041     PTT 40 seconds     CBC and differential [[id number]] Collected: 04/27/24 0014    Lab Status: Final result Specimen: Blood from Arm, Right Updated: 04/27/24 0028     WBC 9.26 Thousand/uL      RBC 3.92 Million/uL      Hemoglobin 12.5 g/dL      Hematocrit 38.4 %      MCV 98 fL      MCH 31.9 pg      MCHC 32.6 g/dL      RDW 13.1 %      MPV 9.8 fL      Platelets 288 Thousands/uL      nRBC 0 /100 WBCs      Segmented % 66 %      Immature Grans % 0 %      Lymphocytes % 21 %      Monocytes % 10 %      Eosinophils Relative 3 %      Basophils Relative 0 %      Absolute Neutrophils 6.04 Thousands/µL      Absolute Immature Grans 0.02 Thousand/uL      Absolute Lymphocytes 1.92 Thousands/µL      Absolute Monocytes 0.94 Thousand/µL      Eosinophils Absolute 0.30 Thousand/µL      Basophils Absolute 0.04 Thousands/µL     Urine Microscopic [[id number]]  (Abnormal) Collected: 04/26/24 2329    Lab Status: Final result Specimen: Urine, Clean Catch Updated: 04/26/24 2352     RBC, UA Innumerable /hpf      WBC, UA None Seen /hpf      Epithelial Cells None Seen /hpf      Bacteria, UA None Seen /hpf     UA w Reflex to Microscopic w Reflex to Culture [[id number]]  (Abnormal) Collected: 04/26/24 2329    Lab Status: Final result Specimen: Urine, Clean Catch Updated: 04/26/24 2348     Color, UA Red     Clarity,  UA Extra Turbid     Specific Gravity, UA 1.011     pH, UA 6.0     Leukocytes, UA Negative     Nitrite, UA Negative     Protein,  (2+) mg/dl      Glucose, UA Negative mg/dl      Ketones, UA Negative mg/dl      Urobilinogen, UA <2.0 mg/dl      Bilirubin, UA Negative     Occult Blood, UA Large                   CT abdomen pelvis wo contrast   Final Result by Leif Gonsalez MD (04/27 0106)      7 mm calculus at the right UPJ causing no significant hydronephrosis.      Scattered colonic diverticulosis with no inflammatory changes present to suggest acute diverticulitis.      Moderate to large sized hiatal hernia.      Workstation performed: [id number]                    Procedures  Procedures         ED Course  ED Course as of 04/27/24 0150   Sat Apr 27, 2024   0010 RBC Urine(!): Innumerable   0035 Reviewed CT.  Proximal right ureteral stone by my read.  Patient remains pain-free.  She still has some nausea but notes it is improving after the Zofran.   0112 CT does show a 7 mm proximal right ureteral stone without significant hydronephrosis.   0144 Reassessed patient.  She is asymptomatic at this time.  Referral for urology placed.  Advised her to return to the emergency department if she has any worsening symptoms over the weekend including worsening bleeding, lightheadedness, dizziness, weakness, fevers, significant pain.                                             Medical Decision Making  79-year-old female with history of atrial fibrillation on Xarelto presented for evaluation of 2 episodes of painless gross hematuria this evening.  No history of similar symptoms.  No back pain, flank pain, abdominal pain, dysuria, frequency.  No lightheadedness, dizziness, weakness, chest pain, shortness of breath.  She does feel bit nauseous but feels like it is her nerves after seeing blood in the toilet.  Denies any rectal bleeding or vaginal bleeding.  Hypertensive on arrival.  Unremarkable exam.  No abdominal  tenderness.  No CVA tenderness.    Blood pressure spontaneously improving.    Found to have 7 mm right ureteral stone on CT scan.    No evidence of infection.    Other than hematuria patient remained asymptomatic.  Plan discharge home and outpatient urology follow-up.  Counseled to return to the emergency department for worsening symptoms.        Amount and/or Complexity of Data Reviewed  Labs: ordered. Decision-making details documented in ED Course.  Radiology: ordered.    Risk  Prescription drug management.             Disposition  Final diagnoses:   Hematuria   Right ureteral stone     Time reflects when diagnosis was documented in both MDM as applicable and the Disposition within this note       Time User Action Codes Description Comment    4/27/2024  1:10 AM Ten Kessler Add [R31.9] Hematuria     4/27/2024  1:11 AM Ten Kessler Add [N20.1] Right ureteral stone           ED Disposition       ED Disposition   Discharge    Condition   Stable    Date/Time   Sat Apr 27, 2024  1:46 AM    Comment   Lavern Hardin discharge to home/self care.                   Follow-up Information       Follow up With Specialties Details Why Contact Info Additional Information    [facility]  As needed [address]  [facility] [address]  [phone] [facility] [address], [place], [place], [address]   [phone]    [facility] Emergency Department Emergency Medicine  If symptoms worsen [address] [address]  [phone] [facility] Emergency Department, [address], [place], [address]            Patient's Medications   Discharge Prescriptions    No medications on file           PDMP Review         Value Time User    PDMP Reviewed  Yes 5/2/2023  3:28 PM Donald Cohn MD            ED Provider  Electronically Signed by              Ten Kessler MD  04/27/24 0151

## 2024-04-29 ENCOUNTER — NURSE TRIAGE (OUTPATIENT)
Age: 79
End: 2024-04-29

## 2024-04-29 DIAGNOSIS — I48.0 PAROXYSMAL ATRIAL FIBRILLATION (HCC): ICD-10-CM

## 2024-04-29 RX ORDER — SOTALOL HYDROCHLORIDE 120 MG/1
120 TABLET ORAL 2 TIMES DAILY
Qty: 180 TABLET | Refills: 3 | Status: SHIPPED | OUTPATIENT
Start: 2024-04-29

## 2024-04-29 NOTE — TELEPHONE ENCOUNTER
New patient seen in the ER for 2 episodes of painless gross hematuria.     CT obtained showing the following -  IMPRESSION:     7 mm calculus at the right UPJ causing no significant hydronephrosis.     Scattered colonic diverticulosis with no inflammatory changes present to suggest acute diverticulitis.     Moderate to large sized hiatal hernia.

## 2024-04-29 NOTE — TELEPHONE ENCOUNTER
Regarding: NP-ED F/U kidney stones blood in urine  ----- Message from Jody Mahan sent at 4/29/2024  1:15 PM EDT -----  NP called and stated was seen in ED 4/27/24 due to having blood in urine and still present. Pt denies fevers chills clots vomiting diarrhea or nausea. PT did have labs and CT completed in ED. Please review for appropriate appt     PT call [phone]

## 2024-05-07 PROBLEM — Z71.2 PERSON CONSULTING FOR EXPLANATION OF EXAMINATION OR TEST FINDING: Status: ACTIVE | Noted: 2024-05-07

## 2024-05-07 PROBLEM — N20.1 RIGHT URETERAL STONE: Status: ACTIVE | Noted: 2024-05-07

## 2024-05-07 NOTE — H&P (VIEW-ONLY)
"     Problem List Items Addressed This Visit       Paroxysmal atrial fibrillation (HCC)    Nonrheumatic mitral valve regurgitation    Right ureteral stone - Primary     Films reviewed. Decision for surgery discussed with her today. She understands that it is unlikely that her stone will pass with expulsive therapy alone. I discussed with her the pre, elo, and post operative care for ureteroscopy and laser lithotripsy with her, she understands the benefits of unblocking the right ureter and the risks of infection, bleeding, pain, damage to surrounding structures, need for additional procedures, risk of failure of the procedure, risk of anesthesia, risk of positioning complications including neurapraxia, chronic pain, and paralysis as well as risk of rhabdomyolysis and compartment syndrome.  Risk of deep venous thrombosis and venous thromboembolism as well as pneumonia and other potential but unforseeable or unpredictable complications was also discussed with the patient.    She has participated in the informed decision making process and she signed consent for right ureteroscopy and laser lithotripsy today.    Urine culture being sent today.    Despite her cardiac comorbidities listed here her functional status is excellent and she has no chest pain, sob, or limited exercise tolerance         Relevant Orders    Case request operating room: CYSTOSCOPY URETEROSCOPY WITH LITHOTRIPSY HOLMIUM LASER, RETROGRADE PYELOGRAM AND INSERTION STENT URETERAL (Completed)    Person consulting for explanation of examination or test finding             Portions of the above record have been created with voice recognition software.  Occasional wrong word or \"sound alike\" substitution may have occurred due to the inherent limitations of voice recognition software.  Read the chart carefully and recognize, using context, where substitution may have occurred.    Assessment and plan:       Please see problem oriented charting for the " assessment plan of today's urological complaints      Aren Hays MD      Chief Complaint     As listed above      History of Present Illness     Lavern Hardin is a 79 y.o. woman presenting in consultation for hematuria.    The patient was referred by Charo Kessler MD and today's note has been sent to the referring provider.    With regard to this complaint it is localized to the right kidney.  The quality is described as a renal pelvic/UPJ stone and the severity of this complaint is described as mild.  These symptoms have been present for days and the timing is ongoing. Previous treatments include none and previous work-up includes imaging work up.  The patient mentions nothing as aggravating and alleviating factors, respectively.  The following associated signs and symptoms are mentioned: none.    The following portions of the patient's history were reviewed and updated as appropriate: allergies, current medications, past family history, past medical history, past social history, past surgical history and problem list.    Detailed Urologic History     - please refer to HPI    Review of Systems     Review of Systems   Constitutional: Negative.    HENT: Negative.     Eyes: Negative.    Respiratory: Negative.     Cardiovascular: Negative.    Gastrointestinal: Negative.    Endocrine: Negative.    Genitourinary: Negative.    Musculoskeletal: Negative.    Skin: Negative.    Allergic/Immunologic: Negative.    Neurological: Negative.    Hematological: Negative.    Psychiatric/Behavioral: Negative.               Allergies     Allergies   Allergen Reactions    Iodinated Contrast Media Hives       Physical Exam     Physical Exam  Vitals and nursing note reviewed.   Constitutional:       General: She is not in acute distress.     Appearance: Normal appearance. She is well-developed. She is not ill-appearing, toxic-appearing or diaphoretic.   HENT:      Head: Normocephalic and atraumatic.   Eyes:      General: No  "scleral icterus.  Pulmonary:      Effort: Pulmonary effort is normal. No respiratory distress.   Abdominal:      General: There is no distension.      Tenderness: There is no abdominal tenderness. There is no right CVA tenderness or left CVA tenderness.   Musculoskeletal:         General: No deformity.      Cervical back: Neck supple.   Skin:     Coloration: Skin is not jaundiced.      Findings: No lesion.   Neurological:      General: No focal deficit present.      Mental Status: She is alert and oriented to person, place, and time. Mental status is at baseline.      Cranial Nerves: No cranial nerve deficit.   Psychiatric:         Mood and Affect: Mood normal.         Behavior: Behavior normal.         Thought Content: Thought content normal.         Judgment: Judgment normal.             Vital Signs  Vitals:    05/08/24 0852   BP: 158/88   BP Location: Left arm   Patient Position: Sitting   Cuff Size: Standard   Pulse: 74   Resp: 14   SpO2: 97%   Weight: 66.4 kg (146 lb 6.4 oz)   Height: 5' 3\" (1.6 m)         Current Medications       Current Outpatient Medications:     Cyanocobalamin (VITAMIN B 12 PO), Take by mouth 2 (two) times a week, Disp: , Rfl:     ergocalciferol (VITAMIN D2) 50,000 units, Take 1 capsule (50,000 Units total) by mouth every 14 (fourteen) days, Disp: 7 capsule, Rfl: 3    levothyroxine 75 mcg tablet, Take 1 tablet (75 mcg total) by mouth daily, Disp: 90 tablet, Rfl: 0    losartan (COZAAR) 25 mg tablet, Take 1 tablet (25 mg total) by mouth daily, Disp: 90 tablet, Rfl: 0    nitroglycerin (NITROSTAT) 0.4 mg SL tablet, Place 1 tablet (0.4 mg total) under the tongue every 5 (five) minutes as needed for chest pain, Disp: 15 tablet, Rfl: 1    omeprazole (PriLOSEC) 20 mg delayed release capsule, Take 1 capsule by mouth daily, Disp: , Rfl:     rivaroxaban (XARELTO) 20 mg tablet, Take 1 tablet (20 mg total) by mouth daily with dinner, Disp: 90 tablet, Rfl: 3    sotalol (BETAPACE) 120 mg tablet, TAKE 1 " TABLET BY MOUTH TWICE A DAY, Disp: 180 tablet, Rfl: 3    triamcinolone (KENALOG) 0.1 % ointment, Apply topically 2 (two) times a day, Disp: 60 g, Rfl: 0    diphenhydrAMINE (BENADRYL) 25 mg capsule, Take 25 mg by mouth 1 (one) time 1 8/15pm and 18/16 am (Patient not taking: Reported on 4/4/2024), Disp: , Rfl:     famotidine (PEPCID) 20 mg tablet, Take 20 mg by mouth 1 (one) time 1 8/15 pm and 1 8/16 am (Patient not taking: Reported on 5/8/2024), Disp: , Rfl:       Active Problems     Patient Active Problem List   Diagnosis    Paroxysmal atrial fibrillation (HCC)    Essential hypertension    PAC (premature atrial contraction)    Dyslipidemia    Acquired hypothyroidism    Plantar fasciitis    Acid reflux    Fibromyalgia    Vitamin D deficiency    Age-related osteoporosis without current pathological fracture    s/p Medtronic dual chamber PPM with left bundle pacing lead 8/16/2023    Encounter for monitoring sotalol therapy    SVT (supraventricular tachycardia)    Nonrheumatic mitral valve regurgitation    Right ureteral stone    Person consulting for explanation of examination or test finding         Past Medical History     Past Medical History:   Diagnosis Date    Acid reflux     Arthritis     Bradycardia 8/16/2023    Cataract     Colon polyp     Disease of thyroid gland     Dyslipidemia (high LDL; low HDL)     Fibromyalgia     GERD (gastroesophageal reflux disease)     Hyperlipidemia     Hypertension     Hypothyroidism     Palpitation     Paroxysmal atrial fibrillation (HCC)     s/p Medtronic dual chamber PPM with left bundle pacing lead 8/16/2023 8/16/2023    Shingles 09/12/2010    Right C7         Surgical History     Past Surgical History:   Procedure Laterality Date    CARDIAC ELECTROPHYSIOLOGY PROCEDURE N/A 8/16/2023    Procedure: Cardiac pacer implant;  Surgeon: Wally Lamas MD;  Location: [facility];  Service: Cardiology    CATARACT EXTRACTION, BILATERAL  11/2016    COLONOSCOPY  07/11/2016     COLONOSCOPY      HYSTERECTOMY  05/2016    total hysterectomy    MAMMO (HISTORICAL)  4/8/2010 & 2/19/2020    REPLACEMENT TOTAL KNEE      TONSILLECTOMY      UPPER GASTROINTESTINAL ENDOSCOPY           Family History     Family History   Problem Relation Age of Onset    Breast cancer Mother 75    Hypertension Mother     Hyperlipidemia Mother     Parkinsonism Mother     Stroke Mother     Chronic bronchitis Mother     Atrial fibrillation Father     Stroke Father     Cancer Father         skin cancer    Prostate cancer Father     Breast cancer Maternal Aunt     Anuerysm Neg Hx     Clotting disorder Neg Hx          Social History     Social History     Social History     Tobacco Use   Smoking Status Never   Smokeless Tobacco Never         Pertinent Lab Values     Lab Results   Component Value Date    CREATININE 0.62 04/27/2024                   Pertinent Imaging       The patient's images were reviewed by me personally and also in real time with them in the examination room using our PACS imaging system.  The imaging findings are significant for a right UPJ stone.

## 2024-05-08 ENCOUNTER — OFFICE VISIT (OUTPATIENT)
Dept: UROLOGY | Facility: CLINIC | Age: 79
End: 2024-05-08
Payer: MEDICARE

## 2024-05-08 VITALS
SYSTOLIC BLOOD PRESSURE: 158 MMHG | WEIGHT: 146.4 LBS | HEART RATE: 74 BPM | DIASTOLIC BLOOD PRESSURE: 88 MMHG | OXYGEN SATURATION: 97 % | HEIGHT: 63 IN | BODY MASS INDEX: 25.94 KG/M2 | RESPIRATION RATE: 14 BRPM

## 2024-05-08 DIAGNOSIS — I34.0 NONRHEUMATIC MITRAL VALVE REGURGITATION: ICD-10-CM

## 2024-05-08 DIAGNOSIS — Z71.2 PERSON CONSULTING FOR EXPLANATION OF EXAMINATION OR TEST FINDING: ICD-10-CM

## 2024-05-08 DIAGNOSIS — I48.0 PAROXYSMAL ATRIAL FIBRILLATION (HCC): ICD-10-CM

## 2024-05-08 DIAGNOSIS — N20.1 RIGHT URETERAL STONE: Primary | ICD-10-CM

## 2024-05-08 PROCEDURE — 87086 URINE CULTURE/COLONY COUNT: CPT | Performed by: UROLOGY

## 2024-05-08 PROCEDURE — 99204 OFFICE O/P NEW MOD 45 MIN: CPT | Performed by: UROLOGY

## 2024-05-08 NOTE — LETTER
May 8, 2024     Ten Kessler MD  [address] [facility] [address]    Patient: Lavern Hardin   YOB: 1945   Date of Visit: 5/8/2024       Dear Dr. Kessler:    Thank you for referring Lavern Hardin to me for evaluation. Below are my notes for this consultation.    If you have questions, please do not hesitate to call me. I look forward to following your patient along with you.         Sincerely,        Aren Hays MD        CC: No Recipients    Aren Hays MD  5/8/2024  9:10 AM  Sign when Signing Visit       Problem List Items Addressed This Visit       Paroxysmal atrial fibrillation (HCC)    Nonrheumatic mitral valve regurgitation    Right ureteral stone - Primary     Films reviewed. Decision for surgery discussed with her today. She understands that it is unlikely that her stone will pass with expulsive therapy alone. I discussed with her the pre, elo, and post operative care for ureteroscopy and laser lithotripsy with her, she understands the benefits of unblocking the right ureter and the risks of infection, bleeding, pain, damage to surrounding structures, need for additional procedures, risk of failure of the procedure, risk of anesthesia, risk of positioning complications including neurapraxia, chronic pain, and paralysis as well as risk of rhabdomyolysis and compartment syndrome.  Risk of deep venous thrombosis and venous thromboembolism as well as pneumonia and other potential but unforseeable or unpredictable complications was also discussed with the patient.    She has participated in the informed decision making process and she signed consent for right ureteroscopy and laser lithotripsy today.    Urine culture being sent today.    Despite her cardiac comorbidities listed here her functional status is excellent and she has no chest pain, sob, or limited exercise tolerance         Relevant Orders    Case request operating room: CYSTOSCOPY URETEROSCOPY WITH LITHOTRIPSY  "HOLMIUM LASER, RETROGRADE PYELOGRAM AND INSERTION STENT URETERAL (Completed)    Person consulting for explanation of examination or test finding             Portions of the above record have been created with voice recognition software.  Occasional wrong word or \"sound alike\" substitution may have occurred due to the inherent limitations of voice recognition software.  Read the chart carefully and recognize, using context, where substitution may have occurred.    Assessment and plan:       Please see problem oriented charting for the assessment plan of today's urological complaints      Aren Hays MD      Chief Complaint     As listed above      History of Present Illness     Lavern Hardin is a 79 y.o. woman presenting in consultation for hematuria.    The patient was referred by Charo Kessler MD and today's note has been sent to the referring provider.    With regard to this complaint it is localized to the right kidney.  The quality is described as a renal pelvic/UPJ stone and the severity of this complaint is described as mild.  These symptoms have been present for days and the timing is ongoing. Previous treatments include none and previous work-up includes imaging work up.  The patient mentions nothing as aggravating and alleviating factors, respectively.  The following associated signs and symptoms are mentioned: none.    The following portions of the patient's history were reviewed and updated as appropriate: allergies, current medications, past family history, past medical history, past social history, past surgical history and problem list.    Detailed Urologic History     - please refer to HPI    Review of Systems     Review of Systems   Constitutional: Negative.    HENT: Negative.     Eyes: Negative.    Respiratory: Negative.     Cardiovascular: Negative.    Gastrointestinal: Negative.    Endocrine: Negative.    Genitourinary: Negative.    Musculoskeletal: Negative.    Skin: Negative.  " "  Allergic/Immunologic: Negative.    Neurological: Negative.    Hematological: Negative.    Psychiatric/Behavioral: Negative.               Allergies     Allergies   Allergen Reactions   • Iodinated Contrast Media Hives       Physical Exam     Physical Exam  Vitals and nursing note reviewed.   Constitutional:       General: She is not in acute distress.     Appearance: Normal appearance. She is well-developed. She is not ill-appearing, toxic-appearing or diaphoretic.   HENT:      Head: Normocephalic and atraumatic.   Eyes:      General: No scleral icterus.  Pulmonary:      Effort: Pulmonary effort is normal. No respiratory distress.   Abdominal:      General: There is no distension.      Tenderness: There is no abdominal tenderness. There is no right CVA tenderness or left CVA tenderness.   Musculoskeletal:         General: No deformity.      Cervical back: Neck supple.   Skin:     Coloration: Skin is not jaundiced.      Findings: No lesion.   Neurological:      General: No focal deficit present.      Mental Status: She is alert and oriented to person, place, and time. Mental status is at baseline.      Cranial Nerves: No cranial nerve deficit.   Psychiatric:         Mood and Affect: Mood normal.         Behavior: Behavior normal.         Thought Content: Thought content normal.         Judgment: Judgment normal.             Vital Signs  Vitals:    05/08/24 0852   BP: 158/88   BP Location: Left arm   Patient Position: Sitting   Cuff Size: Standard   Pulse: 74   Resp: 14   SpO2: 97%   Weight: 66.4 kg (146 lb 6.4 oz)   Height: 5' 3\" (1.6 m)         Current Medications       Current Outpatient Medications:   •  Cyanocobalamin (VITAMIN B 12 PO), Take by mouth 2 (two) times a week, Disp: , Rfl:   •  ergocalciferol (VITAMIN D2) 50,000 units, Take 1 capsule (50,000 Units total) by mouth every 14 (fourteen) days, Disp: 7 capsule, Rfl: 3  •  levothyroxine 75 mcg tablet, Take 1 tablet (75 mcg total) by mouth daily, Disp: 90 " tablet, Rfl: 0  •  losartan (COZAAR) 25 mg tablet, Take 1 tablet (25 mg total) by mouth daily, Disp: 90 tablet, Rfl: 0  •  nitroglycerin (NITROSTAT) 0.4 mg SL tablet, Place 1 tablet (0.4 mg total) under the tongue every 5 (five) minutes as needed for chest pain, Disp: 15 tablet, Rfl: 1  •  omeprazole (PriLOSEC) 20 mg delayed release capsule, Take 1 capsule by mouth daily, Disp: , Rfl:   •  rivaroxaban (XARELTO) 20 mg tablet, Take 1 tablet (20 mg total) by mouth daily with dinner, Disp: 90 tablet, Rfl: 3  •  sotalol (BETAPACE) 120 mg tablet, TAKE 1 TABLET BY MOUTH TWICE A DAY, Disp: 180 tablet, Rfl: 3  •  triamcinolone (KENALOG) 0.1 % ointment, Apply topically 2 (two) times a day, Disp: 60 g, Rfl: 0  •  diphenhydrAMINE (BENADRYL) 25 mg capsule, Take 25 mg by mouth 1 (one) time 1 8/15pm and 18/16 am (Patient not taking: Reported on 4/4/2024), Disp: , Rfl:   •  famotidine (PEPCID) 20 mg tablet, Take 20 mg by mouth 1 (one) time 1 8/15 pm and 1 8/16 am (Patient not taking: Reported on 5/8/2024), Disp: , Rfl:       Active Problems     Patient Active Problem List   Diagnosis   • Paroxysmal atrial fibrillation (HCC)   • Essential hypertension   • PAC (premature atrial contraction)   • Dyslipidemia   • Acquired hypothyroidism   • Plantar fasciitis   • Acid reflux   • Fibromyalgia   • Vitamin D deficiency   • Age-related osteoporosis without current pathological fracture   • s/p Medtronic dual chamber PPM with left bundle pacing lead 8/16/2023   • Encounter for monitoring sotalol therapy   • SVT (supraventricular tachycardia)   • Nonrheumatic mitral valve regurgitation   • Right ureteral stone   • Person consulting for explanation of examination or test finding         Past Medical History     Past Medical History:   Diagnosis Date   • Acid reflux    • Arthritis    • Bradycardia 8/16/2023   • Cataract    • Colon polyp    • Disease of thyroid gland    • Dyslipidemia (high LDL; low HDL)    • Fibromyalgia    • GERD  (gastroesophageal reflux disease)    • Hyperlipidemia    • Hypertension    • Hypothyroidism    • Palpitation    • Paroxysmal atrial fibrillation (HCC)    • s/p Medtronic dual chamber PPM with left bundle pacing lead 8/16/2023 8/16/2023   • Shingles 09/12/2010    Right C7         Surgical History     Past Surgical History:   Procedure Laterality Date   • CARDIAC ELECTROPHYSIOLOGY PROCEDURE N/A 8/16/2023    Procedure: Cardiac pacer implant;  Surgeon: Wally Lamas MD;  Location:  [facility];  Service: Cardiology   • CATARACT EXTRACTION, BILATERAL  11/2016   • COLONOSCOPY  07/11/2016   • COLONOSCOPY     • HYSTERECTOMY  05/2016    total hysterectomy   • MAMMO (HISTORICAL)  4/8/2010 & 2/19/2020   • REPLACEMENT TOTAL KNEE     • TONSILLECTOMY     • UPPER GASTROINTESTINAL ENDOSCOPY           Family History     Family History   Problem Relation Age of Onset   • Breast cancer Mother 75   • Hypertension Mother    • Hyperlipidemia Mother    • Parkinsonism Mother    • Stroke Mother    • Chronic bronchitis Mother    • Atrial fibrillation Father    • Stroke Father    • Cancer Father         skin cancer   • Prostate cancer Father    • Breast cancer Maternal Aunt    • Anuerysm Neg Hx    • Clotting disorder Neg Hx          Social History     Social History    Social History     Tobacco Use   Smoking Status Never   Smokeless Tobacco Never         Pertinent Lab Values     Lab Results   Component Value Date    CREATININE 0.62 04/27/2024                   Pertinent Imaging       The patient's images were reviewed by me personally and also in real time with them in the examination room using our PACS imaging system.  The imaging findings are significant for a right UPJ stone.

## 2024-05-08 NOTE — LETTER
"May 8, 2024     Ten Kessler MD  [address] [facility] [address]    Patient: Lavern Hardin   YOB: 1945   Date of Visit: 5/8/2024       Dear Dr. Kessler:    Thank you for referring Lavern Hardin to me for evaluation. Below are my notes for this consultation.    If you have questions, please do not hesitate to call me. I look forward to following your patient along with you.         Sincerely,        Aren Hays MD        CC: No Recipients    Aren Hays MD  5/8/2024  9:01 AM  Incomplete       Problem List Items Addressed This Visit       Right ureteral stone - Primary    Relevant Orders    Case request operating room: CYSTOSCOPY URETEROSCOPY WITH LITHOTRIPSY HOLMIUM LASER, RETROGRADE PYELOGRAM AND INSERTION STENT URETERAL (Completed)    Person consulting for explanation of examination or test finding         Urine culture please  Please have a ureteroscopy consent on the chart for laser lithotripsy  Book for right ureteroscopy with laser lithotripsy    Discussion:      ***    Portions of the above record have been created with voice recognition software.  Occasional wrong word or \"sound alike\" substitution may have occurred due to the inherent limitations of voice recognition software.  Read the chart carefully and recognize, using context, where substitution may have occurred.    Assessment and plan:       Please see problem oriented charting for the assessment plan of today's urological complaints      Aren Hays MD      Chief Complaint     As listed above      History of Present Illness     Lavern Hardin is a 79 y.o. woman presenting in consultation for hematuria.    The patient was referred by Charo Kessler MD and today's note has been sent to the referring provider.    With regard to this complaint it is localized to the right kidney.  The quality is described as a renal pelvic/UPJ stone and the severity of this complaint is described as mild.  These symptoms have " been present for days and the timing is ongoing. Previous treatments include none and previous work-up includes imaging work up.  The patient mentions nothing as aggravating and alleviating factors, respectively.  The following associated signs and symptoms are mentioned: none.    The following portions of the patient's history were reviewed and updated as appropriate: allergies, current medications, past family history, past medical history, past social history, past surgical history and problem list.    Detailed Urologic History     - please refer to HPI    Review of Systems     Review of Systems   Constitutional: Negative.    HENT: Negative.     Eyes: Negative.    Respiratory: Negative.     Cardiovascular: Negative.    Gastrointestinal: Negative.    Endocrine: Negative.    Genitourinary: Negative.    Musculoskeletal: Negative.    Skin: Negative.    Allergic/Immunologic: Negative.    Neurological: Negative.    Hematological: Negative.    Psychiatric/Behavioral: Negative.               Allergies     Allergies   Allergen Reactions   • Iodinated Contrast Media Hives       Physical Exam     Physical Exam  Vitals and nursing note reviewed.   Constitutional:       General: She is not in acute distress.     Appearance: Normal appearance. She is well-developed. She is not ill-appearing, toxic-appearing or diaphoretic.   HENT:      Head: Normocephalic and atraumatic.   Eyes:      General: No scleral icterus.  Pulmonary:      Effort: Pulmonary effort is normal. No respiratory distress.   Abdominal:      General: There is no distension.      Tenderness: There is no abdominal tenderness. There is no right CVA tenderness or left CVA tenderness.   Musculoskeletal:         General: No deformity.      Cervical back: Neck supple.   Skin:     Coloration: Skin is not jaundiced.      Findings: No lesion.   Neurological:      General: No focal deficit present.      Mental Status: She is alert and oriented to person, place, and time.  "Mental status is at baseline.      Cranial Nerves: No cranial nerve deficit.   Psychiatric:         Mood and Affect: Mood normal.         Behavior: Behavior normal.         Thought Content: Thought content normal.         Judgment: Judgment normal.             Vital Signs  Vitals:    05/08/24 0852   BP: 158/88   BP Location: Left arm   Patient Position: Sitting   Cuff Size: Standard   Pulse: 74   Resp: 14   SpO2: 97%   Weight: 66.4 kg (146 lb 6.4 oz)   Height: 5' 3\" (1.6 m)         Current Medications       Current Outpatient Medications:   •  Cyanocobalamin (VITAMIN B 12 PO), Take by mouth 2 (two) times a week, Disp: , Rfl:   •  ergocalciferol (VITAMIN D2) 50,000 units, Take 1 capsule (50,000 Units total) by mouth every 14 (fourteen) days, Disp: 7 capsule, Rfl: 3  •  levothyroxine 75 mcg tablet, Take 1 tablet (75 mcg total) by mouth daily, Disp: 90 tablet, Rfl: 0  •  losartan (COZAAR) 25 mg tablet, Take 1 tablet (25 mg total) by mouth daily, Disp: 90 tablet, Rfl: 0  •  nitroglycerin (NITROSTAT) 0.4 mg SL tablet, Place 1 tablet (0.4 mg total) under the tongue every 5 (five) minutes as needed for chest pain, Disp: 15 tablet, Rfl: 1  •  omeprazole (PriLOSEC) 20 mg delayed release capsule, Take 1 capsule by mouth daily, Disp: , Rfl:   •  rivaroxaban (XARELTO) 20 mg tablet, Take 1 tablet (20 mg total) by mouth daily with dinner, Disp: 90 tablet, Rfl: 3  •  sotalol (BETAPACE) 120 mg tablet, TAKE 1 TABLET BY MOUTH TWICE A DAY, Disp: 180 tablet, Rfl: 3  •  triamcinolone (KENALOG) 0.1 % ointment, Apply topically 2 (two) times a day, Disp: 60 g, Rfl: 0  •  diphenhydrAMINE (BENADRYL) 25 mg capsule, Take 25 mg by mouth 1 (one) time 1 8/15pm and 18/16 am (Patient not taking: Reported on 4/4/2024), Disp: , Rfl:   •  famotidine (PEPCID) 20 mg tablet, Take 20 mg by mouth 1 (one) time 1 8/15 pm and 1 8/16 am (Patient not taking: Reported on 5/8/2024), Disp: , Rfl:       Active Problems     Patient Active Problem List   Diagnosis "   • Paroxysmal atrial fibrillation (HCC)   • Essential hypertension   • PAC (premature atrial contraction)   • Dyslipidemia   • Acquired hypothyroidism   • Plantar fasciitis   • Acid reflux   • Fibromyalgia   • Vitamin D deficiency   • Age-related osteoporosis without current pathological fracture   • s/p Medtronic dual chamber PPM with left bundle pacing lead 8/16/2023   • Encounter for monitoring sotalol therapy   • SVT (supraventricular tachycardia)   • Nonrheumatic mitral valve regurgitation   • Right ureteral stone   • Person consulting for explanation of examination or test finding         Past Medical History     Past Medical History:   Diagnosis Date   • Acid reflux    • Arthritis    • Bradycardia 8/16/2023   • Cataract    • Colon polyp    • Disease of thyroid gland    • Dyslipidemia (high LDL; low HDL)    • Fibromyalgia    • GERD (gastroesophageal reflux disease)    • Hyperlipidemia    • Hypertension    • Hypothyroidism    • Palpitation    • Paroxysmal atrial fibrillation (HCC)    • s/p Medtronic dual chamber PPM with left bundle pacing lead 8/16/2023 8/16/2023   • Shingles 09/12/2010    Right C7         Surgical History     Past Surgical History:   Procedure Laterality Date   • CARDIAC ELECTROPHYSIOLOGY PROCEDURE N/A 8/16/2023    Procedure: Cardiac pacer implant;  Surgeon: Wally Lamas MD;  [facility];  Service: Cardiology   • CATARACT EXTRACTION, BILATERAL  11/2016   • COLONOSCOPY  07/11/2016   • COLONOSCOPY     • HYSTERECTOMY  05/2016    total hysterectomy   • MAMMO (HISTORICAL)  4/8/2010 & 2/19/2020   • REPLACEMENT TOTAL KNEE     • TONSILLECTOMY     • UPPER GASTROINTESTINAL ENDOSCOPY           Family History     Family History   Problem Relation Age of Onset   • Breast cancer Mother 75   • Hypertension Mother    • Hyperlipidemia Mother    • Parkinsonism Mother    • Stroke Mother    • Chronic bronchitis Mother    • Atrial fibrillation Father    • Stroke Father    • Cancer Father      "    skin cancer   • Prostate cancer Father    • Breast cancer Maternal Aunt    • Anuerysm Neg Hx    • Clotting disorder Neg Hx          Social History     Social History    Social History     Tobacco Use   Smoking Status Never   Smokeless Tobacco Never         Pertinent Lab Values     Lab Results   Component Value Date    CREATININE 0.62 04/27/2024                   Pertinent Imaging       The patient's images were reviewed by me personally and also in real time with them in the examination room using our PACS imaging system.  The imaging findings are significant for a right UPJ stone.      Aren Hays MD  5/7/2024  8:49 AM  Sign when Signing Visit       Problem List Items Addressed This Visit       Right ureteral stone - Primary    Person consulting for explanation of examination or test finding         Urine culture please  Please have a ureteroscopy consent on the chart for laser lithotripsy  Book for right ureteroscopy with laser lithotripsy    Discussion:      ***    Portions of the above record have been created with voice recognition software.  Occasional wrong word or \"sound alike\" substitution may have occurred due to the inherent limitations of voice recognition software.  Read the chart carefully and recognize, using context, where substitution may have occurred.    Assessment and plan:       Please see problem oriented charting for the assessment plan of today's urological complaints      Aren Hays MD      Chief Complaint     As listed above      History of Present Illness     Lavern Hardin is a 79 y.o. *** presenting in consultation for ***.    The patient was referred by *** and today's note has been sent to the referring provider.    With regard to this complaint it is localized to ***.  The quality is described as *** and the severity of this complaint is described as ***.  These symptoms have been present for *** and the timing is ***. Previous treatments include *** and previous " work-up includes ***.  The patient mentions *** as aggravating and alleviating factors, respectively.  The following associated signs and symptoms are mentioned: ***.    The following portions of the patient's history were reviewed and updated as appropriate: allergies, current medications, past family history, past medical history, past social history, past surgical history and problem list.    Detailed Urologic History     - please refer to HPI    Review of Systems     Review of Systems          Allergies     Allergies   Allergen Reactions   • Iodinated Contrast Media Hives       Physical Exam     Physical Exam        Vital Signs  There were no vitals filed for this visit.      Current Medications       Current Outpatient Medications:   •  Cyanocobalamin (VITAMIN B 12 PO), Take by mouth 2 (two) times a week, Disp: , Rfl:   •  diphenhydrAMINE (BENADRYL) 25 mg capsule, Take 25 mg by mouth 1 (one) time 1 8/15pm and 18/16 am (Patient not taking: Reported on 4/4/2024), Disp: , Rfl:   •  ergocalciferol (VITAMIN D2) 50,000 units, Take 1 capsule (50,000 Units total) by mouth every 14 (fourteen) days, Disp: 7 capsule, Rfl: 3  •  famotidine (PEPCID) 20 mg tablet, Take 20 mg by mouth 1 (one) time 1 8/15 pm and 1 8/16 am, Disp: , Rfl:   •  levothyroxine 75 mcg tablet, Take 1 tablet (75 mcg total) by mouth daily, Disp: 90 tablet, Rfl: 0  •  losartan (COZAAR) 25 mg tablet, Take 1 tablet (25 mg total) by mouth daily, Disp: 90 tablet, Rfl: 0  •  nitroglycerin (NITROSTAT) 0.4 mg SL tablet, Place 1 tablet (0.4 mg total) under the tongue every 5 (five) minutes as needed for chest pain, Disp: 15 tablet, Rfl: 1  •  omeprazole (PriLOSEC) 20 mg delayed release capsule, Take 1 capsule by mouth daily, Disp: , Rfl:   •  rivaroxaban (XARELTO) 20 mg tablet, Take 1 tablet (20 mg total) by mouth daily with dinner, Disp: 90 tablet, Rfl: 3  •  sotalol (BETAPACE) 120 mg tablet, TAKE 1 TABLET BY MOUTH TWICE A DAY, Disp: 180 tablet, Rfl: 3  •   triamcinolone (KENALOG) 0.1 % ointment, Apply topically 2 (two) times a day, Disp: 60 g, Rfl: 0      Active Problems     Patient Active Problem List   Diagnosis   • Paroxysmal atrial fibrillation (HCC)   • Essential hypertension   • PAC (premature atrial contraction)   • Dyslipidemia   • Acquired hypothyroidism   • Plantar fasciitis   • Acid reflux   • Fibromyalgia   • Vitamin D deficiency   • Age-related osteoporosis without current pathological fracture   • s/p Medtronic dual chamber PPM with left bundle pacing lead 8/16/2023   • Encounter for monitoring sotalol therapy   • SVT (supraventricular tachycardia)   • Nonrheumatic mitral valve regurgitation         Past Medical History     Past Medical History:   Diagnosis Date   • Acid reflux    • Arthritis    • Bradycardia 8/16/2023   • Cataract    • Colon polyp    • Disease of thyroid gland    • Dyslipidemia (high LDL; low HDL)    • Fibromyalgia    • GERD (gastroesophageal reflux disease)    • Hyperlipidemia    • Hypertension    • Hypothyroidism    • Palpitation    • Paroxysmal atrial fibrillation (HCC)    • s/p Medtronic dual chamber PPM with left bundle pacing lead 8/16/2023 8/16/2023   • Shingles 09/12/2010    Right C7         Surgical History     Past Surgical History:   Procedure Laterality Date   • CARDIAC ELECTROPHYSIOLOGY PROCEDURE N/A 8/16/2023    Procedure: Cardiac pacer implant;  Surgeon: Wally Lamas MD;  [facility]: [facility];  Service: Cardiology   • CATARACT EXTRACTION, BILATERAL  11/2016   • COLONOSCOPY  07/11/2016   • COLONOSCOPY     • HYSTERECTOMY  05/2016    total hysterectomy   • MAMMO (HISTORICAL)  4/8/2010 & 2/19/2020   • REPLACEMENT TOTAL KNEE     • TONSILLECTOMY     • UPPER GASTROINTESTINAL ENDOSCOPY           Family History     Family History   Problem Relation Age of Onset   • Breast cancer Mother 75   • Hypertension Mother    • Hyperlipidemia Mother    • Parkinsonism Mother    • Stroke Mother    • Chronic bronchitis Mother    •  "Atrial fibrillation Father    • Stroke Father    • Cancer Father         skin cancer   • Prostate cancer Father    • Breast cancer Maternal Aunt    • Anuerysm Neg Hx    • Clotting disorder Neg Hx          Social History     Social History    Social History     Tobacco Use   Smoking Status Never   Smokeless Tobacco Never         Pertinent Lab Values     Lab Results   Component Value Date    CREATININE 0.62 04/27/2024       No results found for: \"PSA\"            Pertinent Imaging       The patient's images were reviewed by me personally and also in real time with them in the examination room using our PACS imaging system.  The imaging findings are significant for ***.    "

## 2024-05-08 NOTE — ASSESSMENT & PLAN NOTE
Films reviewed. Decision for surgery discussed with her today. She understands that it is unlikely that her stone will pass with expulsive therapy alone. I discussed with her the pre, elo, and post operative care for ureteroscopy and laser lithotripsy with her, she understands the benefits of unblocking the right ureter and the risks of infection, bleeding, pain, damage to surrounding structures, need for additional procedures, risk of failure of the procedure, risk of anesthesia, risk of positioning complications including neurapraxia, chronic pain, and paralysis as well as risk of rhabdomyolysis and compartment syndrome.  Risk of deep venous thrombosis and venous thromboembolism as well as pneumonia and other potential but unforseeable or unpredictable complications was also discussed with the patient.    She has participated in the informed decision making process and she signed consent for right ureteroscopy and laser lithotripsy today.    Urine culture being sent today.    Despite her cardiac comorbidities listed here her functional status is excellent and she has no chest pain, sob, or limited exercise tolerance

## 2024-05-09 LAB — BACTERIA UR CULT: NORMAL

## 2024-05-13 ENCOUNTER — TELEPHONE (OUTPATIENT)
Dept: UROLOGY | Facility: CLINIC | Age: 79
End: 2024-05-13

## 2024-05-13 ENCOUNTER — ANESTHESIA EVENT (OUTPATIENT)
Dept: PERIOP | Facility: HOSPITAL | Age: 79
End: 2024-05-13
Payer: MEDICARE

## 2024-05-13 NOTE — TELEPHONE ENCOUNTER
Spoke with patient and confirmed surgery date of:5/15/24  Type of surgery:# 5 right  Operating physician: Dr. Mccormick  Location of surgery: [facility]    Verbally went over prep with patient on: 5/13/24  NPO  Bowel prep? No  Hospital calls afternoon prior with arrival time -Calls Friday afternoon for Monday surgeries  Patient needs ride to and from surgery (outpatient/inpatient)   Pre-op testing to be done 2 weeks prior to surgery  (list labs needed)  Blood thinners: XARELTO  List blood thinner/how long needs to held or no hold needed  Clearances needed: NONE PER DR MCCORMICK(Medical/Cardiac/None)    Mailed/emailed to patient on:5/14/24  Copy of packet scanned into Media on:5/14/24  Labs in packet  Soap / Bowel prep in packet  Pre-op & Post-op in packet  Dates of H&P and post-op if needed    Consent: in Media or on admit  If needed:SCANNED TO CHART 5/14/24    Medical/Cardiac Clearance:  Appt with:  Appt date and time:  Date clearance form faxed:  Best fax number:    Medication Suspension of: Medication Name / how many days  Ordering provider:  Faxed Medication Suspension form on:  Best fax number:    [place]:  Faxed form to pharmacy on:    RBC blood bank done on:    Rep info:  Emailed rep on date:

## 2024-05-13 NOTE — H&P
HISTORY AND PHYSICAL  ?  ?  Patient Name: Lavern Hardin  Patient MRN: [id number]  Attending Provider: Raulito Mccormick MD  Service: Urology  Chief Complaint    7 mm right renal pelvis stone    HPI   Lavern Hardin is a 79 y.o. female with hematuria, evaluation with CT showed the above stone.  I plan cystoscopy, right ureteroscopy laser stone stent.  Potential risks and complications discussed, and informed consent was given by the patient.  Medications  Meds/Allergies   No current facility-administered medications for this encounter.      Cannot display prior to admission medications because the patient has not been admitted in this contact.     No current facility-administered medications for this encounter.    Current Outpatient Medications:     Cyanocobalamin (VITAMIN B 12 PO), Take by mouth 2 (two) times a week, Disp: , Rfl:     diphenhydrAMINE (BENADRYL) 25 mg capsule, Take 25 mg by mouth 1 (one) time 1 8/15pm and 18/16 am (Patient not taking: Reported on 4/4/2024), Disp: , Rfl:     ergocalciferol (VITAMIN D2) 50,000 units, Take 1 capsule (50,000 Units total) by mouth every 14 (fourteen) days, Disp: 7 capsule, Rfl: 3    famotidine (PEPCID) 20 mg tablet, Take 20 mg by mouth 1 (one) time 1 8/15 pm and 1 8/16 am (Patient not taking: Reported on 5/8/2024), Disp: , Rfl:     levothyroxine 75 mcg tablet, Take 1 tablet (75 mcg total) by mouth daily, Disp: 90 tablet, Rfl: 0    losartan (COZAAR) 25 mg tablet, Take 1 tablet (25 mg total) by mouth daily, Disp: 90 tablet, Rfl: 0    nitroglycerin (NITROSTAT) 0.4 mg SL tablet, Place 1 tablet (0.4 mg total) under the tongue every 5 (five) minutes as needed for chest pain, Disp: 15 tablet, Rfl: 1    omeprazole (PriLOSEC) 20 mg delayed release capsule, Take 1 capsule by mouth daily, Disp: , Rfl:     rivaroxaban (XARELTO) 20 mg tablet, Take 1 tablet (20 mg total) by mouth daily with dinner, Disp: 90 tablet, Rfl: 3    sotalol (BETAPACE) 120 mg tablet, TAKE 1 TABLET BY MOUTH TWICE A  DAY, Disp: 180 tablet, Rfl: 3    triamcinolone (KENALOG) 0.1 % ointment, Apply topically 2 (two) times a day, Disp: 60 g, Rfl: 0  Review of Systems  10 point review of systems negative except as noted in HPI  Allergies  Allergies   Allergen Reactions    Iodinated Contrast Media Hives     PMH  Past Medical History:   Diagnosis Date    Acid reflux     Arthritis     Bradycardia 8/16/2023    Cataract     Colon polyp     Disease of thyroid gland     Dyslipidemia (high LDL; low HDL)     Fibromyalgia     GERD (gastroesophageal reflux disease)     Hyperlipidemia     Hypertension     Hypothyroidism     Palpitation     Paroxysmal atrial fibrillation (HCC)     s/p Medtronic dual chamber PPM with left bundle pacing lead 8/16/2023 8/16/2023    Shingles 09/12/2010    Right C7     Past surgical history  Past Surgical History:   Procedure Laterality Date    CARDIAC ELECTROPHYSIOLOGY PROCEDURE N/A 8/16/2023    Procedure: Cardiac pacer implant;  Surgeon: Wally Lamas MD;  Location:  [facility];  Service: Cardiology    CATARACT EXTRACTION, BILATERAL  11/2016    COLONOSCOPY  07/11/2016    COLONOSCOPY      HYSTERECTOMY  05/2016    total hysterectomy    MAMMO (HISTORICAL)  4/8/2010 & 2/19/2020    REPLACEMENT TOTAL KNEE      TONSILLECTOMY      UPPER GASTROINTESTINAL ENDOSCOPY       Social history  Social History     Tobacco Use    Smoking status: Never    Smokeless tobacco: Never   Vaping Use    Vaping status: Never Used   Substance Use Topics    Alcohol use: No    Drug use: No     ?  Physical Exam    .vs  General appearance: alert and oriented, in no acute distress  Head: Normocephalic, without obvious abnormality, atraumatic  Throat: lips, mucosa, and tongue normal; teeth and gums normal  Neck: no adenopathy, no carotid bruit, no JVD, supple, symmetrical, trachea midline, and thyroid not enlarged, symmetric, no tenderness/mass/nodules  Lungs: clear to auscultation bilaterally  Heart: regular rate and rhythm, S1, S2 normal, no  murmur, click, rub or gallop  Abdomen: soft, non-tender; bowel sounds normal; no masses,  no organomegaly  Extremities: extremities normal, warm and well-perfused; no cyanosis, clubbing, or edema  Raulito Mccormick MD

## 2024-05-13 NOTE — TELEPHONE ENCOUNTER
PER SOC/pre admission-pt needs to rescheduled from [facility]/spoke w/ pt rescheduled her from 5/15/24 w/ Dr Mccormick at [facility] to 5/28/24 w/ Dr Hays at [facility]

## 2024-05-15 ENCOUNTER — ANESTHESIA (OUTPATIENT)
Dept: PERIOP | Facility: HOSPITAL | Age: 79
End: 2024-05-15
Payer: MEDICARE

## 2024-05-20 NOTE — PRE-PROCEDURE INSTRUCTIONS
Pre-Surgery Instructions:   Medication Instructions    Cyanocobalamin (VITAMIN B 12 PO) Stop taking 7 days prior to surgery.    ergocalciferol (VITAMIN D2) 50,000 units Hold day of surgery.    levothyroxine 75 mcg tablet Take day of surgery.    losartan (COZAAR) 25 mg tablet Hold day of surgery.    omeprazole (PriLOSEC) 20 mg delayed release capsule Take day of surgery.    rivaroxaban (XARELTO) 20 mg tablet Instructions provided by MD    sotalol (BETAPACE) 120 mg tablet Take day of surgery.    triamcinolone (KENALOG) 0.1 % ointment Hold day of surgery.   Medication instructions for day surgery reviewed. Please use only a sip of water to take your instructed medications. Avoid all over the counter vitamins, supplements and NSAIDS for one week prior to surgery per anesthesia guidelines. Tylenol is ok to take as needed.     You will receive a call one business day prior to surgery with an arrival time and hospital directions. If your surgery is scheduled on a Monday, the hospital will be calling you on the Friday prior to your surgery. If you have not heard from anyone by 8pm, please call the hospital supervisor through the hospital  at [phone]. ([place] [phone] or [place] [phone]).    Do not eat or drink anything after midnight the night before your surgery, including candy, mints, lifesavers, or chewing gum. Do not drink alcohol 24hrs before your surgery. Try not to smoke at least 24hrs before your surgery.       Follow the pre surgery showering instructions as listed in the “My Surgical Experience Booklet” or otherwise provided by your surgeon's office. Do not use a blade to shave the surgical area 1 week before surgery. It is okay to use a clean electric clippers up to 24 hours before surgery. Do not apply any lotions, creams, including makeup, cologne, deodorant, or perfumes after showering on the day of your surgery. Do not use dry shampoo, hair spray, hair gel, or any type of hair  products.     No contact lenses, eye make-up, or artificial eyelashes. Remove nail polish, including gel polish, and any artificial, gel, or acrylic nails if possible. Remove all jewelry including rings and body piercing jewelry.     Wear causal clothing that is easy to take on and off. Consider your type of surgery.    Keep any valuables, jewelry, piercings at home. Please bring any specially ordered equipment (sling, braces) if indicated.    Arrange for a responsible person to drive you to and from the hospital on the day of your surgery. Please confirm the visitor policy for the day of your procedure when you receive your phone call with an arrival time.     Call the surgeon's office with any new illnesses, exposures, or additional questions prior to surgery.    Please reference your “My Surgical Experience Booklet” for additional information to prepare for your upcoming surgery.

## 2024-05-23 ENCOUNTER — HOSPITAL ENCOUNTER (OUTPATIENT)
Dept: RADIOLOGY | Facility: HOSPITAL | Age: 79
Discharge: HOME | End: 2024-05-23
Payer: MEDICARE

## 2024-05-23 VITALS — WEIGHT: 146 LBS | BODY MASS INDEX: 25.87 KG/M2 | HEIGHT: 63 IN

## 2024-05-23 DIAGNOSIS — R92.8 ABNORMAL MAMMOGRAM: ICD-10-CM

## 2024-05-23 PROCEDURE — 76642 ULTRASOUND BREAST LIMITED: CPT

## 2024-05-23 PROCEDURE — 77065 DX MAMMO INCL CAD UNI: CPT

## 2024-05-23 PROCEDURE — G0279 TOMOSYNTHESIS, MAMMO: HCPCS

## 2024-05-23 NOTE — DISCHARGE INSTR - AVS FIRST PAGE
Lavern,    Today you underwent ureteroscopy with laser lithotripsy for destruction of stones and unblocking of your kidney and ureter.  This went well.  After surgery like this it is not uncommon to have urgency and frequency of urination along with some blood in the urine.  You may also feel some discomfort in your kidney when urinating.    Please remove your stent in 5 days.  This can be done by removing the clear dressing and then pulling on the black thread to remove a long green tube.  In some cases we do leave the stent for a longer period of time and if you do not see a string coming out of your urethra and our office will call you to arrange for ureteral stent removal by way of cystoscopy and ureteral stent removal in an office setting under local anesthesia.    Please stay well-hydrated as you recover.  We have given you medications to make your recovery less bothersome.    We wish you a rapid and uneventful recovery,    Dr. Hays

## 2024-05-28 ENCOUNTER — HOSPITAL ENCOUNTER (OUTPATIENT)
Facility: HOSPITAL | Age: 79
Setting detail: OUTPATIENT SURGERY
Discharge: HOME | End: 2024-05-28
Attending: UROLOGY | Admitting: UROLOGY
Payer: MEDICARE

## 2024-05-28 ENCOUNTER — TELEPHONE (OUTPATIENT)
Dept: UROLOGY | Facility: CLINIC | Age: 79
End: 2024-05-28

## 2024-05-28 ENCOUNTER — APPOINTMENT (OUTPATIENT)
Dept: RADIOLOGY | Facility: HOSPITAL | Age: 79
End: 2024-05-28
Payer: MEDICARE

## 2024-05-28 VITALS
TEMPERATURE: 97.4 F | SYSTOLIC BLOOD PRESSURE: 141 MMHG | HEIGHT: 63 IN | RESPIRATION RATE: 14 BRPM | OXYGEN SATURATION: 94 % | DIASTOLIC BLOOD PRESSURE: 71 MMHG | HEART RATE: 72 BPM | BODY MASS INDEX: 25.48 KG/M2 | WEIGHT: 143.8 LBS

## 2024-05-28 DIAGNOSIS — N20.0 RIGHT RENAL STONE: Primary | ICD-10-CM

## 2024-05-28 DIAGNOSIS — N20.1 RIGHT URETERAL STONE: Primary | ICD-10-CM

## 2024-05-28 PROCEDURE — C1769 GUIDE WIRE: HCPCS | Performed by: UROLOGY

## 2024-05-28 PROCEDURE — 88300 SURGICAL PATH GROSS: CPT | Performed by: PATHOLOGY

## 2024-05-28 PROCEDURE — C2617 STENT, NON-COR, TEM W/O DEL: HCPCS | Performed by: UROLOGY

## 2024-05-28 PROCEDURE — C1894 INTRO/SHEATH, NON-LASER: HCPCS | Performed by: UROLOGY

## 2024-05-28 PROCEDURE — C1758 CATHETER, URETERAL: HCPCS | Performed by: UROLOGY

## 2024-05-28 PROCEDURE — 82360 CALCULUS ASSAY QUANT: CPT | Performed by: UROLOGY

## 2024-05-28 PROCEDURE — 52356 CYSTO/URETERO W/LITHOTRIPSY: CPT | Performed by: UROLOGY

## 2024-05-28 PROCEDURE — 74420 UROGRAPHY RTRGR +-KUB: CPT

## 2024-05-28 DEVICE — INLAY OPTIMA URETERAL STENT W/O GUIDEWIRE
Type: IMPLANTABLE DEVICE | Site: URETER | Status: FUNCTIONAL
Brand: BARD® INLAY OPTIMA® URETERAL STENT

## 2024-05-28 RX ORDER — ONDANSETRON 2 MG/ML
INJECTION INTRAMUSCULAR; INTRAVENOUS AS NEEDED
Status: DISCONTINUED | OUTPATIENT
Start: 2024-05-28 | End: 2024-05-28

## 2024-05-28 RX ORDER — PROPOFOL 10 MG/ML
INJECTION, EMULSION INTRAVENOUS AS NEEDED
Status: DISCONTINUED | OUTPATIENT
Start: 2024-05-28 | End: 2024-05-28

## 2024-05-28 RX ORDER — DOCUSATE SODIUM 100 MG/1
100 CAPSULE, LIQUID FILLED ORAL 3 TIMES DAILY
Qty: 21 CAPSULE | Refills: 0 | Status: SHIPPED | OUTPATIENT
Start: 2024-05-28 | End: 2024-06-04

## 2024-05-28 RX ORDER — DEXAMETHASONE SODIUM PHOSPHATE 10 MG/ML
INJECTION, SOLUTION INTRAMUSCULAR; INTRAVENOUS AS NEEDED
Status: DISCONTINUED | OUTPATIENT
Start: 2024-05-28 | End: 2024-05-28

## 2024-05-28 RX ORDER — OXYCODONE HYDROCHLORIDE 5 MG/1
2.5 TABLET ORAL EVERY 4 HOURS PRN
Qty: 8 TABLET | Refills: 0 | Status: SHIPPED | OUTPATIENT
Start: 2024-05-28 | End: 2024-06-02

## 2024-05-28 RX ORDER — FENTANYL CITRATE 50 UG/ML
INJECTION, SOLUTION INTRAMUSCULAR; INTRAVENOUS AS NEEDED
Status: DISCONTINUED | OUTPATIENT
Start: 2024-05-28 | End: 2024-05-28

## 2024-05-28 RX ORDER — ONDANSETRON 2 MG/ML
4 INJECTION INTRAMUSCULAR; INTRAVENOUS ONCE AS NEEDED
Status: DISCONTINUED | OUTPATIENT
Start: 2024-05-28 | End: 2024-05-28 | Stop reason: HOSPADM

## 2024-05-28 RX ORDER — SODIUM CHLORIDE, SODIUM LACTATE, POTASSIUM CHLORIDE, CALCIUM CHLORIDE 600; 310; 30; 20 MG/100ML; MG/100ML; MG/100ML; MG/100ML
125 INJECTION, SOLUTION INTRAVENOUS CONTINUOUS
Status: DISCONTINUED | OUTPATIENT
Start: 2024-05-28 | End: 2024-05-28 | Stop reason: HOSPADM

## 2024-05-28 RX ORDER — DIPHENHYDRAMINE HCL 25 MG
12.5 TABLET ORAL EVERY 6 HOURS PRN
Status: DISCONTINUED | OUTPATIENT
Start: 2024-05-28 | End: 2024-05-28 | Stop reason: HOSPADM

## 2024-05-28 RX ORDER — FUROSEMIDE 10 MG/ML
INJECTION INTRAMUSCULAR; INTRAVENOUS AS NEEDED
Status: DISCONTINUED | OUTPATIENT
Start: 2024-05-28 | End: 2024-05-28

## 2024-05-28 RX ORDER — ONDANSETRON 2 MG/ML
4 INJECTION INTRAMUSCULAR; INTRAVENOUS EVERY 6 HOURS PRN
Status: DISCONTINUED | OUTPATIENT
Start: 2024-05-28 | End: 2024-05-28 | Stop reason: HOSPADM

## 2024-05-28 RX ORDER — SODIUM CHLORIDE, SODIUM LACTATE, POTASSIUM CHLORIDE, CALCIUM CHLORIDE 600; 310; 30; 20 MG/100ML; MG/100ML; MG/100ML; MG/100ML
INJECTION, SOLUTION INTRAVENOUS CONTINUOUS PRN
Status: DISCONTINUED | OUTPATIENT
Start: 2024-05-28 | End: 2024-05-28

## 2024-05-28 RX ORDER — HYDROMORPHONE HCL IN WATER/PF 6 MG/30 ML
0.2 PATIENT CONTROLLED ANALGESIA SYRINGE INTRAVENOUS EVERY 4 HOURS PRN
Status: DISCONTINUED | OUTPATIENT
Start: 2024-05-28 | End: 2024-05-28 | Stop reason: HOSPADM

## 2024-05-28 RX ORDER — FENTANYL CITRATE/PF 50 MCG/ML
25 SYRINGE (ML) INJECTION
Status: DISCONTINUED | OUTPATIENT
Start: 2024-05-28 | End: 2024-05-28 | Stop reason: HOSPADM

## 2024-05-28 RX ORDER — OXYCODONE HYDROCHLORIDE 5 MG/1
5 TABLET ORAL EVERY 4 HOURS PRN
Status: DISCONTINUED | OUTPATIENT
Start: 2024-05-28 | End: 2024-05-28 | Stop reason: HOSPADM

## 2024-05-28 RX ORDER — LIDOCAINE HYDROCHLORIDE 10 MG/ML
INJECTION, SOLUTION EPIDURAL; INFILTRATION; INTRACAUDAL; PERINEURAL AS NEEDED
Status: DISCONTINUED | OUTPATIENT
Start: 2024-05-28 | End: 2024-05-28

## 2024-05-28 RX ORDER — SULFAMETHOXAZOLE AND TRIMETHOPRIM 800; 160 MG/1; MG/1
1 TABLET ORAL EVERY 12 HOURS SCHEDULED
Qty: 6 TABLET | Refills: 0 | Status: SHIPPED | OUTPATIENT
Start: 2024-05-28 | End: 2024-05-31

## 2024-05-28 RX ORDER — ACETAMINOPHEN 325 MG/1
650 TABLET ORAL EVERY 6 HOURS PRN
Status: DISCONTINUED | OUTPATIENT
Start: 2024-05-28 | End: 2024-05-28 | Stop reason: HOSPADM

## 2024-05-28 RX ORDER — CEFAZOLIN SODIUM 1 G/50ML
1000 SOLUTION INTRAVENOUS ONCE
Status: COMPLETED | OUTPATIENT
Start: 2024-05-28 | End: 2024-05-28

## 2024-05-28 RX ORDER — PHENYLEPHRINE HCL IN 0.9% NACL 1 MG/10 ML
SYRINGE (ML) INTRAVENOUS AS NEEDED
Status: DISCONTINUED | OUTPATIENT
Start: 2024-05-28 | End: 2024-05-28

## 2024-05-28 RX ORDER — HYDROMORPHONE HCL/PF 1 MG/ML
0.5 SYRINGE (ML) INJECTION
Status: DISCONTINUED | OUTPATIENT
Start: 2024-05-28 | End: 2024-05-28 | Stop reason: HOSPADM

## 2024-05-28 RX ORDER — ACETAMINOPHEN 500 MG
500 TABLET ORAL EVERY 6 HOURS
Qty: 20 TABLET | Refills: 0 | Status: SHIPPED | OUTPATIENT
Start: 2024-05-28 | End: 2024-06-02

## 2024-05-28 RX ADMIN — Medication 100 MCG: at 11:46

## 2024-05-28 RX ADMIN — ONDANSETRON 4 MG: 2 INJECTION INTRAMUSCULAR; INTRAVENOUS at 11:24

## 2024-05-28 RX ADMIN — FENTANYL CITRATE 25 MCG: 50 INJECTION INTRAMUSCULAR; INTRAVENOUS at 12:04

## 2024-05-28 RX ADMIN — FENTANYL CITRATE 50 MCG: 50 INJECTION INTRAMUSCULAR; INTRAVENOUS at 11:27

## 2024-05-28 RX ADMIN — LIDOCAINE HYDROCHLORIDE 50 MG: 10 INJECTION, SOLUTION EPIDURAL; INFILTRATION; INTRACAUDAL; PERINEURAL at 11:27

## 2024-05-28 RX ADMIN — FENTANYL CITRATE 25 MCG: 50 INJECTION INTRAMUSCULAR; INTRAVENOUS at 11:36

## 2024-05-28 RX ADMIN — DEXAMETHASONE SODIUM PHOSPHATE 10 MG: 10 INJECTION, SOLUTION INTRAMUSCULAR; INTRAVENOUS at 11:36

## 2024-05-28 RX ADMIN — PROPOFOL 200 MG: 10 INJECTION, EMULSION INTRAVENOUS at 11:27

## 2024-05-28 RX ADMIN — SODIUM CHLORIDE, SODIUM LACTATE, POTASSIUM CHLORIDE, AND CALCIUM CHLORIDE: .6; .31; .03; .02 INJECTION, SOLUTION INTRAVENOUS at 11:23

## 2024-05-28 RX ADMIN — FUROSEMIDE 20 MG: 10 INJECTION, SOLUTION INTRAMUSCULAR; INTRAVENOUS at 11:58

## 2024-05-28 RX ADMIN — CEFAZOLIN SODIUM 1000 MG: 1 SOLUTION INTRAVENOUS at 11:29

## 2024-05-28 NOTE — INTERVAL H&P NOTE
H&P reviewed. After examining the patient I find no changes in the patients condition since the H&P had been written.    Vitals:    05/28/24 1055   BP: (!) 173/84   Pulse: 71   Resp: 20   Temp: 97.6 °F (36.4 °C)   SpO2: 96%     Proceed to right ureteroscopy and laser lithotripsy with all indicated procedures

## 2024-05-28 NOTE — TELEPHONE ENCOUNTER
Status post ureteroscopy with laser lithotripsy and basket stone extraction, please arrange for ureteral stent removal by the patient herself in 5 days, she can see an advanced practitioner in 6 weeks, a 4 weeks renal ultrasound has been ordered

## 2024-05-28 NOTE — ANESTHESIA POSTPROCEDURE EVALUATION
Post-Op Assessment Note    CV Status:  Stable    Pain management: adequate       Mental Status:  Arousable   Hydration Status:  Stable   PONV Controlled:  None   Airway Patency:  Patent     Post Op Vitals Reviewed: Yes    No anethesia notable event occurred.    Staff: CRNA               BP   157/83   Temp   97.2   Pulse  67   Resp   16   SpO2   98% on 6LFM    Post procedure VS noted above, SV non obstructed

## 2024-05-28 NOTE — ANESTHESIA PREPROCEDURE EVALUATION
Procedure:  CYSTOSCOPY URETEROSCOPY WITH LITHOTRIPSY HOLMIUM LASER, RETROGRADE PYELOGRAM AND INSERTION STENT URETERAL (Right: Bladder)    Relevant Problems   ANESTHESIA   (+) PONV (postoperative nausea and vomiting)      CARDIO   (+) Essential hypertension   (+) Nonrheumatic mitral valve regurgitation   (+) PAC (premature atrial contraction)   (+) Paroxysmal atrial fibrillation (HCC)   (+) SVT (supraventricular tachycardia)      ENDO   (+) Acquired hypothyroidism      GI/HEPATIC   (+) Acid reflux      MUSCULOSKELETAL   (+) Fibromyalgia      NEURO/PSYCH   (+) Fibromyalgia     Lab Results   Component Value Date    WBC 9.26 04/27/2024    HGB 12.5 04/27/2024     04/27/2024     Lab Results   Component Value Date    SODIUM 138 04/27/2024    K 3.9 04/27/2024    BUN 21 04/27/2024    CREATININE 0.62 04/27/2024    EGFR 86 04/27/2024    GLUCOSE 115 03/08/2015     Lab Results   Component Value Date    PTT 40 (H) 04/27/2024      Lab Results   Component Value Date    INR 1.65 (H) 04/27/2024          Physical Exam    Airway    Mallampati score: I  TM Distance: >3 FB  Neck ROM: full     Dental   Comment: Multiple missing and three loose.  Patient understands that these teeth are a risk to be dislodged and consents to anesthesia     Cardiovascular  Rhythm: regular, Rate: normal    Pulmonary  Pulmonary exam normal     Other Findings  post-pubertal.      Anesthesia Plan  ASA Score- 3     Anesthesia Type- general with ASA Monitors.         Additional Monitors:     Airway Plan: LMA.    Comment: I discussed risks (reviewed with patient on the anesthesia consent form), benefits and alternatives for General Anesthesia.  These risks did include breathing tube remaining in place if not strong enough, PONV, damage to lips and teeth, sore throat, eye injury or blindness, risk of heart attack or stroke that may lead to death.  .       Plan Factors-Exercise tolerance (METS): >4 METS.    Chart reviewed. EKG reviewed.  Existing labs  reviewed. Patient summary reviewed.                  Induction- intravenous.    Postoperative Plan- Plan for postoperative opioid use.         Informed Consent- Anesthetic plan and risks discussed with patient.  I personally reviewed this patient with the CRNA. Discussed and agreed on the Anesthesia Plan with the CRNA..

## 2024-05-28 NOTE — OP NOTE
OPERATIVE REPORT  PATIENT NAME: Lavern Hardin    :  1945  MRN: [id number]  Pt Location: [facility] OR ROOM 04    SURGERY DATE: 2024    Surgeons and Role:     * Aren Hays MD - Primary    Preop Diagnosis:  Right ureteral stone [N20.1]    Post-Op Diagnosis Codes:     * Right ureteral stone [N20.1]    Procedure(s):  Right - CYSTOSCOPY URETEROSCOPY WITH LITHOTRIPSY HOLMIUM LASER. BASKET EXTRACTION RIGHT URETERAL STONE. RETROGRADE PYELOGRAM AND INSERTION STENT URETERAL    Specimen(s):  ID Type Source Tests Collected by Time Destination   1 : Right renal stone Calculus Kidney, Right TISSUE EXAM Aren Hays MD 2024 12:01 PM        Estimated Blood Loss:   Minimal    Drains:  * No LDAs found *    Anesthesia Type:   General    Operative Indications:  Right ureteral stone [N20.1]      Operative Findings:  Successful laser lithotripsy and basket stone extraction of a right proximal ureteral/right renal pelvic stone.  6 Croatian by 24 cm stent placed on a string, planned dwell time of 5 days      Complications:   None    Procedure and Technique:          PROCEDURES PERFORMED:  1) Cystoscopy  2) RIGHT retrograde pyelography with fluoroscopic interpretation  3)  RIGHT ureteroscopy with laser lithotripsy and basket extraction of stone  4) Right ureteral stent placement (6F x 24cm)    SURGEON:  Aren Hays MD    ASSISTANTS:  None    NOTE:  There were no qualified teaching residents to assist with this case    ANESTHESIA: General     COMPLICATIONS:   None    ANTIBIOTICS:  ancef    INTRAOPERATIVE THROMBOEMBOLISM PROPHYLAXIS:  Pneumatic compression stockings       FINDINGS:    The RIGHT calculus was not radiopaque on plain fluoroscopy.  Retrograde pyelogram was performed on the Right side using a 5 Fr open ended catheter.  Approximately 3 mL contrast was injected.    3. The following findings were noted: Mild hydronephrosis, filling defect consistent with stone noted      INDICATIONS FOR PROCEDURE:  Lavern  "Lashawn is an 79 y.o. old female with a Right renal calculus.  After discussing the options for treatment,  the patient elected to undergo ureteroscopy and ureteral stent placement with all indicated procedures.  We discussed the procedure in detail, the alternatives, and the risks, and they signed informed consent to proceed (these are outlined in the surgical consent form).    PROCEDURE IN DETAIL:     The patient was identified by name, date of birth, and MRN  and brought to the OR.  Antibiotic prophylaxis and DVT prophylaxis were administered as per the guidelines.  They were placed in the dorsal lithotomy position with care to pad all pressure points.  They were prepped and draped in the usual sterile fashion.  A surgical time out was performed with all in the room in agreement with the correct patient, procedure, indications, and laterality.  A 21-Azeri rigid cystoscope was used to enter the bladder.  The bladder was inspected in its entirety and there were no lesions noted.  The ureteral orifices were identified in their normal orthotopic positions.     The Right ureteral orifice was identified and a 5 Fr open ended catheter was placed into the ureteral orifice.   A retrograde pyelogram was performed with the findings as described above.  A wire was advanced up to the kidney under fluoroscopic guidance.  Leaving this safety wire in place, the bladder was drained.      A \"7.5 Fr semi-rigid ureteroscope was advanced up the ureter under vision .  No stone was noted within the ureter, a 12/14 access sheath was then placed followed by a flexible scope.    The stone was encountered in the renal pelvic/midpole location.  The stone was not noted to be impacted.  A holmium laser fiber was passed through the ureteroscope and laser lithotripsy was commenced at settings of 1.5 J and 5 hz.  The stones were fragmented to very small pieces and dust.      Once we were satisfied that the stone was fragmented to dust, a 1.9 " Korean zero tip nitinol basket was passed through the ureteroscope.  The residual fragments were basketed out and removed. The ureteroscope was backed down the ureter under vision and there were no residual fragments and the ureter was noted to be intact with no injury and mild edema where the stone had been located.   A 6 Fr x 24 cm JJ stent was then passed up the wire  under fluoroscopic guidance into the kidney with a good curl noted in the kidney and in the bladder.  The stent string was not removed. The bladder was drained.      The patient was placed back into the supine position, awakened from general anesthesia and brought to recovery room in stable condition.      ESTIMATED BLOOD LOSS:  Minimal      DRAINS:      SPECIMENS:   Order Name Source Comment Collection Info Order Time   TISSUE EXAM Kidney, Right Right renal stone for analysis Collected By: Aren Hays MD 5/28/2024 12:10 PM     Release to patient through [employer]   Immediate             IMPLANTS:   Implant Name Type Inv. Item Serial No.  Lot No. LRB No. Used Action   STENT URETERAL 6FR 24CML INLAY OPTIMA - [id number]  STENT URETERAL 6FR 24CML INLAY [employer]IM[employer]  [employer] [id number] Right 1 Implanted        COMPLICATIONS: None    DISPOSITION: PACU    PLAN:  The patient is status post ureteroscopy with laser lithotripsy and basket stone extraction with ureteral stent placement. They can be discharged home later today when meeting criteria. They will remove their ureteral stent in 5 days.    I was present for the entire procedure. and A qualified resident physician was not available.    Patient Disposition:  PACU         SIGNATURE: Aren Hays MD  DATE: May 28, 2024  TIME: 12:11 PM

## 2024-05-29 PROCEDURE — 88300 SURGICAL PATH GROSS: CPT | Performed by: PATHOLOGY

## 2024-05-29 NOTE — TELEPHONE ENCOUNTER
Post Op Note    Lavern Hardin is a 79 y.o. female s/p CYSTOSCOPY URETEROSCOPY WITH LITHOTRIPSY HOLMIUM LASER, BASKET EXTRACTION RIGHT URETERAL STONE, RETROGRADE PYELOGRAM AND INSERTION STENT URETERAL (Right: Bladder)  performed 5/28/24.  Lavern Hardin is a patient of Dr. Dr. Hays and is seen at the [place] office.     How would you rate your pain on a scale from 1 to 10, 10 being the worst pain ever? 4  Have you had a fever? No  Have your bowel movements been regular? No  Do you have any difficulty urinating? No    Do you have any other questions or concerns that I can address at this time?   -Went over potential/expected post op symptoms   -discussed ER precautions  -Confirmed post op appts and locations. Provided CS number and he will call to schedule imaging 1 week prior  -Went over medication   -went over stent removal Saturday will call Monday to ensure removal     Patient reports the blood in her urine has increase since yesterday as well as he flank pain. Urine started off pink to fruit punch in color with dime sized clots yesterday. Today she reports dark maroon colored urine, however she has not seen any clots yet today. She is having a lot of flank pain as well. Is taking medication as prescribed.     I advised her to increase water intake even more then she currently has been and I would speak with providers to see If there is any other recommendations

## 2024-05-29 NOTE — TELEPHONE ENCOUNTER
VM left for patient requesting call back    When patient calls back please ask if she is currently taking her xarelto

## 2024-05-30 NOTE — TELEPHONE ENCOUNTER
Patient called stating she is returning the call.  She stated the blood in urine is a lot and she is taking the xarelto.  She stated she never stopped taking it.  She stated every so often there are some clots.    Patient can be reached at [phone]

## 2024-05-30 NOTE — TELEPHONE ENCOUNTER
Attempted to call patient back to see how her hematuria is and again ask if she is on xarelto     Vm left if patient calls back please ask how the blood in her urine is and ask if she is currently taking xarelto

## 2024-05-31 NOTE — TELEPHONE ENCOUNTER
Spoke to patient and she stated that she has been taking Tylenol 500 mg. To assist with back pain and utilizing a heating pad and hydrating with water. She stated that Colace makes her nauseous. She was informed after AP review that she can stop her Xarelto for three days and we will give her a follow up phone call on Monday. Patient was supplied reassurance that there is always staff on call after hours if she has any additional questions or concerns. She was thankful for the call and is looking forward to hearing from our office on Monday.

## 2024-05-31 NOTE — TELEPHONE ENCOUNTER
VM left for patient asking for call back.     When patient calls back please advise her the PA said she can stop the xarelto for 3 days to help the blood clear up and then the stent should be coming out. I will call again on Monday to ensure the stent was removed

## 2024-06-02 ENCOUNTER — APPOINTMENT (EMERGENCY)
Dept: CT IMAGING | Facility: HOSPITAL | Age: 79
End: 2024-06-02
Payer: MEDICARE

## 2024-06-02 ENCOUNTER — HOSPITAL ENCOUNTER (EMERGENCY)
Facility: HOSPITAL | Age: 79
Discharge: HOME | End: 2024-06-02
Attending: EMERGENCY MEDICINE
Payer: MEDICARE

## 2024-06-02 VITALS
TEMPERATURE: 97.8 F | RESPIRATION RATE: 16 BRPM | HEART RATE: 73 BPM | DIASTOLIC BLOOD PRESSURE: 75 MMHG | OXYGEN SATURATION: 99 % | SYSTOLIC BLOOD PRESSURE: 174 MMHG

## 2024-06-02 DIAGNOSIS — R10.9 ACUTE RIGHT FLANK PAIN: ICD-10-CM

## 2024-06-02 DIAGNOSIS — G89.18 POST-OP PAIN: Primary | ICD-10-CM

## 2024-06-02 LAB
ALBUMIN SERPL BCP-MCNC: 3.9 G/DL (ref 3.5–5)
ALP SERPL-CCNC: 57 U/L (ref 34–104)
ALT SERPL W P-5'-P-CCNC: 13 U/L (ref 7–52)
ANION GAP SERPL CALCULATED.3IONS-SCNC: 7 MMOL/L (ref 4–13)
AST SERPL W P-5'-P-CCNC: 17 U/L (ref 13–39)
BACTERIA UR QL AUTO: ABNORMAL /HPF
BASOPHILS # BLD AUTO: 0.04 THOUSANDS/ÂΜL (ref 0–0.1)
BASOPHILS NFR BLD AUTO: 1 % (ref 0–1)
BILIRUB SERPL-MCNC: 0.3 MG/DL (ref 0.2–1)
BILIRUB UR QL STRIP: NEGATIVE
BUN SERPL-MCNC: 16 MG/DL (ref 5–25)
CALCIUM SERPL-MCNC: 9.2 MG/DL (ref 8.4–10.2)
CHLORIDE SERPL-SCNC: 104 MMOL/L (ref 96–108)
CLARITY UR: ABNORMAL
CO2 SERPL-SCNC: 26 MMOL/L (ref 21–32)
COLOR UR: ABNORMAL
CREAT SERPL-MCNC: 0.82 MG/DL (ref 0.6–1.3)
EOSINOPHIL # BLD AUTO: 0.28 THOUSAND/ÂΜL (ref 0–0.61)
EOSINOPHIL NFR BLD AUTO: 4 % (ref 0–6)
ERYTHROCYTE [DISTWIDTH] IN BLOOD BY AUTOMATED COUNT: 13.1 % (ref 11.6–15.1)
GFR SERPL CREATININE-BSD FRML MDRD: 68 ML/MIN/1.73SQ M
GLUCOSE SERPL-MCNC: 97 MG/DL (ref 65–140)
GLUCOSE UR STRIP-MCNC: NEGATIVE MG/DL
HCT VFR BLD AUTO: 36.2 % (ref 34.8–46.1)
HGB BLD-MCNC: 12.3 G/DL (ref 11.5–15.4)
HGB UR QL STRIP.AUTO: ABNORMAL
HOLD SPECIMEN: NORMAL
IMM GRANULOCYTES # BLD AUTO: 0.02 THOUSAND/UL (ref 0–0.2)
IMM GRANULOCYTES NFR BLD AUTO: 0 % (ref 0–2)
KETONES UR STRIP-MCNC: ABNORMAL MG/DL
LEUKOCYTE ESTERASE UR QL STRIP: ABNORMAL
LYMPHOCYTES # BLD AUTO: 1.77 THOUSANDS/ÂΜL (ref 0.6–4.47)
LYMPHOCYTES NFR BLD AUTO: 22 % (ref 14–44)
MCH RBC QN AUTO: 32.6 PG (ref 26.8–34.3)
MCHC RBC AUTO-ENTMCNC: 34 G/DL (ref 31.4–37.4)
MCV RBC AUTO: 96 FL (ref 82–98)
MONOCYTES # BLD AUTO: 0.68 THOUSAND/ÂΜL (ref 0.17–1.22)
MONOCYTES NFR BLD AUTO: 8 % (ref 4–12)
NEUTROPHILS # BLD AUTO: 5.26 THOUSANDS/ÂΜL (ref 1.85–7.62)
NEUTS SEG NFR BLD AUTO: 65 % (ref 43–75)
NITRITE UR QL STRIP: NEGATIVE
NON-SQ EPI CELLS URNS QL MICRO: ABNORMAL /HPF
NRBC BLD AUTO-RTO: 0 /100 WBCS
PH UR STRIP.AUTO: 6 [PH]
PLATELET # BLD AUTO: 346 THOUSANDS/UL (ref 149–390)
PMV BLD AUTO: 9.8 FL (ref 8.9–12.7)
POTASSIUM SERPL-SCNC: 4.5 MMOL/L (ref 3.5–5.3)
PROT SERPL-MCNC: 6.8 G/DL (ref 6.4–8.4)
PROT UR STRIP-MCNC: ABNORMAL MG/DL
RBC # BLD AUTO: 3.77 MILLION/UL (ref 3.81–5.12)
RBC #/AREA URNS AUTO: ABNORMAL /HPF
SODIUM SERPL-SCNC: 137 MMOL/L (ref 135–147)
SP GR UR STRIP.AUTO: 1.02 (ref 1–1.03)
UROBILINOGEN UR STRIP-ACNC: <2 MG/DL
WBC # BLD AUTO: 8.05 THOUSAND/UL (ref 4.31–10.16)
WBC #/AREA URNS AUTO: ABNORMAL /HPF

## 2024-06-02 PROCEDURE — 81001 URINALYSIS AUTO W/SCOPE: CPT | Performed by: EMERGENCY MEDICINE

## 2024-06-02 PROCEDURE — 74176 CT ABD & PELVIS W/O CONTRAST: CPT

## 2024-06-02 PROCEDURE — 87086 URINE CULTURE/COLONY COUNT: CPT | Performed by: EMERGENCY MEDICINE

## 2024-06-02 PROCEDURE — 93005 ELECTROCARDIOGRAM TRACING: CPT

## 2024-06-02 PROCEDURE — 36415 COLL VENOUS BLD VENIPUNCTURE: CPT

## 2024-06-02 PROCEDURE — 99285 EMERGENCY DEPT VISIT HI MDM: CPT | Performed by: EMERGENCY MEDICINE

## 2024-06-02 PROCEDURE — 85025 COMPLETE CBC W/AUTO DIFF WBC: CPT | Performed by: EMERGENCY MEDICINE

## 2024-06-02 PROCEDURE — 96365 THER/PROPH/DIAG IV INF INIT: CPT

## 2024-06-02 PROCEDURE — 80053 COMPREHEN METABOLIC PANEL: CPT | Performed by: EMERGENCY MEDICINE

## 2024-06-02 PROCEDURE — 99284 EMERGENCY DEPT VISIT MOD MDM: CPT

## 2024-06-02 PROCEDURE — 96375 TX/PRO/DX INJ NEW DRUG ADDON: CPT

## 2024-06-02 RX ORDER — TAMSULOSIN HYDROCHLORIDE 0.4 MG/1
0.4 CAPSULE ORAL
Qty: 2 CAPSULE | Refills: 0 | Status: SHIPPED | OUTPATIENT
Start: 2024-06-02 | End: 2024-06-04

## 2024-06-02 RX ORDER — OXYBUTYNIN CHLORIDE 5 MG/1
5 TABLET ORAL ONCE
Status: COMPLETED | OUTPATIENT
Start: 2024-06-02 | End: 2024-06-02

## 2024-06-02 RX ORDER — TAMSULOSIN HYDROCHLORIDE 0.4 MG/1
0.4 CAPSULE ORAL ONCE
Status: COMPLETED | OUTPATIENT
Start: 2024-06-02 | End: 2024-06-02

## 2024-06-02 RX ORDER — OXYBUTYNIN CHLORIDE 5 MG/1
5 TABLET ORAL 3 TIMES DAILY
Qty: 6 TABLET | Refills: 0 | Status: SHIPPED | OUTPATIENT
Start: 2024-06-02 | End: 2024-06-04

## 2024-06-02 RX ORDER — KETOROLAC TROMETHAMINE 10 MG/1
10 TABLET, FILM COATED ORAL EVERY 8 HOURS PRN
Qty: 9 TABLET | Refills: 0 | Status: SHIPPED | OUTPATIENT
Start: 2024-06-02 | End: 2024-06-05

## 2024-06-02 RX ORDER — PHENAZOPYRIDINE HYDROCHLORIDE 100 MG/1
100 TABLET, FILM COATED ORAL ONCE
Status: COMPLETED | OUTPATIENT
Start: 2024-06-02 | End: 2024-06-02

## 2024-06-02 RX ORDER — KETOROLAC TROMETHAMINE 30 MG/ML
15 INJECTION, SOLUTION INTRAMUSCULAR; INTRAVENOUS ONCE
Status: COMPLETED | OUTPATIENT
Start: 2024-06-02 | End: 2024-06-02

## 2024-06-02 RX ORDER — SOTALOL HYDROCHLORIDE 80 MG/1
120 TABLET ORAL ONCE
Status: COMPLETED | OUTPATIENT
Start: 2024-06-02 | End: 2024-06-02

## 2024-06-02 RX ORDER — PHENAZOPYRIDINE HYDROCHLORIDE 200 MG/1
200 TABLET, FILM COATED ORAL 3 TIMES DAILY
Qty: 6 TABLET | Refills: 0 | Status: SHIPPED | OUTPATIENT
Start: 2024-06-02

## 2024-06-02 RX ADMIN — KETOROLAC TROMETHAMINE 15 MG: 30 INJECTION, SOLUTION INTRAMUSCULAR; INTRAVENOUS at 20:45

## 2024-06-02 RX ADMIN — OXYBUTYNIN CHLORIDE 5 MG: 5 TABLET ORAL at 22:10

## 2024-06-02 RX ADMIN — SODIUM CHLORIDE, SODIUM LACTATE, POTASSIUM CHLORIDE, AND CALCIUM CHLORIDE 1000 ML: .6; .31; .03; .02 INJECTION, SOLUTION INTRAVENOUS at 19:33

## 2024-06-02 RX ADMIN — PHENAZOPYRIDINE 100 MG: 100 TABLET ORAL at 22:10

## 2024-06-02 RX ADMIN — SOTALOL HYDROCHLORIDE 120 MG: 80 TABLET ORAL at 22:10

## 2024-06-02 RX ADMIN — TAMSULOSIN HYDROCHLORIDE 0.4 MG: 0.4 CAPSULE ORAL at 22:10

## 2024-06-02 NOTE — ED PROVIDER NOTES
History  Chief Complaint   Patient presents with    Post-op Problem     Pt presents to the ED with right flank pain onset this morning. Pt reports had a kidney stone with lithotripsy and stent placement last Tuesday. Removed stent this am.      This is a 79-year-old female with a relevant past medical history of hypertension, hyperlipidemia, A-fib on Xarelto, presenting to the ED today for complaint of right-sided flank pain.  Patient states that she recently had lithotripsy, stent placement, and her stent was removed earlier today, and states that she has continued right flank pain.  She states that since the stent was removed her flank pain has worsened.  She denies any vomiting, however has been nauseous.  She has had some dysuria, as well as frequency.  She denies any fever, chills, sweats, diarrhea, constipation, focal weakness, focal numbness or tingling, chest pain shortness of breath, rashes lesions bruises or any other significantly related symptoms.  Patient has not taken anything for her symptoms.  Her pain is mild to moderate in quality, worsened by any type of movement, or pressure on her right flank.  And also is worsened by urination.  She has not noticed any other associated symptoms aside from as mentioned above.  She has not noticed any alleviating or exacerbating factors aside from what was mentioned above.        Prior to Admission Medications   Prescriptions Last Dose Informant Patient Reported? Taking?   Cyanocobalamin (VITAMIN B 12 PO)  Self Yes No   Sig: Take by mouth 2 (two) times a week   acetaminophen (TYLENOL) 500 mg tablet   No No   Sig: Take 1 tablet (500 mg total) by mouth every 6 (six) hours for 5 days   docusate sodium (COLACE) 100 mg capsule   No No   Sig: Take 1 capsule (100 mg total) by mouth 3 (three) times a day for 7 days   ergocalciferol (VITAMIN D2) 50,000 units  Self No No   Sig: Take 1 capsule (50,000 Units total) by mouth every 14 (fourteen) days   famotidine (PEPCID) 20  mg tablet  Self Yes No   Sig: Take 20 mg by mouth 1 (one) time 1 8/15 pm and 1 8/16 am   levothyroxine 75 mcg tablet  Self No No   Sig: Take 1 tablet (75 mcg total) by mouth daily   losartan (COZAAR) 25 mg tablet  Self No No   Sig: Take 1 tablet (25 mg total) by mouth daily   nitroglycerin (NITROSTAT) 0.4 mg SL tablet  Self No No   Sig: Place 1 tablet (0.4 mg total) under the tongue every 5 (five) minutes as needed for chest pain   omeprazole (PriLOSEC) 20 mg delayed release capsule  Self Yes No   Sig: Take 1 capsule by mouth daily   oxyCODONE (Roxicodone) 5 immediate release tablet   No No   Sig: Take 0.5 tablets (2.5 mg total) by mouth every 4 (four) hours as needed for severe pain for up to 5 days Max Daily Amount: 15 mg   rivaroxaban (XARELTO) 20 mg tablet  Self No No   Sig: Take 1 tablet (20 mg total) by mouth daily with dinner   sotalol (BETAPACE) 120 mg tablet  Self No No   Sig: TAKE 1 TABLET BY MOUTH TWICE A DAY   triamcinolone (KENALOG) 0.1 % ointment  Self No No   Sig: Apply topically 2 (two) times a day      Facility-Administered Medications: None       Past Medical History:   Diagnosis Date    Acid reflux     Arthritis     Bradycardia 08/16/2023    Cataract     Colon polyp     Disease of thyroid gland     Dyslipidemia (high LDL; low HDL)     Fibromyalgia     Fibromyalgia, primary     GERD (gastroesophageal reflux disease)     Hyperlipidemia     Hypertension     Hypothyroidism     Kidney stone     Motion sickness     Palpitation     Paroxysmal atrial fibrillation (HCC)     PONV (postoperative nausea and vomiting)     s/p Medtronic dual chamber PPM with left bundle pacing lead 8/16/2023 08/16/2023    Shingles 09/12/2010    Right C7       Past Surgical History:   Procedure Laterality Date    CARDIAC ELECTROPHYSIOLOGY PROCEDURE N/A 08/16/2023    Procedure: Cardiac pacer implant;  Surgeon: Wally Lamas MD;  Location: [facility];  Service: Cardiology    CATARACT EXTRACTION, BILATERAL  11/2016     COLONOSCOPY  07/11/2016    COLONOSCOPY      HYSTERECTOMY  05/2016    total hysterectomy    INSERT / REPLACE / REMOVE PACEMAKER      MAMMO (HISTORICAL)  4/8/2010 & 2/19/2020    IL CYSTO/URETERO W/LITHOTRIPSY &INDWELL STENT INSRT Right 5/28/2024    Procedure: CYSTOSCOPY URETEROSCOPY WITH LITHOTRIPSY HOLMIUM LASER, BASKET EXTRACTION RIGHT URETERAL STONE, RETROGRADE PYELOGRAM AND INSERTION STENT URETERAL;  Surgeon: Aren Hays MD;  Location:  MAIN OR;  Service: Urology    REPLACEMENT TOTAL KNEE      partial    TONSILLECTOMY      UPPER GASTROINTESTINAL ENDOSCOPY         Family History   Problem Relation Age of Onset    Breast cancer Mother 75    Hypertension Mother     Hyperlipidemia Mother     Parkinsonism Mother     Stroke Mother     Chronic bronchitis Mother     Atrial fibrillation Father     Stroke Father     Cancer Father         skin cancer    Prostate cancer Father     Breast cancer Maternal Aunt     Anuerysm Neg Hx     Clotting disorder Neg Hx      I have reviewed and agree with the history as documented.    E-Cigarette/Vaping    E-Cigarette Use Never User      E-Cigarette/Vaping Substances    Nicotine No     THC No     CBD No     Flavoring No     Unknown No      Social History     Tobacco Use    Smoking status: Never    Smokeless tobacco: Never   Vaping Use    Vaping status: Never Used   Substance Use Topics    Alcohol use: No    Drug use: No       Review of Systems   Constitutional:  Negative for activity change, chills and fever.   HENT:  Negative for congestion and rhinorrhea.    Eyes:  Negative for photophobia and visual disturbance.   Respiratory:  Negative for cough, chest tightness and shortness of breath.    Cardiovascular:  Negative for chest pain and leg swelling.   Gastrointestinal:  Positive for abdominal pain and nausea. Negative for abdominal distention and vomiting.   Genitourinary:  Negative for dysuria and frequency.   Musculoskeletal:  Negative for back pain and neck stiffness.   Skin:   Negative for rash and wound.   Neurological:  Negative for dizziness and weakness.   Psychiatric/Behavioral:  Negative for agitation and suicidal ideas.        Physical Exam  Physical Exam  Vitals and nursing note reviewed.   Constitutional:       General: She is not in acute distress.     Appearance: Normal appearance. She is normal weight. She is not ill-appearing or toxic-appearing.   HENT:      Head: Normocephalic and atraumatic.      Right Ear: External ear normal.      Left Ear: External ear normal.      Nose: Nose normal. No congestion or rhinorrhea.      Mouth/Throat:      Mouth: Mucous membranes are moist.      Pharynx: Oropharynx is clear. No oropharyngeal exudate.   Eyes:      General: No scleral icterus.     Conjunctiva/sclera: Conjunctivae normal.   Cardiovascular:      Rate and Rhythm: Normal rate and regular rhythm.      Pulses: Normal pulses.      Heart sounds: Normal heart sounds. No murmur heard.     No gallop.   Pulmonary:      Effort: Pulmonary effort is normal. No respiratory distress.      Breath sounds: Normal breath sounds. No stridor. No wheezing or rhonchi.   Abdominal:      General: Abdomen is flat. Bowel sounds are normal. There is no distension.      Palpations: Abdomen is soft. There is no mass.      Tenderness: There is abdominal tenderness (Right lower quadrant). There is right CVA tenderness. There is no left CVA tenderness.   Musculoskeletal:         General: No swelling or tenderness. Normal range of motion.      Cervical back: Normal range of motion and neck supple. No tenderness.      Right lower leg: No edema.      Left lower leg: No edema.   Skin:     General: Skin is warm and dry.      Capillary Refill: Capillary refill takes less than 2 seconds.      Coloration: Skin is not jaundiced.      Findings: No bruising.   Neurological:      General: No focal deficit present.      Mental Status: She is alert and oriented to person, place, and time. Mental status is at baseline.       Sensory: No sensory deficit.      Motor: No weakness.   Psychiatric:         Mood and Affect: Mood normal.         Behavior: Behavior normal.         Thought Content: Thought content normal.         Judgment: Judgment normal.         Vital Signs  ED Triage Vitals   Temperature Pulse Respirations Blood Pressure SpO2   06/02/24 1641 06/02/24 1641 06/02/24 1641 06/02/24 1641 06/02/24 1641   97.8 °F (36.6 °C) 70 18 139/66 98 %      Temp Source Heart Rate Source Patient Position - Orthostatic VS BP Location FiO2 (%)   06/02/24 1641 06/02/24 1641 06/02/24 1641 06/02/24 1641 --   Oral Monitor Sitting Right arm       Pain Score       06/02/24 2045       8           Vitals:    06/02/24 1641 06/02/24 2030   BP: 139/66 (!) 174/75   Pulse: 70 73   Patient Position - Orthostatic VS: Sitting          Visual Acuity      ED Medications  Medications   lactated ringers bolus 1,000 mL (0 mL Intravenous Stopped 6/2/24 2033)   ketorolac (TORADOL) injection 15 mg (15 mg Intravenous Given 6/2/24 2045)   sotalol (BETAPACE) tablet 120 mg (120 mg Oral Given 6/2/24 2210)   tamsulosin (FLOMAX) capsule 0.4 mg (0.4 mg Oral Given 6/2/24 2210)   phenazopyridine (PYRIDIUM) tablet 100 mg (100 mg Oral Given 6/2/24 2210)   oxybutynin (DITROPAN) tablet 5 mg (5 mg Oral Given 6/2/24 2210)       Diagnostic Studies  Results Reviewed       Procedure Component Value Units Date/Time    Urine Microscopic [[id number]]  (Abnormal) Collected: 06/02/24 1932    Lab Status: Final result Specimen: Urine, Other Updated: 06/02/24 2016     RBC, UA Innumerable /hpf      WBC, UA Innumerable /hpf      Epithelial Cells None Seen /hpf      Bacteria, UA None Seen /hpf     Urine culture [[id number]] Collected: 06/02/24 1932    Lab Status: In process Specimen: Urine, Other Updated: 06/02/24 2016    UA w Reflex to Microscopic w Reflex to Culture [[id number]]  (Abnormal) Collected: 06/02/24 1932    Lab Status: Final result Specimen: Urine, Other Updated: 06/02/24 1954     Color, UA  Light Orange     Clarity, UA Turbid     Specific Gravity, UA 1.017     pH, UA 6.0     Leukocytes, UA Small     Nitrite, UA Negative     Protein,  (2+) mg/dl      Glucose, UA Negative mg/dl      Ketones, UA 10 (1+) mg/dl      Urobilinogen, UA <2.0 mg/dl      Bilirubin, UA Negative     Occult Blood, UA Large    H[place]son draw [[id number]] Collected: 06/02/24 1648    Lab Status: Final result Specimen: Blood from Arm, Right Updated: 06/02/24 1801    Narrative:      The following orders were created for panel order [place] draw.  Procedure                               Abnormality         Status                     ---------                               -----------         ------                     Light Blue Top on hold[[id number]]                           Final result               Gold top on hold[[id number]]                                 Final result               Green / Black tube on hold[[id number]]                       Final result                 Please view results for these tests on the individual orders.    Comprehensive metabolic panel [[id number]] Collected: 06/02/24 1648    Lab Status: Final result Specimen: Blood from Arm, Right Updated: 06/02/24 1714     Sodium 137 mmol/L      Potassium 4.5 mmol/L      Chloride 104 mmol/L      CO2 26 mmol/L      ANION GAP 7 mmol/L      BUN 16 mg/dL      Creatinine 0.82 mg/dL      Glucose 97 mg/dL      Calcium 9.2 mg/dL      AST 17 U/L      ALT 13 U/L      Alkaline Phosphatase 57 U/L      Total Protein 6.8 g/dL      Albumin 3.9 g/dL      Total Bilirubin 0.30 mg/dL      eGFR 68 ml/min/1.73sq m     Narrative:      National Kid[employer] Foundation guidelines for Chronic Kidney Disease (CKD):     Stage 1 with normal or high GFR (GFR > 90 mL/min/1.73 square meters)    Stage 2 Mild CKD (GFR = 60-89 mL/min/1.73 square meters)    Stage 3A Moderate CKD (GFR = 45-59 mL/min/1.73 square meters)    Stage 3B Moderate CKD (GFR = 30-44 mL/min/1.73 square meters)    Stage 4 Severe  CKD (GFR = 15-29 mL/min/1.73 square meters)    Stage 5 End Stage CKD (GFR <15 mL/min/1.73 square meters)  Note: GFR calculation is accurate only with a steady state creatinine    CBC and differential [[id number]]  (Abnormal) Collected: 06/02/24 1648    Lab Status: Final result Specimen: Blood from Arm, Right Updated: 06/02/24 1704     WBC 8.05 Thousand/uL      RBC 3.77 Million/uL      Hemoglobin 12.3 g/dL      Hematocrit 36.2 %      MCV 96 fL      MCH 32.6 pg      MCHC 34.0 g/dL      RDW 13.1 %      MPV 9.8 fL      Platelets 346 Thousands/uL      nRBC 0 /100 WBCs      Segmented % 65 %      Immature Grans % 0 %      Lymphocytes % 22 %      Monocytes % 8 %      Eosinophils Relative 4 %      Basophils Relative 1 %      Absolute Neutrophils 5.26 Thousands/µL      Absolute Immature Grans 0.02 Thousand/uL      Absolute Lymphocytes 1.77 Thousands/µL      Absolute Monocytes 0.68 Thousand/µL      Eosinophils Absolute 0.28 Thousand/µL      Basophils Absolute 0.04 Thousands/µL                    CT abdomen pelvis wo contrast   Final Result by Lucio Mcmillan MD (06/02 2119)      Mild right hydroureteronephrosis with 1 mm calculi in the proximal and mid ureter, likely nonobstructing as the hydroureter continues to the UVJ. Mild periureteral and perinephric fat stranding.      Single focus of air in the urinary bladder. Correlate with recent instrumentation and urinalysis.      Large hiatal hernia.      Workstation performed: [id number]                    Procedures  Procedures         ED Course  ED Course as of 06/02/24 2226   Sun Jun 02, 2024 2122 Pain improved with toradol.  Pt due for sotalol, will order.     2127 Uro messaged.                                   SBIRT 20yo+      Flowsheet Row Most Recent Value   Initial Alcohol Screen: US AUDIT-C     1. How often do you have a drink containing alcohol? 0 Filed at: 06/02/2024 1949   2. How many drinks containing alcohol do you have on a typical day you are drinking?  0 Filed  at: 06/02/2024 1949   3a. Male UNDER 65: How often do you have five or more drinks on one occasion? 0 Filed at: 06/02/2024 1949   3b. FEMALE Any Age, or MALE 65+: How often do you have 4 or more drinks on one occassion? 0 Filed at: 06/02/2024 1949   Audit-C Score 0 Filed at: 06/02/2024 1949   SANDIP: How many times in the past year have you...    Used an illegal drug or used a prescription medication for non-medical reasons? Never Filed at: 06/02/2024 1949                      Medical Decision Making  This is a 79-year-old female presenting to the ED today for complaint of right-sided flank pain.  She states that her symptoms started this morning after her stent was removed.  Patient states that her pain has been unresponsive to oxycodone.  On exam she has right-sided flank pain, right-sided inguinal tenderness.  Her exam otherwise is unremarkable.  She is afebrile.  Her differential diagnosis includes: Retained stone versus urinary tract infection versus ascending urinary tract infection versus other.  Patient underwent urinalysis showing evidence for hematuria, pyuria without any bacteria.  She is currently on antibiotics.  Her CT of the abdomen pelvis showed evidence for perinephric stranding, with some residual hydronephrosis with small stone fragment.  Patient case was discussed with urology whom agreed with our management, recommended outpatient therapy with oxybutynin, Flomax, Pyridium, and follow-up as an outpatient with PCP/urology.  I discussed these findings, recommendations with the patient, and after risk-benefit discussion, she will go home and try outpatient therapy.  P.o. Toradol prescription sent.  The management plan was discussed in detail with the patient at bedside and all questions were answered. Strict ED return instructions were discussed at bedside. Prior to discharge, both verbal and written instructions were provided. We discussed the signs and symptoms that should prompt the patient to  "return to the ED. All questions were answered and the patient was comfortable with the plan of care and discharged home. The patient agrees to return to the Emergency Department for concerns and/or progression of illness.    Portions of the above record have been created with voice recognition software.  Occasional wrong word or \"sound alike\" substitutions may have occurred due to the inherent limitations of voice recognition software.  Read the chart carefully and recognize, using context, where substitutions may have occurred.    Amount and/or Complexity of Data Reviewed  Labs: ordered.  Radiology: ordered.    Risk  Prescription drug management.             Disposition  Final diagnoses:   Post-op pain   Acute right flank pain     Time reflects when diagnosis was documented in both MDM as applicable and the Disposition within this note       Time User Action Codes Description Comment    6/2/2024 10:06 PM Ella Adames [G89.18] Post-op pain     6/2/2024 10:06 PM Ella Adames Add [R10.9] Acute right flank pain           ED Disposition       ED Disposition   Discharge    Condition   Stable    Date/Time   Sun Jun 2, 2024 2206    Comment   Lavern Hardin discharge to home/self care.                   Follow-up Information       Follow up With Specialties Details Why Contact Info    Donald Cohn MD Internal Medicine Call in 1 day  [address] [facility]  [address] [place] [address]  [phone]              Patient's Medications   Discharge Prescriptions    KETOROLAC (TORADOL) 10 MG TABLET    Take 1 tablet (10 mg total) by mouth every 8 (eight) hours as needed for moderate pain for up to 3 days       Start Date: 6/2/2024  End Date: 6/5/2024       Order Dose: 10 mg       Quantity: 9 tablet    Refills: 0    OXYBUTYNIN (DITROPAN) 5 MG TABLET    Take 1 tablet (5 mg total) by mouth 3 (three) times a day for 2 days       Start Date: 6/2/2024  End Date: 6/4/2024       Order Dose: 5 mg       Quantity: 6 tablet    Refills: 0 "    PHENAZOPYRIDINE (PYRIDIUM) 200 MG TABLET    Take 1 tablet (200 mg total) by mouth 3 (three) times a day       Start Date: 6/2/2024  End Date: --       Order Dose: 200 mg       Quantity: 6 tablet    Refills: 0    TAMSULOSIN (FLOMAX) 0.4 MG    Take 1 capsule (0.4 mg total) by mouth daily with dinner for 2 days       Start Date: 6/2/2024  End Date: 6/4/2024       Order Dose: 0.4 mg       Quantity: 2 capsule    Refills: 0       No discharge procedures on file.    PDMP Review         Value Time User    PDMP Reviewed  Yes 5/2/2023  3:28 PM Donald Cohn MD            ED Provider  Electronically Signed by             Ella Adames MD  06/02/24 9176

## 2024-06-03 LAB
ATRIAL RATE: 70 BPM
P AXIS: 81 DEGREES
PR INTERVAL: 144 MS
QRS AXIS: 54 DEGREES
QRSD INTERVAL: 80 MS
QT INTERVAL: 430 MS
QTC INTERVAL: 464 MS
T WAVE AXIS: 64 DEGREES
VENTRICULAR RATE: 70 BPM

## 2024-06-03 PROCEDURE — 93010 ELECTROCARDIOGRAM REPORT: CPT | Performed by: INTERNAL MEDICINE

## 2024-06-03 NOTE — TELEPHONE ENCOUNTER
Pt returned call.  Pt sts that she removed stent yesterday and was having pain after removal.  Pt sts that she is feeling better but she noticed that her urine is bright yellow with some red in it.  Pt sts she has some mild pain but is managing it with Tylenol.  Informed pt as per the RN, it is not uncommon to have mild blood and pain for about 24 hours or so after stent removal.  Also informed pt to keep an eye on it and if it continues past today, call us back.  Pt verbalized understanding.

## 2024-06-03 NOTE — TELEPHONE ENCOUNTER
VM left requesting call back     When patient calls back please ask her if her stent came out and how she is feeling. Please ask if she is still having blood in her urine. Please confirm he post op appt with imaging prior

## 2024-06-03 NOTE — DISCHARGE INSTRUCTIONS
You were seen in the ED for right flank pain.  You had blood work, CT scan, urinalysis showing no significant acute abnormalities. You were diagnosed with residual right flank pain due to stent removal.  You have been discharged with oxybutynin, Pyridium, Flomax.  Please follow up with your primary care physician within the next 1 week for continued management of your conditions.  Please come back to the ED if you develop uncontrollable pain, nausea vomiting, shortness of breath. Thank you very much for utilizing the ED this evening.

## 2024-06-04 LAB — BACTERIA UR CULT: NORMAL

## 2024-06-05 ENCOUNTER — REMOTE DEVICE CLINIC VISIT (OUTPATIENT)
Dept: CARDIOLOGY CLINIC | Facility: CLINIC | Age: 79
End: 2024-06-05
Payer: MEDICARE

## 2024-06-05 DIAGNOSIS — Z95.0 PRESENCE OF PERMANENT CARDIAC PACEMAKER: Primary | ICD-10-CM

## 2024-06-05 PROCEDURE — 93296 REM INTERROG EVL PM/IDS: CPT | Performed by: INTERNAL MEDICINE

## 2024-06-05 PROCEDURE — 93294 REM INTERROG EVL PM/LDLS PM: CPT | Performed by: INTERNAL MEDICINE

## 2024-06-05 NOTE — TELEPHONE ENCOUNTER
Patient calling in with c/o that she is still having hematuria, she states it is bright red with every urination. No clots    Patient is on Xarelto, when she had stopped the blood thinner the bleeding resolved and since starting back the hematuria began again.    Patient states she is not over exerting herself, she has been resting and drinking a lot of water.    C/o of slight ache in right flank    ED precautions reviewed    Please advise

## 2024-06-05 NOTE — TELEPHONE ENCOUNTER
Patient s/p CYSTOSCOPY URETEROSCOPY WITH LITHOTRIPSY HOLMIUM LASER, BASKET EXTRACTION RIGHT URETERAL STONE, RETROGRADE PYELOGRAM AND INSERTION STENT URETERAL (Right: Bladder)  performed 5/28/24 with Dr Hays. She removed stent 6/2/24 and proceeded to ER after.     Please advise if patient should continue to monitor or if something else needed.

## 2024-06-05 NOTE — PROGRESS NOTES
MDT DC PM/ACTIVE SYSTEM IS MRI CONDITIONAL   CARELINK TRANSMISSION:  BATTERY VOLTAGE ADEQUATE (12.4 YR.).  AP 86.2%  10.2%.  ALL LEAD PARAMETERS WITHIN NORMAL LIMITS.  NO NEW HIGH RATE EPISODES.  NORMAL DEVICE FUNCTION.  RG

## 2024-06-10 ENCOUNTER — TELEPHONE (OUTPATIENT)
Dept: INTERNAL MEDICINE CLINIC | Facility: CLINIC | Age: 79
End: 2024-06-10

## 2024-06-10 ENCOUNTER — OFFICE VISIT (OUTPATIENT)
Dept: INTERNAL MEDICINE CLINIC | Facility: CLINIC | Age: 79
End: 2024-06-10
Payer: MEDICARE

## 2024-06-10 VITALS
TEMPERATURE: 97.6 F | DIASTOLIC BLOOD PRESSURE: 63 MMHG | OXYGEN SATURATION: 98 % | SYSTOLIC BLOOD PRESSURE: 113 MMHG | WEIGHT: 143 LBS | HEART RATE: 73 BPM | HEIGHT: 63 IN | BODY MASS INDEX: 25.34 KG/M2

## 2024-06-10 DIAGNOSIS — E55.9 VITAMIN D DEFICIENCY: ICD-10-CM

## 2024-06-10 DIAGNOSIS — N20.0 NEPHROLITHIASIS: Primary | ICD-10-CM

## 2024-06-10 DIAGNOSIS — E55.9 VITAMIN D DEFICIENCY DISEASE: ICD-10-CM

## 2024-06-10 DIAGNOSIS — M81.0 AGE-RELATED OSTEOPOROSIS WITHOUT CURRENT PATHOLOGICAL FRACTURE: ICD-10-CM

## 2024-06-10 DIAGNOSIS — M79.7 FIBROMYALGIA: ICD-10-CM

## 2024-06-10 DIAGNOSIS — I48.0 PAROXYSMAL ATRIAL FIBRILLATION (HCC): ICD-10-CM

## 2024-06-10 LAB
CALCIUM OXALATE DIHYDRATE MFR STONE IR: 20 %
COLOR STONE: NORMAL
COM MFR STONE: 80 %
COMMENT-STONE3: NORMAL
COMPOSITION: NORMAL
LABORATORY COMMENT REPORT: NORMAL
PHOTO: NORMAL
SIZE STONE: NORMAL MM
SPEC SOURCE SUBJ: NORMAL
STONE ANALYSIS-IMP: NORMAL
STONE ANALYSIS-IMP: NORMAL
WT STONE: 47 MG

## 2024-06-10 PROCEDURE — G2211 COMPLEX E/M VISIT ADD ON: HCPCS | Performed by: INTERNAL MEDICINE

## 2024-06-10 PROCEDURE — 99214 OFFICE O/P EST MOD 30 MIN: CPT | Performed by: INTERNAL MEDICINE

## 2024-06-10 RX ORDER — ERGOCALCIFEROL 1.25 MG/1
50000 CAPSULE ORAL
Qty: 7 CAPSULE | Refills: 3 | Status: SHIPPED | OUTPATIENT
Start: 2024-06-10 | End: 2024-09-10

## 2024-06-10 NOTE — TELEPHONE ENCOUNTER
----- Message from Donald Cohn MD sent at 6/10/2024  5:08 PM EDT -----  Please tell her that when she gets the blood work for the TSH and the vitamin levels she should not be taking any vitamins for 24 hours and also I added the sed rate and C-reactive protein.

## 2024-06-10 NOTE — PROGRESS NOTES
Assessment/Plan:           1. Nephrolithiasis  Comments:  Patient advised to stay on low oxalate diet up to 100 mg daily and plenty of fluids.  2. Vitamin D deficiency disease  -     ergocalciferol (VITAMIN D2) 50,000 units; Take 1 capsule (50,000 Units total) by mouth every 14 (fourteen) days  3. Paroxysmal atrial fibrillation (HCC)  Comments:  Patient is status post ablation on sotalol and  Xarelto.  Orders:  -     rivaroxaban (XARELTO) 20 mg tablet; Take 1 tablet (20 mg total) by mouth daily with dinner  4. Age-related osteoporosis without current pathological fracture  5. Vitamin D deficiency  Comments:  Blood work is pending.  6. Fibromyalgia  -     C-reactive protein; Future  -     Sedimentation rate, automated; Future         1. Vitamin D deficiency disease    - ergocalciferol (VITAMIN D2) 50,000 units; Take 1 capsule (50,000 Units total) by mouth every 14 (fourteen) days  Dispense: 7 capsule; Refill: 3    2. Paroxysmal atrial fibrillation (HCC)    - rivaroxaban (XARELTO) 20 mg tablet; Take 1 tablet (20 mg total) by mouth daily with dinner  Dispense: 90 tablet; Refill: 3       No problem-specific Assessment & Plan notes found for this encounter.           Subjective:      Patient ID: Lavern Hardin is a 79 y.o. female.    Is a 79-year-old lady who was seen in the emergency room with right-sided flank pain.  Currently her pain is improved and she denies any fever chills or any burning on urination.        The following portions of the patient's history were reviewed and updated as appropriate: She  has a past medical history of Acid reflux, Arthritis, Bradycardia (08/16/2023), Cataract, Colon polyp, Disease of thyroid gland, Dyslipidemia (high LDL; low HDL), Fibromyalgia, Fibromyalgia, primary, GERD (gastroesophageal reflux disease), Hyperlipidemia, Hypertension, Hypothyroidism, Kidney stone, Motion sickness, Palpitation, Paroxysmal atrial fibrillation (HCC), PONV (postoperative nausea and vomiting), s/p  Medtronic dual chamber PPM with left bundle pacing lead 8/16/2023 (08/16/2023), and Shingles (09/12/2010).  She   Patient Active Problem List    Diagnosis Date Noted    PONV (postoperative nausea and vomiting)     Right ureteral stone 05/07/2024    Person consulting for explanation of examination or test finding 05/07/2024    SVT (supraventricular tachycardia) 04/04/2024    Nonrheumatic mitral valve regurgitation 04/04/2024    Encounter for monitoring sotalol therapy 09/05/2023    s/p Medtronic dual chamber PPM with left bundle pacing lead 8/16/2023 08/16/2023    Fibromyalgia 10/10/2022    Vitamin D deficiency 10/10/2022    Age-related osteoporosis without current pathological fracture 10/10/2022    Acid reflux     Acquired hypothyroidism 09/11/2020    Plantar fasciitis 09/11/2020    Paroxysmal atrial fibrillation (HCC) 07/10/2018    Essential hypertension 07/10/2018    PAC (premature atrial contraction) 07/10/2018    Dyslipidemia 07/10/2018     She  has a past surgical history that includes Replacement total knee; Tonsillectomy; Hysterectomy (05/2016); Cataract extraction, bilateral (11/2016); Colonoscopy (07/11/2016); Mammo (historical) (4/8/2010 & 2/19/2020); Colonoscopy; Upper gastrointestinal endoscopy; Cardiac electrophysiology procedure (N/A, 08/16/2023); Insert / replace / remove pacemaker; pr cysto/uretero w/lithotripsy &indwell stent insrt (Right, 5/28/2024); and [place] retrograde pyelogram (5/28/2024).  Her family history includes Atrial fibrillation in her father; Breast cancer in her maternal aunt; Breast cancer (age of onset: 75) in her mother; Cancer in her father; Chronic bronchitis in her mother; Hyperlipidemia in her mother; Hypertension in her mother; Parkinsonism in her mother; Prostate cancer in her father; Stroke in her father and mother.  She  reports that she has never smoked. She has never used smokeless tobacco. She reports that she does not drink alcohol and does not use drugs.  Current  Outpatient Medications   Medication Sig Dispense Refill    Cyanocobalamin (VITAMIN B 12 PO) Take by mouth 2 (two) times a week      ergocalciferol (VITAMIN D2) 50,000 units Take 1 capsule (50,000 Units total) by mouth every 14 (fourteen) days 7 capsule 3    levothyroxine 75 mcg tablet Take 1 tablet (75 mcg total) by mouth daily 90 tablet 0    losartan (COZAAR) 25 mg tablet Take 1 tablet (25 mg total) by mouth daily 90 tablet 0    nitroglycerin (NITROSTAT) 0.4 mg SL tablet Place 1 tablet (0.4 mg total) under the tongue every 5 (five) minutes as needed for chest pain 15 tablet 1    omeprazole (PriLOSEC) 20 mg delayed release capsule Take 1 capsule by mouth daily      rivaroxaban (XARELTO) 20 mg tablet Take 1 tablet (20 mg total) by mouth daily with dinner 90 tablet 3    sotalol (BETAPACE) 120 mg tablet TAKE 1 TABLET BY MOUTH TWICE A  tablet 3    triamcinolone (KENALOG) 0.1 % ointment Apply topically 2 (two) times a day 60 g 0    docusate sodium (COLACE) 100 mg capsule Take 1 capsule (100 mg total) by mouth 3 (three) times a day for 7 days 21 capsule 0    famotidine (PEPCID) 20 mg tablet Take 20 mg by mouth 1 (one) time 1 8/15 pm and 1 8/16 am (Patient not taking: Reported on 6/10/2024)      ketorolac (TORADOL) 10 mg tablet Take 1 tablet (10 mg total) by mouth every 8 (eight) hours as needed for moderate pain for up to 3 days 9 tablet 0    oxybutynin (DITROPAN) 5 mg tablet Take 1 tablet (5 mg total) by mouth 3 (three) times a day for 2 days 6 tablet 0    phenazopyridine (PYRIDIUM) 200 mg tablet Take 1 tablet (200 mg total) by mouth 3 (three) times a day (Patient not taking: Reported on 6/10/2024) 6 tablet 0    tamsulosin (FLOMAX) 0.4 mg Take 1 capsule (0.4 mg total) by mouth daily with dinner for 2 days 2 capsule 0     No current facility-administered medications for this visit.     Current Outpatient Medications on File Prior to Visit   Medication Sig    Cyanocobalamin (VITAMIN B 12 PO) Take by mouth 2 (two)  times a week    levothyroxine 75 mcg tablet Take 1 tablet (75 mcg total) by mouth daily    losartan (COZAAR) 25 mg tablet Take 1 tablet (25 mg total) by mouth daily    nitroglycerin (NITROSTAT) 0.4 mg SL tablet Place 1 tablet (0.4 mg total) under the tongue every 5 (five) minutes as needed for chest pain    omeprazole (PriLOSEC) 20 mg delayed release capsule Take 1 capsule by mouth daily    sotalol (BETAPACE) 120 mg tablet TAKE 1 TABLET BY MOUTH TWICE A DAY    triamcinolone (KENALOG) 0.1 % ointment Apply topically 2 (two) times a day    [DISCONTINUED] ergocalciferol (VITAMIN D2) 50,000 units Take 1 capsule (50,000 Units total) by mouth every 14 (fourteen) days    [DISCONTINUED] rivaroxaban (XARELTO) 20 mg tablet Take 1 tablet (20 mg total) by mouth daily with dinner    docusate sodium (COLACE) 100 mg capsule Take 1 capsule (100 mg total) by mouth 3 (three) times a day for 7 days    famotidine (PEPCID) 20 mg tablet Take 20 mg by mouth 1 (one) time 1 8/15 pm and 1 8/16 am (Patient not taking: Reported on 6/10/2024)    ketorolac (TORADOL) 10 mg tablet Take 1 tablet (10 mg total) by mouth every 8 (eight) hours as needed for moderate pain for up to 3 days    oxybutynin (DITROPAN) 5 mg tablet Take 1 tablet (5 mg total) by mouth 3 (three) times a day for 2 days    phenazopyridine (PYRIDIUM) 200 mg tablet Take 1 tablet (200 mg total) by mouth 3 (three) times a day (Patient not taking: Reported on 6/10/2024)    tamsulosin (FLOMAX) 0.4 mg Take 1 capsule (0.4 mg total) by mouth daily with dinner for 2 days     No current facility-administered medications on file prior to visit.     Medications Discontinued During This Encounter   Medication Reason    ergocalciferol (VITAMIN D2) 50,000 units Reorder    rivaroxaban (XARELTO) 20 mg tablet Reorder      She is allergic to iodinated contrast media..    Review of Systems   Constitutional:  Negative for appetite change, chills, fatigue and fever.   HENT:  Negative for sore throat and  "trouble swallowing.    Eyes:  Negative for redness.   Respiratory:  Negative for shortness of breath.    Cardiovascular:  Negative for chest pain and palpitations.   Gastrointestinal:  Negative for abdominal pain, constipation and diarrhea.   Genitourinary:  Negative for dysuria and hematuria.   Musculoskeletal:  Positive for arthralgias and back pain. Negative for neck pain.   Skin:  Negative for rash.   Neurological:  Negative for seizures, weakness and headaches.   Hematological:  Negative for adenopathy.   Psychiatric/Behavioral:  Negative for confusion. The patient is not nervous/anxious.          Objective:      /63 (BP Location: Left arm, Patient Position: Sitting, Cuff Size: Adult)   Pulse 73   Temp 97.6 °F (36.4 °C) (Temporal)   Ht 5' 3\" (1.6 m)   Wt 64.9 kg (143 lb)   SpO2 98%   BMI 25.33 kg/m²     Results Reviewed       None            Recent Results (from the past 1344 hour(s))   UA w Reflex to Microscopic w Reflex to Culture    Collection Time: 04/26/24 11:29 PM    Specimen: Urine, Clean Catch   Result Value Ref Range    Color, UA Red     Clarity, UA Extra Turbid     Specific Gravity, UA 1.011 1.003 - 1.030    pH, UA 6.0 4.5, 5.0, 5.5, 6.0, 6.5, 7.0, 7.5, 8.0    Leukocytes, UA Negative Negative    Nitrite, UA Negative Negative    Protein,  (2+) (A) Negative mg/dl    Glucose, UA Negative Negative mg/dl    Ketones, UA Negative Negative mg/dl    Urobilinogen, UA <2.0 <2.0 mg/dl mg/dl    Bilirubin, UA Negative Negative    Occult Blood, UA Large (A) Negative   Urine Microscopic    Collection Time: 04/26/24 11:29 PM   Result Value Ref Range    RBC, UA Innumerable (A) None Seen, 1-2 /hpf    WBC, UA None Seen None Seen, 1-2 /hpf    Epithelial Cells None Seen None Seen, Occasional /hpf    Bacteria, UA None Seen None Seen, Occasional /hpf   CBC and differential    Collection Time: 04/27/24 12:14 AM   Result Value Ref Range    WBC 9.26 4.31 - 10.16 Thousand/uL    RBC 3.92 3.81 - 5.12 Million/uL "    Hemoglobin 12.5 11.5 - 15.4 g/dL    Hematocrit 38.4 34.8 - 46.1 %    MCV 98 82 - 98 fL    MCH 31.9 26.8 - 34.3 pg    MCHC 32.6 31.4 - 37.4 g/dL    RDW 13.1 11.6 - 15.1 %    MPV 9.8 8.9 - 12.7 fL    Platelets 288 149 - 390 Thousands/uL    nRBC 0 /100 WBCs    Segmented % 66 43 - 75 %    Immature Grans % 0 0 - 2 %    Lymphocytes % 21 14 - 44 %    Monocytes % 10 4 - 12 %    Eosinophils Relative 3 0 - 6 %    Basophils Relative 0 0 - 1 %    Absolute Neutrophils 6.04 1.85 - 7.62 Thousands/µL    Absolute Immature Grans 0.02 0.00 - 0.20 Thousand/uL    Absolute Lymphocytes 1.92 0.60 - 4.47 Thousands/µL    Absolute Monocytes 0.94 0.17 - 1.22 Thousand/µL    Eosinophils Absolute 0.30 0.00 - 0.61 Thousand/µL    Basophils Absolute 0.04 0.00 - 0.10 Thousands/µL   Protime-INR    Collection Time: 04/27/24 12:14 AM   Result Value Ref Range    Protime 20.3 (H) 11.6 - 14.5 seconds    INR 1.65 (H) 0.84 - 1.19   APTT    Collection Time: 04/27/24 12:14 AM   Result Value Ref Range    PTT 40 (H) 23 - 37 seconds   Comprehensive metabolic panel    Collection Time: 04/27/24 12:14 AM   Result Value Ref Range    Sodium 138 135 - 147 mmol/L    Potassium 3.9 3.5 - 5.3 mmol/L    Chloride 106 96 - 108 mmol/L    CO2 26 21 - 32 mmol/L    ANION GAP 6 4 - 13 mmol/L    BUN 21 5 - 25 mg/dL    Creatinine 0.62 0.60 - 1.30 mg/dL    Glucose 105 65 - 140 mg/dL    Calcium 9.4 8.4 - 10.2 mg/dL    AST 18 13 - 39 U/L    ALT 11 7 - 52 U/L    Alkaline Phosphatase 60 34 - 104 U/L    Total Protein 6.9 6.4 - 8.4 g/dL    Albumin 4.2 3.5 - 5.0 g/dL    Total Bilirubin 0.45 0.20 - 1.00 mg/dL    eGFR 86 ml/min/1.73sq m   Urine culture    Collection Time: 05/08/24  9:13 AM    Specimen: Urine, Clean Catch   Result Value Ref Range    Urine Culture No Growth <1000 cfu/mL    Stone analysis    Collection Time: 05/28/24 12:00 AM   Result Value Ref Range    COLOR Brown     Size 4x3 mm    Stone Weight 47 mg    Composition Comment     Ca Oxalate,Dihydrate 20 %    Ca  Oxalate,Monohydr. 80 %    STONE COMMENT Comment     STONE COMMENT Comment     Please note Comment     Disclaimer Comment     Photo Comment     Stone Source Comment    Tissue Exam    Collection Time: 05/28/24 12:01 PM   Result Value Ref Range    Case Report       Surgical Pathology Report                         Case: [id number]                                  Authorizing Provider:  Aren Hays MD        Collected:           05/28/2024 1201              Ordering Location:     [facility]     Received:            05/28/2024 1349                                     [facility]                                                        Pathologist:           Lorrie Cabrera DO                                                     Specimen:    Kidney, Right, Right renal stone                                                           Final Diagnosis       A. Calculus, Right Kidney, Removal:  - Calculus, gross examination only.  - See separate [facility] report for chemical analysis results (to follow).      Additional Information       All reported additional testing was performed with appropriately reactive controls.  These tests were developed and their performance characteristics determined by [facility] or appropriate performing facility, though some tests may be performed on tissues which have not been validated for performance characteristics (such as staining performed on alcohol exposed cell blocks and decalcified tissues).  Results should be interpreted with caution and in the context of the patients’ clinical condition. These tests may not be cleared or approved by the U.S. Food and Drug Administration, though the FDA has determined that such clearance or approval is not necessary. These tests are used for clinical purposes and they should not be regarded as investigational or for research. This laboratory has been approved by CLIA 88, designated as a high-complexity  "laboratory and is qualified to perform these tests.      Gross Description       A.  The specimen is received fresh, labeled with the patient's name and hospital number, and is designated \" right renal stone\".  The specimen consists of multiple brown stony fragments measuring in aggregate of 1.0 x 0.3 x 0.2 cm.  There is no soft tissue grossly identified.  The [facility] [address] [place] [address].  Gross examination only. No sections submitted.    -Alina Jones     CBC and differential    Collection Time: 06/02/24  4:48 PM   Result Value Ref Range    WBC 8.05 4.31 - 10.16 Thousand/uL    RBC 3.77 (L) 3.81 - 5.12 Million/uL    Hemoglobin 12.3 11.5 - 15.4 g/dL    Hematocrit 36.2 34.8 - 46.1 %    MCV 96 82 - 98 fL    MCH 32.6 26.8 - 34.3 pg    MCHC 34.0 31.4 - 37.4 g/dL    RDW 13.1 11.6 - 15.1 %    MPV 9.8 8.9 - 12.7 fL    Platelets 346 149 - 390 Thousands/uL    nRBC 0 /100 WBCs    Segmented % 65 43 - 75 %    Immature Grans % 0 0 - 2 %    Lymphocytes % 22 14 - 44 %    Monocytes % 8 4 - 12 %    Eosinophils Relative 4 0 - 6 %    Basophils Relative 1 0 - 1 %    Absolute Neutrophils 5.26 1.85 - 7.62 Thousands/µL    Absolute Immature Grans 0.02 0.00 - 0.20 Thousand/uL    Absolute Lymphocytes 1.77 0.60 - 4.47 Thousands/µL    Absolute Monocytes 0.68 0.17 - 1.22 Thousand/µL    Eosinophils Absolute 0.28 0.00 - 0.61 Thousand/µL    Basophils Absolute 0.04 0.00 - 0.10 Thousands/µL   Comprehensive metabolic panel    Collection Time: 06/02/24  4:48 PM   Result Value Ref Range    Sodium 137 135 - 147 mmol/L    Potassium 4.5 3.5 - 5.3 mmol/L    Chloride 104 96 - 108 mmol/L    CO2 26 21 - 32 mmol/L    ANION GAP 7 4 - 13 mmol/L    BUN 16 5 - 25 mg/dL    Creatinine 0.82 0.60 - 1.30 mg/dL    Glucose 97 65 - 140 mg/dL    Calcium 9.2 8.4 - 10.2 mg/dL    AST 17 13 - 39 U/L    ALT 13 7 - 52 U/L    Alkaline Phosphatase 57 34 - 104 U/L    Total Protein 6.8 6.4 - 8.4 g/dL    " Albumin 3.9 3.5 - 5.0 g/dL    Total Bilirubin 0.30 0.20 - 1.00 mg/dL    eGFR 68 ml/min/1.73sq m   Green / Black tube on hold    Collection Time: 06/02/24  4:48 PM   Result Value Ref Range    Extra Tube Hold for add-ons.    UA w Reflex to Microscopic w Reflex to Culture    Collection Time: 06/02/24  7:32 PM    Specimen: Urine, Other   Result Value Ref Range    Color, UA Light Orange     Clarity, UA Turbid     Specific Gravity, UA 1.017 1.003 - 1.030    pH, UA 6.0 4.5, 5.0, 5.5, 6.0, 6.5, 7.0, 7.5, 8.0    Leukocytes, UA Small (A) Negative    Nitrite, UA Negative Negative    Protein,  (2+) (A) Negative mg/dl    Glucose, UA Negative Negative mg/dl    Ketones, UA 10 (1+) (A) Negative mg/dl    Urobilinogen, UA <2.0 <2.0 mg/dl mg/dl    Bilirubin, UA Negative Negative    Occult Blood, UA Large (A) Negative   Urine Microscopic    Collection Time: 06/02/24  7:32 PM   Result Value Ref Range    RBC, UA Innumerable (A) None Seen, 1-2 /hpf    WBC, UA Innumerable (A) None Seen, 1-2 /hpf    Epithelial Cells None Seen None Seen, Occasional /hpf    Bacteria, UA None Seen None Seen, Occasional /hpf   Urine culture    Collection Time: 06/02/24  7:32 PM    Specimen: Urine, Other   Result Value Ref Range    Urine Culture No Growth <1000 cfu/mL    ECG 12 lead    Collection Time: 06/02/24  7:42 PM   Result Value Ref Range    Ventricular Rate 70 BPM    Atrial Rate 70 BPM    ME Interval 144 ms    QRSD Interval 80 ms    QT Interval 430 ms    QTC Interval 464 ms    P Axis 81 degrees    QRS Axis 54 degrees    T Wave Axis 64 degrees        Physical Exam  Constitutional:       General: She is not in acute distress.     Appearance: Normal appearance.   HENT:      Head: Normocephalic and atraumatic.      Nose: Nose normal.      Mouth/Throat:      Mouth: Mucous membranes are moist.   Eyes:      Extraocular Movements: Extraocular movements intact.      Pupils: Pupils are equal, round, and reactive to light.   Cardiovascular:      Rate and  Rhythm: Normal rate and regular rhythm.      Pulses: Normal pulses.      Heart sounds: Normal heart sounds. No murmur heard.     No friction rub.   Pulmonary:      Effort: Pulmonary effort is normal. No respiratory distress.      Breath sounds: Normal breath sounds. No wheezing.   Abdominal:      General: Abdomen is flat. Bowel sounds are normal. There is no distension.      Palpations: Abdomen is soft. There is no mass.      Tenderness: There is no abdominal tenderness. There is no guarding.   Musculoskeletal:         General: Normal range of motion.      Cervical back: Normal range of motion.   Skin:     General: Skin is warm.   Neurological:      General: No focal deficit present.      Mental Status: She is alert and oriented to person, place, and time. Mental status is at baseline.      Cranial Nerves: No cranial nerve deficit.   Psychiatric:         Mood and Affect: Mood normal.         Behavior: Behavior normal.

## 2024-06-11 ENCOUNTER — APPOINTMENT (OUTPATIENT)
Dept: LAB | Facility: AMBULARY SURGERY CENTER | Age: 79
End: 2024-06-11
Payer: MEDICARE

## 2024-06-11 ENCOUNTER — TELEPHONE (OUTPATIENT)
Dept: INTERNAL MEDICINE CLINIC | Facility: CLINIC | Age: 79
End: 2024-06-11

## 2024-06-11 DIAGNOSIS — E03.9 ACQUIRED HYPOTHYROIDISM: Primary | ICD-10-CM

## 2024-06-11 DIAGNOSIS — M79.7 FIBROMYALGIA: ICD-10-CM

## 2024-06-11 DIAGNOSIS — K90.89 OTHER SPECIFIED INTESTINAL MALABSORPTION: ICD-10-CM

## 2024-06-11 DIAGNOSIS — I48.0 PAROXYSMAL ATRIAL FIBRILLATION (HCC): ICD-10-CM

## 2024-06-11 LAB
25(OH)D3 SERPL-MCNC: 65 NG/ML (ref 30–100)
ALBUMIN SERPL BCP-MCNC: 4.1 G/DL (ref 3.5–5)
ALP SERPL-CCNC: 53 U/L (ref 34–104)
ALT SERPL W P-5'-P-CCNC: 12 U/L (ref 7–52)
ANION GAP SERPL CALCULATED.3IONS-SCNC: 8 MMOL/L (ref 4–13)
AST SERPL W P-5'-P-CCNC: 18 U/L (ref 13–39)
BASOPHILS # BLD AUTO: 0.04 THOUSANDS/ÂΜL (ref 0–0.1)
BASOPHILS NFR BLD AUTO: 1 % (ref 0–1)
BILIRUB SERPL-MCNC: 0.49 MG/DL (ref 0.2–1)
BUN SERPL-MCNC: 20 MG/DL (ref 5–25)
CALCIUM SERPL-MCNC: 9.2 MG/DL (ref 8.4–10.2)
CHLORIDE SERPL-SCNC: 102 MMOL/L (ref 96–108)
CO2 SERPL-SCNC: 29 MMOL/L (ref 21–32)
CREAT SERPL-MCNC: 0.7 MG/DL (ref 0.6–1.3)
CRP SERPL QL: <1 MG/L
EOSINOPHIL # BLD AUTO: 0.18 THOUSAND/ÂΜL (ref 0–0.61)
EOSINOPHIL NFR BLD AUTO: 3 % (ref 0–6)
ERYTHROCYTE [DISTWIDTH] IN BLOOD BY AUTOMATED COUNT: 13 % (ref 11.6–15.1)
ERYTHROCYTE [SEDIMENTATION RATE] IN BLOOD: 32 MM/HOUR (ref 0–29)
GFR SERPL CREATININE-BSD FRML MDRD: 82 ML/MIN/1.73SQ M
GLUCOSE P FAST SERPL-MCNC: 87 MG/DL (ref 65–99)
HCT VFR BLD AUTO: 35.1 % (ref 34.8–46.1)
HGB BLD-MCNC: 11.4 G/DL (ref 11.5–15.4)
IMM GRANULOCYTES # BLD AUTO: 0.01 THOUSAND/UL (ref 0–0.2)
IMM GRANULOCYTES NFR BLD AUTO: 0 % (ref 0–2)
LYMPHOCYTES # BLD AUTO: 1.77 THOUSANDS/ÂΜL (ref 0.6–4.47)
LYMPHOCYTES NFR BLD AUTO: 29 % (ref 14–44)
MCH RBC QN AUTO: 32.1 PG (ref 26.8–34.3)
MCHC RBC AUTO-ENTMCNC: 32.5 G/DL (ref 31.4–37.4)
MCV RBC AUTO: 99 FL (ref 82–98)
MONOCYTES # BLD AUTO: 0.54 THOUSAND/ÂΜL (ref 0.17–1.22)
MONOCYTES NFR BLD AUTO: 9 % (ref 4–12)
NEUTROPHILS # BLD AUTO: 3.54 THOUSANDS/ÂΜL (ref 1.85–7.62)
NEUTS SEG NFR BLD AUTO: 58 % (ref 43–75)
NRBC BLD AUTO-RTO: 0 /100 WBCS
PLATELET # BLD AUTO: 384 THOUSANDS/UL (ref 149–390)
PMV BLD AUTO: 9.7 FL (ref 8.9–12.7)
POTASSIUM SERPL-SCNC: 3.9 MMOL/L (ref 3.5–5.3)
PROT SERPL-MCNC: 6.6 G/DL (ref 6.4–8.4)
RBC # BLD AUTO: 3.55 MILLION/UL (ref 3.81–5.12)
SODIUM SERPL-SCNC: 139 MMOL/L (ref 135–147)
TSH SERPL DL<=0.05 MIU/L-ACNC: 0.28 UIU/ML (ref 0.45–4.5)
VIT B12 SERPL-MCNC: 347 PG/ML (ref 180–914)
WBC # BLD AUTO: 6.08 THOUSAND/UL (ref 4.31–10.16)

## 2024-06-11 PROCEDURE — 82306 VITAMIN D 25 HYDROXY: CPT

## 2024-06-11 PROCEDURE — 85652 RBC SED RATE AUTOMATED: CPT

## 2024-06-11 PROCEDURE — 84443 ASSAY THYROID STIM HORMONE: CPT

## 2024-06-11 PROCEDURE — 80053 COMPREHEN METABOLIC PANEL: CPT

## 2024-06-11 PROCEDURE — 82607 VITAMIN B-12: CPT

## 2024-06-11 PROCEDURE — 86140 C-REACTIVE PROTEIN: CPT

## 2024-06-11 PROCEDURE — 85025 COMPLETE CBC W/AUTO DIFF WBC: CPT

## 2024-06-11 PROCEDURE — 36415 COLL VENOUS BLD VENIPUNCTURE: CPT

## 2024-06-11 RX ORDER — LEVOTHYROXINE SODIUM 0.05 MG/1
50 TABLET ORAL
Qty: 30 TABLET | Refills: 2 | Status: SHIPPED | OUTPATIENT
Start: 2024-06-11

## 2024-08-02 DIAGNOSIS — I10 ESSENTIAL HYPERTENSION: ICD-10-CM

## 2024-08-02 RX ORDER — LOSARTAN POTASSIUM 25 MG/1
25 TABLET ORAL DAILY
Qty: 90 TABLET | Refills: 0 | Status: SHIPPED | OUTPATIENT
Start: 2024-08-02

## 2024-08-05 ENCOUNTER — HOSPITAL ENCOUNTER (OUTPATIENT)
Dept: ULTRASOUND IMAGING | Facility: HOSPITAL | Age: 79
Discharge: HOME | End: 2024-08-05
Attending: UROLOGY
Payer: MEDICARE

## 2024-08-05 DIAGNOSIS — N20.0 RIGHT RENAL STONE: ICD-10-CM

## 2024-08-05 PROCEDURE — 76770 US EXAM ABDO BACK WALL COMP: CPT

## 2024-08-12 ENCOUNTER — OFFICE VISIT (OUTPATIENT)
Dept: INTERNAL MEDICINE CLINIC | Facility: CLINIC | Age: 79
End: 2024-08-12
Payer: MEDICARE

## 2024-08-12 VITALS
HEIGHT: 63 IN | HEART RATE: 74 BPM | OXYGEN SATURATION: 99 % | WEIGHT: 141.5 LBS | SYSTOLIC BLOOD PRESSURE: 142 MMHG | TEMPERATURE: 98.1 F | BODY MASS INDEX: 25.07 KG/M2 | DIASTOLIC BLOOD PRESSURE: 86 MMHG

## 2024-08-12 DIAGNOSIS — R25.2 CRAMP AND SPASM: ICD-10-CM

## 2024-08-12 DIAGNOSIS — N20.0 NEPHROLITHIASIS: ICD-10-CM

## 2024-08-12 DIAGNOSIS — I10 ESSENTIAL HYPERTENSION: ICD-10-CM

## 2024-08-12 DIAGNOSIS — I48.0 PAROXYSMAL ATRIAL FIBRILLATION (HCC): ICD-10-CM

## 2024-08-12 DIAGNOSIS — E53.8 B12 DEFICIENCY: ICD-10-CM

## 2024-08-12 DIAGNOSIS — E03.9 ACQUIRED HYPOTHYROIDISM: Primary | ICD-10-CM

## 2024-08-12 PROCEDURE — 99214 OFFICE O/P EST MOD 30 MIN: CPT | Performed by: INTERNAL MEDICINE

## 2024-08-12 PROCEDURE — G2211 COMPLEX E/M VISIT ADD ON: HCPCS | Performed by: INTERNAL MEDICINE

## 2024-08-12 RX ORDER — LEVOTHYROXINE SODIUM 50 UG/1
50 TABLET ORAL
Qty: 90 TABLET | Refills: 1 | Status: SHIPPED | OUTPATIENT
Start: 2024-08-12

## 2024-08-12 RX ORDER — CYANOCOBALAMIN (VITAMIN B-12) 500 MCG
1 TABLET ORAL 3 TIMES WEEKLY
Qty: 100 TABLET | Refills: 2 | Status: SHIPPED | OUTPATIENT
Start: 2024-08-12

## 2024-08-12 NOTE — PROGRESS NOTES
Assessment/Plan:           1. Acquired hypothyroidism  Comments:  Continue levothyroxine 50 mcg daily.  Orders:  -     T4, free; Future  -     TSH, 3rd generation; Future  -     levothyroxine 50 mcg tablet; Take 1 tablet (50 mcg total) by mouth daily in the early morning  2. Essential hypertension  3. Nephrolithiasis  Comments:  Patient advised to go on low oxalate diet and plenty of water.  4. Cramp and spasm  Comments:  Hydration and as needed magnesium advised.  5. B12 deficiency  -     Cyanocobalamin (Vitamin B 12) 500 MCG TABS; Take 1 tablet by mouth 3 (three) times a week  6. Paroxysmal atrial fibrillation (HCC)  Comments:  This is stable and patient is on Xarelto 20 mg daily.  Orders:  -     CBC and differential; Future  -     Comprehensive metabolic panel; Future         1. Essential hypertension      2. Acquired hypothyroidism    - T4, free; Future  - TSH, 3rd generation; Future  - levothyroxine 50 mcg tablet; Take 1 tablet (50 mcg total) by mouth daily in the early morning  Dispense: 90 tablet; Refill: 1    3. Nephrolithiasis      4. Cramp and spasm      5. B12 deficiency    - Cyanocobalamin (Vitamin B 12) 500 MCG TABS; Take 1 tablet by mouth 3 (three) times a week  Dispense: 100 tablet; Refill: 2    6. Paroxysmal atrial fibrillation (HCC)    - CBC and differential; Future  - Comprehensive metabolic panel; Future       No problem-specific Assessment & Plan notes found for this encounter.           Subjective:      Patient ID: Lavern Hardin is a 79 y.o. female.    HPI    The following portions of the patient's history were reviewed and updated as appropriate: She  has a past medical history of Acid reflux, Arthritis, Bradycardia (08/16/2023), Cataract, Colon polyp, Disease of thyroid gland, Dyslipidemia (high LDL; low HDL), Fibromyalgia, Fibromyalgia, primary, GERD (gastroesophageal reflux disease), Hyperlipidemia, Hypertension, Hypothyroidism, Kidney stone, Motion sickness, Palpitation, Paroxysmal atrial  fibrillation (HCC), PONV (postoperative nausea and vomiting), s/p Medtronic dual chamber PPM with left bundle pacing lead 8/16/2023 (08/16/2023), and Shingles (09/12/2010).  She   Patient Active Problem List    Diagnosis Date Noted    PONV (postoperative nausea and vomiting)     Right ureteral stone 05/07/2024    Person consulting for explanation of examination or test finding 05/07/2024    SVT (supraventricular tachycardia) 04/04/2024    Nonrheumatic mitral valve regurgitation 04/04/2024    Encounter for monitoring sotalol therapy 09/05/2023    s/p Medtronic dual chamber PPM with left bundle pacing lead 8/16/2023 08/16/2023    Fibromyalgia 10/10/2022    Vitamin D deficiency 10/10/2022    Age-related osteoporosis without current pathological fracture 10/10/2022    Acid reflux     Acquired hypothyroidism 09/11/2020    Plantar fasciitis 09/11/2020    Paroxysmal atrial fibrillation (HCC) 07/10/2018    Essential hypertension 07/10/2018    PAC (premature atrial contraction) 07/10/2018    Dyslipidemia 07/10/2018     She  has a past surgical history that includes Replacement total knee; Tonsillectomy; Hysterectomy (05/2016); Cataract extraction, bilateral (11/2016); Colonoscopy (07/11/2016); Mammo (historical) (4/8/2010 & 2/19/2020); Colonoscopy; Upper gastrointestinal endoscopy; Cardiac electrophysiology procedure (N/A, 08/16/2023); Insert / replace / remove pacemaker; pr cysto/uretero w/lithotripsy &indwell stent insrt (Right, 5/28/2024); and [place] retrograde pyelogram (5/28/2024).  Her family history includes Atrial fibrillation in her father; Breast cancer in her maternal aunt; Breast cancer (age of onset: 75) in her mother; Cancer in her father; Chronic bronchitis in her mother; Hyperlipidemia in her mother; Hypertension in her mother; Parkinsonism in her mother; Prostate cancer in her father; Stroke in her father and mother.  She  reports that she has never smoked. She has never used smokeless tobacco. She reports that  she does not drink alcohol and does not use drugs.  Current Outpatient Medications   Medication Sig Dispense Refill    Cyanocobalamin (Vitamin B 12) 500 MCG TABS Take 1 tablet by mouth 3 (three) times a week 100 tablet 2    ergocalciferol (VITAMIN D2) 50,000 units Take 1 capsule (50,000 Units total) by mouth every 14 (fourteen) days 7 capsule 3    levothyroxine 50 mcg tablet Take 1 tablet (50 mcg total) by mouth daily in the early morning 90 tablet 1    losartan (COZAAR) 25 mg tablet Take 1 tablet (25 mg total) by mouth daily 90 tablet 0    omeprazole (PriLOSEC) 20 mg delayed release capsule Take 1 capsule by mouth daily      rivaroxaban (XARELTO) 20 mg tablet Take 1 tablet (20 mg total) by mouth daily with dinner 90 tablet 3    sotalol (BETAPACE) 120 mg tablet TAKE 1 TABLET BY MOUTH TWICE A  tablet 3    triamcinolone (KENALOG) 0.1 % ointment Apply topically 2 (two) times a day 60 g 0     No current facility-administered medications for this visit.     Current Outpatient Medications on File Prior to Visit   Medication Sig    ergocalciferol (VITAMIN D2) 50,000 units Take 1 capsule (50,000 Units total) by mouth every 14 (fourteen) days    losartan (COZAAR) 25 mg tablet Take 1 tablet (25 mg total) by mouth daily    omeprazole (PriLOSEC) 20 mg delayed release capsule Take 1 capsule by mouth daily    rivaroxaban (XARELTO) 20 mg tablet Take 1 tablet (20 mg total) by mouth daily with dinner    sotalol (BETAPACE) 120 mg tablet TAKE 1 TABLET BY MOUTH TWICE A DAY    triamcinolone (KENALOG) 0.1 % ointment Apply topically 2 (two) times a day    [DISCONTINUED] Cyanocobalamin (VITAMIN B 12 PO) Take by mouth 2 (two) times a week    [DISCONTINUED] levothyroxine 50 mcg tablet Take 1 tablet (50 mcg total) by mouth daily in the early morning    [DISCONTINUED] docusate sodium (COLACE) 100 mg capsule Take 1 capsule (100 mg total) by mouth 3 (three) times a day for 7 days (Patient not taking: Reported on 8/12/2024)     [DISCONTINUED] famotidine (PEPCID) 20 mg tablet Take 20 mg by mouth 1 (one) time 1 8/15 pm and 1 8/16 am (Patient not taking: Reported on 6/10/2024)    [DISCONTINUED] ketorolac (TORADOL) 10 mg tablet Take 1 tablet (10 mg total) by mouth every 8 (eight) hours as needed for moderate pain for up to 3 days (Patient not taking: Reported on 8/12/2024)    [DISCONTINUED] nitroglycerin (NITROSTAT) 0.4 mg SL tablet Place 1 tablet (0.4 mg total) under the tongue every 5 (five) minutes as needed for chest pain (Patient not taking: Reported on 8/12/2024)    [DISCONTINUED] oxybutynin (DITROPAN) 5 mg tablet Take 1 tablet (5 mg total) by mouth 3 (three) times a day for 2 days (Patient not taking: Reported on 8/12/2024)    [DISCONTINUED] phenazopyridine (PYRIDIUM) 200 mg tablet Take 1 tablet (200 mg total) by mouth 3 (three) times a day (Patient not taking: Reported on 6/10/2024)    [DISCONTINUED] tamsulosin (FLOMAX) 0.4 mg Take 1 capsule (0.4 mg total) by mouth daily with dinner for 2 days (Patient not taking: Reported on 8/12/2024)     No current facility-administered medications on file prior to visit.     Medications Discontinued During This Encounter   Medication Reason    Cyanocobalamin (VITAMIN B 12 PO) Reorder    levothyroxine 50 mcg tablet Reorder    nitroglycerin (NITROSTAT) 0.4 mg SL tablet     phenazopyridine (PYRIDIUM) 200 mg tablet     oxybutynin (DITROPAN) 5 mg tablet     tamsulosin (FLOMAX) 0.4 mg     ketorolac (TORADOL) 10 mg tablet     famotidine (PEPCID) 20 mg tablet     docusate sodium (COLACE) 100 mg capsule       She is allergic to iodinated contrast media..    Review of Systems   Constitutional:  Negative for appetite change, chills, fatigue and fever.   HENT:  Negative for sore throat and trouble swallowing.    Eyes:  Negative for redness.   Respiratory:  Negative for shortness of breath.    Cardiovascular:  Negative for chest pain and palpitations.   Gastrointestinal:  Negative for abdominal pain,  "constipation and diarrhea.   Genitourinary:  Negative for dysuria and hematuria.   Musculoskeletal:  Negative for back pain and neck pain.   Skin:  Negative for rash.   Neurological:  Negative for seizures, weakness and headaches.   Hematological:  Negative for adenopathy.   Psychiatric/Behavioral:  Negative for confusion. The patient is not nervous/anxious.          Objective:      /86 (BP Location: Left arm, Patient Position: Sitting, Cuff Size: Standard)   Pulse 74   Temp 98.1 °F (36.7 °C) (Temporal)   Ht 5' 3\" (1.6 m)   Wt 64.2 kg (141 lb 8 oz)   SpO2 99%   BMI 25.07 kg/m²     Results Reviewed       None            No results found for this or any previous visit (from the past 1344 hour(s)).     Physical Exam  Constitutional:       General: She is not in acute distress.     Appearance: Normal appearance.   HENT:      Head: Normocephalic and atraumatic.      Nose: Nose normal.      Mouth/Throat:      Mouth: Mucous membranes are moist.   Eyes:      Extraocular Movements: Extraocular movements intact.      Pupils: Pupils are equal, round, and reactive to light.   Cardiovascular:      Rate and Rhythm: Normal rate. Rhythm irregular.      Pulses: Normal pulses.      Heart sounds: Normal heart sounds. No murmur heard.     No friction rub.   Pulmonary:      Effort: Pulmonary effort is normal. No respiratory distress.      Breath sounds: Normal breath sounds. No wheezing.   Abdominal:      General: Abdomen is flat. Bowel sounds are normal. There is no distension.      Palpations: Abdomen is soft. There is no mass.      Tenderness: There is no abdominal tenderness. There is no guarding.   Musculoskeletal:         General: Normal range of motion.      Cervical back: Normal range of motion and neck supple.   Neurological:      General: No focal deficit present.      Mental Status: She is alert and oriented to person, place, and time. Mental status is at baseline.      Cranial Nerves: No cranial nerve deficit. "   Psychiatric:         Mood and Affect: Mood normal.         Behavior: Behavior normal.

## 2024-08-26 ENCOUNTER — OFFICE VISIT (OUTPATIENT)
Dept: UROLOGY | Facility: CLINIC | Age: 79
End: 2024-08-26
Payer: MEDICARE

## 2024-08-26 VITALS
WEIGHT: 145 LBS | BODY MASS INDEX: 25.69 KG/M2 | DIASTOLIC BLOOD PRESSURE: 98 MMHG | HEIGHT: 63 IN | HEART RATE: 87 BPM | OXYGEN SATURATION: 97 % | SYSTOLIC BLOOD PRESSURE: 140 MMHG

## 2024-08-26 DIAGNOSIS — N20.0 NEPHROLITHIASIS: Primary | ICD-10-CM

## 2024-08-26 PROCEDURE — 99213 OFFICE O/P EST LOW 20 MIN: CPT

## 2024-08-26 NOTE — PROGRESS NOTES
8/26/2024      No chief complaint on file.        Assessment and Plan    79 y.o. female managed by Dr. Hays    Nephrolithiasis  -s/p right ureteroscopy (5/28/24)  -renal US (8/05/24) unremarkable.  -Stone analysis calcium oxalate  -Encouraged continuing hydration  -Advised patient to follow-up with us on an as-needed basis for any urologic concerns.     History of Present Illness  Lavern Hardin is a 79 y.o. female here with past medical history of paroxysmal atrial fibrillation (on xarelto), nonrheumatic mitral valve regurgitation, HTN, acquired hypothyroidism, and right ureteral stone stone s/p right ureteroscopy (5/28/24) presenting today for follow-up.     She states that her only symptom prior to her ureteroscopy was hematuria. She is asymptomatic. She denies gross hematuria. She denies dysuria, flank pain frequency, and urgency. She denies hx of recurrent UTIs.     She states that she drinks a lot of water.     Renal US (8/05/24) was unremarkable. Bilateral ureteral jets detected.             Review of Systems   Constitutional:  Negative for chills and fever.   HENT:  Negative for ear pain and sore throat.    Eyes:  Negative for pain and visual disturbance.   Respiratory:  Negative for cough and shortness of breath.    Cardiovascular:  Negative for chest pain and palpitations.   Gastrointestinal:  Negative for abdominal pain, constipation, nausea and vomiting.   Genitourinary:  Negative for difficulty urinating, dysuria, flank pain, frequency, hematuria and urgency.   Musculoskeletal:  Negative for arthralgias and back pain.   Skin:  Negative for color change and rash.   Neurological:  Negative for seizures and syncope.   All other systems reviewed and are negative.               Vitals  There were no vitals filed for this visit.    Physical Exam  Constitutional:       Appearance: Normal appearance.   HENT:      Head: Normocephalic.   Eyes:      Extraocular Movements: Extraocular movements intact.       Pupils: Pupils are equal, round, and reactive to light.   Pulmonary:      Effort: Pulmonary effort is normal. No respiratory distress.   Musculoskeletal:         General: Normal range of motion.      Cervical back: Normal range of motion.   Neurological:      Mental Status: She is alert and oriented to person, place, and time.   Psychiatric:         Mood and Affect: Mood normal.         Behavior: Behavior normal.         Thought Content: Thought content normal.         Judgment: Judgment normal.           Past History  Past Medical History:   Diagnosis Date    Acid reflux     Arthritis     Bradycardia 08/16/2023    Cataract     Colon polyp     Disease of thyroid gland     Dyslipidemia (high LDL; low HDL)     Fibromyalgia     Fibromyalgia, primary     GERD (gastroesophageal reflux disease)     Hyperlipidemia     Hypertension     Hypothyroidism     Kidney stone     Motion sickness     Palpitation     Paroxysmal atrial fibrillation (HCC)     PONV (postoperative nausea and vomiting)     s/p Medtronic dual chamber PPM with left bundle pacing lead 8/16/2023 08/16/2023    Shingles 09/12/2010    Right C7     Social History     Socioeconomic History    Marital status: /Civil Union     Spouse name: Not on file    Number of children: Not on file    Years of education: Not on file    Highest education level: Not on file   Occupational History    Not on file   Tobacco Use    Smoking status: Never    Smokeless tobacco: Never   Vaping Use    Vaping status: Never Used   Substance and Sexual Activity    Alcohol use: No    Drug use: No    Sexual activity: Not Currently   Other Topics Concern    Not on file   Social History Narrative    Not on file     Social Determinants of Health     Financial Resource Strain: Low Risk  (2/9/2024)    Overall Financial Resource Strain (CARDIA)     Difficulty of Paying Living Expenses: Not hard at all   Food Insecurity: Not on file   Transportation Needs: No Transportation Needs (2/9/2024)     "PRAPARE - Transportation     Lack of Transportation (Medical): No     Lack of Transportation (Non-Medical): No   Physical Activity: Not on file   Stress: Not on file   Social Connections: Not on file   Intimate Partner Violence: Not on file   Housing Stability: Not on file     Social History     Tobacco Use   Smoking Status Never   Smokeless Tobacco Never     Family History   Problem Relation Age of Onset    Breast cancer Mother 75    Hypertension Mother     Hyperlipidemia Mother     Parkinsonism Mother     Stroke Mother     Chronic bronchitis Mother     Atrial fibrillation Father     Stroke Father     Cancer Father         skin cancer    Prostate cancer Father     Breast cancer Maternal Aunt     Anuerysm Neg Hx     Clotting disorder Neg Hx        The following portions of the patient's history were reviewed and updated as appropriate: allergies, current medications, past medical history, past social history, past surgical history and problem list.    Results  No results found for this or any previous visit (from the past 1 hour(s)).]  No results found for: \"PSA\"  Lab Results   Component Value Date    GLUCOSE 115 03/08/2015    CALCIUM 9.2 06/11/2024     (L) 03/08/2015    K 3.9 06/11/2024    CO2 29 06/11/2024     06/11/2024    BUN 20 06/11/2024    CREATININE 0.70 06/11/2024     Lab Results   Component Value Date    WBC 6.08 06/11/2024    HGB 11.4 (L) 06/11/2024    HCT 35.1 06/11/2024    MCV 99 (H) 06/11/2024     06/11/2024       CHRISTINE Qiu  "

## 2024-09-04 ENCOUNTER — REMOTE DEVICE CLINIC VISIT (OUTPATIENT)
Dept: CARDIOLOGY CLINIC | Facility: CLINIC | Age: 79
End: 2024-09-04
Payer: MEDICARE

## 2024-09-04 DIAGNOSIS — Z95.0 CARDIAC PACEMAKER IN SITU: Primary | ICD-10-CM

## 2024-09-04 PROCEDURE — 93294 REM INTERROG EVL PM/LDLS PM: CPT | Performed by: INTERNAL MEDICINE

## 2024-09-04 PROCEDURE — 93296 REM INTERROG EVL PM/IDS: CPT | Performed by: INTERNAL MEDICINE

## 2024-09-04 NOTE — PROGRESS NOTES
MDT DC PM/ACTIVE SYSTEM IS MRI CONDITIONAL   CARELINK TRANSMISSION: BATTERY VOLTAGE ADEQUATE (12.2 YR). AP 89.9%  6.3% (AAIR-DDDR 70PPM). ALL AVAILABLE LEAD PARAMETERS WITHIN NORMAL LIMITS. 1 VT-NS EPISODE - 10 BEATS @  BPM.  EF 57% (6/20/2023 ECHO). 4 AT/AF EPISODES TREATED WITH REACTIVE ATP (0/4-0% TERMINATION) - MAX EPISODE DURATION 82:57:33 HRS. 4 MONITOR AT/AF EPISODES - MAX DURATION 01:11 MINS. AT/AF BURDEN 7.4% PATIENT TAKING XARELTO, SOTALOL. NORMAL DEVICE FUNCTION.   ES

## 2024-09-04 NOTE — PROGRESS NOTES
Results for orders placed or performed in visit on 09/04/24   Cardiac EP device report    Narrative    MDT DC PM/ACTIVE SYSTEM IS MRI CONDITIONAL  CARELINK TRANSMISSION: BATTERY VOLTAGE ADEQUATE (12.2 YR). AP 89.9%  6.3% (AAIR-DDDR 70PPM). ALL AVAILABLE LEAD PARAMETERS WITHIN NORMAL LIMITS. 1 VT-NS EPISODE - 10 BEATS @  BPM.  EF 57% (6/20/2023 ECHO). 4 AT/AF EPISODES TREATED WITH REACTIVE ATP (0/4-0% TERMINATION) - MAX EPISODE DURATION 82:57:33 HRS. 4 MONITOR AT/AF EPISODES - MAX DURATION 01:11 MINS. AT/AF BURDEN 7.4% PATIENT TAKING XARELTO, SOTALOL. OPTI-VOL WITHIN NORMAL LIMITS. NORMAL DEVICE FUNCTION.   ES

## 2024-10-26 DIAGNOSIS — I10 ESSENTIAL HYPERTENSION: ICD-10-CM

## 2024-10-26 RX ORDER — LOSARTAN POTASSIUM 25 MG/1
25 TABLET ORAL DAILY
Qty: 90 TABLET | Refills: 1 | Status: SHIPPED | OUTPATIENT
Start: 2024-10-26

## 2024-11-20 ENCOUNTER — OFFICE VISIT (OUTPATIENT)
Dept: INTERNAL MEDICINE CLINIC | Facility: CLINIC | Age: 79
End: 2024-11-20
Payer: MEDICARE

## 2024-11-20 VITALS
BODY MASS INDEX: 25.69 KG/M2 | TEMPERATURE: 98.2 F | HEIGHT: 63 IN | WEIGHT: 145 LBS | DIASTOLIC BLOOD PRESSURE: 74 MMHG | SYSTOLIC BLOOD PRESSURE: 130 MMHG | OXYGEN SATURATION: 98 %

## 2024-11-20 DIAGNOSIS — Z23 ENCOUNTER FOR IMMUNIZATION: ICD-10-CM

## 2024-11-20 DIAGNOSIS — E03.9 ACQUIRED HYPOTHYROIDISM: ICD-10-CM

## 2024-11-20 DIAGNOSIS — I48.0 PAROXYSMAL ATRIAL FIBRILLATION (HCC): ICD-10-CM

## 2024-11-20 DIAGNOSIS — Z95.0 PACEMAKER: ICD-10-CM

## 2024-11-20 DIAGNOSIS — M81.0 AGE-RELATED OSTEOPOROSIS WITHOUT CURRENT PATHOLOGICAL FRACTURE: ICD-10-CM

## 2024-11-20 DIAGNOSIS — I10 ESSENTIAL HYPERTENSION: Primary | ICD-10-CM

## 2024-11-20 DIAGNOSIS — S80.01XA CONTUSION OF RIGHT KNEE, INITIAL ENCOUNTER: ICD-10-CM

## 2024-11-20 PROCEDURE — G0008 ADMIN INFLUENZA VIRUS VAC: HCPCS

## 2024-11-20 PROCEDURE — 99214 OFFICE O/P EST MOD 30 MIN: CPT | Performed by: INTERNAL MEDICINE

## 2024-11-20 PROCEDURE — 90662 IIV NO PRSV INCREASED AG IM: CPT

## 2024-11-20 NOTE — PROGRESS NOTES
Assessment/Plan:           1. Essential hypertension  Comments:  Stable continue losartan and sotalol.  2. Encounter for immunization  -     influenza vaccine, high-dose, PF 0.5 mL (Fluzone High Dose)  3. Acquired hypothyroidism  4. Paroxysmal atrial fibrillation (HCC)  Comments:  Following with cardiology and is on Xarelto 20 mg daily.  5. Contusion of right knee, initial encounter  Comments:  Status post arthroplasty.  Slight contusion.  Tylenol and monitoring advised.  6. Age-related osteoporosis without current pathological fracture  Comments:  Continue calcium and vitamin D.  Patient did not want treatment and was seen by rheumatology.  7. s/p Medtronic dual chamber PPM with left bundle pacing lead 8/16/2023         1. Encounter for immunization (Primary)    - influenza vaccine, high-dose, PF 0.5 mL (Fluzone High Dose)       No problem-specific Assessment & Plan notes found for this encounter.           Subjective:      Patient ID: Lavern Hardin is a 79 y.o. female.    HPI    The following portions of the patient's history were reviewed and updated as appropriate: She  has a past medical history of Acid reflux, Arthritis, Bradycardia (08/16/2023), Cataract, Colon polyp, Disease of thyroid gland, Dyslipidemia (high LDL; low HDL), Fibromyalgia, Fibromyalgia, primary, GERD (gastroesophageal reflux disease), Hyperlipidemia, Hypertension, Hypothyroidism, Kidney stone, Motion sickness, Palpitation, Paroxysmal atrial fibrillation (HCC), PONV (postoperative nausea and vomiting), s/p Medtronic dual chamber PPM with left bundle pacing lead 8/16/2023 (08/16/2023), and Shingles (09/12/2010).  She   Patient Active Problem List    Diagnosis Date Noted    PONV (postoperative nausea and vomiting)     Right ureteral stone 05/07/2024    Person consulting for explanation of examination or test finding 05/07/2024    SVT (supraventricular tachycardia) (HCC) 04/04/2024    Nonrheumatic mitral valve regurgitation 04/04/2024     Encounter for monitoring sotalol therapy 09/05/2023    s/p Medtronic dual chamber PPM with left bundle pacing lead 8/16/2023 08/16/2023    Fibromyalgia 10/10/2022    Vitamin D deficiency 10/10/2022    Age-related osteoporosis without current pathological fracture 10/10/2022    Acid reflux     Acquired hypothyroidism 09/11/2020    Plantar fasciitis 09/11/2020    Paroxysmal atrial fibrillation (HCC) 07/10/2018    Essential hypertension 07/10/2018    PAC (premature atrial contraction) 07/10/2018    Dyslipidemia 07/10/2018     She  has a past surgical history that includes Replacement total knee; Tonsillectomy; Hysterectomy (05/2016); Cataract extraction, bilateral (11/2016); Colonoscopy (07/11/2016); Mammo (historical) (4/8/2010 & 2/19/2020); Colonoscopy; Upper gastrointestinal endoscopy; Cardiac electrophysiology procedure (N/A, 08/16/2023); Insert / replace / remove pacemaker; pr cysto/uretero w/lithotripsy &indwell stent insrt (Right, 5/28/2024); and [place] retrograde pyelogram (5/28/2024).  Her family history includes Atrial fibrillation in her father; Breast cancer in her maternal aunt; Breast cancer (age of onset: 75) in her mother; Cancer in her father; Chronic bronchitis in her mother; Hyperlipidemia in her mother; Hypertension in her mother; Parkinsonism in her mother; Prostate cancer in her father; Stroke in her father and mother.  She  reports that she has never smoked. She has never used smokeless tobacco. She reports that she does not currently use alcohol. She reports that she does not use drugs.  Current Outpatient Medications   Medication Sig Dispense Refill    Cyanocobalamin (Vitamin B 12) 500 MCG TABS Take 1 tablet by mouth 3 (three) times a week 100 tablet 2    ergocalciferol (VITAMIN D2) 50,000 units Take 1 capsule (50,000 Units total) by mouth every 14 (fourteen) days 7 capsule 3    levothyroxine 50 mcg tablet Take 1 tablet (50 mcg total) by mouth daily in the early morning 90 tablet 1    losartan  (COZAAR) 25 mg tablet Take 1 tablet (25 mg total) by mouth daily 90 tablet 1    omeprazole (PriLOSEC) 20 mg delayed release capsule Take 1 capsule by mouth daily      rivaroxaban (XARELTO) 20 mg tablet Take 1 tablet (20 mg total) by mouth daily with dinner 90 tablet 3    sotalol (BETAPACE) 120 mg tablet TAKE 1 TABLET BY MOUTH TWICE A  tablet 3    triamcinolone (KENALOG) 0.1 % ointment Apply topically 2 (two) times a day 60 g 0     No current facility-administered medications for this visit.     Current Outpatient Medications on File Prior to Visit   Medication Sig    Cyanocobalamin (Vitamin B 12) 500 MCG TABS Take 1 tablet by mouth 3 (three) times a week    ergocalciferol (VITAMIN D2) 50,000 units Take 1 capsule (50,000 Units total) by mouth every 14 (fourteen) days    levothyroxine 50 mcg tablet Take 1 tablet (50 mcg total) by mouth daily in the early morning    losartan (COZAAR) 25 mg tablet Take 1 tablet (25 mg total) by mouth daily    omeprazole (PriLOSEC) 20 mg delayed release capsule Take 1 capsule by mouth daily    rivaroxaban (XARELTO) 20 mg tablet Take 1 tablet (20 mg total) by mouth daily with dinner    sotalol (BETAPACE) 120 mg tablet TAKE 1 TABLET BY MOUTH TWICE A DAY    triamcinolone (KENALOG) 0.1 % ointment Apply topically 2 (two) times a day     No current facility-administered medications on file prior to visit.     There are no discontinued medications.   She is allergic to iodinated contrast media..    Review of Systems   Constitutional:  Negative for appetite change, chills, fatigue and fever.   HENT:  Negative for sore throat and trouble swallowing.    Eyes:  Negative for redness.   Respiratory:  Negative for shortness of breath.    Cardiovascular:  Negative for chest pain and palpitations.   Gastrointestinal:  Negative for abdominal pain, constipation and diarrhea.   Genitourinary:  Negative for dysuria and hematuria.   Musculoskeletal:  Positive for arthralgias. Negative for back pain  "and neck pain.        Right knee pain   Skin:  Negative for rash.   Neurological:  Negative for seizures, weakness and headaches.   Hematological:  Negative for adenopathy.   Psychiatric/Behavioral:  Negative for confusion. The patient is not nervous/anxious.          Objective:      /74   Temp 98.2 °F (36.8 °C) (Temporal)   Ht 5' 3\" (1.6 m)   Wt 65.8 kg (145 lb)   SpO2 98%   BMI 25.69 kg/m²     Results Reviewed       None            No results found for this or any previous visit (from the past 8 weeks).     Physical Exam  Constitutional:       General: She is not in acute distress.     Appearance: Normal appearance.   HENT:      Head: Normocephalic and atraumatic.      Nose: Nose normal.      Mouth/Throat:      Mouth: Mucous membranes are moist.   Eyes:      Extraocular Movements: Extraocular movements intact.      Pupils: Pupils are equal, round, and reactive to light.   Cardiovascular:      Rate and Rhythm: Normal rate and regular rhythm.      Pulses: Normal pulses.      Heart sounds: Normal heart sounds. No murmur heard.     No friction rub.   Pulmonary:      Effort: Pulmonary effort is normal. No respiratory distress.      Breath sounds: Normal breath sounds. No wheezing.   Abdominal:      General: Abdomen is flat. Bowel sounds are normal. There is no distension.      Palpations: Abdomen is soft. There is no mass.      Tenderness: There is no abdominal tenderness. There is no guarding.   Musculoskeletal:         General: Swelling present. Normal range of motion.      Cervical back: Neck supple.      Comments: Mild swelling in the right knee area no erythema or warmth.  Scar of surgery okay.   Neurological:      General: No focal deficit present.      Mental Status: She is alert and oriented to person, place, and time. Mental status is at baseline.      Cranial Nerves: No cranial nerve deficit.   Psychiatric:         Mood and Affect: Mood normal.         Behavior: Behavior normal.         "

## 2024-11-21 ENCOUNTER — HOSPITAL ENCOUNTER (OUTPATIENT)
Dept: RADIOLOGY | Facility: HOSPITAL | Age: 79
Discharge: HOME | End: 2024-11-21
Payer: MEDICARE

## 2024-11-21 VITALS — BODY MASS INDEX: 25.69 KG/M2 | HEIGHT: 63 IN | WEIGHT: 145 LBS

## 2024-11-21 DIAGNOSIS — R92.8 FOLLOW-UP EXAMINATION OF ABNORMAL MAMMOGRAM: ICD-10-CM

## 2024-11-21 PROCEDURE — 77065 DX MAMMO INCL CAD UNI: CPT

## 2024-11-21 PROCEDURE — G0279 TOMOSYNTHESIS, MAMMO: HCPCS

## 2024-11-25 ENCOUNTER — APPOINTMENT (OUTPATIENT)
Dept: LAB | Facility: AMBULARY SURGERY CENTER | Age: 79
End: 2024-11-25
Payer: MEDICARE

## 2024-11-25 DIAGNOSIS — I48.0 PAROXYSMAL ATRIAL FIBRILLATION (HCC): ICD-10-CM

## 2024-11-25 DIAGNOSIS — E03.9 ACQUIRED HYPOTHYROIDISM: ICD-10-CM

## 2024-11-25 LAB
ALBUMIN SERPL BCG-MCNC: 4.2 G/DL (ref 3.5–5)
ALP SERPL-CCNC: 59 U/L (ref 34–104)
ALT SERPL W P-5'-P-CCNC: 14 U/L (ref 7–52)
ANION GAP SERPL CALCULATED.3IONS-SCNC: 9 MMOL/L (ref 4–13)
AST SERPL W P-5'-P-CCNC: 21 U/L (ref 13–39)
BACTERIA UR QL AUTO: ABNORMAL /HPF
BASOPHILS # BLD AUTO: 0.05 THOUSANDS/ΜL (ref 0–0.1)
BASOPHILS NFR BLD AUTO: 1 % (ref 0–1)
BILIRUB SERPL-MCNC: 0.63 MG/DL (ref 0.2–1)
BILIRUB UR QL STRIP: NEGATIVE
BUN SERPL-MCNC: 23 MG/DL (ref 5–25)
CALCIUM SERPL-MCNC: 9.3 MG/DL (ref 8.4–10.2)
CHLORIDE SERPL-SCNC: 101 MMOL/L (ref 96–108)
CLARITY UR: CLEAR
CO2 SERPL-SCNC: 29 MMOL/L (ref 21–32)
COLOR UR: YELLOW
CREAT SERPL-MCNC: 0.84 MG/DL (ref 0.6–1.3)
EOSINOPHIL # BLD AUTO: 0.22 THOUSAND/ΜL (ref 0–0.61)
EOSINOPHIL NFR BLD AUTO: 4 % (ref 0–6)
ERYTHROCYTE [DISTWIDTH] IN BLOOD BY AUTOMATED COUNT: 13.6 % (ref 11.6–15.1)
GFR SERPL CREATININE-BSD FRML MDRD: 66 ML/MIN/1.73SQ M
GLUCOSE P FAST SERPL-MCNC: 84 MG/DL (ref 65–99)
GLUCOSE UR STRIP-MCNC: NEGATIVE MG/DL
HCT VFR BLD AUTO: 39.9 % (ref 34.8–46.1)
HGB BLD-MCNC: 12.7 G/DL (ref 11.5–15.4)
HGB UR QL STRIP.AUTO: NEGATIVE
HYALINE CASTS #/AREA URNS LPF: ABNORMAL /LPF
IMM GRANULOCYTES # BLD AUTO: 0.03 THOUSAND/UL (ref 0–0.2)
IMM GRANULOCYTES NFR BLD AUTO: 1 % (ref 0–2)
KETONES UR STRIP-MCNC: NEGATIVE MG/DL
LEUKOCYTE ESTERASE UR QL STRIP: NEGATIVE
LYMPHOCYTES # BLD AUTO: 1.59 THOUSANDS/ΜL (ref 0.6–4.47)
LYMPHOCYTES NFR BLD AUTO: 26 % (ref 14–44)
MCH RBC QN AUTO: 31.4 PG (ref 26.8–34.3)
MCHC RBC AUTO-ENTMCNC: 31.8 G/DL (ref 31.4–37.4)
MCV RBC AUTO: 99 FL (ref 82–98)
MONOCYTES # BLD AUTO: 0.7 THOUSAND/ΜL (ref 0.17–1.22)
MONOCYTES NFR BLD AUTO: 11 % (ref 4–12)
MUCOUS THREADS UR QL AUTO: ABNORMAL
NEUTROPHILS # BLD AUTO: 3.54 THOUSANDS/ΜL (ref 1.85–7.62)
NEUTS SEG NFR BLD AUTO: 57 % (ref 43–75)
NITRITE UR QL STRIP: NEGATIVE
NON-SQ EPI CELLS URNS QL MICRO: ABNORMAL /HPF
NRBC BLD AUTO-RTO: 0 /100 WBCS
PH UR STRIP.AUTO: 6 [PH]
PLATELET # BLD AUTO: 317 THOUSANDS/UL (ref 149–390)
PMV BLD AUTO: 10 FL (ref 8.9–12.7)
POTASSIUM SERPL-SCNC: 4.1 MMOL/L (ref 3.5–5.3)
PROT SERPL-MCNC: 7.1 G/DL (ref 6.4–8.4)
PROT UR STRIP-MCNC: ABNORMAL MG/DL
RBC # BLD AUTO: 4.04 MILLION/UL (ref 3.81–5.12)
RBC #/AREA URNS AUTO: ABNORMAL /HPF
SODIUM SERPL-SCNC: 139 MMOL/L (ref 135–147)
SP GR UR STRIP.AUTO: 1.03 (ref 1–1.03)
T4 FREE SERPL-MCNC: 0.9 NG/DL (ref 0.61–1.12)
TSH SERPL DL<=0.05 MIU/L-ACNC: 24.71 UIU/ML (ref 0.45–4.5)
UROBILINOGEN UR STRIP-ACNC: <2 MG/DL
WBC # BLD AUTO: 6.13 THOUSAND/UL (ref 4.31–10.16)
WBC #/AREA URNS AUTO: ABNORMAL /HPF

## 2024-11-25 PROCEDURE — 80053 COMPREHEN METABOLIC PANEL: CPT

## 2024-11-25 PROCEDURE — 84439 ASSAY OF FREE THYROXINE: CPT

## 2024-11-25 PROCEDURE — 36415 COLL VENOUS BLD VENIPUNCTURE: CPT

## 2024-11-25 PROCEDURE — 84443 ASSAY THYROID STIM HORMONE: CPT

## 2024-11-25 PROCEDURE — 85025 COMPLETE CBC W/AUTO DIFF WBC: CPT

## 2024-11-26 ENCOUNTER — TELEPHONE (OUTPATIENT)
Dept: INTERNAL MEDICINE CLINIC | Facility: CLINIC | Age: 79
End: 2024-11-26

## 2024-11-26 DIAGNOSIS — E03.9 ACQUIRED HYPOTHYROIDISM: Primary | ICD-10-CM

## 2024-11-26 NOTE — TELEPHONE ENCOUNTER
----- Message from Donald Cohn MD sent at 11/25/2024  5:38 PM EST -----  Please tell her that the TSH was elevated and I am going to have to repeated in a.  Of 6 weeks

## 2024-12-06 ENCOUNTER — TELEPHONE (OUTPATIENT)
Dept: INTERNAL MEDICINE CLINIC | Facility: CLINIC | Age: 79
End: 2024-12-06

## 2024-12-06 DIAGNOSIS — E03.9 ACQUIRED HYPOTHYROIDISM: Primary | ICD-10-CM

## 2024-12-06 RX ORDER — LEVOTHYROXINE SODIUM 75 UG/1
75 TABLET ORAL
Qty: 100 TABLET | Refills: 3 | Status: SHIPPED | OUTPATIENT
Start: 2024-12-06

## 2024-12-10 ENCOUNTER — RESULTS FOLLOW-UP (OUTPATIENT)
Dept: CARDIOLOGY CLINIC | Facility: CLINIC | Age: 79
End: 2024-12-10

## 2024-12-10 ENCOUNTER — IN-CLINIC DEVICE VISIT (OUTPATIENT)
Dept: CARDIOLOGY CLINIC | Facility: CLINIC | Age: 79
End: 2024-12-10
Payer: MEDICARE

## 2024-12-10 DIAGNOSIS — Z95.0 PRESENCE OF CARDIAC PACEMAKER: Primary | ICD-10-CM

## 2024-12-10 PROCEDURE — 93280 PM DEVICE PROGR EVAL DUAL: CPT | Performed by: INTERNAL MEDICINE

## 2024-12-10 NOTE — PROGRESS NOTES
Results for orders placed or performed in visit on 12/10/24   Cardiac EP device report    Narrative    MDT DC PM/ACTIVE SYSTEM IS MRI CONDITIONAL  DEVICE INTERROGATED IN THE [place] OFFICE: BATTERY VOLTAGE ADEQUATE (11.9 YRS). AP 92.9%.  3.0%. (AAIR-DDDR 70 PPM). ALL LEAD PARAMETERS WITHIN NORMAL LIMITS. 11 AT/AF EPISODES TREATED WITH REACTIVE ATP (0/11-0% TERMINATION), MAX DURATION 1.7 DAYS. 2 MONITORED AT/AF EPISODES, MAX DURATION 3 MIN 50 SEC. AT/AF BURDEN 3.5%. PT IS ON SOTALOL, XARELTO. NO PROGRAMMING CHANGES MADE TO DEVICE PARAMETERS. NORMAL DEVICE FUNCTION. [facility]/ES

## 2025-01-14 DIAGNOSIS — I48.0 PAROXYSMAL ATRIAL FIBRILLATION (HCC): ICD-10-CM

## 2025-01-14 NOTE — TELEPHONE ENCOUNTER
Reason for call:   [x] Refill-NOT a duplicate. Patient using new pharmacy. Also would prefer 30 day supply vs 90 day as it is cheaper for her.   [] Prior Auth  [] Other:     Office:   [x] PCP/Provider - Lisa EASTON/ Donald Cohn MD  [] Specialty/Provider -     Medication: rivaroxaban (XARELTO) 20 mg tablet     Dose/Frequency: Take 1 tablet (20 mg total) by mouth daily with dinner     Quantity: 30    Pharmacy: [address]     Does the patient have enough for 3 days?   [x] Yes   [] No - Send as HP to POD

## 2025-01-15 ENCOUNTER — APPOINTMENT (OUTPATIENT)
Dept: LAB | Facility: AMBULARY SURGERY CENTER | Age: 80
End: 2025-01-15
Payer: MEDICARE

## 2025-01-15 ENCOUNTER — OFFICE VISIT (OUTPATIENT)
Dept: CARDIOLOGY CLINIC | Facility: CLINIC | Age: 80
End: 2025-01-15
Payer: MEDICARE

## 2025-01-15 VITALS
BODY MASS INDEX: 25.8 KG/M2 | WEIGHT: 145.6 LBS | SYSTOLIC BLOOD PRESSURE: 152 MMHG | DIASTOLIC BLOOD PRESSURE: 88 MMHG | HEART RATE: 70 BPM | HEIGHT: 63 IN

## 2025-01-15 DIAGNOSIS — I48.0 PAROXYSMAL ATRIAL FIBRILLATION (HCC): Primary | ICD-10-CM

## 2025-01-15 DIAGNOSIS — E03.9 ACQUIRED HYPOTHYROIDISM: ICD-10-CM

## 2025-01-15 DIAGNOSIS — I10 ESSENTIAL HYPERTENSION: ICD-10-CM

## 2025-01-15 LAB
T4 FREE SERPL-MCNC: 1.09 NG/DL (ref 0.61–1.12)
TSH SERPL DL<=0.05 MIU/L-ACNC: 3.14 UIU/ML (ref 0.45–4.5)

## 2025-01-15 PROCEDURE — 93000 ELECTROCARDIOGRAM COMPLETE: CPT | Performed by: INTERNAL MEDICINE

## 2025-01-15 PROCEDURE — 84443 ASSAY THYROID STIM HORMONE: CPT

## 2025-01-15 PROCEDURE — 84439 ASSAY OF FREE THYROXINE: CPT

## 2025-01-15 PROCEDURE — 36415 COLL VENOUS BLD VENIPUNCTURE: CPT

## 2025-01-15 PROCEDURE — 99215 OFFICE O/P EST HI 40 MIN: CPT | Performed by: INTERNAL MEDICINE

## 2025-01-15 RX ORDER — LOSARTAN POTASSIUM 50 MG/1
50 TABLET ORAL DAILY
Qty: 90 TABLET | Refills: 3 | Status: SHIPPED | OUTPATIENT
Start: 2025-01-15

## 2025-01-15 NOTE — PROGRESS NOTES
HEART AND VASCULAR  CARDIAC ELECTROPHYSIOLOGY   HEART RHYTHM CENTER  [facility]    Outpatient f/u  Today's Date: 01/15/25        Patient name: Lavern Hardin  YOB: 1945  Sex: female         Chief Complaint: *f/u afib      ASSESSMENT:  Problem List Items Addressed This Visit          Cardiovascular and Mediastinum    Paroxysmal atrial fibrillation (HCC) - Primary    Relevant Orders    POCT ECG    Essential hypertension    Relevant Medications    losartan (COZAAR) 50 mg tablet     78 yo female  1)H/o peristent afib, post ablation 2021 and then pacemaker 2023 and Sotalol and very low burden afib 1-3% on checks  asymptomatic and feels great.         H/o afib: Afib persistent since at least July 2020, last normal EKG July 2019, symptoms started June 2020, [id number]. Symptomatic with fatigue, shortness of breath, and intermittent feels strong sensation in her head when .     Normal EF. LAE mild 3.8cm. Mild MR, MOderate TR.  No ETOH. NO MEG screening.    2) HTN on multidrug regimen, high today  4) Mild MR, MOderate TR      Cr normal  QTc 440    PLAN:  1. Continue sotalol 120mg bid and xarelto.  2. Increase losartan to 50mg for HTN.Doing well.     F/u 12 month        Orders Placed This Encounter   Procedures    POCT ECG     Medications Discontinued During This Encounter   Medication Reason    losartan (COZAAR) 25 mg tablet Reorder         .............................................................................................    HPI/Subjective:   78 yo female  1)H/o peristent afib, post ablation 2021 and then pacemaker 2023 and Sotalol and very low burden afib 1-3% on checks  asymptomatic and feels great.         H/o afib: Afib persistent since at least July 2020, last normal EKG July 2019, symptoms started June 2020, [id number]. Symptomatic with fatigue, shortness of breath, and intermittent feels strong sensation in her head when .     Normal EF. LAE mild 3.8cm. Mild MR, MOderate  TR.  No ETOH. NO MEG screening.    2) HTN on multidrug regimen, high today  4) Mild MR, MOderate TR      Cr normal  QTc 440      Past Medical History:   Diagnosis Date    Acid reflux     Arthritis     Bradycardia 08/16/2023    Cataract     Colon polyp     Disease of thyroid gland     Dyslipidemia (high LDL; low HDL)     Fibromyalgia     Fibromyalgia, primary     GERD (gastroesophageal reflux disease)     Hyperlipidemia     Hypertension     Hypothyroidism     Kidney stone     Motion sickness     Palpitation     Paroxysmal atrial fibrillation (HCC)     PONV (postoperative nausea and vomiting)     s/p Medtronic dual chamber PPM with left bundle pacing lead 8/16/2023 08/16/2023    Shingles 09/12/2010    Right C7       Allergies   Allergen Reactions    Iodinated Contrast Media Hives     I reviewed the Home Medication list and Allergies in the chart.   Scheduled Meds:  Current Outpatient Medications   Medication Sig Dispense Refill    Cyanocobalamin (Vitamin B 12) 500 MCG TABS Take 1 tablet by mouth 3 (three) times a week 100 tablet 2    ergocalciferol (VITAMIN D2) 50,000 units Take 1 capsule (50,000 Units total) by mouth every 14 (fourteen) days 7 capsule 3    levothyroxine 75 mcg tablet Take 1 tablet (75 mcg total) by mouth daily in the early morning 100 tablet 3    losartan (COZAAR) 50 mg tablet Take 1 tablet (50 mg total) by mouth daily 90 tablet 3    omeprazole (PriLOSEC) 20 mg delayed release capsule Take 1 capsule by mouth daily      rivaroxaban (XARELTO) 20 mg tablet Take 1 tablet (20 mg total) by mouth daily with dinner 30 tablet 5    sotalol (BETAPACE) 120 mg tablet TAKE 1 TABLET BY MOUTH TWICE A  tablet 3    triamcinolone (KENALOG) 0.1 % ointment Apply topically 2 (two) times a day 60 g 0     No current facility-administered medications for this visit.     PRN Meds:.        Family History   Problem Relation Age of Onset    Breast cancer Mother 75    Hypertension Mother     Hyperlipidemia Mother      "Parkinsonism Mother     Stroke Mother     Chronic bronchitis Mother     Atrial fibrillation Father     Stroke Father     Cancer Father         skin cancer    Prostate cancer Father     Breast cancer Maternal Aunt     Anuerysm Neg Hx     Clotting disorder Neg Hx        Social History     Socioeconomic History    Marital status: /Civil Union     Spouse name: Not on file    Number of children: Not on file    Years of education: Not on file    Highest education level: Not on file   Occupational History    Not on file   Tobacco Use    Smoking status: Never    Smokeless tobacco: Never   Vaping Use    Vaping status: Never Used   Substance and Sexual Activity    Alcohol use: Not Currently    Drug use: Never    Sexual activity: Not Currently   Other Topics Concern    Not on file   Social History Narrative    Not on file     Social Drivers of Health     Financial Resource Strain: Low Risk  (2/9/2024)    Overall Financial Resource Strain (CARDIA)     Difficulty of Paying Living Expenses: Not hard at all   Food Insecurity: Not on file   Transportation Needs: No Transportation Needs (2/9/2024)    PRAPARE - Transportation     Lack of Transportation (Medical): No     Lack of Transportation (Non-Medical): No   Physical Activity: Not on file   Stress: Not on file   Social Connections: Not on file   Intimate Partner Violence: Not on file   Housing Stability: Not on file         OBJECTIVE:    /88 (BP Location: Right arm, Patient Position: Sitting, Cuff Size: Standard)   Pulse 70   Ht 5' 3\" (1.6 m)   Wt 66 kg (145 lb 9.6 oz)   BMI 25.79 kg/m²   Vitals:    01/15/25 0756   Weight: 66 kg (145 lb 9.6 oz)     /88 (BP Location: Right arm, Patient Position: Sitting, Cuff Size: Standard)   Pulse 70   Ht 5' 3\" (1.6 m)   Wt 66 kg (145 lb 9.6 oz)   BMI 25.79 kg/m²   Vitals:    01/15/25 0756   Weight: 66 kg (145 lb 9.6 oz)       /88 (BP Location: Right arm, Patient Position: Sitting, Cuff Size: Standard)   Pulse " "70   Ht 5' 3\" (1.6 m)   Wt 66 kg (145 lb 9.6 oz)   BMI 25.79 kg/m²   Vitals:    01/15/25 0756   Weight: 66 kg (145 lb 9.6 oz)     GEN: No acute distress, Alert and oriented, well appearing  HEENT:External ears normal, wearing a mask.   EYES: Pupils equal, sclera anicteric, midline, normal conjuctiva  NECK: No JVD, supple, no obvious masses or thryomegaly or goiter  CARDIOVASCULAR:  RRR, No murmur, rub, gallops S1,S2  LUNGS: Clear To auscultation bilaterally, normal effort, no rales, rhonchi, crackles   ABDOMEN:  nondistended,  without obvious organomegaly or ascites  EXTREMITIES/VASCULAR:  No edema. warm an well perfused.  PSYCH: Normal Affect,  linear speech pattern without evidence of psychosis.   NEURO: Grossly intact, moving all extremiteis equal, face symmetric, alert and responsive, no obvious focal defecits   GAIT:  Ambulates normally without difficulty  HEME: No bleeding, bruising, petechia, purpura   SKIN: No significant rashes on visibile skin, warm, no diaphoresis or pallor.           Lab Results:       LABS:      Chemistry        Component Value Date/Time     (L) 03/08/2015 1950    K 4.1 11/25/2024 1043    K 3.7 03/08/2015 1950     11/25/2024 1043     03/08/2015 1950    CO2 29 11/25/2024 1043    CO2 23 03/08/2015 1950    BUN 23 11/25/2024 1043    BUN 15 03/08/2015 1950    CREATININE 0.84 11/25/2024 1043    CREATININE 0.61 03/08/2015 1950        Component Value Date/Time    CALCIUM 9.3 11/25/2024 1043    CALCIUM 9.0 03/08/2015 1950    ALKPHOS 59 11/25/2024 1043    ALKPHOS 65 10/11/2016 1210    AST 21 11/25/2024 1043    AST 15 10/11/2016 1210    ALT 14 11/25/2024 1043    ALT 13 10/11/2016 1210    BILITOT 0.6 10/11/2016 1210            No results found for: \"CHOL\"  Lab Results   Component Value Date    HDL 65 03/01/2022    HDL 74 09/22/2021    HDL 78 03/01/2021     Lab Results   Component Value Date    LDLCALC 195 (H) 03/01/2022    LDLCALC 157 (H) 09/22/2021    LDLCALC 161 (H) " "2021     Lab Results   Component Value Date    TRIG 83 2022    TRIG 81 2021    TRIG 87 2021     No results found for: \"CHOLHDL\"    IMAGING: No results found.     Cardiac testing:   Results for orders placed during the hospital encounter of 20    Echo complete with contrast if indicated    Narrative  [place] [id number] [phone]    Transthoracic Echocardiogram  2D, M-mode, Doppler, and Color Doppler    Study date:  25-Sep-2020    Patient: DAYLIN ANTHONY  MR number: [id number]  Account number: [id number]  : 1945  Age: 75 years  Gender: Female  Status: Outpatient  Location: Echo lab  Height: 63 in  Weight: 156.6 lb  BP: 157/ 87 mmHg    Indications: Atrial fibrillation    Diagnoses: I48.0 - Atrial fibrillation    Sonographer:  YUSUF Villarreal  Primary Physician:  Donald Cohn MD  Referring Physician:  Chaitanya Walsh MD  Group:  [facility]  Interpreting Physician:  Haylee Jules MD    SUMMARY    LEFT VENTRICLE:  Systolic function was normal. Ejection fraction was estimated in the range of 55 % to 60 % to be 55 %.  There were no regional wall motion abnormalities.  Wall thickness was at the upper limits of normal.    LEFT ATRIUM:  The atrium was moderately dilated.    RIGHT ATRIUM:  The atrium was moderately to markedly dilated.    MITRAL VALVE:  There was at least mild to moderate regurgitation.    AORTIC VALVE:  There was mild regurgitation.    TRICUSPID VALVE:  There was moderate to severe regurgitation.  Estimated peak PA pressure was 50 mmHg.    IVC, HEPATIC VEINS:  The respirophasic change in diameter was more than 50%.    HISTORY: PRIOR HISTORY: A.Fib.,Hypothyroidism,HTN,PAC,Dyslipidemia,Arthritis,Fibromyalgia,palpitation.    PROCEDURE: The procedure was performed in the echo lab. This was a routine study. The transthoracic approach was used. The study included complete 2D imaging, " M-mode, complete spectral Doppler, and color Doppler. The heart rate was 138  bpm, at the start of the study. Images were obtained from the parasternal, apical, subcostal, and suprasternal notch acoustic windows. Intravenous contrast ( 0.4ml Definity in NSS) was administered to enhance Doppler signals. Image quality  was adequate.    LEFT VENTRICLE: Size was normal. Systolic function was normal. Ejection fraction was estimated in the range of 55 % to 60 % to be 55 %. There were no regional wall motion abnormalities. Wall thickness was at the upper limits of normal.    RIGHT VENTRICLE: The size was normal. Systolic function was normal.    LEFT ATRIUM: The atrium was moderately dilated.    RIGHT ATRIUM: The atrium was moderately to markedly dilated.    MITRAL VALVE: There was normal leaflet separation. DOPPLER: The transmitral velocity was within the normal range. There was no evidence for stenosis. There was at least mild to moderate regurgitation.    AORTIC VALVE: The valve was trileaflet. Leaflets exhibited mildly increased thickness and normal cuspal separation. DOPPLER: Transaortic velocity was within the normal range. There was no evidence for stenosis. There was mild  regurgitation.    TRICUSPID VALVE: DOPPLER: There was moderate to severe regurgitation. Estimated peak PA pressure was 50 mmHg.    PULMONIC VALVE: DOPPLER: There was trace regurgitation.    PERICARDIUM: There was no thickening or calcification. There was no pericardial effusion.    AORTA: The root exhibited normal size.    SYSTEMIC VEINS: IVC: The inferior vena cava was normal in size. The respirophasic change in diameter was more than 50%.    SYSTEM MEASUREMENT TABLES    2D mode  AoR Diam 2D: 3 cm  LA Diam (2D): 3.8 cm  LA/Ao (2D): 1.27  FS (2D Teich): 35.1 %  IVSd (2D): 0.93 cm  LVDEV: 87.2 cmï¾³  LVEDV MOD BP: 95 cmï¾³  LVESV: 30.9 cmï¾³  LVIDd(2D): 4.39 cm  LVISd (2D): 2.85 cm  LVOT Area 2D: 2.84 cmï¾²  LVPWd (2D): 0.89 cm  SV (Teich): 56.3  cmï¾³    Apical four chamber  LVEDV MOD A4C: 96 cmï¾³  LVEF A4C: 49 %  LVLD A4C: 7.11 cm    Apical two chamber  LVEF A2C: 61 %    Unspecified Scan Mode  KOMAL Cont Eq (Peak Tc): 2.59 cmï¾²  LVOT (VTI): 21.2 cm  LVOT Diam.: 1.9 cm  LVOT Vmax: 1130 mm/s  LVOT Vmax; Mean: 1130 mm/s  Peak Grad.; Mean: 5 mm[Hg]  SV (LVOT): 60 cmï¾³  VTI;Mean: 2 mm[Hg]  MR Vol. (PISA): 98 cmï¾³  MV Peak A Tc: 395 mm/s  MV Peak E Ct. Mean: 854 mm/s  MVA (PHT): 4.4 cmï¾²  Max P mm[Hg]  PHT: 50 ms  V Max: 5600 mm/s  Vmax: 5600 mm/s  Max P mm[Hg]  V Max: 3010 mm/s  Vmax: 3040 mm/s  RA Area: 28.2 cmï¾²  RA Volume: 103 cmï¾³  TAPSE: 2.3 cm    [facility] Accredited Echocardiography Laboratory    Prepared and electronically signed by    Haylee Jules MD  Signed 25-Sep-2020 16:34:04    Results for orders placed during the hospital encounter of 20    DELANO    Narrative  [address] [id number] [phone]    Transesophageal Echocardiogram  2D, Doppler, and Color Doppler    Study date:  06-Aug-2020    Patient: DAYLIN ANTHONY  MR number: [id number]  Account number: [id number]  : 1945  Age: 75 years  Gender: Female  Status: Outpatient  Location: Echo lab  Height: 63 in  Weight: 161 lb  BP: 164/ 100 mmHg    Indications: PAF    Diagnoses: I48.0 - Atrial fibrillation    Sonographer:  YUSUF Bazan  Interpreting Physician:  Rolf Leyva MD  Primary Physician:  Donald Cohn MD  Referring Physician:  CHRISTINE Mackenzie  Group:  [facility]  Cardiology Fellow:  Adam Zimmerman MD  RN:  Shahla Hayward RN    SUMMARY    LEFT VENTRICLE:  Systolic function was normal. Ejection fraction was estimated to be 60 %.  There were no regional wall motion abnormalities.  There was no evidence of concentric hypertrophy.    RIGHT VENTRICLE:  The ventricle was mildly dilated.  Systolic function was normal.    LEFT ATRIUM:  The  atrium was markedly dilated.  No thrombus was identified.    LEFT ATRIAL APPENDAGE:  The function was moderately reduced (moderately reduced emptying velocity).  No thrombus was identified.    ATRIAL SEPTUM:  No defect or patent foramen ovale was identified.  Doppler evaluation was performed. There was no right-to-left shunt, in the baseline state.    RIGHT ATRIUM:  The atrium was markedly dilated.  No thrombus was identified.    RIGHT ATRIAL APPENDAGE:  The function was moderately reduced (moderately reduced emptying velocity).  There was no right atrial appendage thrombus identified.    MITRAL VALVE:  There was mild to moderate regurgitation.  Regurgitation grade was 1-2+ on a scale of 0 to 4+.    AORTIC VALVE:  There was trace regurgitation.    TRICUSPID VALVE:  There was moderate to severe regurgitation with an ERO of 66 mm2 and a regurgitant volume estimated at 47 mL.  Regurgitation grade was 3+ on a scale of 0 to 4+.    HISTORY: PRIOR HISTORY: PAF/PAC; HTN; Dyslipidemia    PROCEDURE: The procedure was performed in the echo lab. This was a routine study. The risks and alternatives of the procedure were explained to the patient and informed consent was obtained. The transesophageal approach was used. The study  included complete 2D imaging, complete spectral Doppler, and color Doppler. The heart rate was 80 bpm, at the start of the study. An adult omniplane probe was inserted by the cardiology fellow under direct supervision of the attending  cardiologist. Intubated with ease. One intubation attempt(s). There was no blood detected on the probe. Image quality was good. There were no complications during the procedure. MEDICATIONS: Anesthesia administered by anesthesia team.    LEFT VENTRICLE: Size was normal. Systolic function was normal. Ejection fraction was estimated to be 60 %. There were no regional wall motion abnormalities. Wall thickness was normal. There was no evidence of concentric  hypertrophy.  DOPPLER: The study was not technically sufficient to allow evaluation of LV diastolic function.    RIGHT VENTRICLE: The ventricle was mildly dilated. Systolic function was normal.    LEFT ATRIUM: The atrium was markedly dilated. No thrombus was identified. APPENDAGE: The size was normal. No thrombus was identified. DOPPLER: The function was moderately reduced (moderately reduced emptying velocity).    ATRIAL SEPTUM: No defect or patent foramen ovale was identified. Doppler evaluation was performed. There was no right-to-left shunt, in the baseline state.    RIGHT ATRIUM: The atrium was markedly dilated. No thrombus was identified. APPENDAGE: The size was normal. There was no right atrial appendage thrombus identified. DOPPLER: The function was moderately reduced (moderately reduced emptying  velocity).    MITRAL VALVE: Valve structure was normal. There was normal leaflet separation. DOPPLER: There was no evidence for stenosis. There was mild to moderate regurgitation. Regurgitation grade was 1-2+ on a scale of 0 to 4+.    AORTIC VALVE: The valve was trileaflet. Leaflets exhibited normal thickness and normal cuspal separation. DOPPLER: There was no evidence for stenosis. There was trace regurgitation.    TRICUSPID VALVE: The valve structure was normal. There was normal leaflet separation. DOPPLER: The transtricuspid velocity was within the normal range. There was moderate to severe regurgitation with an ERO of 66 mm2 and a regurgitant  volume estimated at 47 mL. Regurgitation grade was 3+ on a scale of 0 to 4+. The regurgitant jet was directed centrally.    PULMONIC VALVE: Leaflets exhibited normal thickness, no calcification, and normal cuspal separation. DOPPLER: There was no evidence for stenosis. There was no significant regurgitation.    PERICARDIUM: There was no pericardial effusion. The pericardium was normal in appearance.    AORTA: The root exhibited normal size. The ascending aorta was normal  in size. There was no atheroma. There was no evidence for dissection. There was no evidence for aneurysm.    [facility] Commission Accredited Echocardiography Laboratory    Prepared and electronically signed by    Rolf Leyva MD  Signed 06-Aug-2020 18:25:56    No results found for this or any previous visit.    Results for orders placed during the hospital encounter of 21    NM myocardial perfusion spect (stress and/or rest)    [address] [id number] [phone]    Rest/Stress Gated SPECT Myocardial Perfusion Imaging After Regadenoson    Patient: DAYLIN ANTHONY  MR number: [id number]  Account number: [id number]  : 1945  Age: 76 years  Gender: Female  Status: Outpatient  Location: Stress lab  Height: 63 in  Weight: 155 lb  BP: 180/ 100 mmHg    Allergies: IODINE, IODINATED DIAGNOSTIC AGENTS    Diagnosis: R07.9 - Chest pain, unspecified    Primary Physician:  Donald Cohn MD  RN:  KENAN Berrios  Referring Physician:  Chaitanya Walsh MD  Report Prepared By::  KENAN Berrios  RN:  Bekah Healy RN  Interpreting Physician:  Sammie Mcneil DO    INDICATIONS: Evaluation for coronary artery disease.    HISTORY: The patient is a 76 year old  female. Chest pain status: chest pain. Coronary artery disease risk factors: dyslipidemia and hypertension. Cardiovascular history: arrhythmia. Medications: an anticoagulant, a beta blocker,  a calcium channel blocker, aspirin, a lipid lowering agent, and thyroid medications.    PHYSICAL EXAM: Baseline physical exam screening: no wheezes audible.    REST ECG: Normal baseline ECG. Atrial fibrillation.    PROCEDURE: The study was performed in the the Stress lab. A regadenoson infusion pharmacologic stress test was performed. Gated SPECT myocardial perfusion imaging was performed after stress and at rest. Systolic blood pressure was 180  mmHg, at the start of the study. Diastolic  blood pressure was 100 mmHg, at the start of the study. The heart rate was 87 bpm, at the start of the study. Patient was not experiencing chest pain at the time of the injection of the  radiopharmaceutical. IV double checked.  Regadenoson protocol:  Time HR bpm SBP mmHg DBP mmHg Symptoms ST change Rhythm/conduct  Baseline 10:07 85 180 100 none none NSR, no ectopy, A-fib  Immediate 10:13 102 180 90 none -- same as above  1 min 10:14 86 169 80 dizziness -- same as above  2 min 10:15 84 160 74 subsiding, nausea -- --  3 min 10:16 87 159 81 subsiding -- same as above  4 min 10:17 86 143 88 none -- --  No medications or fluids given.    STRESS SUMMARY: Duration of pharmacologic stress was 3 min. Functional capacity was normal. Maximal heart rate during stress was 111 bpm. The heart rate response to stress was normal. There was normal resting blood pressure with an  appropriate response to stress. The rate-pressure product for the peak heart rate and blood pressure was 76846. There was no chest pain during stress. The stress test was terminated due to protocol completion. Pre oxygen saturation: 98 %.  Peak oxygen saturation: 99 %. The stress ECG was normal. There were no stress arrhythmias or conduction abnormalities.    ISOTOPE ADMINISTRATION:  Resting isotope administration Stress isotope administration  Agent Sestamibi Sestamibi  Dose 10.9 mCi 32 mCi  Date 03/05/2021 03/05/2021    The radiopharmaceutical was injected at the peak effect of pharmacologic stress.    MYOCARDIAL PERFUSION IMAGING:  The image quality was good. Left ventricular size was normal.    PERFUSION DEFECTS:  -  There were no perfusion defects.    GATED SPECT:  The calculated left ventricular ejection fraction was 74 %. Left ventricular ejection fraction was within normal limits by visual estimate. There was no left ventricular regional abnormality.    SUMMARY:  -  Stress results: Target heart rate was achieved. There was no chest pain during  stress.  -  ECG conclusions: The stress ECG was normal.  -  Perfusion imaging: There were no perfusion defects.  -  Gated SPECT: The calculated left ventricular ejection fraction was 74 %. Left ventricular ejection fraction was within normal limits by visual estimate. There was no left ventricular regional abnormality.    IMPRESSIONS: Normal study after pharmacologic stress. Myocardial perfusion imaging was normal at rest and with stress. Left ventricular systolic function was normal.    Prepared and signed by    Sammie Mcneil DO  Signed 03/06/2021 16:28:17          I reviewed and interpreted the following LABS/EKG/TELE/IMAGING and below is summary of my interpretation (if data available):    LABS: normal bmp, cbc    Current EKG and Rhythm Strip: Atrial pacing  70bpm Qtc 440ms    Reviewed pacemaker interogation AFib burden low 3%        ECHO images reviewed and this is my interpretation:  Normal EF, mild LAE, mild MR, mod TR

## 2025-01-23 DIAGNOSIS — I48.0 PAROXYSMAL ATRIAL FIBRILLATION (HCC): ICD-10-CM

## 2025-01-23 NOTE — TELEPHONE ENCOUNTER
Samples of this drug were given to the patient, quantity 21, Lot Number [id number]. The following was discussed with the patient as indicated: administration, storage, potential interactions, side effects.

## 2025-02-03 DIAGNOSIS — I48.0 PAROXYSMAL ATRIAL FIBRILLATION (HCC): ICD-10-CM

## 2025-02-05 RX ORDER — SOTALOL HYDROCHLORIDE 120 MG/1
120 TABLET ORAL 2 TIMES DAILY
Qty: 180 TABLET | Refills: 0 | Status: SHIPPED | OUTPATIENT
Start: 2025-02-05

## 2025-02-12 ENCOUNTER — OFFICE VISIT (OUTPATIENT)
Dept: INTERNAL MEDICINE CLINIC | Facility: CLINIC | Age: 80
End: 2025-02-12
Payer: MEDICARE

## 2025-02-12 VITALS
WEIGHT: 144 LBS | BODY MASS INDEX: 25.52 KG/M2 | OXYGEN SATURATION: 97 % | TEMPERATURE: 98.5 F | HEIGHT: 63 IN | DIASTOLIC BLOOD PRESSURE: 90 MMHG | SYSTOLIC BLOOD PRESSURE: 126 MMHG | HEART RATE: 90 BPM

## 2025-02-12 DIAGNOSIS — I48.0 PAROXYSMAL ATRIAL FIBRILLATION (HCC): Primary | ICD-10-CM

## 2025-02-12 DIAGNOSIS — Z00.00 MEDICARE ANNUAL WELLNESS VISIT, SUBSEQUENT: ICD-10-CM

## 2025-02-12 DIAGNOSIS — I10 ESSENTIAL HYPERTENSION: ICD-10-CM

## 2025-02-12 DIAGNOSIS — M81.0 AGE-RELATED OSTEOPOROSIS WITHOUT CURRENT PATHOLOGICAL FRACTURE: ICD-10-CM

## 2025-02-12 DIAGNOSIS — E03.9 ACQUIRED HYPOTHYROIDISM: ICD-10-CM

## 2025-02-12 PROCEDURE — G0439 PPPS, SUBSEQ VISIT: HCPCS | Performed by: INTERNAL MEDICINE

## 2025-02-12 PROCEDURE — 99214 OFFICE O/P EST MOD 30 MIN: CPT | Performed by: INTERNAL MEDICINE

## 2025-02-12 NOTE — ASSESSMENT & PLAN NOTE
Orders:    CBC and differential; Future    Comprehensive metabolic panel; Future    Urinalysis with microscopic; Future

## 2025-02-12 NOTE — PROGRESS NOTES
Name: Lavern Hardin      : 1945      MRN: [id number]  Encounter Provider: Donald Cohn MD  Encounter Date: 2025   Encounter department: [facility] INTERNAL MEDICINE    Assessment & Plan  Paroxysmal atrial fibrillation (HCC)    Orders:    CBC and differential; Future    Comprehensive metabolic panel; Future    Urinalysis with microscopic; Future    Acquired hypothyroidism         Essential hypertension         Age-related osteoporosis without current pathological fracture         Medicare annual wellness visit, subsequent            1. Paroxysmal atrial fibrillation (HCC)  CBC and differential    Comprehensive metabolic panel    Urinalysis with microscopic    Following with Cardiology and is on Xarelto.      2. Acquired hypothyroidism      Continue levothyroxine 75 mcg.      3. Essential hypertension      This is stable on losartan and sotalol.      4. Age-related osteoporosis without current pathological fracture      Rheumatology follow-up advised.      5. Medicare annual wellness visit, subsequent           Preventive health issues were discussed with patient, and age appropriate screening tests were ordered as noted in patient's After Visit Summary. Personalized health advice and appropriate referrals for health education or preventive services given if needed, as noted in patient's After Visit Summary.    History of Present Illness     HPI   Patient Care Team:  Donald Cohn MD as PCP - General  MD Aleksandra Malhotra MD    Review of Systems   Constitutional:  Negative for appetite change, chills, fatigue and fever.   HENT:  Negative for sore throat and trouble swallowing.    Eyes:  Negative for redness.   Respiratory:  Negative for shortness of breath.    Cardiovascular:  Negative for chest pain and palpitations.   Gastrointestinal:  Negative for abdominal pain, constipation and diarrhea.   Genitourinary:  Negative for dysuria and hematuria.   Musculoskeletal:  Negative for  back pain and neck pain.   Skin:  Negative for rash.   Neurological:  Negative for seizures, weakness and headaches.   Hematological:  Negative for adenopathy.   Psychiatric/Behavioral:  Negative for confusion. The patient is not nervous/anxious.      Medical History Reviewed by provider this encounter:       Annual Wellness Visit Questionnaire   Lavern is here for her Subsequent Wellness visit. Last Medicare Wellness visit information reviewed, patient interviewed and updates made to the record today.      Health Risk Assessment:   Patient rates overall health as good. Patient feels that their physical health rating is slightly better. Patient is very satisfied with their life. Eyesight was rated as same. Hearing was rated as same. Patient feels that their emotional and mental health rating is much better. Patients states they are never, rarely angry. Patient states they are sometimes unusually tired/fatigued. Pain experienced in the last 7 days has been some. Patient's pain rating has been 1/10. Patient states that she has experienced no weight loss or gain in last 6 months.     Depression Screening:   PHQ-2 Score: 0      Fall Risk Screening:   In the past year, patient has experienced: history of falling in past year    Number of falls: 1  Injured during fall?: Yes    Feels unsteady when standing or walking?: No    Worried about falling?: No      Urinary Incontinence Screening:   Patient has not leaked urine accidently in the last six months.     Home Safety:  Patient does not have trouble with stairs inside or outside of their home. Patient has working smoke alarms and has working carbon monoxide detector. Home safety hazards include: none.     Nutrition:   Current diet is Regular.     Medications:   Patient is currently taking over-the-counter supplements. OTC medications include: see medication list. Patient is able to manage medications.     Activities of Daily Living (ADLs)/Instrumental Activities of Daily  Living (IADLs):   Walk and transfer into and out of bed and chair?: Yes  Dress and groom yourself?: Yes    Bathe or shower yourself?: Yes    Feed yourself? Yes  Do your laundry/housekeeping?: Yes  Manage your money, pay your bills and track your expenses?: Yes  Make your own meals?: Yes    Do your own shopping?: Yes    Previous Hospitalizations:   Any hospitalizations or ED visits within the last 12 months?: Yes    How many hospitalizations have you had in the last year?: 1-2    Advance Care Planning:   Living will: No    Durable POA for healthcare: No    Advanced directive: No      PREVENTIVE SCREENINGS      Cardiovascular Screening:    General: Screening Current      Diabetes Screening:     General: Screening Current      Breast Cancer Screening:     General: Screening Current      Cervical Cancer Screening:    General: Screening Not Indicated      Osteoporosis Screening:    General: Screening Not Indicated and History Osteoporosis      Lung Cancer Screening:     General: Screening Not Indicated      Hepatitis C Screening:    General: Screening Current    Screening, Brief Intervention, and Referral to Treatment (SBIRT)     Screening  Typical number of drinks in a day: 0  Typical number of drinks in a week: 0  Interpretation: Low risk drinking behavior.    AUDIT-C Screenin) How often did you have a drink containing alcohol in the past year? never  2) How many drinks did you have on a typical day when you were drinking in the past year? 0  3) How often did you have 6 or more drinks on one occasion in the past year? never    AUDIT-C Score: 0  Interpretation: Score 0-2 (female): Negative screen for alcohol misuse    Single Item Drug Screening:  How often have you used an illegal drug (including marijuana) or a prescription medication for non-medical reasons in the past year? never    Single Item Drug Screen Score: 0  Interpretation: Negative screen for possible drug use disorder    Other Counseling Topics:  "  Car/seat belt/driving safety, skin self-exam, sunscreen and calcium and vitamin D intake and regular weightbearing exercise.     Social Drivers of Health     Financial Resource Strain: Low Risk  (2/9/2024)    Overall Financial Resource Strain (CARDIA)     Difficulty of Paying Living Expenses: Not hard at all   Food Insecurity: No Food Insecurity (2/12/2025)    Hunger Vital Sign     Worried About Running Out of Food in the Last Year: Never true     Ran Out of Food in the Last Year: Never true   Transportation Needs: No Transportation Needs (2/12/2025)    PRAPARE - Transportation     Lack of Transportation (Medical): No     Lack of Transportation (Non-Medical): No   Housing Stability: Low Risk  (2/12/2025)    Housing Stability Vital Sign     Unable to Pay for Housing in the Last Year: No     Number of Times Moved in the Last Year: 0     Homeless in the Last Year: No   Utilities: Not At Risk (2/12/2025)    [facility] Utilities     Threatened with loss of utilities: No     No results found.    Objective   /90 (BP Location: Left arm, Patient Position: Sitting, Cuff Size: Adult)   Pulse 90   Temp 98.5 °F (36.9 °C) (Temporal)   Ht 5' 3\" (1.6 m)   Wt 65.3 kg (144 lb)   SpO2 97%   BMI 25.51 kg/m²     Physical Exam  Vitals and nursing note reviewed.   Constitutional:       General: She is not in acute distress.     Appearance: She is well-developed.   HENT:      Head: Normocephalic and atraumatic.      Mouth/Throat:      Mouth: Mucous membranes are moist.   Eyes:      Conjunctiva/sclera: Conjunctivae normal.   Cardiovascular:      Rate and Rhythm: Normal rate and regular rhythm.      Heart sounds: No murmur heard.  Pulmonary:      Effort: Pulmonary effort is normal. No respiratory distress.      Breath sounds: Normal breath sounds.   Abdominal:      Palpations: Abdomen is soft.      Tenderness: There is no abdominal tenderness.   Musculoskeletal:         General: No swelling.      Cervical back: Normal range of motion " and neck supple.   Skin:     General: Skin is warm and dry.      Capillary Refill: Capillary refill takes less than 2 seconds.   Neurological:      General: No focal deficit present.      Mental Status: She is alert.   Psychiatric:         Mood and Affect: Mood normal.

## 2025-03-08 DIAGNOSIS — E03.9 ACQUIRED HYPOTHYROIDISM: ICD-10-CM

## 2025-03-08 RX ORDER — LEVOTHYROXINE SODIUM 75 UG/1
75 TABLET ORAL
Qty: 90 TABLET | Refills: 1 | Status: SHIPPED | OUTPATIENT
Start: 2025-03-08

## 2025-03-10 DIAGNOSIS — I48.0 PAROXYSMAL ATRIAL FIBRILLATION (HCC): ICD-10-CM

## 2025-03-10 NOTE — TELEPHONE ENCOUNTER
Reason for call:   [x] Refill   [] Prior Auth  [] Other:     Office:   [x] PCP/Provider -  Donald Cohn MD   [] Specialty/Provider -     Medication:     rivaroxaban (XARELTO) 20 mg tablet       Dose/Frequency:     20 mg, Oral, Daily with dinner       Quantity: 30    Pharmacy: [facility]   Does the patient have enough for 3 days?   [x] Yes   [] No - Send as HP to POD    Mail Away Pharmacy   Does the patient have enough for 10 days?   [] Yes   [] No - Send as HP to POD

## 2025-03-12 ENCOUNTER — REMOTE DEVICE CLINIC VISIT (OUTPATIENT)
Dept: CARDIOLOGY CLINIC | Facility: CLINIC | Age: 80
End: 2025-03-12
Payer: MEDICARE

## 2025-03-12 DIAGNOSIS — R00.1 BRADYCARDIA: ICD-10-CM

## 2025-03-12 DIAGNOSIS — I48.0 PAROXYSMAL ATRIAL FIBRILLATION (HCC): Primary | ICD-10-CM

## 2025-03-12 DIAGNOSIS — Z95.0 PRESENCE OF CARDIAC PACEMAKER: ICD-10-CM

## 2025-03-12 PROCEDURE — 93296 REM INTERROG EVL PM/IDS: CPT | Performed by: INTERNAL MEDICINE

## 2025-03-12 PROCEDURE — 93294 REM INTERROG EVL PM/LDLS PM: CPT | Performed by: INTERNAL MEDICINE

## 2025-03-13 NOTE — PROGRESS NOTES
MDT DC PM/ACTIVE SYSTEM IS MRI CONDITIONAL   CARELINK TRANSMISSION:  BATTERY VOLTAGE ADEQUATE (11.6 YRS).  AP 35.8%   47.8% (>40% PAF/AAIR-DDDR 70 BPM).   ALL AVAILABLE LEAD PARAMETERS WITHIN NORMAL LIMITS.  NO SIGNIFICANT HIGH RATE EPISODES. 1  AT/AF EPISODE TREATED W/rATP (0/0-0% TERMINATION). MAX EPISODE DURATION >99:59:59 HRS. TIME IN AT/AF 61.1%. PT TAKING XARELTO, SOTALOL. EF 57% (ECHO 6/20/2023.  NORMAL DEVICE FUNCTION. PAS

## 2025-03-23 ENCOUNTER — RESULTS FOLLOW-UP (OUTPATIENT)
Dept: CARDIOLOGY CLINIC | Facility: CLINIC | Age: 80
End: 2025-03-23

## 2025-03-24 ENCOUNTER — APPOINTMENT (EMERGENCY)
Dept: RADIOLOGY | Facility: HOSPITAL | Age: 80
End: 2025-03-24
Payer: MEDICARE

## 2025-03-24 ENCOUNTER — APPOINTMENT (EMERGENCY)
Dept: CT IMAGING | Facility: HOSPITAL | Age: 80
End: 2025-03-24
Payer: MEDICARE

## 2025-03-24 ENCOUNTER — HOSPITAL ENCOUNTER (EMERGENCY)
Facility: HOSPITAL | Age: 80
Discharge: HOME | End: 2025-03-24
Attending: EMERGENCY MEDICINE
Payer: MEDICARE

## 2025-03-24 VITALS
OXYGEN SATURATION: 92 % | HEART RATE: 77 BPM | WEIGHT: 145.72 LBS | BODY MASS INDEX: 25.82 KG/M2 | RESPIRATION RATE: 18 BRPM | SYSTOLIC BLOOD PRESSURE: 133 MMHG | TEMPERATURE: 97.4 F | DIASTOLIC BLOOD PRESSURE: 81 MMHG | HEIGHT: 63 IN

## 2025-03-24 DIAGNOSIS — M54.9 MUSCULOSKELETAL BACK PAIN: ICD-10-CM

## 2025-03-24 DIAGNOSIS — R07.89 CHEST PRESSURE: Primary | ICD-10-CM

## 2025-03-24 LAB
ALBUMIN SERPL BCG-MCNC: 4.1 G/DL (ref 3.5–5)
ALP SERPL-CCNC: 57 U/L (ref 34–104)
ALT SERPL W P-5'-P-CCNC: 10 U/L (ref 7–52)
ANION GAP SERPL CALCULATED.3IONS-SCNC: 8 MMOL/L (ref 4–13)
AST SERPL W P-5'-P-CCNC: 15 U/L (ref 13–39)
ATRIAL RATE: 73 BPM
BASOPHILS # BLD AUTO: 0.05 THOUSANDS/ÂΜL (ref 0–0.1)
BASOPHILS NFR BLD AUTO: 1 % (ref 0–1)
BILIRUB SERPL-MCNC: 0.72 MG/DL (ref 0.2–1)
BUN SERPL-MCNC: 18 MG/DL (ref 5–25)
CALCIUM SERPL-MCNC: 9 MG/DL (ref 8.4–10.2)
CARDIAC TROPONIN I PNL SERPL HS: 3 NG/L (ref 8–18)
CARDIAC TROPONIN I PNL SERPL HS: 3 NG/L (ref ?–50)
CHLORIDE SERPL-SCNC: 106 MMOL/L (ref 96–108)
CO2 SERPL-SCNC: 24 MMOL/L (ref 21–32)
CREAT SERPL-MCNC: 0.69 MG/DL (ref 0.6–1.3)
EOSINOPHIL # BLD AUTO: 0.17 THOUSAND/ÂΜL (ref 0–0.61)
EOSINOPHIL NFR BLD AUTO: 2 % (ref 0–6)
ERYTHROCYTE [DISTWIDTH] IN BLOOD BY AUTOMATED COUNT: 13.1 % (ref 11.6–15.1)
GFR SERPL CREATININE-BSD FRML MDRD: 82 ML/MIN/1.73SQ M
GLUCOSE SERPL-MCNC: 105 MG/DL (ref 65–140)
HCT VFR BLD AUTO: 38.7 % (ref 34.8–46.1)
HGB BLD-MCNC: 12.8 G/DL (ref 11.5–15.4)
IMM GRANULOCYTES # BLD AUTO: 0.02 THOUSAND/UL (ref 0–0.2)
IMM GRANULOCYTES NFR BLD AUTO: 0 % (ref 0–2)
LYMPHOCYTES # BLD AUTO: 1.4 THOUSANDS/ÂΜL (ref 0.6–4.47)
LYMPHOCYTES NFR BLD AUTO: 19 % (ref 14–44)
MCH RBC QN AUTO: 31.8 PG (ref 26.8–34.3)
MCHC RBC AUTO-ENTMCNC: 33.1 G/DL (ref 31.4–37.4)
MCV RBC AUTO: 96 FL (ref 82–98)
MONOCYTES # BLD AUTO: 0.67 THOUSAND/ÂΜL (ref 0.17–1.22)
MONOCYTES NFR BLD AUTO: 9 % (ref 4–12)
NEUTROPHILS # BLD AUTO: 4.91 THOUSANDS/ÂΜL (ref 1.85–7.62)
NEUTS SEG NFR BLD AUTO: 69 % (ref 43–75)
NRBC BLD AUTO-RTO: 0 /100 WBCS
PLATELET # BLD AUTO: 322 THOUSANDS/UL (ref 149–390)
PMV BLD AUTO: 9.5 FL (ref 8.9–12.7)
POTASSIUM SERPL-SCNC: 3.9 MMOL/L (ref 3.5–5.3)
PROT SERPL-MCNC: 7.1 G/DL (ref 6.4–8.4)
QRS AXIS: 0 DEGREES
QRSD INTERVAL: 98 MS
QT INTERVAL: 432 MS
QTC INTERVAL: 482 MS
RBC # BLD AUTO: 4.02 MILLION/UL (ref 3.81–5.12)
SODIUM SERPL-SCNC: 138 MMOL/L (ref 135–147)
T WAVE AXIS: 74 DEGREES
VENTRICULAR RATE: 75 BPM
WBC # BLD AUTO: 7.22 THOUSAND/UL (ref 4.31–10.16)

## 2025-03-24 PROCEDURE — 99285 EMERGENCY DEPT VISIT HI MDM: CPT | Performed by: EMERGENCY MEDICINE

## 2025-03-24 PROCEDURE — 93005 ELECTROCARDIOGRAM TRACING: CPT

## 2025-03-24 PROCEDURE — 93010 ELECTROCARDIOGRAM REPORT: CPT | Performed by: INTERNAL MEDICINE

## 2025-03-24 PROCEDURE — 71046 X-RAY EXAM CHEST 2 VIEWS: CPT

## 2025-03-24 PROCEDURE — 84484 ASSAY OF TROPONIN QUANT: CPT | Performed by: STUDENT IN AN ORGANIZED HEALTH CARE EDUCATION/TRAINING PROGRAM

## 2025-03-24 PROCEDURE — 99284 EMERGENCY DEPT VISIT MOD MDM: CPT

## 2025-03-24 PROCEDURE — 85025 COMPLETE CBC W/AUTO DIFF WBC: CPT | Performed by: STUDENT IN AN ORGANIZED HEALTH CARE EDUCATION/TRAINING PROGRAM

## 2025-03-24 PROCEDURE — 36415 COLL VENOUS BLD VENIPUNCTURE: CPT | Performed by: STUDENT IN AN ORGANIZED HEALTH CARE EDUCATION/TRAINING PROGRAM

## 2025-03-24 PROCEDURE — 80053 COMPREHEN METABOLIC PANEL: CPT | Performed by: STUDENT IN AN ORGANIZED HEALTH CARE EDUCATION/TRAINING PROGRAM

## 2025-03-24 PROCEDURE — 71275 CT ANGIOGRAPHY CHEST: CPT

## 2025-03-24 PROCEDURE — 96365 THER/PROPH/DIAG IV INF INIT: CPT

## 2025-03-24 PROCEDURE — 74174 CTA ABD&PLVS W/CONTRAST: CPT

## 2025-03-24 PROCEDURE — 96375 TX/PRO/DX INJ NEW DRUG ADDON: CPT

## 2025-03-24 RX ORDER — ACETAMINOPHEN 10 MG/ML
1000 INJECTION, SOLUTION INTRAVENOUS ONCE
Status: COMPLETED | OUTPATIENT
Start: 2025-03-24 | End: 2025-03-24

## 2025-03-24 RX ORDER — METHOCARBAMOL 500 MG/1
1000 TABLET, FILM COATED ORAL ONCE
Status: COMPLETED | OUTPATIENT
Start: 2025-03-24 | End: 2025-03-24

## 2025-03-24 RX ORDER — DIPHENHYDRAMINE HYDROCHLORIDE 50 MG/ML
50 INJECTION, SOLUTION INTRAMUSCULAR; INTRAVENOUS ONCE
Status: COMPLETED | OUTPATIENT
Start: 2025-03-24 | End: 2025-03-24

## 2025-03-24 RX ORDER — LIDOCAINE 50 MG/G
2 PATCH TOPICAL ONCE
Status: DISCONTINUED | OUTPATIENT
Start: 2025-03-24 | End: 2025-03-24 | Stop reason: HOSPADM

## 2025-03-24 RX ORDER — METHOCARBAMOL 500 MG/1
500 TABLET, FILM COATED ORAL 2 TIMES DAILY
Qty: 20 TABLET | Refills: 0 | Status: SHIPPED | OUTPATIENT
Start: 2025-03-24

## 2025-03-24 RX ORDER — METHYLPREDNISOLONE SODIUM SUCCINATE 125 MG/2ML
200 INJECTION, POWDER, LYOPHILIZED, FOR SOLUTION INTRAMUSCULAR; INTRAVENOUS ONCE
Status: COMPLETED | OUTPATIENT
Start: 2025-03-24 | End: 2025-03-24

## 2025-03-24 RX ADMIN — DIPHENHYDRAMINE HYDROCHLORIDE 50 MG: 50 INJECTION, SOLUTION INTRAMUSCULAR; INTRAVENOUS at 15:11

## 2025-03-24 RX ADMIN — LIDOCAINE 2 PATCH: 50 PATCH TOPICAL at 10:25

## 2025-03-24 RX ADMIN — METHOCARBAMOL 1000 MG: 500 TABLET ORAL at 10:25

## 2025-03-24 RX ADMIN — METHYLPREDNISOLONE SODIUM SUCCINATE 200 MG: 125 INJECTION, POWDER, FOR SOLUTION INTRAMUSCULAR; INTRAVENOUS at 12:11

## 2025-03-24 RX ADMIN — ACETAMINOPHEN 1000 MG: 10 INJECTION INTRAVENOUS at 10:25

## 2025-03-24 RX ADMIN — IOHEXOL 70 ML: 350 INJECTION, SOLUTION INTRAVENOUS at 16:17

## 2025-03-24 NOTE — ED ATTENDING ATTESTATION
3/24/2025  IHannah DO, saw and evaluated the patient. I have discussed the patient with the resident/non-physician practitioner and agree with the resident's/non-physician practitioner's findings, Plan of Care, and MDM as documented in the resident's/non-physician practitioner's note, except where noted. All available labs and Radiology studies were reviewed.  I was present for key portions of any procedure(s) performed by the resident/non-physician practitioner and I was immediately available to provide assistance.       At this point I agree with the current assessment done in the Emergency Department.  I have conducted an independent evaluation of this patient a history and physical is as follows:    80-year-old female presenting to the emergency department with back pain that started yesterday morning when she woke up.  States it is in her upper back, has a history of fibromyalgia but this feels different.  No trauma.  No nausea or vomiting, no fevers or chills.  Denies chest pain.  However mentions that it feels like something is laying across her lower chest underneath her breasts.  Symptoms have been constant since yesterday, however vary in intensity.  No shortness of breath.  No exertional symptoms.  No exacerbating or remitting factors.  No diaphoresis.  No numbness or tingling or weakness.    Upon physical examination, patient does not appear to be in acute distress, heart regular rate, lungs clear to auscultation bilaterally, abdomen soft nontender, she has tenderness primarily to the upper thoracic paraspinal musculature worse on the left, does not have midline spinal tenderness or step-offs for me.  No numbness or weakness.  Radial pulses equal bilaterally.    No STEMI on EKG.  Blood work is reassuring.  Imaging results as below.  Patient updated results.  After being updated with results, patient mentions to the resident that she was recently moving several heavy boxes before pain started,  this is likely the etiology of her pain.  Provided prescription for muscle relaxers, advised cardiology follow-up, symptomatic care at home, return precautions were discussed, she verbalized understanding and is in agreement with plan, she was feeling much better upon discharge.    ED Course         Critical Care Time  Procedures    CTA dissection protocol chest abdomen pelvis w wo contrast   Final Result      1.  No thoracoabdominal aortic aneurysm or dissection.   2.  No acute pulmonary embolism.   3.  Moderate hiatal hernia containing the proximal stomach and mesenteric fat. No evidence of bowel obstruction, inflammation, appendicitis, obstructive uropathy, free air, or free fluid.  Otherwise no acute intrathoracic or intra-abdominal/pelvic    abnormalities.               Workstation performed: [id number]         XR chest 2 views   ED Interpretation   No acute cardiopulmonary process.  No obvious mediastinal widening.  Appropriate cardiac silhouette.  No obvious consolidation or costophrenic blunting      Final Result      No acute cardiopulmonary disease.            Workstation performed: [id number]

## 2025-03-24 NOTE — ED PROVIDER NOTES
Time reflects when diagnosis was documented in both MDM as applicable and the Disposition within this note       Time User Action Codes Description Comment    3/24/2025  6:36 PM Kenny Mcdaniels Add [R07.89] Chest pressure     3/24/2025  6:38 PM Kenny Mcdaniels Add [S29.012A] Strain of thoracic paraspinal muscles excluding T1 and T2 levels, initial encounter     3/24/2025  6:38 PM Kenny Mcdaniels Add [M54.9] Musculoskeletal back pain     3/24/2025  6:38 PM Kenny Mcdaniels Remove [S29.012A] Strain of thoracic paraspinal muscles excluding T1 and T2 levels, initial encounter           ED Disposition       ED Disposition   Discharge    Condition   Stable    Date/Time   Mon Mar 24, 2025  6:23 PM    Comment   Lavern Hardin discharge to home/self care.                   Assessment & Plan   {Hyperlinks  Risk Stratification - NIHSS - HEART SCORE - Fill out sepsis note and make sure you call [id number] if severe or septic shock:[id number]}    Medical Decision Making  Patient presents with:  Back Pain: Upper back pain that comes around the left shoulder. Just keeps getting worse since yesterday. Feels like something is tight around her under breast area. 161/102 BP at home. Pt does have Medtronic Pacer.   DDx includes MSK strain, insufficiency fracture, shingles, ACS, PE, electrolyte abnormality, or other unknown process  Workup per ED course: Workup reassuring, imaging without acute findings.  Less marked symptomatic improvement with Robaxin/lidocaine patch.   Discharged with PCP/cardiology follow-up, short course of Robaxin for symptomatic management.  Dispo: Workup and return precautions reviewed. Patient expresses understanding, is comfortable with discharge, aggress to follow-up as advised and return to ED if symptoms recur.       Amount and/or Complexity of Data Reviewed  Labs: ordered. Decision-making details documented in ED Course.  Radiology: ordered. Decision-making details documented in  "ED Course.    Risk  Prescription drug management.        ED Course as of 03/24/25 1859   Mon Mar 24, 2025   1000 2 days progressive worsening back pain which began upon waking yesterday.  Pain initially generalized and subsequently consolidated to upper thoracic spine and left paraspinal muscles.  Denies trauma/inciting incident, HA/dizziness/visual disturbance, SOB.     H/o A-fib on Xarelto, SVT/PAC s/p dual chamber pacemaker, fibromyalgia, shingles. Denies missed medication dose.  Pain distinctly different from prior fibromyalgia exacerbations.     Last night patient felt bandlike pressure across chest as if \"rope wrapped around her chest\".     home ECG noted bouts of A-fib last night.       BP elevated on home cuff    Denies Fever/chills, exertional SOB or worsening of symptoms.     Resting comfortable without acute distress  Cardiopulmonary exam reassuring  Midline tenderness C/T-spine with point tenderness left upper thoracic paraspinal muscles near left shoulder blade.   Extremities full strength, radial pulses equal BUE  Abdominal exam benign no lower extremity edema    C/f MSK strain, insufficiency fracture, shingles, ACS, PE, electrolyte abnormality, or other unknown process    Plan cardiac workup, chest x-ray, Robaxin/lidocaine patch  Consider advanced imaging    1055 WBC: 7.22   1055 Hemoglobin: 12.8   1055 Comprehensive metabolic panel  No electrolyte derangement, anion gap, Transaminitis, DYLAN     1206 Chest x-ray without obvious cardiopulmonary process or mediastinal widening.     Plan CTA dissection study with 4-hour prep as patient confirmed documented contrast allergy with rash.  Denies prior anaphylactic reaction.  Risks/benefits discussed with patient and she requests to proceed with the scan and contrast allergy preparation.     Plan discussed with radiology with 4-hour prep approved per Dr. FEMI Cook   1242 Patient resting comfortably no new complaints   1506 HS TnI random(!): 3  Delta 0   1814 CTA " dissection protocol chest abdomen pelvis w wo contrast  IMPRESSION:     1.  No thoracoabdominal aortic aneurysm or dissection.  2.  No acute pulmonary embolism.  3.  Moderate hiatal hernia containing the proximal stomach and mesenteric fat. No evidence of bowel obstruction, inflammation, appendicitis, obstructive uropathy, free air, or free fluid.  Otherwise no acute intrathoracic or intra-abdominal/pelvic abnormalities.   1826 Findings reviewed with patient who notes moving several heavy boxes 2 days ago shortly before symptom onset    Plan discharge with routine follow-up, short course of Robaxin for symptomatic management and cardiology referral       Medications   lidocaine (LIDODERM) 5 % patch 2 patch (2 patches Topical Medication Applied 3/24/25 1025)   methocarbamol (ROBAXIN) tablet 1,000 mg (1,000 mg Oral Given 3/24/25 1025)   acetaminophen (Ofirmev) injection 1,000 mg (0 mg Intravenous Stopped 3/24/25 1122)   diphenhydrAMINE (BENADRYL) injection 50 mg (50 mg Intravenous Given 3/24/25 1511)   methylPREDNISolone sodium succinate (Solu-MEDROL) injection 200 mg (200 mg Intravenous Given 3/24/25 1211)   iohexol (OMNIPAQUE) 350 MG/ML injection (MULTI-DOSE) 70 mL (70 mL Intravenous Given 3/24/25 1617)       ED Risk Strat Scores   HEART Risk Score      Flowsheet Row Most Recent Value   Heart Score Risk Calculator    History 1 Filed at: 03/24/2025 1841   ECG 1 Filed at: 03/24/2025 1841   Age 2 Filed at: 03/24/2025 1841   Risk Factors 1 Filed at: 03/24/2025 1841   Troponin 0 Filed at: 03/24/2025 1841   HEART Score 5 Filed at: 03/24/2025 1841          HEART Risk Score      Flowsheet Row Most Recent Value   Heart Score Risk Calculator    History 1 Filed at: 03/24/2025 1841   ECG 1 Filed at: 03/24/2025 1841   Age 2 Filed at: 03/24/2025 1841   Risk Factors 1 Filed at: 03/24/2025 1841   Troponin 0 Filed at: 03/24/2025 1841   HEART Score 5 Filed at: 03/24/2025 1841                              SBIRT 22yo+      Flowsheet  Row Most Recent Value   Initial Alcohol Screen: US AUDIT-C     1. How often do you have a drink containing alcohol? 0 Filed at: 03/24/2025 0941   2. How many drinks containing alcohol do you have on a typical day you are drinking?  0 Filed at: 03/24/2025 0941   3a. Male UNDER 65: How often do you have five or more drinks on one occasion? 0 Filed at: 03/24/2025 0941   3b. FEMALE Any Age, or MALE 65+: How often do you have 4 or more drinks on one occassion? 0 Filed at: 03/24/2025 0941   Audit-C Score 0 Filed at: 03/24/2025 0941   SANDIP: How many times in the past year have you...    Used an illegal drug or used a prescription medication for non-medical reasons? Never Filed at: 03/24/2025 0941                            History of Present Illness   {Hyperlinks  History (Med, Surg, Fam, Social) - Current Medications - Allergies  :[id number]}    Chief Complaint   Patient presents with    Back Pain     Upper back pain that comes around the left shoulder. Just keeps getting worse since yesterday. Feels like something is tight around her under breast area. 161/102 BP at home. Pt does have Medtronic Pacer.        Past Medical History:   Diagnosis Date    Acid reflux     Arthritis     Bradycardia 08/16/2023    Cataract     Colon polyp     Disease of thyroid gland     Dyslipidemia (high LDL; low HDL)     Fibrocystic breast     Fibromyalgia     Fibromyalgia, primary     GERD (gastroesophageal reflux disease)     Hyperlipidemia     Hypertension     Hypothyroidism     Kidney stone     Motion sickness     Palpitation     Paroxysmal atrial fibrillation (HCC)     PONV (postoperative nausea and vomiting)     s/p Medtronic dual chamber PPM with left bundle pacing lead 8/16/2023 08/16/2023    Shingles 09/12/2010    Right C7      Past Surgical History:   Procedure Laterality Date    CARDIAC ELECTROPHYSIOLOGY PROCEDURE N/A 08/16/2023    Procedure: Cardiac pacer implant;  Surgeon: Wally Lamas MD;  Location: [facility];   Service: Cardiology    CATARACT EXTRACTION, BILATERAL  11/2016    COLONOSCOPY  07/11/2016    COLONOSCOPY      FL RETROGRADE PYELOGRAM  5/28/2024    HYSTERECTOMY  05/2016    total hysterectomy    INSERT / REPLACE / REMOVE PACEMAKER      MAMMO (HISTORICAL)  4/8/2010 & 2/19/2020    MS CYSTO/URETERO W/LITHOTRIPSY &INDWELL STENT INSRT Right 5/28/2024    Procedure: CYSTOSCOPY URETEROSCOPY WITH LITHOTRIPSY HOLMIUM LASER, BASKET EXTRACTION RIGHT URETERAL STONE, RETROGRADE PYELOGRAM AND INSERTION STENT URETERAL;  Surgeon: Aren Hays MD;  Location:  MAIN OR;  Service: Urology    REPLACEMENT TOTAL KNEE      partial    TONSILLECTOMY      UPPER GASTROINTESTINAL ENDOSCOPY        Family History   Problem Relation Age of Onset    Breast cancer Mother 75    Hypertension Mother     Hyperlipidemia Mother     Parkinsonism Mother     Stroke Mother     Chronic bronchitis Mother     Atrial fibrillation Father     Stroke Father     Cancer Father         skin cancer    Prostate cancer Father     Breast cancer Maternal Aunt     Anuerysm Neg Hx     Clotting disorder Neg Hx       Social History     Tobacco Use    Smoking status: Never    Smokeless tobacco: Never   Vaping Use    Vaping status: Never Used   Substance Use Topics    Alcohol use: Not Currently    Drug use: Never      E-Cigarette/Vaping    E-Cigarette Use Never User       E-Cigarette/Vaping Substances    Nicotine No     THC No     CBD No     Flavoring No     Unknown No       I have reviewed and agree with the history as documented.     Lavern Hardin is a 80 y.o. female p/w ***       Denies Fever/Chills, Nausea/vomiting, Headache/vision changes, SOB/Chest pain, hemoptysis/hematochezia, GI/ symptoms, or any other complaint at this time.    All other systems reviewed and are negative    HPI    Review of Systems        Objective   {Hyperlinks  Historical Vitals - Historical Labs - Chart Review/Microbiology - Last Echo - Code Status  :[id number]}    ED Triage Vitals [03/24/25  0939]   Temperature Pulse Blood Pressure Respirations SpO2 Patient Position - Orthostatic VS   (!) 97.4 °F (36.3 °C) 90 141/84 18 99 % Sitting      Temp Source Heart Rate Source BP Location FiO2 (%) Pain Score    Oral Monitor Right arm -- 4      Vitals      Date and Time Temp Pulse SpO2 Resp BP Pain Score FACES Pain Rating User   03/24/25 1800 -- 77 92 % 18 133/81 -- -- EG   03/24/25 1730 -- 82 98 % 18 140/80 No Pain -- MO   03/24/25 1200 -- 72 97 % 16 140/81 -- -- DB   03/24/25 0939 97.4 °F (36.3 °C) 90 99 % 18 141/84 4 -- CECILIA            Physical Exam    General appearance: resting comfortably, no acute distress   HENT: Normocephalic, atraumatic, hearing grossly intact, and mucous membranes moist   Eyes: Conjunctiva normal, PERRL, EOM intact   Lungs/Chest: Respirations even and unlabored, breath sounds normal  Cardiovascular: Normal rate, regular rhythm  Abdomen: soft, non-distended, non-tender  Musculoskeletal: extremities normal, atraumatic, no cyanosis or edema   Pulses: radial / dorsalis pedis pulses palpable  Skin: warm and dry   Neurologic: Awake and alert, no apparent focal deficit  Psych: Mood consistent with affect     Results Reviewed       Procedure Component Value Units Date/Time    High Sensitivity Troponin I Random [[id number]]  (Abnormal) Collected: 03/24/25 1157    Lab Status: Final result Specimen: Blood from Arm, Right Updated: 03/24/25 1305     HS TnI random 3 ng/L     HS Troponin 0hr (reflex protocol) [[id number]]  (Normal) Collected: 03/24/25 1004    Lab Status: Final result Specimen: Blood from Arm, Right Updated: 03/24/25 1037     hs TnI 0hr 3 ng/L     Comprehensive metabolic panel [[id number]] Collected: 03/24/25 1004    Lab Status: Final result Specimen: Blood from Arm, Right Updated: 03/24/25 1031     Sodium 138 mmol/L      Potassium 3.9 mmol/L      Chloride 106 mmol/L      CO2 24 mmol/L      ANION GAP 8 mmol/L      BUN 18 mg/dL      Creatinine 0.69 mg/dL      Glucose 105 mg/dL      Calcium  9.0 mg/dL      AST 15 U/L      ALT 10 U/L      Alkaline Phosphatase 57 U/L      Total Protein 7.1 g/dL      Albumin 4.1 g/dL      Total Bilirubin 0.72 mg/dL      eGFR 82 ml/min/1.73sq m     Narrative:      National Kid[employer] Foundation guidelines for Chronic Kidney Disease (CKD):     Stage 1 with normal or high GFR (GFR > 90 mL/min/1.73 square meters)    Stage 2 Mild CKD (GFR = 60-89 mL/min/1.73 square meters)    Stage 3A Moderate CKD (GFR = 45-59 mL/min/1.73 square meters)    Stage 3B Moderate CKD (GFR = 30-44 mL/min/1.73 square meters)    Stage 4 Severe CKD (GFR = 15-29 mL/min/1.73 square meters)    Stage 5 End Stage CKD (GFR <15 mL/min/1.73 square meters)  Note: GFR calculation is accurate only with a steady state creatinine    CBC and differential [[id number]] Collected: 03/24/25 1004    Lab Status: Final result Specimen: Blood from Arm, Right Updated: 03/24/25 1024     WBC 7.22 Thousand/uL      RBC 4.02 Million/uL      Hemoglobin 12.8 g/dL      Hematocrit 38.7 %      MCV 96 fL      MCH 31.8 pg      MCHC 33.1 g/dL      RDW 13.1 %      MPV 9.5 fL      Platelets 322 Thousands/uL      nRBC 0 /100 WBCs      Segmented % 69 %      Immature Grans % 0 %      Lymphocytes % 19 %      Monocytes % 9 %      Eosinophils Relative 2 %      Basophils Relative 1 %      Absolute Neutrophils 4.91 Thousands/µL      Absolute Immature Grans 0.02 Thousand/uL      Absolute Lymphocytes 1.40 Thousands/µL      Absolute Monocytes 0.67 Thousand/µL      Eosinophils Absolute 0.17 Thousand/µL      Basophils Absolute 0.05 Thousands/µL             CTA dissection protocol chest abdomen pelvis w wo contrast   Final Interpretation by Sukhdev Huddleston MD (03/24 5049)      1.  No thoracoabdominal aortic aneurysm or dissection.   2.  No acute pulmonary embolism.   3.  Moderate hiatal hernia containing the proximal stomach and mesenteric fat. No evidence of bowel obstruction, inflammation, appendicitis, obstructive uropathy, free air, or free fluid.   Otherwise no acute intrathoracic or intra-abdominal/pelvic    abnormalities.               Workstation performed: [id number]         XR chest 2 views   ED Interpretation by Kenny Mcdaniels MD (03/24 1205)   No acute cardiopulmonary process.  No obvious mediastinal widening.  Appropriate cardiac silhouette.  No obvious consolidation or costophrenic blunting      Final Interpretation by Homer Ugalde MD (03/24 1133)      No acute cardiopulmonary disease.            Workstation performed: [id number]             Procedures    ED Medication and Procedure Management   Prior to Admission Medications   Prescriptions Last Dose Informant Patient Reported? Taking?   Cyanocobalamin (Vitamin B 12) 500 MCG TABS  Self No No   Sig: Take 1 tablet by mouth 3 (three) times a week   ergocalciferol (VITAMIN D2) 50,000 units  Self No No   Sig: Take 1 capsule (50,000 Units total) by mouth every 14 (fourteen) days   levothyroxine 75 mcg tablet   No No   Sig: Take 1 tablet (75 mcg total) by mouth daily in the early morning   losartan (COZAAR) 50 mg tablet   No No   Sig: Take 1 tablet (50 mg total) by mouth daily   omeprazole (PriLOSEC) 20 mg delayed release capsule  Self Yes No   Sig: Take 1 capsule by mouth daily   rivaroxaban (XARELTO) 20 mg tablet   No No   Sig: Take 1 tablet (20 mg total) by mouth daily with dinner   sotalol (BETAPACE) 120 mg tablet   No No   Sig: Take 1 tablet (120 mg total) by mouth 2 (two) times a day   triamcinolone (KENALOG) 0.1 % ointment  Self No No   Sig: Apply topically 2 (two) times a day      Facility-Administered Medications: None     Discharge Medication List as of 3/24/2025  6:43 PM        START taking these medications    Details   methocarbamol (ROBAXIN) 500 mg tablet Take 1 tablet (500 mg total) by mouth 2 (two) times a day, Starting Mon 3/24/2025, Normal           CONTINUE these medications which have NOT CHANGED    Details   Cyanocobalamin (Vitamin B 12) 500 MCG TABS Take 1 tablet by mouth 3  (three) times a week, Starting Mon 8/12/2024, Print      ergocalciferol (VITAMIN D2) 50,000 units Take 1 capsule (50,000 Units total) by mouth every 14 (fourteen) days, Starting Mon 6/10/2024, Normal      levothyroxine 75 mcg tablet Take 1 tablet (75 mcg total) by mouth daily in the early morning, Starting Sat 3/8/2025, Normal      losartan (COZAAR) 50 mg tablet Take 1 tablet (50 mg total) by mouth daily, Starting Wed 1/15/2025, Normal      omeprazole (PriLOSEC) 20 mg delayed release capsule Take 1 capsule by mouth daily, Historical Med      rivaroxaban (XARELTO) 20 mg tablet Take 1 tablet (20 mg total) by mouth daily with dinner, Starting Tue 3/11/2025, Until Mon 6/9/2025, Normal      sotalol (BETAPACE) 120 mg tablet Take 1 tablet (120 mg total) by mouth 2 (two) times a day, Starting Wed 2/5/2025, Normal      triamcinolone (KENALOG) 0.1 % ointment Apply topically 2 (two) times a day, Starting Mon 3/25/2024, Normal             ED SEPSIS DOCUMENTATION   Time reflects when diagnosis was documented in both MDM as applicable and the Disposition within this note       Time User Action Codes Description Comment    3/24/2025  6:36 PM Kenny Mcdaniels [R07.89] Chest pressure     3/24/2025  6:38 PM Kenny Mcdaniels [S29.012A] Strain of thoracic paraspinal muscles excluding T1 and T2 levels, initial encounter     3/24/2025  6:38 PM Kenny Mcdaniels [M54.9] Musculoskeletal back pain     3/24/2025  6:38 PM Kenny Mcdaniels Remove [S29.012A] Strain of thoracic paraspinal muscles excluding T1 and T2 levels, initial encounter

## 2025-04-18 ENCOUNTER — HOSPITAL ENCOUNTER (OUTPATIENT)
Dept: RADIOLOGY | Facility: HOSPITAL | Age: 80
Discharge: HOME | End: 2025-04-18
Payer: MEDICARE

## 2025-04-18 DIAGNOSIS — Z12.31 SCREENING MAMMOGRAM FOR BREAST CANCER: ICD-10-CM

## 2025-04-18 PROCEDURE — 77067 SCR MAMMO BI INCL CAD: CPT

## 2025-04-18 PROCEDURE — 77063 BREAST TOMOSYNTHESIS BI: CPT

## 2025-04-26 DIAGNOSIS — E55.9 VITAMIN D DEFICIENCY DISEASE: ICD-10-CM

## 2025-04-28 RX ORDER — ERGOCALCIFEROL 1.25 MG/1
50000 CAPSULE, LIQUID FILLED ORAL
Qty: 7 CAPSULE | Refills: 3 | Status: SHIPPED | OUTPATIENT
Start: 2025-04-28 | End: 2025-07-29

## 2025-05-10 DIAGNOSIS — I48.0 PAROXYSMAL ATRIAL FIBRILLATION (HCC): ICD-10-CM

## 2025-05-12 RX ORDER — SOTALOL HYDROCHLORIDE 120 MG/1
120 TABLET ORAL 2 TIMES DAILY
Qty: 180 TABLET | Refills: 3 | Status: SHIPPED | OUTPATIENT
Start: 2025-05-12

## 2025-05-14 ENCOUNTER — OFFICE VISIT (OUTPATIENT)
Dept: INTERNAL MEDICINE CLINIC | Facility: CLINIC | Age: 80
End: 2025-05-14
Payer: MEDICARE

## 2025-05-14 VITALS
SYSTOLIC BLOOD PRESSURE: 130 MMHG | OXYGEN SATURATION: 98 % | HEART RATE: 73 BPM | HEIGHT: 63 IN | WEIGHT: 144.8 LBS | BODY MASS INDEX: 25.66 KG/M2 | TEMPERATURE: 98.6 F | DIASTOLIC BLOOD PRESSURE: 84 MMHG

## 2025-05-14 DIAGNOSIS — M81.0 AGE-RELATED OSTEOPOROSIS WITHOUT CURRENT PATHOLOGICAL FRACTURE: ICD-10-CM

## 2025-05-14 DIAGNOSIS — Z95.0 PACEMAKER: ICD-10-CM

## 2025-05-14 DIAGNOSIS — E03.9 ACQUIRED HYPOTHYROIDISM: ICD-10-CM

## 2025-05-14 DIAGNOSIS — I10 ESSENTIAL HYPERTENSION: Primary | ICD-10-CM

## 2025-05-14 DIAGNOSIS — I48.0 PAROXYSMAL ATRIAL FIBRILLATION (HCC): ICD-10-CM

## 2025-05-14 PROCEDURE — 99214 OFFICE O/P EST MOD 30 MIN: CPT | Performed by: INTERNAL MEDICINE

## 2025-05-14 PROCEDURE — G2211 COMPLEX E/M VISIT ADD ON: HCPCS | Performed by: INTERNAL MEDICINE

## 2025-05-14 NOTE — PROGRESS NOTES
Assessment/Plan:           1. Essential hypertension  Comments:  Stable on current regimen of losartan and sotalol.  2. Paroxysmal atrial fibrillation (HCC)  Comments:  Following with cardiology and is stable on current regimen  Orders:  -     CBC and differential; Future  -     Comprehensive metabolic panel; Future  -     Urinalysis with microscopic; Future  3. Acquired hypothyroidism  Comments:  Continue levothyroxine 75 mcg.  Lab ordered.  Orders:  -     TSH, 3rd generation; Future  -     T4, free; Future  4. Age-related osteoporosis without current pathological fracture  5. s/p Medtronic dual chamber PPM with left bundle pacing lead 8/16/2023         1. Paroxysmal atrial fibrillation (HCC) (Primary)      2. Acquired hypothyroidism      3. Essential hypertension      4. Age-related osteoporosis without current pathological fracture      5. s/p Medtronic dual chamber PPM with left bundle pacing lead 8/16/2023         No problem-specific Assessment & Plan notes found for this encounter.           Subjective:      Patient ID: Lavern Hardin is a 80 y.o. female.    HPI    The following portions of the patient's history were reviewed and updated as appropriate: She  has a past medical history of Acid reflux, Arthritis, Bradycardia (08/16/2023), Cataract, Colon polyp, Disease of thyroid gland, Dyslipidemia (high LDL; low HDL), Fibrocystic breast, Fibromyalgia, Fibromyalgia, primary, GERD (gastroesophageal reflux disease), Hyperlipidemia, Hypertension, Hypothyroidism, Kidney stone, Motion sickness, Palpitation, Paroxysmal atrial fibrillation (HCC), PONV (postoperative nausea and vomiting), s/p Medtronic dual chamber PPM with left bundle pacing lead 8/16/2023 (08/16/2023), and Shingles (09/12/2010).  She   Patient Active Problem List    Diagnosis Date Noted    PONV (postoperative nausea and vomiting)     Right ureteral stone 05/07/2024    Person consulting for explanation of examination or test finding 05/07/2024    SVT  (supraventricular tachycardia) (HCC) 04/04/2024    Nonrheumatic mitral valve regurgitation 04/04/2024    Encounter for monitoring sotalol therapy 09/05/2023    s/p Medtronic dual chamber PPM with left bundle pacing lead 8/16/2023 08/16/2023    Fibromyalgia 10/10/2022    Vitamin D deficiency 10/10/2022    Age-related osteoporosis without current pathological fracture 10/10/2022    Acid reflux     Acquired hypothyroidism 09/11/2020    Plantar fasciitis 09/11/2020    Paroxysmal atrial fibrillation (HCC) 07/10/2018    Essential hypertension 07/10/2018    PAC (premature atrial contraction) 07/10/2018    Dyslipidemia 07/10/2018     She  has a past surgical history that includes Replacement total knee; Tonsillectomy; Hysterectomy (05/2016); Cataract extraction, bilateral (11/2016); Colonoscopy (07/11/2016); Mammo (historical) (4/8/2010 & 2/19/2020); Colonoscopy; Upper gastrointestinal endoscopy; Cardiac electrophysiology procedure (N/A, 08/16/2023); Insert / replace / remove pacemaker; pr cysto/uretero w/lithotripsy &indwell stent insrt (Right, 5/28/2024); and [place] retrograde pyelogram (5/28/2024).  Her family history includes Atrial fibrillation in her father; Breast cancer in her maternal aunt; Breast cancer (age of onset: 75) in her mother; Cancer in her father; Chronic bronchitis in her mother; Hyperlipidemia in her mother; Hypertension in her mother; Parkinsonism in her mother; Prostate cancer in her father; Stroke in her father and mother.  She  reports that she has never smoked. She has never used smokeless tobacco. She reports that she does not currently use alcohol. She reports that she does not use drugs.  Current Outpatient Medications   Medication Sig Dispense Refill    Cyanocobalamin (Vitamin B 12) 500 MCG TABS Take 1 tablet by mouth 3 (three) times a week 100 tablet 2    ergocalciferol (VITAMIN D2) 50,000 units Take 1 capsule (50,000 Units total) by mouth every 14 (fourteen) days 7 capsule 3    levothyroxine 75  mcg tablet Take 1 tablet (75 mcg total) by mouth daily in the early morning 90 tablet 1    losartan (COZAAR) 50 mg tablet Take 1 tablet (50 mg total) by mouth daily 90 tablet 3    omeprazole (PriLOSEC) 20 mg delayed release capsule Take 1 capsule by mouth in the morning.      rivaroxaban (XARELTO) 20 mg tablet Take 1 tablet (20 mg total) by mouth daily with dinner 30 tablet 5    sotalol (BETAPACE) 120 mg tablet Take 1 tablet (120 mg total) by mouth 2 (two) times a day 180 tablet 3    triamcinolone (KENALOG) 0.1 % ointment Apply topically 2 (two) times a day 60 g 0    methocarbamol (ROBAXIN) 500 mg tablet Take 1 tablet (500 mg total) by mouth 2 (two) times a day (Patient not taking: Reported on 5/14/2025) 20 tablet 0     No current facility-administered medications for this visit.     Current Outpatient Medications on File Prior to Visit   Medication Sig    Cyanocobalamin (Vitamin B 12) 500 MCG TABS Take 1 tablet by mouth 3 (three) times a week    ergocalciferol (VITAMIN D2) 50,000 units Take 1 capsule (50,000 Units total) by mouth every 14 (fourteen) days    levothyroxine 75 mcg tablet Take 1 tablet (75 mcg total) by mouth daily in the early morning    losartan (COZAAR) 50 mg tablet Take 1 tablet (50 mg total) by mouth daily    omeprazole (PriLOSEC) 20 mg delayed release capsule Take 1 capsule by mouth in the morning.    rivaroxaban (XARELTO) 20 mg tablet Take 1 tablet (20 mg total) by mouth daily with dinner    sotalol (BETAPACE) 120 mg tablet Take 1 tablet (120 mg total) by mouth 2 (two) times a day    triamcinolone (KENALOG) 0.1 % ointment Apply topically 2 (two) times a day    methocarbamol (ROBAXIN) 500 mg tablet Take 1 tablet (500 mg total) by mouth 2 (two) times a day (Patient not taking: Reported on 5/14/2025)     No current facility-administered medications on file prior to visit.     There are no discontinued medications.   She is allergic to iodinated contrast media..    Review of Systems  "  Constitutional:  Negative for appetite change, chills, fatigue and fever.   HENT:  Negative for sore throat and trouble swallowing.    Eyes:  Negative for redness.   Respiratory:  Negative for shortness of breath.    Cardiovascular:  Negative for chest pain and palpitations.   Gastrointestinal:  Negative for abdominal pain, constipation and diarrhea.   Genitourinary:  Negative for dysuria and hematuria.   Musculoskeletal:  Negative for back pain and neck pain.   Skin:  Negative for rash.   Neurological:  Negative for seizures, weakness and headaches.   Hematological:  Negative for adenopathy.   Psychiatric/Behavioral:  Negative for confusion. The patient is not nervous/anxious.          Objective:      /84 (BP Location: Right arm, Patient Position: Sitting, Cuff Size: Standard)   Pulse 73   Temp 98.6 °F (37 °C) (Temporal)   Ht 5' 3\" (1.6 m)   Wt 65.7 kg (144 lb 12.8 oz)   SpO2 98%   BMI 25.65 kg/m²     Results Reviewed       None            Recent Results (from the past 8 weeks)   ECG 12 lead    Collection Time: 03/24/25  9:57 AM   Result Value Ref Range    Ventricular Rate 75 BPM    Atrial Rate 73 BPM    AZ Interval  ms    QRSD Interval 98 ms    QT Interval 432 ms    QTC Interval 482 ms    P Axis  degrees    QRS Axis 0 degrees    T Wave Axis 74 degrees   CBC and differential    Collection Time: 03/24/25 10:04 AM   Result Value Ref Range    WBC 7.22 4.31 - 10.16 Thousand/uL    RBC 4.02 3.81 - 5.12 Million/uL    Hemoglobin 12.8 11.5 - 15.4 g/dL    Hematocrit 38.7 34.8 - 46.1 %    MCV 96 82 - 98 fL    MCH 31.8 26.8 - 34.3 pg    MCHC 33.1 31.4 - 37.4 g/dL    RDW 13.1 11.6 - 15.1 %    MPV 9.5 8.9 - 12.7 fL    Platelets 322 149 - 390 Thousands/uL    nRBC 0 /100 WBCs    Segmented % 69 43 - 75 %    Immature Grans % 0 0 - 2 %    Lymphocytes % 19 14 - 44 %    Monocytes % 9 4 - 12 %    Eosinophils Relative 2 0 - 6 %    Basophils Relative 1 0 - 1 %    Absolute Neutrophils 4.91 1.85 - 7.62 Thousands/µL    Absolute " "Immature Grans 0.02 0.00 - 0.20 Thousand/uL    Absolute Lymphocytes 1.40 0.60 - 4.47 Thousands/µL    Absolute Monocytes 0.67 0.17 - 1.22 Thousand/µL    Eosinophils Absolute 0.17 0.00 - 0.61 Thousand/µL    Basophils Absolute 0.05 0.00 - 0.10 Thousands/µL   Comprehensive metabolic panel    Collection Time: 03/24/25 10:04 AM   Result Value Ref Range    Sodium 138 135 - 147 mmol/L    Potassium 3.9 3.5 - 5.3 mmol/L    Chloride 106 96 - 108 mmol/L    CO2 24 21 - 32 mmol/L    ANION GAP 8 4 - 13 mmol/L    BUN 18 5 - 25 mg/dL    Creatinine 0.69 0.60 - 1.30 mg/dL    Glucose 105 65 - 140 mg/dL    Calcium 9.0 8.4 - 10.2 mg/dL    AST 15 13 - 39 U/L    ALT 10 7 - 52 U/L    Alkaline Phosphatase 57 34 - 104 U/L    Total Protein 7.1 6.4 - 8.4 g/dL    Albumin 4.1 3.5 - 5.0 g/dL    Total Bilirubin 0.72 0.20 - 1.00 mg/dL    eGFR 82 ml/min/1.73sq m   HS Troponin 0hr (reflex protocol)    Collection Time: 03/24/25 10:04 AM   Result Value Ref Range    hs TnI 0hr 3 \"Refer to ACS Flowchart\"- see link ng/L   High Sensitivity Troponin I Random    Collection Time: 03/24/25 11:57 AM   Result Value Ref Range    HS TnI random 3 (L) 8 - 18 ng/L        Physical Exam  Constitutional:       General: She is not in acute distress.     Appearance: Normal appearance.   HENT:      Head: Normocephalic and atraumatic.      Nose: Nose normal.      Mouth/Throat:      Mouth: Mucous membranes are moist.     Eyes:      Extraocular Movements: Extraocular movements intact.      Pupils: Pupils are equal, round, and reactive to light.       Cardiovascular:      Rate and Rhythm: Normal rate and regular rhythm.      Pulses: Normal pulses.      Heart sounds: Normal heart sounds. No murmur heard.     No friction rub.   Pulmonary:      Effort: Pulmonary effort is normal. No respiratory distress.      Breath sounds: Normal breath sounds. No wheezing.   Abdominal:      General: Abdomen is flat. Bowel sounds are normal. There is no distension.      Palpations: Abdomen is " soft. There is no mass.      Tenderness: There is no abdominal tenderness. There is no guarding.     Musculoskeletal:         General: Normal range of motion.      Cervical back: Normal range of motion and neck supple.     Neurological:      General: No focal deficit present.      Mental Status: She is alert and oriented to person, place, and time. Mental status is at baseline.      Cranial Nerves: No cranial nerve deficit.     Psychiatric:         Mood and Affect: Mood normal.         Behavior: Behavior normal.

## 2025-06-12 ENCOUNTER — REMOTE DEVICE CLINIC VISIT (OUTPATIENT)
Dept: CARDIOLOGY CLINIC | Facility: CLINIC | Age: 80
End: 2025-06-12
Payer: MEDICARE

## 2025-06-12 DIAGNOSIS — I49.5 SSS (SICK SINUS SYNDROME) (HCC): Primary | ICD-10-CM

## 2025-06-12 DIAGNOSIS — I48.91 ATRIAL FIBRILLATION, UNSPECIFIED TYPE (HCC): ICD-10-CM

## 2025-06-12 PROCEDURE — 93296 REM INTERROG EVL PM/IDS: CPT | Performed by: INTERNAL MEDICINE

## 2025-06-12 PROCEDURE — 93294 REM INTERROG EVL PM/LDLS PM: CPT | Performed by: INTERNAL MEDICINE

## 2025-06-12 NOTE — PROGRESS NOTES
Results for orders placed or performed in visit on 06/12/25   Cardiac EP device report    Narrative    MDT DC PM/ACTIVE SYSTEM IS MRI CONDITIONAL  CARELINK TRANSMISSION: BATTERY VOLTAGE ADEQUATE (11.5 YRS). AP: 81.3%. : 13.2% (MVP-ON). ALL AVAILABLE LEAD PARAMETERS WITHIN NORMAL LIMITS. 8 TREATED AT/AF EPSIODES W/ rATP. 1 AT/AF EPISODE W/ AF IN PROGRESS (HX OF SAME). AF BURDEN: 16.3%. PT TAKES XARELTO, SOTALOL. EF: 57% (ECHO 6/20/23). NORMAL DEVICE FUNCTION. CH

## 2025-06-14 ENCOUNTER — RESULTS FOLLOW-UP (OUTPATIENT)
Dept: CARDIOLOGY CLINIC | Facility: CLINIC | Age: 80
End: 2025-06-14

## 2025-08-12 ENCOUNTER — OFFICE VISIT (OUTPATIENT)
Dept: CARDIOLOGY CLINIC | Facility: CLINIC | Age: 80
End: 2025-08-12
Attending: EMERGENCY MEDICINE
Payer: MEDICARE

## (undated) DEVICE — CHLORHEXIDINE 4PCT 4 OZ

## (undated) DEVICE — GUIDEWIRE STRGHT TIP 0.035 IN  SOLO PLUS

## (undated) DEVICE — CYSTOSCOPY PACK: Brand: CONVERTORS

## (undated) DEVICE — CATH URETERAL 5FR X 70 CM FLEX TIP POLYUR BARD

## (undated) DEVICE — INTRO SHEATH PEEL AWAY 7FR

## (undated) DEVICE — CYSTO TUBING TUR Y IRRIGATION

## (undated) DEVICE — SKIN MARKER DUAL TIP WITH RULER CAP, FLEXIBLE RULER AND LABELS: Brand: DEVON

## (undated) DEVICE — DISPOSABLE OR TOWEL: Brand: CARDINAL HEALTH

## (undated) DEVICE — INVIEW CLEAR LEGGINGS: Brand: CONVERTORS

## (undated) DEVICE — CATH GUIDING FIXED SHAPE 43CM

## (undated) DEVICE — SYRINGE 10ML LL

## (undated) DEVICE — SCD SEQUENTIAL COMPRESSION COMFORT SLEEVE MEDIUM KNEE LENGTH: Brand: KENDALL SCD

## (undated) DEVICE — GLOVE INDICATOR PI UNDERGLOVE SZ 8 BLUE

## (undated) DEVICE — 3M™ TEGADERM™ TRANSPARENT FILM DRESSING FRAME STYLE, 1624W, 2-3/8 IN X 2-3/4 IN (6 CM X 7 CM), 100/CT 4CT/CASE: Brand: 3M™ TEGADERM™

## (undated) DEVICE — GAUZE SPONGES,16 PLY: Brand: CURITY

## (undated) DEVICE — INTRO SHEATH PEEL AWAY 9 FR

## (undated) DEVICE — ASTOUND STANDARD SURGICAL GOWN, XL: Brand: CONVERTORS

## (undated) DEVICE — UROLOGIC DRAIN BAG: Brand: UNBRANDED

## (undated) DEVICE — FIBER LASER HOLMIUM 272MICRON HOL1020F

## (undated) DEVICE — TUBING SUCTION 5MM X 12 FT

## (undated) DEVICE — RADIFOCUS GLIDEWIRE: Brand: GLIDEWIRE

## (undated) DEVICE — SURGICAL GOWN, XL SMARTSLEEVE: Brand: CONVERTORS

## (undated) DEVICE — BASKET SPECIMEN RETRIVAL 1.9FR 120CM

## (undated) DEVICE — SHEATH URETERAL ACCESS 12/14FR 35CM PROXIS

## (undated) DEVICE — GLOVE SRG BIOGEL ECLIPSE 7.5